# Patient Record
Sex: FEMALE | Race: BLACK OR AFRICAN AMERICAN | NOT HISPANIC OR LATINO | URBAN - METROPOLITAN AREA
[De-identification: names, ages, dates, MRNs, and addresses within clinical notes are randomized per-mention and may not be internally consistent; named-entity substitution may affect disease eponyms.]

---

## 2017-05-22 ENCOUNTER — EMERGENCY (EMERGENCY)
Facility: HOSPITAL | Age: 52
LOS: 1 days | Discharge: HOME | End: 2017-05-22
Attending: EMERGENCY MEDICINE | Admitting: EMERGENCY MEDICINE
Payer: MEDICAID

## 2017-05-22 VITALS
TEMPERATURE: 98 F | HEART RATE: 91 BPM | SYSTOLIC BLOOD PRESSURE: 154 MMHG | DIASTOLIC BLOOD PRESSURE: 86 MMHG | OXYGEN SATURATION: 100 % | RESPIRATION RATE: 18 BRPM

## 2017-05-22 VITALS
TEMPERATURE: 98 F | SYSTOLIC BLOOD PRESSURE: 143 MMHG | OXYGEN SATURATION: 100 % | RESPIRATION RATE: 18 BRPM | DIASTOLIC BLOOD PRESSURE: 76 MMHG | HEART RATE: 72 BPM

## 2017-05-22 DIAGNOSIS — Z90.710 ACQUIRED ABSENCE OF BOTH CERVIX AND UTERUS: Chronic | ICD-10-CM

## 2017-05-22 LAB
4/8 RATIO: 1.23 CELLS/UL — LOW (ref 1.69–2.84)
ABS CD8: 1204 CELLS/UL — HIGH (ref 291–576)
ALBUMIN SERPL ELPH-MCNC: 4.2 G/DL — SIGNIFICANT CHANGE UP (ref 3.3–5)
ALP SERPL-CCNC: 125 U/L — HIGH (ref 40–120)
ALT FLD-CCNC: 23 U/L — SIGNIFICANT CHANGE UP (ref 4–33)
AST SERPL-CCNC: 19 U/L — SIGNIFICANT CHANGE UP (ref 4–32)
BASOPHILS # BLD AUTO: 0.05 K/UL — SIGNIFICANT CHANGE UP (ref 0–0.2)
BASOPHILS NFR BLD AUTO: 0.6 % — SIGNIFICANT CHANGE UP (ref 0–2)
BILIRUB SERPL-MCNC: 0.2 MG/DL — SIGNIFICANT CHANGE UP (ref 0.2–1.2)
BUN SERPL-MCNC: 18 MG/DL — SIGNIFICANT CHANGE UP (ref 7–23)
CALCIUM SERPL-MCNC: 9.2 MG/DL — SIGNIFICANT CHANGE UP (ref 8.4–10.5)
CD4 %: 43 % — SIGNIFICANT CHANGE UP (ref 43–52)
CD8 %: 35 % — HIGH (ref 17–28)
CHLORIDE SERPL-SCNC: 108 MMOL/L — HIGH (ref 98–107)
CO2 SERPL-SCNC: 22 MMOL/L — SIGNIFICANT CHANGE UP (ref 22–31)
CREAT SERPL-MCNC: 1.05 MG/DL — SIGNIFICANT CHANGE UP (ref 0.5–1.3)
EOSINOPHIL # BLD AUTO: 0.21 K/UL — SIGNIFICANT CHANGE UP (ref 0–0.5)
EOSINOPHIL NFR BLD AUTO: 2.3 % — SIGNIFICANT CHANGE UP (ref 0–6)
GLUCOSE SERPL-MCNC: 118 MG/DL — HIGH (ref 70–99)
HCT VFR BLD CALC: 38.9 % — SIGNIFICANT CHANGE UP (ref 34.5–45)
HGB BLD-MCNC: 12.6 G/DL — SIGNIFICANT CHANGE UP (ref 11.5–15.5)
IMM GRANULOCYTES NFR BLD AUTO: 0.2 % — SIGNIFICANT CHANGE UP (ref 0–1.5)
LIDOCAIN IGE QN: 32 U/L — SIGNIFICANT CHANGE UP (ref 7–60)
LYMPHOCYTES # BLD AUTO: 3.5 K/UL — HIGH (ref 1–3.3)
LYMPHOCYTES # BLD AUTO: 38.8 % — SIGNIFICANT CHANGE UP (ref 13–44)
MCHC RBC-ENTMCNC: 29.5 PG — SIGNIFICANT CHANGE UP (ref 27–34)
MCHC RBC-ENTMCNC: 32.4 % — SIGNIFICANT CHANGE UP (ref 32–36)
MCV RBC AUTO: 91.1 FL — SIGNIFICANT CHANGE UP (ref 80–100)
MONOCYTES # BLD AUTO: 0.4 K/UL — SIGNIFICANT CHANGE UP (ref 0–0.9)
MONOCYTES NFR BLD AUTO: 4.4 % — SIGNIFICANT CHANGE UP (ref 2–14)
NEUTROPHILS # BLD AUTO: 4.85 K/UL — SIGNIFICANT CHANGE UP (ref 1.8–7.4)
NEUTROPHILS NFR BLD AUTO: 53.7 % — SIGNIFICANT CHANGE UP (ref 43–77)
PLATELET # BLD AUTO: 413 K/UL — HIGH (ref 150–400)
PMV BLD: 8.9 FL — SIGNIFICANT CHANGE UP (ref 7–13)
POTASSIUM SERPL-MCNC: 3.9 MMOL/L — SIGNIFICANT CHANGE UP (ref 3.5–5.3)
POTASSIUM SERPL-SCNC: 3.9 MMOL/L — SIGNIFICANT CHANGE UP (ref 3.5–5.3)
PROT SERPL-MCNC: 7.5 G/DL — SIGNIFICANT CHANGE UP (ref 6–8.3)
RBC # BLD: 4.27 M/UL — SIGNIFICANT CHANGE UP (ref 3.8–5.2)
RBC # FLD: 15.1 % — HIGH (ref 10.3–14.5)
SODIUM SERPL-SCNC: 145 MMOL/L — SIGNIFICANT CHANGE UP (ref 135–145)
T-CELL CD4 SUBSET PNL BLD: 1483 CELL/UL — HIGH (ref 431–1434)
WBC # BLD: 9.03 K/UL — SIGNIFICANT CHANGE UP (ref 3.8–10.5)
WBC # FLD AUTO: 9.03 K/UL — SIGNIFICANT CHANGE UP (ref 3.8–10.5)

## 2017-05-22 PROCEDURE — 93010 ELECTROCARDIOGRAM REPORT: CPT

## 2017-05-22 PROCEDURE — 99285 EMERGENCY DEPT VISIT HI MDM: CPT | Mod: 25

## 2017-05-22 PROCEDURE — 71020: CPT | Mod: 26

## 2017-05-22 RX ORDER — FAMOTIDINE 10 MG/ML
20 INJECTION INTRAVENOUS ONCE
Qty: 0 | Refills: 0 | Status: COMPLETED | OUTPATIENT
Start: 2017-05-22 | End: 2017-05-22

## 2017-05-22 RX ADMIN — FAMOTIDINE 20 MILLIGRAM(S): 10 INJECTION INTRAVENOUS at 20:36

## 2017-05-22 RX ADMIN — Medication 30 MILLILITER(S): at 20:36

## 2017-05-22 NOTE — ED PROVIDER NOTE - OBJECTIVE STATEMENT
50 yo F with history hiv, GERD, htn presenting with epigastric burning worse at night x months. PT states nexium works for her but cannot use nexium because it is not covered by medicaid. Denies fevers, chills, cough, rhinorrhea, otorrhea, otalgia, nausea, vomiting, constipation, diarrhea, chest pain, shortness of breath or changes in urinary habits. 52 yo F with history hiv, GERD, htn presenting with epigastric burning worse at night x months. PT states nexium works for her but cannot use nexium because it is not covered by medicaid. Denies fevers, chills, cough, rhinorrhea, otorrhea, otalgia, nausea, vomiting, constipation, diarrhea, chest pain, shortness of breath or changes in urinary habits.  Pascale att: 51F h/o HIV, gerd, asthma c/o midsternal burning sensation x months. Past 3-4 months midsternal burning radiating down midline abdomen, constant, worse with meals, worse in nighttime, associated burps and nausea. Pain with food ingestion. Denies fever, chills, sob, pleurisy. Patient took nexium in past with relief but insurance recently discontinued. Patient states HIV load undetectable months ago on atripla, per EMR cd4 41 2016. HEART 1 (age) PERC zero SMOKING active FamHx neg cad, neg early mi, neg sudden death. Denies pleurisy, hemoptysis, tachypnea, fever, cough, chills, rhinorrhea, sneezing.

## 2017-05-22 NOTE — ED PROVIDER NOTE - MEDICAL DECISION MAKING DETAILS
52 yo with GERD - pepcid, maalox, labs given hiv history - very low concern esophagitis, no signs of inflammation in the oropharynx

## 2017-05-22 NOTE — ED PROVIDER NOTE - PROGRESS NOTE DETAILS
cxr neg, labs wnl, dc with nexium or ppi, f/up with id, explained signs of esophagitis, return for new or worsening signs.

## 2017-05-22 NOTE — ED PROVIDER NOTE - ATTENDING CONTRIBUTION TO CARE
Dr. Ashraf: I have personally seen and examined this patient at the bedside. I have fully participated in the care of this patient. I have reviewed all pertinent clinical information, including history, physical exam, plan and the Resident's note and agree except as noted. HPI above as by me. PE above as by me. DDX low risk acs, esophagitis versus gerd PLAN cxr cbc cmp lipase cd4 count

## 2017-05-23 LAB
HIV1 RNA # SERPL NAA+PROBE: SIGNIFICANT CHANGE UP ZZ
HIV1 RNA SERPL NAA+PROBE-LOG#: SIGNIFICANT CHANGE UP

## 2017-06-06 ENCOUNTER — EMERGENCY (EMERGENCY)
Facility: HOSPITAL | Age: 52
LOS: 1 days | Discharge: HOME | End: 2017-06-06
Attending: EMERGENCY MEDICINE | Admitting: EMERGENCY MEDICINE
Payer: MEDICAID

## 2017-06-06 VITALS
TEMPERATURE: 98 F | RESPIRATION RATE: 15 BRPM | HEART RATE: 70 BPM | SYSTOLIC BLOOD PRESSURE: 137 MMHG | DIASTOLIC BLOOD PRESSURE: 67 MMHG | OXYGEN SATURATION: 100 % | WEIGHT: 177.91 LBS

## 2017-06-06 VITALS
HEART RATE: 80 BPM | RESPIRATION RATE: 18 BRPM | OXYGEN SATURATION: 100 % | SYSTOLIC BLOOD PRESSURE: 143 MMHG | DIASTOLIC BLOOD PRESSURE: 98 MMHG

## 2017-06-06 DIAGNOSIS — Z90.710 ACQUIRED ABSENCE OF BOTH CERVIX AND UTERUS: Chronic | ICD-10-CM

## 2017-06-06 LAB
ALBUMIN SERPL ELPH-MCNC: 4.2 G/DL — SIGNIFICANT CHANGE UP (ref 3.3–5)
ALP SERPL-CCNC: 145 U/L — HIGH (ref 40–120)
ALT FLD-CCNC: 27 U/L — SIGNIFICANT CHANGE UP (ref 4–33)
APPEARANCE UR: CLEAR — SIGNIFICANT CHANGE UP
AST SERPL-CCNC: 21 U/L — SIGNIFICANT CHANGE UP (ref 4–32)
BACTERIA # UR AUTO: SIGNIFICANT CHANGE UP
BASOPHILS # BLD AUTO: 0.03 K/UL — SIGNIFICANT CHANGE UP (ref 0–0.2)
BASOPHILS NFR BLD AUTO: 0.4 % — SIGNIFICANT CHANGE UP (ref 0–2)
BILIRUB SERPL-MCNC: 0.2 MG/DL — SIGNIFICANT CHANGE UP (ref 0.2–1.2)
BILIRUB UR-MCNC: NEGATIVE — SIGNIFICANT CHANGE UP
BLOOD UR QL VISUAL: NEGATIVE — SIGNIFICANT CHANGE UP
BUN SERPL-MCNC: 16 MG/DL — SIGNIFICANT CHANGE UP (ref 7–23)
CALCIUM SERPL-MCNC: 9.5 MG/DL — SIGNIFICANT CHANGE UP (ref 8.4–10.5)
CHLORIDE SERPL-SCNC: 104 MMOL/L — SIGNIFICANT CHANGE UP (ref 98–107)
CK MB BLD-MCNC: 1.3 — SIGNIFICANT CHANGE UP (ref 0–2.5)
CK MB BLD-MCNC: 1.4 — SIGNIFICANT CHANGE UP (ref 0–2.5)
CK MB BLD-MCNC: 5.81 NG/ML — HIGH (ref 1–4.7)
CK MB BLD-MCNC: 6.2 NG/ML — HIGH (ref 1–4.7)
CK SERPL-CCNC: 407 U/L — HIGH (ref 25–170)
CK SERPL-CCNC: 469 U/L — HIGH (ref 25–170)
CO2 SERPL-SCNC: 22 MMOL/L — SIGNIFICANT CHANGE UP (ref 22–31)
COLOR SPEC: SIGNIFICANT CHANGE UP
CREAT SERPL-MCNC: 0.83 MG/DL — SIGNIFICANT CHANGE UP (ref 0.5–1.3)
EOSINOPHIL # BLD AUTO: 0.2 K/UL — SIGNIFICANT CHANGE UP (ref 0–0.5)
EOSINOPHIL NFR BLD AUTO: 3 % — SIGNIFICANT CHANGE UP (ref 0–6)
GLUCOSE SERPL-MCNC: 115 MG/DL — HIGH (ref 70–99)
GLUCOSE UR-MCNC: NEGATIVE — SIGNIFICANT CHANGE UP
HCT VFR BLD CALC: 41.2 % — SIGNIFICANT CHANGE UP (ref 34.5–45)
HGB BLD-MCNC: 13.2 G/DL — SIGNIFICANT CHANGE UP (ref 11.5–15.5)
IMM GRANULOCYTES NFR BLD AUTO: 0.3 % — SIGNIFICANT CHANGE UP (ref 0–1.5)
KETONES UR-MCNC: NEGATIVE — SIGNIFICANT CHANGE UP
LEUKOCYTE ESTERASE UR-ACNC: NEGATIVE — SIGNIFICANT CHANGE UP
LYMPHOCYTES # BLD AUTO: 2.48 K/UL — SIGNIFICANT CHANGE UP (ref 1–3.3)
LYMPHOCYTES # BLD AUTO: 36.9 % — SIGNIFICANT CHANGE UP (ref 13–44)
MCHC RBC-ENTMCNC: 29.4 PG — SIGNIFICANT CHANGE UP (ref 27–34)
MCHC RBC-ENTMCNC: 32 % — SIGNIFICANT CHANGE UP (ref 32–36)
MCV RBC AUTO: 91.8 FL — SIGNIFICANT CHANGE UP (ref 80–100)
MONOCYTES # BLD AUTO: 0.36 K/UL — SIGNIFICANT CHANGE UP (ref 0–0.9)
MONOCYTES NFR BLD AUTO: 5.4 % — SIGNIFICANT CHANGE UP (ref 2–14)
NEUTROPHILS # BLD AUTO: 3.63 K/UL — SIGNIFICANT CHANGE UP (ref 1.8–7.4)
NEUTROPHILS NFR BLD AUTO: 54 % — SIGNIFICANT CHANGE UP (ref 43–77)
NITRITE UR-MCNC: NEGATIVE — SIGNIFICANT CHANGE UP
PH UR: 7 — SIGNIFICANT CHANGE UP (ref 4.6–8)
PLATELET # BLD AUTO: 406 K/UL — HIGH (ref 150–400)
PMV BLD: 9.3 FL — SIGNIFICANT CHANGE UP (ref 7–13)
POTASSIUM SERPL-MCNC: 3.7 MMOL/L — SIGNIFICANT CHANGE UP (ref 3.5–5.3)
POTASSIUM SERPL-SCNC: 3.7 MMOL/L — SIGNIFICANT CHANGE UP (ref 3.5–5.3)
PROT SERPL-MCNC: 7.5 G/DL — SIGNIFICANT CHANGE UP (ref 6–8.3)
PROT UR-MCNC: 10 — SIGNIFICANT CHANGE UP
RBC # BLD: 4.49 M/UL — SIGNIFICANT CHANGE UP (ref 3.8–5.2)
RBC # FLD: 14.7 % — HIGH (ref 10.3–14.5)
RBC CASTS # UR COMP ASSIST: SIGNIFICANT CHANGE UP (ref 0–?)
SODIUM SERPL-SCNC: 140 MMOL/L — SIGNIFICANT CHANGE UP (ref 135–145)
SP GR SPEC: 1.02 — SIGNIFICANT CHANGE UP (ref 1–1.03)
SQUAMOUS # UR AUTO: SIGNIFICANT CHANGE UP
TROPONIN T SERPL-MCNC: < 0.06 NG/ML — SIGNIFICANT CHANGE UP (ref 0–0.06)
TROPONIN T SERPL-MCNC: < 0.06 NG/ML — SIGNIFICANT CHANGE UP (ref 0–0.06)
UROBILINOGEN FLD QL: NORMAL E.U. — SIGNIFICANT CHANGE UP (ref 0.1–0.2)
WBC # BLD: 6.72 K/UL — SIGNIFICANT CHANGE UP (ref 3.8–10.5)
WBC # FLD AUTO: 6.72 K/UL — SIGNIFICANT CHANGE UP (ref 3.8–10.5)
WBC UR QL: SIGNIFICANT CHANGE UP (ref 0–?)

## 2017-06-06 PROCEDURE — 93010 ELECTROCARDIOGRAM REPORT: CPT

## 2017-06-06 PROCEDURE — 99285 EMERGENCY DEPT VISIT HI MDM: CPT | Mod: 25,GC

## 2017-06-06 PROCEDURE — 71020: CPT | Mod: 26

## 2017-06-06 RX ORDER — FAMOTIDINE 10 MG/ML
20 INJECTION INTRAVENOUS ONCE
Qty: 0 | Refills: 0 | Status: COMPLETED | OUTPATIENT
Start: 2017-06-06 | End: 2017-06-06

## 2017-06-06 RX ORDER — ACETAMINOPHEN 500 MG
650 TABLET ORAL ONCE
Qty: 0 | Refills: 0 | Status: COMPLETED | OUTPATIENT
Start: 2017-06-06 | End: 2017-06-06

## 2017-06-06 RX ADMIN — Medication 650 MILLIGRAM(S): at 17:57

## 2017-06-06 RX ADMIN — Medication 30 MILLILITER(S): at 15:15

## 2017-06-06 RX ADMIN — FAMOTIDINE 20 MILLIGRAM(S): 10 INJECTION INTRAVENOUS at 15:15

## 2017-06-06 NOTE — ED PROVIDER NOTE - PROGRESS NOTE DETAILS
Benjamin Lantigua MD PGY3: Labs, imaging reviewed. Pt improved w/ tylenol, pepcid, maalox. Will discharge with instructions for outpatient cardiology follow-up.

## 2017-06-06 NOTE — ED PROVIDER NOTE - ATTENDING CONTRIBUTION TO CARE
Seen and examined, episodic R CP this a.m., non-exertional, +positional, not pleuritic. States had similar pains in past, prev. CASTANEDA incl. stress test 3 yrs. ago was neg. and pain did not recur until now. No assoc. SOB/N/V/diaphoresis. Pt. NAD at rest. On HAART tx for HIV- Atripla. Clear lungs, mild tender CW, soft, NT abd, NT calves.

## 2017-06-06 NOTE — ED ADULT NURSE NOTE - CHPI ED SYMPTOMS NEG
no vomiting/no pain/no fever/no dizziness/no tingling/no chills/no nausea/no decreased eating/drinking/no numbness/no weakness

## 2017-06-06 NOTE — ED PROVIDER NOTE - CARE PLAN
Principal Discharge DX:	Chest pain Principal Discharge DX:	Chest pain  Instructions for follow-up, activity and diet:	Call your cardiologist today or tomorrow to schedule follow up appointment for within the next 3-5 days.  Continue taking your medications as prescribed.  Return to this Emergency Department if you experience worsening condition or for any other emergency.

## 2017-06-06 NOTE — ED PROVIDER NOTE - OBJECTIVE STATEMENT
51 yo woman w/ h/o htn, hiv, asthma, osteoarthritis p/w chest pain. Developed chest pain this am, locate in R upper chest, w/o radiation, constant, worse w/ sitting up, improved w/ lying flat. Has never had pain like this before, had normal stress test years ago. Also complaining of pain in b/l knees and ankles, which is chronic and thought to be 2/2 osteoarthritis, no improvement w/ acetaminophen 325mg. States for past few days she has had swelling in b/l ankles, worse w/ standing, improved at night when she puts her legs up. States she has had multiple aunts and uncles w/ cardiac disease. Stopped smoking last month. Denies fever, cough, injury, n/v/d, h/o thrombosis, recent surgery/hospitalization. 53 yo woman w/ h/o htn, hiv, asthma, osteoarthritis p/w chest pain. Developed chest pain this am, locate in R upper chest, w/o radiation, constant, worse w/ sitting up, improved w/ lying flat. ? if had pain like this before, had normal stress test years ago. Also complaining of pain in b/l knees and ankles, which is chronic and thought to be 2/2 osteoarthritis, no improvement w/ acetaminophen 325mg. States for past few days she has had swelling in b/l ankles, worse w/ standing, improved at night when she puts her legs up. States she has had multiple aunts and uncles w/ cardiac disease. Stopped smoking last month. Denies fever, cough, injury, n/v/d, h/o thrombosis, recent surgery/hospitalization.

## 2017-06-06 NOTE — ED ADULT NURSE NOTE - OBJECTIVE STATEMENT
53 y/o female presents to ED with intermittent BLE swelling and right sided chest pain.  Pt states that she has been noticing edema to Ascencion ankles at the end of the day, pt states that the swelling 53 y/o female presents to ED with intermittent BLE swelling and right sided chest pain.  Pt states that she has been noticing edema to Ascencion ankles at the end of the day, for the past 3-4 days. pt states that the swelling resolves with elevation and rest. Pt states that this AM she started feeling some right chest discomfort c/w her reflux.  Pt denies SOB, diaphoresis, lightheadedness, no n/v/d, no fever/ chills, no cough

## 2017-06-06 NOTE — ED PROVIDER NOTE - PLAN OF CARE
Call your cardiologist today or tomorrow to schedule follow up appointment for within the next 3-5 days.  Continue taking your medications as prescribed.  Return to this Emergency Department if you experience worsening condition or for any other emergency.

## 2017-06-06 NOTE — ED PROVIDER NOTE - MEDICAL DECISION MAKING DETAILS
53 yo woman w/ chest pain, peripheral edema. Chest pain is likely musculoskeletal given tenderness on exam. History is atypical for acs. Lower extremity edema is likely dependent. Will check labs, cxr, ua. Likely trop x2 and discharge. 51 yo woman w/ chest pain, peripheral edema. Chest pain may be musculoskeletal given tenderness on exam. History is atypical for acs. Lower extremity edema is likely dependent. Will check labs, cxr, ua. Likely trop x2 and discharge.

## 2017-07-12 ENCOUNTER — EMERGENCY (EMERGENCY)
Facility: HOSPITAL | Age: 52
LOS: 1 days | Discharge: HOME | End: 2017-07-12
Admitting: EMERGENCY MEDICINE
Payer: MEDICAID

## 2017-07-12 VITALS
OXYGEN SATURATION: 99 % | DIASTOLIC BLOOD PRESSURE: 97 MMHG | SYSTOLIC BLOOD PRESSURE: 149 MMHG | TEMPERATURE: 98 F | RESPIRATION RATE: 20 BRPM | HEART RATE: 88 BPM

## 2017-07-12 DIAGNOSIS — Z90.710 ACQUIRED ABSENCE OF BOTH CERVIX AND UTERUS: Chronic | ICD-10-CM

## 2017-07-12 PROCEDURE — 99285 EMERGENCY DEPT VISIT HI MDM: CPT

## 2017-07-12 PROCEDURE — 73060 X-RAY EXAM OF HUMERUS: CPT | Mod: 26,RT

## 2017-07-12 PROCEDURE — 93971 EXTREMITY STUDY: CPT | Mod: 26

## 2017-07-12 RX ORDER — FAMOTIDINE 10 MG/ML
1 INJECTION INTRAVENOUS
Qty: 15 | Refills: 0 | OUTPATIENT
Start: 2017-07-12 | End: 2017-07-27

## 2017-07-12 RX ORDER — OXYCODONE AND ACETAMINOPHEN 5; 325 MG/1; MG/1
1 TABLET ORAL ONCE
Qty: 0 | Refills: 0 | Status: DISCONTINUED | OUTPATIENT
Start: 2017-07-12 | End: 2017-07-12

## 2017-07-12 RX ORDER — DIPHENHYDRAMINE HCL 50 MG
1 CAPSULE ORAL
Qty: 40 | Refills: 0 | OUTPATIENT
Start: 2017-07-12 | End: 2017-07-22

## 2017-07-12 RX ORDER — ACETAMINOPHEN 500 MG
1 TABLET ORAL
Qty: 40 | Refills: 0 | OUTPATIENT
Start: 2017-07-12 | End: 2017-07-22

## 2017-07-12 RX ORDER — FAMOTIDINE 10 MG/ML
40 INJECTION INTRAVENOUS ONCE
Qty: 0 | Refills: 0 | Status: COMPLETED | OUTPATIENT
Start: 2017-07-12 | End: 2017-07-12

## 2017-07-12 RX ADMIN — FAMOTIDINE 40 MILLIGRAM(S): 10 INJECTION INTRAVENOUS at 15:42

## 2017-07-12 RX ADMIN — OXYCODONE AND ACETAMINOPHEN 1 TABLET(S): 5; 325 TABLET ORAL at 15:20

## 2017-07-12 RX ADMIN — OXYCODONE AND ACETAMINOPHEN 1 TABLET(S): 5; 325 TABLET ORAL at 14:51

## 2017-07-12 NOTE — ED PROVIDER NOTE - PLAN OF CARE
Take Pepcid 40mg daily for acid reflux symptoms. Take Tylenol 650mg every 4 hours as needed for pain. Follow up with your primary care physician within 1 week for further evaluation. Return to the Emergency Department for any new, worsening or concerning symptoms.

## 2017-07-12 NOTE — ED PROVIDER NOTE - OBJECTIVE STATEMENT
52 year old female with PMHx of HTN, HIV+ pw atraumatic right sided arm pain, decreased right shoulder ROM and right arm ecchymosis since 2 days ago. Pt states that she woke up with arm pain 2 days ago and bruising which has not felt any better after two days. Admits that the right arm was slightly swollen since the pain began. The patient has taken Tyenol 650mg 4 hours ago with no relief. Denies n/v/f/c, CP, SOB, abdominal pain, hx of clots/DVT/PE, OCP use, tobacco use, hx of travel/immobilization, numbness/weakness/tingling in extremities. 52 year old female with PMHx of HTN, HIV+ pw atraumatic right sided arm pain, decreased right shoulder ROM and right arm ecchymosis since 2 days ago. Pt states that she woke up with arm pain 2 days ago and bruising which has not felt any better after two days. Admits that the right arm was slightly swollen since the pain began. The patient has taken Tyenol 650mg 4 hours ago with no relief. Denies n/v/f/c, CP, SOB, abdominal pain, hx of clots/DVT/PE, OCP use, tobacco use, hx of travel/immobilization, numbness/weakness/tingling in extremities, blood thinner use.

## 2017-07-12 NOTE — ED ADULT TRIAGE NOTE - CHIEF COMPLAINT QUOTE
pt coming with right arm pain/and woke up on Monday with ecchymotic spot to right arm denies trauma, pt also stated F/u with Dermatology for other spots in Guevara arm.   Hx. Asthma, acid reflex, HTN

## 2017-07-12 NOTE — ED PROVIDER NOTE - CARE PLAN
Principal Discharge DX:	Arm pain  Instructions for follow-up, activity and diet:	Take Pepcid 40mg daily for acid reflux symptoms. Take Tylenol 650mg every 4 hours as needed for pain. Follow up with your primary care physician within 1 week for further evaluation. Return to the Emergency Department for any new, worsening or concerning symptoms.

## 2017-07-12 NOTE — ED PROVIDER NOTE - MEDICAL DECISION MAKING DETAILS
52 year old female with PMHx of HTN, HIV+ pw atraumatic right sided arm pain, decreased right shoulder ROM and right arm ecchymosis since 2 days ago.  Plan: pain management, xray, doppler right arm

## 2017-07-15 ENCOUNTER — EMERGENCY (EMERGENCY)
Facility: HOSPITAL | Age: 52
LOS: 1 days | Discharge: HOME | End: 2017-07-15
Attending: EMERGENCY MEDICINE | Admitting: EMERGENCY MEDICINE
Payer: MEDICAID

## 2017-07-15 VITALS
OXYGEN SATURATION: 100 % | DIASTOLIC BLOOD PRESSURE: 80 MMHG | SYSTOLIC BLOOD PRESSURE: 128 MMHG | RESPIRATION RATE: 16 BRPM | HEART RATE: 65 BPM

## 2017-07-15 VITALS
RESPIRATION RATE: 18 BRPM | SYSTOLIC BLOOD PRESSURE: 151 MMHG | DIASTOLIC BLOOD PRESSURE: 77 MMHG | OXYGEN SATURATION: 100 % | TEMPERATURE: 97 F | HEART RATE: 84 BPM

## 2017-07-15 DIAGNOSIS — Z90.710 ACQUIRED ABSENCE OF BOTH CERVIX AND UTERUS: Chronic | ICD-10-CM

## 2017-07-15 LAB
ALBUMIN SERPL ELPH-MCNC: 4.1 G/DL — SIGNIFICANT CHANGE UP (ref 3.3–5)
ALP SERPL-CCNC: 167 U/L — HIGH (ref 40–120)
ALT FLD-CCNC: 25 U/L — SIGNIFICANT CHANGE UP (ref 4–33)
AST SERPL-CCNC: 17 U/L — SIGNIFICANT CHANGE UP (ref 4–32)
BASOPHILS # BLD AUTO: 0.05 K/UL — SIGNIFICANT CHANGE UP (ref 0–0.2)
BASOPHILS NFR BLD AUTO: 0.6 % — SIGNIFICANT CHANGE UP (ref 0–2)
BILIRUB SERPL-MCNC: < 0.2 MG/DL — LOW (ref 0.2–1.2)
BUN SERPL-MCNC: 15 MG/DL — SIGNIFICANT CHANGE UP (ref 7–23)
CALCIUM SERPL-MCNC: 9.1 MG/DL — SIGNIFICANT CHANGE UP (ref 8.4–10.5)
CHLORIDE SERPL-SCNC: 105 MMOL/L — SIGNIFICANT CHANGE UP (ref 98–107)
CK MB BLD-MCNC: 3.67 NG/ML — SIGNIFICANT CHANGE UP (ref 1–4.7)
CK SERPL-CCNC: 178 U/L — HIGH (ref 25–170)
CO2 SERPL-SCNC: 22 MMOL/L — SIGNIFICANT CHANGE UP (ref 22–31)
CREAT SERPL-MCNC: 0.75 MG/DL — SIGNIFICANT CHANGE UP (ref 0.5–1.3)
EOSINOPHIL # BLD AUTO: 0.27 K/UL — SIGNIFICANT CHANGE UP (ref 0–0.5)
EOSINOPHIL NFR BLD AUTO: 3 % — SIGNIFICANT CHANGE UP (ref 0–6)
GLUCOSE SERPL-MCNC: 131 MG/DL — HIGH (ref 70–99)
HCT VFR BLD CALC: 39.3 % — SIGNIFICANT CHANGE UP (ref 34.5–45)
HGB BLD-MCNC: 12.8 G/DL — SIGNIFICANT CHANGE UP (ref 11.5–15.5)
IMM GRANULOCYTES # BLD AUTO: 0.03 # — SIGNIFICANT CHANGE UP
IMM GRANULOCYTES NFR BLD AUTO: 0.3 % — SIGNIFICANT CHANGE UP (ref 0–1.5)
LIDOCAIN IGE QN: 24.6 U/L — SIGNIFICANT CHANGE UP (ref 7–60)
LYMPHOCYTES # BLD AUTO: 2.87 K/UL — SIGNIFICANT CHANGE UP (ref 1–3.3)
LYMPHOCYTES # BLD AUTO: 31.7 % — SIGNIFICANT CHANGE UP (ref 13–44)
MCHC RBC-ENTMCNC: 29.6 PG — SIGNIFICANT CHANGE UP (ref 27–34)
MCHC RBC-ENTMCNC: 32.6 % — SIGNIFICANT CHANGE UP (ref 32–36)
MCV RBC AUTO: 91 FL — SIGNIFICANT CHANGE UP (ref 80–100)
MONOCYTES # BLD AUTO: 0.49 K/UL — SIGNIFICANT CHANGE UP (ref 0–0.9)
MONOCYTES NFR BLD AUTO: 5.4 % — SIGNIFICANT CHANGE UP (ref 2–14)
NEUTROPHILS # BLD AUTO: 5.35 K/UL — SIGNIFICANT CHANGE UP (ref 1.8–7.4)
NEUTROPHILS NFR BLD AUTO: 59 % — SIGNIFICANT CHANGE UP (ref 43–77)
NRBC # FLD: 0 — SIGNIFICANT CHANGE UP
PLATELET # BLD AUTO: 437 K/UL — HIGH (ref 150–400)
PMV BLD: 8.7 FL — SIGNIFICANT CHANGE UP (ref 7–13)
POTASSIUM SERPL-MCNC: 3.7 MMOL/L — SIGNIFICANT CHANGE UP (ref 3.5–5.3)
POTASSIUM SERPL-SCNC: 3.7 MMOL/L — SIGNIFICANT CHANGE UP (ref 3.5–5.3)
PROT SERPL-MCNC: 7.6 G/DL — SIGNIFICANT CHANGE UP (ref 6–8.3)
RBC # BLD: 4.32 M/UL — SIGNIFICANT CHANGE UP (ref 3.8–5.2)
RBC # FLD: 14 % — SIGNIFICANT CHANGE UP (ref 10.3–14.5)
SODIUM SERPL-SCNC: 143 MMOL/L — SIGNIFICANT CHANGE UP (ref 135–145)
TROPONIN T SERPL-MCNC: < 0.06 NG/ML — SIGNIFICANT CHANGE UP (ref 0–0.06)
WBC # BLD: 9.06 K/UL — SIGNIFICANT CHANGE UP (ref 3.8–10.5)
WBC # FLD AUTO: 9.06 K/UL — SIGNIFICANT CHANGE UP (ref 3.8–10.5)

## 2017-07-15 PROCEDURE — 99284 EMERGENCY DEPT VISIT MOD MDM: CPT

## 2017-07-15 RX ORDER — SODIUM CHLORIDE 9 MG/ML
1000 INJECTION INTRAMUSCULAR; INTRAVENOUS; SUBCUTANEOUS ONCE
Qty: 0 | Refills: 0 | Status: COMPLETED | OUTPATIENT
Start: 2017-07-15 | End: 2017-07-15

## 2017-07-15 RX ADMIN — SODIUM CHLORIDE 1000 MILLILITER(S): 9 INJECTION INTRAMUSCULAR; INTRAVENOUS; SUBCUTANEOUS at 18:09

## 2017-07-15 RX ADMIN — Medication 30 MILLILITER(S): at 19:35

## 2017-07-15 NOTE — ED ADULT TRIAGE NOTE - CHIEF COMPLAINT QUOTE
pt c/o dizziness and bubble ing feeling in chest. pt went to [facility] to [facility] and came out not  feeling great. states  I have acid reflux but not sure if that's it / denies pain in chest or sob/

## 2017-07-15 NOTE — ED PROVIDER NOTE - OBJECTIVE STATEMENT
53 y/o F pt 51 y/o F pt w pmhx of HIV, HTN, GERD, asthma, presents w c/o intermittent dizziness since yesterday. Pt states she felt lightheaded yesterday which resolved, today she had a heavy breakfast and then she went to sleep, she felt pinching chest pain along with gassiness, went to [facility] to get her BP checked it was 155/97, felt lightheaded while leaving the pharmacy. Pt states she went to an urgent care, was not able to be seen due to her insurance was sent to ED upon pt's request via ambulance. Pt admits to frequent burping and passing gas, her pinching chest pain gets relieved with passing gas. Pt denies f/c, n, 53 y/o F pt w pmhx of HIV, HTN, GERD, asthma, presents w c/o intermittent dizziness since yesterday. Pt states she felt lightheaded yesterday which resolved, today she had a heavy breakfast and then she went to sleep, she felt pinching chest pain along with gassiness and mild nausea, went to [facility] to get her BP checked it was 155/97, felt lightheaded while leaving the pharmacy. Pt states she went to an urgent care, was not able to be seen due to her insurance was sent to ED upon pt's request via ambulance. Pt admits to frequent burping and passing gas, her pinching chest pain gets relieved with passing gas. Pt denies f/c, sob, abdominal pain, diarrhea, constipation, weakness, numbness, tingling.

## 2017-07-15 NOTE — ED PROVIDER NOTE - CARE PLAN
Principal Discharge DX:	GERD (gastroesophageal reflux disease)  Instructions for follow-up, activity and diet:	Rest, drink plenty of fluids.  Advance activity as tolerated.  Continue all previously prescribed medications as directed. Follow up with your primary care physician in 48-72 hours. Follow up with Gastroenterologist for endoscopy consult as previously scheduled - bring copies of your results.  Return to the ER for worsening or persistent symptoms, and/or ANY NEW OR CONCERNING SYMPTOMS. If you have issues obtaining follow up, please call: [phone] to obtain a doctor or specialist who takes your insurance in your area.

## 2017-07-15 NOTE — ED PROVIDER NOTE - PLAN OF CARE
Rest, drink plenty of fluids.  Advance activity as tolerated.  Continue all previously prescribed medications as directed. Follow up with your primary care physician in 48-72 hours. Follow up with Gastroenterologist for endoscopy consult as previously scheduled - bring copies of your results.  Return to the ER for worsening or persistent symptoms, and/or ANY NEW OR CONCERNING SYMPTOMS. If you have issues obtaining follow up, please call: [phone] to obtain a doctor or specialist who takes your insurance in your area.

## 2017-07-15 NOTE — ED PROVIDER NOTE - MEDICAL DECISION MAKING DETAILS
51 y/o pt presents with left sided pinching chest pain, lightheaded, burping and flatulence  CAD, GERD, cbc, cmp, lipase, Jerica, EKG, Maalox, IV ns

## 2017-08-09 ENCOUNTER — EMERGENCY (EMERGENCY)
Facility: HOSPITAL | Age: 52
LOS: 1 days | Discharge: HOME | End: 2017-08-09
Attending: EMERGENCY MEDICINE | Admitting: EMERGENCY MEDICINE
Payer: MEDICAID

## 2017-08-09 VITALS
HEART RATE: 72 BPM | SYSTOLIC BLOOD PRESSURE: 156 MMHG | RESPIRATION RATE: 18 BRPM | DIASTOLIC BLOOD PRESSURE: 97 MMHG | TEMPERATURE: 98 F | OXYGEN SATURATION: 98 %

## 2017-08-09 VITALS
OXYGEN SATURATION: 99 % | SYSTOLIC BLOOD PRESSURE: 145 MMHG | HEART RATE: 78 BPM | RESPIRATION RATE: 18 BRPM | DIASTOLIC BLOOD PRESSURE: 81 MMHG

## 2017-08-09 DIAGNOSIS — Z90.710 ACQUIRED ABSENCE OF BOTH CERVIX AND UTERUS: Chronic | ICD-10-CM

## 2017-08-09 LAB
ALBUMIN SERPL ELPH-MCNC: 4.2 G/DL — SIGNIFICANT CHANGE UP (ref 3.3–5)
ALP SERPL-CCNC: 151 U/L — HIGH (ref 40–120)
ALT FLD-CCNC: 30 U/L — SIGNIFICANT CHANGE UP (ref 4–33)
AST SERPL-CCNC: 26 U/L — SIGNIFICANT CHANGE UP (ref 4–32)
BASOPHILS # BLD AUTO: 0.04 K/UL — SIGNIFICANT CHANGE UP (ref 0–0.2)
BASOPHILS NFR BLD AUTO: 0.6 % — SIGNIFICANT CHANGE UP (ref 0–2)
BILIRUB SERPL-MCNC: 0.3 MG/DL — SIGNIFICANT CHANGE UP (ref 0.2–1.2)
BUN SERPL-MCNC: 13 MG/DL — SIGNIFICANT CHANGE UP (ref 7–23)
CALCIUM SERPL-MCNC: 9 MG/DL — SIGNIFICANT CHANGE UP (ref 8.4–10.5)
CHLORIDE SERPL-SCNC: 98 MMOL/L — SIGNIFICANT CHANGE UP (ref 98–107)
CK MB BLD-MCNC: 5.77 NG/ML — HIGH (ref 1–4.7)
CK SERPL-CCNC: 343 U/L — HIGH (ref 25–170)
CO2 SERPL-SCNC: 23 MMOL/L — SIGNIFICANT CHANGE UP (ref 22–31)
CREAT SERPL-MCNC: 0.71 MG/DL — SIGNIFICANT CHANGE UP (ref 0.5–1.3)
D DIMER BLD IA.RAPID-MCNC: < 150 NG/ML — SIGNIFICANT CHANGE UP
EOSINOPHIL # BLD AUTO: 0.22 K/UL — SIGNIFICANT CHANGE UP (ref 0–0.5)
EOSINOPHIL NFR BLD AUTO: 3.2 % — SIGNIFICANT CHANGE UP (ref 0–6)
GLUCOSE SERPL-MCNC: 99 MG/DL — SIGNIFICANT CHANGE UP (ref 70–99)
HCT VFR BLD CALC: 39.1 % — SIGNIFICANT CHANGE UP (ref 34.5–45)
HGB BLD-MCNC: 12.8 G/DL — SIGNIFICANT CHANGE UP (ref 11.5–15.5)
IMM GRANULOCYTES # BLD AUTO: 0.02 # — SIGNIFICANT CHANGE UP
IMM GRANULOCYTES NFR BLD AUTO: 0.3 % — SIGNIFICANT CHANGE UP (ref 0–1.5)
LYMPHOCYTES # BLD AUTO: 2.42 K/UL — SIGNIFICANT CHANGE UP (ref 1–3.3)
LYMPHOCYTES # BLD AUTO: 35.4 % — SIGNIFICANT CHANGE UP (ref 13–44)
MCHC RBC-ENTMCNC: 28.8 PG — SIGNIFICANT CHANGE UP (ref 27–34)
MCHC RBC-ENTMCNC: 32.7 % — SIGNIFICANT CHANGE UP (ref 32–36)
MCV RBC AUTO: 88.1 FL — SIGNIFICANT CHANGE UP (ref 80–100)
MONOCYTES # BLD AUTO: 0.4 K/UL — SIGNIFICANT CHANGE UP (ref 0–0.9)
MONOCYTES NFR BLD AUTO: 5.9 % — SIGNIFICANT CHANGE UP (ref 2–14)
NEUTROPHILS # BLD AUTO: 3.73 K/UL — SIGNIFICANT CHANGE UP (ref 1.8–7.4)
NEUTROPHILS NFR BLD AUTO: 54.6 % — SIGNIFICANT CHANGE UP (ref 43–77)
NRBC # FLD: 0 — SIGNIFICANT CHANGE UP
PLATELET # BLD AUTO: 408 K/UL — HIGH (ref 150–400)
PMV BLD: 9.2 FL — SIGNIFICANT CHANGE UP (ref 7–13)
POTASSIUM SERPL-MCNC: 4.3 MMOL/L — SIGNIFICANT CHANGE UP (ref 3.5–5.3)
POTASSIUM SERPL-SCNC: 4.3 MMOL/L — SIGNIFICANT CHANGE UP (ref 3.5–5.3)
PROT SERPL-MCNC: 7.7 G/DL — SIGNIFICANT CHANGE UP (ref 6–8.3)
RBC # BLD: 4.44 M/UL — SIGNIFICANT CHANGE UP (ref 3.8–5.2)
RBC # FLD: 13.5 % — SIGNIFICANT CHANGE UP (ref 10.3–14.5)
SODIUM SERPL-SCNC: 136 MMOL/L — SIGNIFICANT CHANGE UP (ref 135–145)
TROPONIN T SERPL-MCNC: < 0.06 NG/ML — SIGNIFICANT CHANGE UP (ref 0–0.06)
WBC # BLD: 6.83 K/UL — SIGNIFICANT CHANGE UP (ref 3.8–10.5)
WBC # FLD AUTO: 6.83 K/UL — SIGNIFICANT CHANGE UP (ref 3.8–10.5)

## 2017-08-09 PROCEDURE — 71020: CPT | Mod: 26

## 2017-08-09 PROCEDURE — 93010 ELECTROCARDIOGRAM REPORT: CPT | Mod: 59

## 2017-08-09 PROCEDURE — 99284 EMERGENCY DEPT VISIT MOD MDM: CPT | Mod: 25

## 2017-08-09 RX ORDER — KETOROLAC TROMETHAMINE 30 MG/ML
15 SYRINGE (ML) INJECTION ONCE
Qty: 0 | Refills: 0 | Status: DISCONTINUED | OUTPATIENT
Start: 2017-08-09 | End: 2017-08-09

## 2017-08-09 RX ORDER — ACETAMINOPHEN 500 MG
650 TABLET ORAL ONCE
Qty: 0 | Refills: 0 | Status: COMPLETED | OUTPATIENT
Start: 2017-08-09 | End: 2017-08-09

## 2017-08-09 RX ORDER — FAMOTIDINE 10 MG/ML
20 INJECTION INTRAVENOUS ONCE
Qty: 0 | Refills: 0 | Status: COMPLETED | OUTPATIENT
Start: 2017-08-09 | End: 2017-08-09

## 2017-08-09 RX ADMIN — FAMOTIDINE 20 MILLIGRAM(S): 10 INJECTION INTRAVENOUS at 12:06

## 2017-08-09 RX ADMIN — Medication 650 MILLIGRAM(S): at 12:06

## 2017-08-09 RX ADMIN — Medication 15 MILLIGRAM(S): at 08:29

## 2017-08-09 RX ADMIN — Medication 30 MILLILITER(S): at 12:06

## 2017-08-09 RX ADMIN — Medication 15 MILLIGRAM(S): at 08:45

## 2017-08-09 NOTE — ED PROVIDER NOTE - PLAN OF CARE
Take Ibuprofen 600mg every 6 hours as needed for pain. Continue taking home medications as prescribed. Follow up with your primary care physician within 1 week for further evaluation. Return to the Emergency Department for any new, worsening or concerning symptoms.

## 2017-08-09 NOTE — ED ADULT NURSE NOTE - CHPI ED SYMPTOMS NEG
no dizziness/no syncope/no cough/no vomiting/no back pain/no shortness of breath/no chills/no diaphoresis/no nausea/no fever

## 2017-08-09 NOTE — ED PROVIDER NOTE - CARE PLAN
Principal Discharge DX:	Chest wall pain  Instructions for follow-up, activity and diet:	Take Ibuprofen 600mg every 6 hours as needed for pain. Continue taking home medications as prescribed. Follow up with your primary care physician within 1 week for further evaluation. Return to the Emergency Department for any new, worsening or concerning symptoms.

## 2017-08-09 NOTE — ED ADULT NURSE REASSESSMENT NOTE - NS ED NURSE REASSESS COMMENT FT1
received report on pt from YIMI Pineda at 0815. pt presents awake a&ox4, denies dizziness or ha. skin warm, dry, appropriate for race. respirations even unlabored. endorses pain beneath left breast, non-radiating. denies sob. abdomen soft nontender nondistended. denies n/v/d denies fever or chills. IVL site clean, dry, intact, patent. D-Dimer drawn and sent to lab. cardiac monitor in place in nsr. pt medicated with toradol 15mg ivp as ordered. safety maintained. will continue to observe.

## 2017-08-09 NOTE — ED ADULT TRIAGE NOTE - CHIEF COMPLAINT QUOTE
Pt arrives crying pt  st" I woke up with left sided pain in chest...very sharp." "I have hx of gerd but this is different."

## 2017-08-09 NOTE — ED PROVIDER NOTE - MEDICAL DECISION MAKING DETAILS
53 year old female with a PMHx of HIV, GERD, Asthma, HTN pw non-radiating left sided pleuritic chest pain that woke her from sleep today at 06:00.  Plan: cbc, cmp, CE, EKG, CXR, pain management

## 2017-08-09 NOTE — ED PROVIDER NOTE - OBJECTIVE STATEMENT
53 year old female with a PMHx of HIV, GERD, Asthma, HTN pw non-radiating left sided pleuritic chest pain that woke her from sleep today at 06:00. Pt was here on 7/15/2017 for dizziness and had a negative cardiac enzymes. Quit smoking 3 months ago. Admits she is feeling gassy. Pt took an asa this am with no relief. Pain is constant, sharp in nature, 8/10 in severity, worse when laying down and deep breathing, alleviated when sitting up. Denies LOC, syncope, lightheadedness, diaphoresis, n/v/f/c, cough, hemoptysis, SOB, abdominal pain, urinary symptoms, hx of clots/PE/DVT, hx of travel/immobilization, calf pain/swelling, pertinent fam hx, rash. 53 year old female with a PMHx of HIV, GERD, Asthma, HTN pw non-radiating left sided pleuritic chest pain that woke her from sleep today at 06:00. Pt was here on 7/15/2017 for dizziness and had negative cardiac enzymes. Quit smoking 3 months ago. Admits she is feeling gassy. Pt took an asa this am with no relief. Pain is constant, sharp in nature, 8/10 in severity, worse when laying down and deep breathing, alleviated when sitting up. Denies LOC, syncope, lightheadedness, diaphoresis, n/v/f/c, cough, hemoptysis, SOB, abdominal pain, urinary symptoms, hx of clots/PE/DVT, hx of travel/immobilization, calf pain/swelling, pertinent fam hx, rash.

## 2017-08-09 NOTE — ED ADULT NURSE NOTE - OBJECTIVE STATEMENT
Pt 52y female pmh OA, HIV, GERD, Hysterectomy, HTN, aaox4 and ambulatory. Pt presents to ED c/o sharp left sided chest pain that woke her from sleep. Pt has GERD states that this pain is different. Increased flatulence past few days. Pt appears comfortable, NAD observed, pain 7/10. IV placed left hand 22G, labs drawn and sent. Pt placed on cardiac monitor, NSR on monitor. Pt denies SOB, dizziness, fever, chills, dysuria, urinary symptoms, n/v/d, abd pain. Will continue to monitor.

## 2017-08-09 NOTE — ED PROVIDER NOTE - ATTENDING CONTRIBUTION TO CARE
51 yo female, c/o pleuritic like chest pain, left sided, like gas, no chest pressure, movement increases discomfort, but walking does not. no radiation to arms jaw or back, denies SOB, PERC negative. /88 P 80 RR 18 Pulse ox 98 percent RA chest clear, movement increase discomfort, cor RR without murmur, abd soft non tender Imp; musculoskeletal chest pain EKG no acute changes, d-dimer negative, Plan NSAIDS abnd discharge to PMD.

## 2017-09-28 ENCOUNTER — EMERGENCY (EMERGENCY)
Facility: HOSPITAL | Age: 52
LOS: 1 days | Discharge: HOME | End: 2017-09-28
Attending: EMERGENCY MEDICINE | Admitting: EMERGENCY MEDICINE
Payer: MEDICAID

## 2017-09-28 VITALS
SYSTOLIC BLOOD PRESSURE: 147 MMHG | RESPIRATION RATE: 16 BRPM | HEART RATE: 85 BPM | TEMPERATURE: 99 F | DIASTOLIC BLOOD PRESSURE: 76 MMHG | OXYGEN SATURATION: 100 %

## 2017-09-28 VITALS
TEMPERATURE: 98 F | RESPIRATION RATE: 18 BRPM | SYSTOLIC BLOOD PRESSURE: 142 MMHG | OXYGEN SATURATION: 97 % | HEART RATE: 78 BPM | DIASTOLIC BLOOD PRESSURE: 95 MMHG

## 2017-09-28 DIAGNOSIS — Z90.710 ACQUIRED ABSENCE OF BOTH CERVIX AND UTERUS: Chronic | ICD-10-CM

## 2017-09-28 LAB
ALBUMIN SERPL ELPH-MCNC: 4.1 G/DL — SIGNIFICANT CHANGE UP (ref 3.3–5)
ALP SERPL-CCNC: 166 U/L — HIGH (ref 40–120)
ALT FLD-CCNC: 58 U/L — HIGH (ref 4–33)
APPEARANCE UR: CLEAR — SIGNIFICANT CHANGE UP
AST SERPL-CCNC: 39 U/L — HIGH (ref 4–32)
BACTERIA # UR AUTO: SIGNIFICANT CHANGE UP
BASE EXCESS BLDV CALC-SCNC: -1.2 MMOL/L — SIGNIFICANT CHANGE UP
BASOPHILS # BLD AUTO: 0.05 K/UL — SIGNIFICANT CHANGE UP (ref 0–0.2)
BASOPHILS NFR BLD AUTO: 0.5 % — SIGNIFICANT CHANGE UP (ref 0–2)
BILIRUB SERPL-MCNC: 0.2 MG/DL — SIGNIFICANT CHANGE UP (ref 0.2–1.2)
BILIRUB UR-MCNC: NEGATIVE — SIGNIFICANT CHANGE UP
BLOOD GAS VENOUS - CREATININE: 0.59 MG/DL — SIGNIFICANT CHANGE UP (ref 0.5–1.3)
BLOOD UR QL VISUAL: NEGATIVE — SIGNIFICANT CHANGE UP
BUN SERPL-MCNC: 12 MG/DL — SIGNIFICANT CHANGE UP (ref 7–23)
CALCIUM SERPL-MCNC: 9.1 MG/DL — SIGNIFICANT CHANGE UP (ref 8.4–10.5)
CHLORIDE BLDV-SCNC: 107 MMOL/L — SIGNIFICANT CHANGE UP (ref 96–108)
CHLORIDE SERPL-SCNC: 102 MMOL/L — SIGNIFICANT CHANGE UP (ref 98–107)
CO2 SERPL-SCNC: 20 MMOL/L — LOW (ref 22–31)
COLOR SPEC: YELLOW — SIGNIFICANT CHANGE UP
CREAT SERPL-MCNC: 0.78 MG/DL — SIGNIFICANT CHANGE UP (ref 0.5–1.3)
EOSINOPHIL # BLD AUTO: 0.22 K/UL — SIGNIFICANT CHANGE UP (ref 0–0.5)
EOSINOPHIL NFR BLD AUTO: 2.4 % — SIGNIFICANT CHANGE UP (ref 0–6)
GAS PNL BLDV: 137 MMOL/L — SIGNIFICANT CHANGE UP (ref 136–146)
GLUCOSE BLDV-MCNC: 121 — HIGH (ref 70–99)
GLUCOSE SERPL-MCNC: 118 MG/DL — HIGH (ref 70–99)
GLUCOSE UR-MCNC: NEGATIVE — SIGNIFICANT CHANGE UP
HCG SERPL-ACNC: < 5 MIU/ML — SIGNIFICANT CHANGE UP
HCO3 BLDV-SCNC: 24 MMOL/L — SIGNIFICANT CHANGE UP (ref 20–27)
HCT VFR BLD CALC: 39.7 % — SIGNIFICANT CHANGE UP (ref 34.5–45)
HCT VFR BLDV CALC: 41.2 % — SIGNIFICANT CHANGE UP (ref 34.5–45)
HGB BLD-MCNC: 12.8 G/DL — SIGNIFICANT CHANGE UP (ref 11.5–15.5)
HGB BLDV-MCNC: 13.4 G/DL — SIGNIFICANT CHANGE UP (ref 11.5–15.5)
IMM GRANULOCYTES # BLD AUTO: 0.02 # — SIGNIFICANT CHANGE UP
IMM GRANULOCYTES NFR BLD AUTO: 0.2 % — SIGNIFICANT CHANGE UP (ref 0–1.5)
KETONES UR-MCNC: NEGATIVE — SIGNIFICANT CHANGE UP
LACTATE BLDV-MCNC: 1.5 MMOL/L — SIGNIFICANT CHANGE UP (ref 0.5–2)
LEUKOCYTE ESTERASE UR-ACNC: NEGATIVE — SIGNIFICANT CHANGE UP
LIDOCAIN IGE QN: 28.4 U/L — SIGNIFICANT CHANGE UP (ref 7–60)
LYMPHOCYTES # BLD AUTO: 2.59 K/UL — SIGNIFICANT CHANGE UP (ref 1–3.3)
LYMPHOCYTES # BLD AUTO: 27.9 % — SIGNIFICANT CHANGE UP (ref 13–44)
MCHC RBC-ENTMCNC: 28.6 PG — SIGNIFICANT CHANGE UP (ref 27–34)
MCHC RBC-ENTMCNC: 32.2 % — SIGNIFICANT CHANGE UP (ref 32–36)
MCV RBC AUTO: 88.8 FL — SIGNIFICANT CHANGE UP (ref 80–100)
MONOCYTES # BLD AUTO: 0.61 K/UL — SIGNIFICANT CHANGE UP (ref 0–0.9)
MONOCYTES NFR BLD AUTO: 6.6 % — SIGNIFICANT CHANGE UP (ref 2–14)
MUCOUS THREADS # UR AUTO: SIGNIFICANT CHANGE UP
NEUTROPHILS # BLD AUTO: 5.8 K/UL — SIGNIFICANT CHANGE UP (ref 1.8–7.4)
NEUTROPHILS NFR BLD AUTO: 62.4 % — SIGNIFICANT CHANGE UP (ref 43–77)
NITRITE UR-MCNC: NEGATIVE — SIGNIFICANT CHANGE UP
NRBC # FLD: 0 — SIGNIFICANT CHANGE UP
PCO2 BLDV: 38 MMHG — LOW (ref 41–51)
PH BLDV: 7.4 PH — SIGNIFICANT CHANGE UP (ref 7.32–7.43)
PH UR: 7 — SIGNIFICANT CHANGE UP (ref 4.6–8)
PLATELET # BLD AUTO: 475 K/UL — HIGH (ref 150–400)
PMV BLD: 8.6 FL — SIGNIFICANT CHANGE UP (ref 7–13)
PO2 BLDV: 65 MMHG — HIGH (ref 35–40)
POTASSIUM BLDV-SCNC: 3.7 MMOL/L — SIGNIFICANT CHANGE UP (ref 3.4–4.5)
POTASSIUM SERPL-MCNC: 4.6 MMOL/L — SIGNIFICANT CHANGE UP (ref 3.5–5.3)
POTASSIUM SERPL-SCNC: 4.6 MMOL/L — SIGNIFICANT CHANGE UP (ref 3.5–5.3)
PROT SERPL-MCNC: 7.8 G/DL — SIGNIFICANT CHANGE UP (ref 6–8.3)
PROT UR-MCNC: 20 — SIGNIFICANT CHANGE UP
RBC # BLD: 4.47 M/UL — SIGNIFICANT CHANGE UP (ref 3.8–5.2)
RBC # FLD: 14 % — SIGNIFICANT CHANGE UP (ref 10.3–14.5)
RBC CASTS # UR COMP ASSIST: SIGNIFICANT CHANGE UP (ref 0–?)
SAO2 % BLDV: 93 % — HIGH (ref 60–85)
SODIUM SERPL-SCNC: 136 MMOL/L — SIGNIFICANT CHANGE UP (ref 135–145)
SP GR SPEC: 1.01 — SIGNIFICANT CHANGE UP (ref 1–1.03)
SQUAMOUS # UR AUTO: SIGNIFICANT CHANGE UP
UROBILINOGEN FLD QL: NORMAL E.U. — SIGNIFICANT CHANGE UP (ref 0.1–0.2)
WBC # BLD: 9.29 K/UL — SIGNIFICANT CHANGE UP (ref 3.8–10.5)
WBC # FLD AUTO: 9.29 K/UL — SIGNIFICANT CHANGE UP (ref 3.8–10.5)
WBC UR QL: SIGNIFICANT CHANGE UP (ref 0–?)

## 2017-09-28 PROCEDURE — 74177 CT ABD & PELVIS W/CONTRAST: CPT | Mod: 26

## 2017-09-28 PROCEDURE — 93010 ELECTROCARDIOGRAM REPORT: CPT

## 2017-09-28 PROCEDURE — 99285 EMERGENCY DEPT VISIT HI MDM: CPT | Mod: 25

## 2017-09-28 PROCEDURE — 71020: CPT | Mod: 26

## 2017-09-28 RX ORDER — FAMOTIDINE 10 MG/ML
20 INJECTION INTRAVENOUS ONCE
Qty: 0 | Refills: 0 | Status: COMPLETED | OUTPATIENT
Start: 2017-09-28 | End: 2017-09-28

## 2017-09-28 RX ORDER — MORPHINE SULFATE 50 MG/1
2 CAPSULE, EXTENDED RELEASE ORAL ONCE
Qty: 0 | Refills: 0 | Status: DISCONTINUED | OUTPATIENT
Start: 2017-09-28 | End: 2017-09-28

## 2017-09-28 RX ORDER — ONDANSETRON 8 MG/1
4 TABLET, FILM COATED ORAL ONCE
Qty: 0 | Refills: 0 | Status: COMPLETED | OUTPATIENT
Start: 2017-09-28 | End: 2017-09-28

## 2017-09-28 RX ORDER — MORPHINE SULFATE 50 MG/1
4 CAPSULE, EXTENDED RELEASE ORAL ONCE
Qty: 0 | Refills: 0 | Status: DISCONTINUED | OUTPATIENT
Start: 2017-09-28 | End: 2017-09-28

## 2017-09-28 RX ADMIN — MORPHINE SULFATE 4 MILLIGRAM(S): 50 CAPSULE, EXTENDED RELEASE ORAL at 14:46

## 2017-09-28 RX ADMIN — ONDANSETRON 4 MILLIGRAM(S): 8 TABLET, FILM COATED ORAL at 14:46

## 2017-09-28 RX ADMIN — MORPHINE SULFATE 4 MILLIGRAM(S): 50 CAPSULE, EXTENDED RELEASE ORAL at 15:16

## 2017-09-28 RX ADMIN — FAMOTIDINE 20 MILLIGRAM(S): 10 INJECTION INTRAVENOUS at 14:46

## 2017-09-28 RX ADMIN — Medication 30 MILLILITER(S): at 18:35

## 2017-09-28 NOTE — ED PROVIDER NOTE - PLAN OF CARE
See your primary care doctor within 48 hours. 1) Follow up with Gastroenterology this week. Take Pepcid 20 mg 30 minutes before meals 2x/day.  Take over the counter Maalox as needed. Stop eating spicy and acidic foods. Eat smaller meals more frequently. 2) See your OBGYN doctor, referral given if needed. 3) Take Tylenol as needed for pain. Avoid heavy lifting. You may see a cardiologist, referral given. Worsening pain, new fever, chills, nausea, vomiting, new chest pain/shortness of breath return to ER.

## 2017-09-28 NOTE — ED PROVIDER NOTE - CARE PLAN
Instructions for follow-up, activity and diet:	See your primary care doctor within 48 hours. 1) Follow up with Gastroenterology this week. Take Pepcid 20 mg 30 minutes before meals 2x/day.  Take over the counter Maalox as needed. Stop eating spicy and acidic foods. Eat smaller meals more frequently. 2) See your OBGYN doctor, referral given if needed. 3) Take Tylenol as needed for pain. Avoid heavy lifting. You may see a cardiologist, referral given. Worsening pain, new fever, chills, nausea, vomiting, new chest pain/shortness of breath return to ER. Principal Discharge DX:	Lower abdominal pain  Instructions for follow-up, activity and diet:	See your primary care doctor within 48 hours. 1) Follow up with Gastroenterology this week. Take Pepcid 20 mg 30 minutes before meals 2x/day.  Take over the counter Maalox as needed. Stop eating spicy and acidic foods. Eat smaller meals more frequently. 2) See your OBGYN doctor, referral given if needed. 3) Take Tylenol as needed for pain. Avoid heavy lifting. You may see a cardiologist, referral given. Worsening pain, new fever, chills, nausea, vomiting, new chest pain/shortness of breath return to ER.

## 2017-09-28 NOTE — ED PROVIDER NOTE - PROGRESS NOTE DETAILS
KRISTYN Rome: received sign out from KRISTYN Munoz to follow up CT.  CT negative.  Pt states she feels better and is requesting to eat.  Pt ambulating without difficulty.  PO challenge and reassess. PA Gerasimou - pt feeling well, abdomen ND, NT, no rebound or guarding. amenable for discharge with GI follow up

## 2017-09-28 NOTE — ED ADULT NURSE NOTE - OBJECTIVE STATEMENT
pt c.o. abd pain since last week. seen by pmd and was thought to have a uti ,. continued pain across lower abdomen as well as rib pain with deep breath. denies fevers, vomiting, diarrhea. slight pain on palpation of lower abdomen. denies vaginal discharge. sl placed labs sent. pt c.o. abd pain since last week. seen by pmd and was thought to have a uti ,. continued pain across lower abdomen as well as rib pain with deep breath. denies fevers, vomiting, diarrhea. slight pain on palpation of lower abdomen. denies vaginal discharge. pt c.o. feeling like she was going to pass out eariler, BIBA. sl placed labs sent.

## 2017-09-28 NOTE — ED PROVIDER NOTE - OBJECTIVE STATEMENT
51 y/o female with pmhx of HIV on Atripla (undetectable viral load), HPV, GERD, asthma, HTN presents to ED for multiple complaints. Pt c/o diffuse lower abdominal pain x 2 weeks. Pt states she has had intermittent lower abd "cramping," worsening, associated with increased gas, nausea. Relief with bowel movements, drinks tea and feels better. Worse with food. LBM this morning, no diarrhea. Endorsing polyuria x 2 weeks, no hematuria or dysuria. States she was given a cream from her pcp for a yeast infection for vaginal itchiness which resolved. Given Bactrim antibiotic, unsure why, has been taking it for 1 week. Pt c/o b/l lower rib pain since lifting weights at physical therapy for her arthritis. Worse with breathing in and movement. No cp or sob, cough, recent URI, recent travel or surgeries, family hx of MI/clots. Pt was recently seen twice in ED last 2 months, dimer, cxr, were negative. No fever, chills, cp, sob, palpitations, abd distention, vaginal discharge or bleeding, pelvic pain, weakness, numbness. 53 y/o female with pmhx of HIV on Atripla (undetectable viral load), HPV, GERD, asthma, HTN presents to ED for multiple complaints. Pt c/o diffuse lower abdominal pain x 2 weeks. Pt states she has had intermittent lower abd "cramping," worsening, associated with increased gas, nausea. Relief with bowel movements, drinks tea and feels better. Worse with food. LBM this morning, no diarrhea. Endorsing polyuria x 2 weeks, no hematuria or dysuria. States she was given a cream from her pcp for a yeast infection for vaginal itchiness which resolved. Given Bactrim antibiotic, unsure why, has been taking it for 1 week. Has not seen an OBGYN in 10-15 years. Pt c/o b/l lower rib pain since lifting weights at physical therapy for her arthritis. Worse with breathing in and movement. No cp or sob, cough, recent URI, recent travel or surgeries, family hx of MI/clots. Pt was recently seen twice in ED last 2 months, dimer, cxr, were negative. No fever, chills, cp, sob, palpitations, abd distention, vaginal discharge or bleeding, pelvic pain, weakness, numbness.

## 2017-09-28 NOTE — ED PROVIDER NOTE - MEDICAL DECISION MAKING DETAILS
51 y/o female with pmhx of HIV on Atripla (undetectable viral load), HPV, GERD, asthma, HTN presents to ED for multiple complaints. 1) rib pain - ekg, cxr 2) labs, u/a, urine cx, CT with contrast

## 2017-09-28 NOTE — ED PROVIDER NOTE - ATTENDING CONTRIBUTION TO CARE
AJM: Patient seen with PA and agree with above note. 53 y/o female with pmhx of HIV on Atripla (undetectable viral load), HPV, GERD, asthma, HTN presents to ED for multiple complaints. Pt c/o diffuse lower abdominal pain x 2 weeks. Pt states she has had intermittent lower abd "cramping," worsening, associated with increased gas, nausea. Relief with bowel movements, drinks tea and feels better. Worse with food. LBM this morning, no diarrhea. Endorsing polyuria x 2 weeks, no hematuria or dysuria.. Benign abd exam. No vaginal complaints. No cvs ttp. VSS. Pt had abd ttp for PA on exam. given vague symptoms with ttp on PA exam will obtain labs, ua, ugc, ct a/p. if unremarkable will dc home. No chest pain, sob, syncope, rashes, travel, trauma

## 2017-09-28 NOTE — ED PROVIDER NOTE - PMH
Asthma    Benign hypertension    GERD (gastroesophageal reflux disease)    H/O abdominal hysterectomy    HIV disease    HPV (human papilloma virus) infection    Osteoarthritis

## 2017-09-29 LAB
BACTERIA UR CULT: SIGNIFICANT CHANGE UP
C TRACH RRNA SPEC QL NAA+PROBE: SIGNIFICANT CHANGE UP
N GONORRHOEA RRNA SPEC QL NAA+PROBE: SIGNIFICANT CHANGE UP
SPECIMEN SOURCE: SIGNIFICANT CHANGE UP
SPECIMEN SOURCE: SIGNIFICANT CHANGE UP

## 2018-04-27 ENCOUNTER — EMERGENCY (EMERGENCY)
Facility: HOSPITAL | Age: 53
LOS: 1 days | Discharge: HOME | End: 2018-04-27
Attending: EMERGENCY MEDICINE | Admitting: EMERGENCY MEDICINE
Payer: MEDICAID

## 2018-04-27 VITALS
RESPIRATION RATE: 16 BRPM | TEMPERATURE: 98 F | HEART RATE: 86 BPM | SYSTOLIC BLOOD PRESSURE: 152 MMHG | DIASTOLIC BLOOD PRESSURE: 85 MMHG | OXYGEN SATURATION: 100 %

## 2018-04-27 DIAGNOSIS — Z90.710 ACQUIRED ABSENCE OF BOTH CERVIX AND UTERUS: Chronic | ICD-10-CM

## 2018-04-27 PROCEDURE — 71046 X-RAY EXAM CHEST 2 VIEWS: CPT | Mod: 26

## 2018-04-27 PROCEDURE — 99285 EMERGENCY DEPT VISIT HI MDM: CPT | Mod: 25

## 2018-04-27 PROCEDURE — 93010 ELECTROCARDIOGRAM REPORT: CPT

## 2018-04-27 RX ORDER — ACETAMINOPHEN 500 MG
650 TABLET ORAL ONCE
Qty: 0 | Refills: 0 | Status: COMPLETED | OUTPATIENT
Start: 2018-04-27 | End: 2018-04-27

## 2018-04-27 RX ORDER — FAMOTIDINE 10 MG/ML
20 INJECTION INTRAVENOUS ONCE
Qty: 0 | Refills: 0 | Status: COMPLETED | OUTPATIENT
Start: 2018-04-27 | End: 2018-04-27

## 2018-04-27 RX ORDER — METOCLOPRAMIDE HCL 10 MG
10 TABLET ORAL ONCE
Qty: 0 | Refills: 0 | Status: COMPLETED | OUTPATIENT
Start: 2018-04-27 | End: 2018-04-27

## 2018-04-27 NOTE — ED ADULT TRIAGE NOTE - CHIEF COMPLAINT QUOTE
Pt c/o burning chest pain and headache x 1 week. Pt describes chest pain as burning after eating and when laying flat. Pt states "I've been taking my zantac but it doesn't work all day" Respirations are even and unlabored. Hx of HTN, GERD, Asthma.

## 2018-04-27 NOTE — ED PROVIDER NOTE - OBJECTIVE STATEMENT
52F h/o HIV on Atripla (undetectable viral load), HPV, GERD, asthma, HTN presenting with chest pain. Notes epigastric burning for past few months ago, intermittent, associated with generalized headache. Denies change in vision, F, SOB, N/V/D. 52F h/o HIV on Atripla (undetectable viral load), HPV, GERD, asthma, HTN presenting with chest pain. Notes epigastric burning for past few months ago, intermittent, associated with generalized headache. No acute change in pain today, she just feels symptoms have lasted too long. Notes her PMD trying famotidine instead of zantec but famotidine has not worked as well, has not been on PPi. Denies change in vision, F, SOB, N/V/D.

## 2018-04-27 NOTE — ED PROVIDER NOTE - ATTENDING CONTRIBUTION TO CARE
52f, pmhx hiv on HAART, htn. p/w chronic epigastric pain into chest, burning in nature. last egd 2 years ago. takes zantac bid with relief but came to ED jerad it's ongoing 52f, pmhx hiv on HAART, htn. p/w chronic epigastric pain into chest, burning in nature. last egd 2 years ago. takes zantac bid with relief but came to ED jerad it's ongoing x 2 months. worse at night and when she lies down. no change in her pain bringing her in today. no f/c, brbpr/melena, n/v, sob, palps or dizziness. exam, vs wnl, nad. hs and lungs normal, abdo benign, legs normal. will get labs. ecg normal. if labs normal, d/c with ppi and GI f/u. given chronicity and typical gerd symptoms w/o weight loss or brbpr, then if w/u neg safe for d/c.

## 2018-04-27 NOTE — ED PROVIDER NOTE - MEDICAL DECISION MAKING DETAILS
52F w/ above history p/w intermittent burning epigastric pain for few months, no acute change today, denies SOB, N/V, dysuria, concerning for symptomatic GERD

## 2018-04-28 LAB
ALBUMIN SERPL ELPH-MCNC: 4.5 G/DL — SIGNIFICANT CHANGE UP (ref 3.3–5)
ALP SERPL-CCNC: 152 U/L — HIGH (ref 40–120)
ALT FLD-CCNC: 30 U/L — SIGNIFICANT CHANGE UP (ref 4–33)
AST SERPL-CCNC: 22 U/L — SIGNIFICANT CHANGE UP (ref 4–32)
BASOPHILS # BLD AUTO: 0.06 K/UL — SIGNIFICANT CHANGE UP (ref 0–0.2)
BASOPHILS NFR BLD AUTO: 0.6 % — SIGNIFICANT CHANGE UP (ref 0–2)
BILIRUB SERPL-MCNC: 0.2 MG/DL — SIGNIFICANT CHANGE UP (ref 0.2–1.2)
BUN SERPL-MCNC: 20 MG/DL — SIGNIFICANT CHANGE UP (ref 7–23)
CALCIUM SERPL-MCNC: 9.8 MG/DL — SIGNIFICANT CHANGE UP (ref 8.4–10.5)
CHLORIDE SERPL-SCNC: 102 MMOL/L — SIGNIFICANT CHANGE UP (ref 98–107)
CO2 SERPL-SCNC: 21 MMOL/L — LOW (ref 22–31)
CREAT SERPL-MCNC: 0.88 MG/DL — SIGNIFICANT CHANGE UP (ref 0.5–1.3)
EOSINOPHIL # BLD AUTO: 0.34 K/UL — SIGNIFICANT CHANGE UP (ref 0–0.5)
EOSINOPHIL NFR BLD AUTO: 3.3 % — SIGNIFICANT CHANGE UP (ref 0–6)
GLUCOSE SERPL-MCNC: 103 MG/DL — HIGH (ref 70–99)
HCT VFR BLD CALC: 40.5 % — SIGNIFICANT CHANGE UP (ref 34.5–45)
HGB BLD-MCNC: 13.4 G/DL — SIGNIFICANT CHANGE UP (ref 11.5–15.5)
IMM GRANULOCYTES # BLD AUTO: 0.03 # — SIGNIFICANT CHANGE UP
IMM GRANULOCYTES NFR BLD AUTO: 0.3 % — SIGNIFICANT CHANGE UP (ref 0–1.5)
LIDOCAIN IGE QN: 33.7 U/L — SIGNIFICANT CHANGE UP (ref 7–60)
LYMPHOCYTES # BLD AUTO: 3.9 K/UL — HIGH (ref 1–3.3)
LYMPHOCYTES # BLD AUTO: 37.6 % — SIGNIFICANT CHANGE UP (ref 13–44)
MCHC RBC-ENTMCNC: 30.1 PG — SIGNIFICANT CHANGE UP (ref 27–34)
MCHC RBC-ENTMCNC: 33.1 % — SIGNIFICANT CHANGE UP (ref 32–36)
MCV RBC AUTO: 91 FL — SIGNIFICANT CHANGE UP (ref 80–100)
MONOCYTES # BLD AUTO: 0.51 K/UL — SIGNIFICANT CHANGE UP (ref 0–0.9)
MONOCYTES NFR BLD AUTO: 4.9 % — SIGNIFICANT CHANGE UP (ref 2–14)
NEUTROPHILS # BLD AUTO: 5.54 K/UL — SIGNIFICANT CHANGE UP (ref 1.8–7.4)
NEUTROPHILS NFR BLD AUTO: 53.3 % — SIGNIFICANT CHANGE UP (ref 43–77)
NRBC # FLD: 0 — SIGNIFICANT CHANGE UP
PLATELET # BLD AUTO: 445 K/UL — HIGH (ref 150–400)
PMV BLD: 8.7 FL — SIGNIFICANT CHANGE UP (ref 7–13)
POTASSIUM SERPL-MCNC: 3.9 MMOL/L — SIGNIFICANT CHANGE UP (ref 3.5–5.3)
POTASSIUM SERPL-SCNC: 3.9 MMOL/L — SIGNIFICANT CHANGE UP (ref 3.5–5.3)
PROT SERPL-MCNC: 7.9 G/DL — SIGNIFICANT CHANGE UP (ref 6–8.3)
RBC # BLD: 4.45 M/UL — SIGNIFICANT CHANGE UP (ref 3.8–5.2)
RBC # FLD: 14.4 % — SIGNIFICANT CHANGE UP (ref 10.3–14.5)
SODIUM SERPL-SCNC: 140 MMOL/L — SIGNIFICANT CHANGE UP (ref 135–145)
TROPONIN T SERPL-MCNC: < 0.06 NG/ML — SIGNIFICANT CHANGE UP (ref 0–0.06)
WBC # BLD: 10.38 K/UL — SIGNIFICANT CHANGE UP (ref 3.8–10.5)
WBC # FLD AUTO: 10.38 K/UL — SIGNIFICANT CHANGE UP (ref 3.8–10.5)

## 2018-04-28 RX ORDER — OMEPRAZOLE 10 MG/1
1 CAPSULE, DELAYED RELEASE ORAL
Qty: 14 | Refills: 0
Start: 2018-04-28 | End: 2018-05-11

## 2018-04-28 RX ADMIN — Medication 650 MILLIGRAM(S): at 00:17

## 2018-04-28 RX ADMIN — FAMOTIDINE 20 MILLIGRAM(S): 10 INJECTION INTRAVENOUS at 00:17

## 2018-04-28 RX ADMIN — Medication 10 MILLIGRAM(S): at 00:19

## 2018-04-28 RX ADMIN — Medication 30 MILLILITER(S): at 00:17

## 2018-04-28 NOTE — ED ADULT NURSE NOTE - OBJECTIVE STATEMENT
Break coverage RN received pt to spot 3 A&Ox4 c/o worsening epigastric pain intermittently for one year reports became unbearable earlier tonight. Denies SOB associated, denies N/V, denies difficulty with BMs or urination. IV placed, labs sent, VS as documented, will endorse to primary RN.

## 2018-06-13 ENCOUNTER — EMERGENCY (EMERGENCY)
Facility: HOSPITAL | Age: 53
LOS: 1 days | Discharge: HOME | End: 2018-06-13
Attending: EMERGENCY MEDICINE | Admitting: EMERGENCY MEDICINE
Payer: MEDICAID

## 2018-06-13 VITALS
DIASTOLIC BLOOD PRESSURE: 82 MMHG | OXYGEN SATURATION: 98 % | SYSTOLIC BLOOD PRESSURE: 148 MMHG | HEART RATE: 88 BPM | TEMPERATURE: 98 F | RESPIRATION RATE: 18 BRPM

## 2018-06-13 DIAGNOSIS — Z90.710 ACQUIRED ABSENCE OF BOTH CERVIX AND UTERUS: Chronic | ICD-10-CM

## 2018-06-13 LAB
ALBUMIN SERPL ELPH-MCNC: 4.1 G/DL — SIGNIFICANT CHANGE UP (ref 3.3–5)
ALP SERPL-CCNC: 140 U/L — HIGH (ref 40–120)
ALT FLD-CCNC: 27 U/L — SIGNIFICANT CHANGE UP (ref 4–33)
AST SERPL-CCNC: 23 U/L — SIGNIFICANT CHANGE UP (ref 4–32)
BASOPHILS # BLD AUTO: 0.04 K/UL — SIGNIFICANT CHANGE UP (ref 0–0.2)
BASOPHILS NFR BLD AUTO: 0.5 % — SIGNIFICANT CHANGE UP (ref 0–2)
BILIRUB SERPL-MCNC: < 0.2 MG/DL — LOW (ref 0.2–1.2)
BUN SERPL-MCNC: 12 MG/DL — SIGNIFICANT CHANGE UP (ref 7–23)
CALCIUM SERPL-MCNC: 9.2 MG/DL — SIGNIFICANT CHANGE UP (ref 8.4–10.5)
CHLORIDE SERPL-SCNC: 102 MMOL/L — SIGNIFICANT CHANGE UP (ref 98–107)
CO2 SERPL-SCNC: 21 MMOL/L — LOW (ref 22–31)
CREAT SERPL-MCNC: 0.88 MG/DL — SIGNIFICANT CHANGE UP (ref 0.5–1.3)
EOSINOPHIL # BLD AUTO: 0.29 K/UL — SIGNIFICANT CHANGE UP (ref 0–0.5)
EOSINOPHIL NFR BLD AUTO: 3.7 % — SIGNIFICANT CHANGE UP (ref 0–6)
GLUCOSE SERPL-MCNC: 112 MG/DL — HIGH (ref 70–99)
HCT VFR BLD CALC: 40.6 % — SIGNIFICANT CHANGE UP (ref 34.5–45)
HGB BLD-MCNC: 13.6 G/DL — SIGNIFICANT CHANGE UP (ref 11.5–15.5)
IMM GRANULOCYTES # BLD AUTO: 0.01 # — SIGNIFICANT CHANGE UP
IMM GRANULOCYTES NFR BLD AUTO: 0.1 % — SIGNIFICANT CHANGE UP (ref 0–1.5)
LYMPHOCYTES # BLD AUTO: 2.93 K/UL — SIGNIFICANT CHANGE UP (ref 1–3.3)
LYMPHOCYTES # BLD AUTO: 37 % — SIGNIFICANT CHANGE UP (ref 13–44)
MCHC RBC-ENTMCNC: 29.6 PG — SIGNIFICANT CHANGE UP (ref 27–34)
MCHC RBC-ENTMCNC: 33.5 % — SIGNIFICANT CHANGE UP (ref 32–36)
MCV RBC AUTO: 88.3 FL — SIGNIFICANT CHANGE UP (ref 80–100)
MONOCYTES # BLD AUTO: 0.55 K/UL — SIGNIFICANT CHANGE UP (ref 0–0.9)
MONOCYTES NFR BLD AUTO: 6.9 % — SIGNIFICANT CHANGE UP (ref 2–14)
NEUTROPHILS # BLD AUTO: 4.1 K/UL — SIGNIFICANT CHANGE UP (ref 1.8–7.4)
NEUTROPHILS NFR BLD AUTO: 51.8 % — SIGNIFICANT CHANGE UP (ref 43–77)
NRBC # FLD: 0 — SIGNIFICANT CHANGE UP
PLATELET # BLD AUTO: 437 K/UL — HIGH (ref 150–400)
PMV BLD: 8.6 FL — SIGNIFICANT CHANGE UP (ref 7–13)
POTASSIUM SERPL-MCNC: 3.9 MMOL/L — SIGNIFICANT CHANGE UP (ref 3.5–5.3)
POTASSIUM SERPL-SCNC: 3.9 MMOL/L — SIGNIFICANT CHANGE UP (ref 3.5–5.3)
PROT SERPL-MCNC: 7.7 G/DL — SIGNIFICANT CHANGE UP (ref 6–8.3)
RBC # BLD: 4.6 M/UL — SIGNIFICANT CHANGE UP (ref 3.8–5.2)
RBC # FLD: 14.3 % — SIGNIFICANT CHANGE UP (ref 10.3–14.5)
SODIUM SERPL-SCNC: 138 MMOL/L — SIGNIFICANT CHANGE UP (ref 135–145)
WBC # BLD: 7.92 K/UL — SIGNIFICANT CHANGE UP (ref 3.8–10.5)
WBC # FLD AUTO: 7.92 K/UL — SIGNIFICANT CHANGE UP (ref 3.8–10.5)

## 2018-06-13 PROCEDURE — 71046 X-RAY EXAM CHEST 2 VIEWS: CPT | Mod: 26

## 2018-06-13 PROCEDURE — 99284 EMERGENCY DEPT VISIT MOD MDM: CPT

## 2018-06-13 RX ORDER — IPRATROPIUM/ALBUTEROL SULFATE 18-103MCG
3 AEROSOL WITH ADAPTER (GRAM) INHALATION
Qty: 0 | Refills: 0 | Status: COMPLETED | OUTPATIENT
Start: 2018-06-13 | End: 2018-06-13

## 2018-06-13 RX ADMIN — Medication 3 MILLILITER(S): at 17:46

## 2018-06-13 RX ADMIN — Medication 3 MILLILITER(S): at 17:59

## 2018-06-13 RX ADMIN — Medication 125 MILLIGRAM(S): at 17:58

## 2018-06-13 RX ADMIN — Medication 3 MILLILITER(S): at 17:58

## 2018-06-13 NOTE — ED PROVIDER NOTE - ATTENDING CONTRIBUTION TO CARE
DR. AGUILERA, ATTENDING MD-  I performed a face to face bedside interview with patient regarding history of present illness, review of symptoms and past medical history. I completed an independent physical exam.  I have discussed patient's plan of care with PA.   Documentation as above in the note.    52 y/o female h/o hiv on haart undetectable viral load, asthma, htn bibems for two days of prod cough and wheezing.  Denies f/c, ha, neck stiffness, abd pain, n/v/d, dysuria, rash.  Afebrile, vs wnl, nad, diffuse end exp wheeze, tachypnea, s1s2 rrr no m/r/g, abd soft non ttp no r/g, no cva tenderness b/l, no leg swelling b/l, no rash.  Asthma exac, eval for uri vs pna.  Obtain cbc, bmp, cxr, DR. AGUILERA, ATTENDING MD-  I performed a face to face bedside interview with patient regarding history of present illness, review of symptoms and past medical history. I completed an independent physical exam.  I have discussed patient's plan of care with PA.   Documentation as above in the note.    54 y/o female h/o hiv on haart undetectable viral load, asthma, htn bibems for two days of prod cough and wheezing.  Denies f/c, ha, neck stiffness, abd pain, n/v/d, dysuria, rash.  Afebrile, vs wnl, nad, diffuse end exp wheeze, tachypnea, s1s2 rrr no m/r/g, abd soft non ttp no r/g, no cva tenderness b/l, no leg swelling b/l, no rash.  Asthma exac, eval for inciting uri vs pna.  Obtain cbc, bmp, cxr, give duonebs, steroid, reassess, may need obs for further care.

## 2018-06-13 NOTE — ED PROVIDER NOTE - MEDICAL DECISION MAKING DETAILS
53 y.o female smoker pmhx of HIV ( on Atripla undetectable viral load), asthma, GERD here with 3 days of productive cough, chills with L side pain in the setting of coughing that she states feels the same way when she had walking pneumonia. Pt with diffuse wheezing on exam- will give nebs, steroids, basic labs, xray chest to r.o pneumonia. ekg was ordered in triage. reassess.

## 2018-06-13 NOTE — ED PROVIDER NOTE - PLAN OF CARE
You were seen today for your asthma exacerbation.  Continue to use albuterol as needed for wheezing.  Take the prescribed steroids until you feel better.  Follow up with your primary care physician in 2-3 days for re-evaluation.  RETURN TO THE EMERGENCY DEPARTMENT IMMEDIATELY IF YOU FEEL SEVERE SHORTNESS OF BREATH OR FOR ANY OTHER CONCERN.

## 2018-06-13 NOTE — ED PROVIDER NOTE - PHYSICAL EXAMINATION
anterior L chest wall TTP anterior L chest wall TTP  tolerating airway and secretions  speaking in full sentences

## 2018-06-13 NOTE — ED ADULT TRIAGE NOTE - CHIEF COMPLAINT QUOTE
Patient brought to ER by EMS from home for cough and flu like symptoms for the past 2 days with yellow sputum. Pt took a couple of Bactrim with no relief. Pt has positive wheeze and left sided chest pain. Pt given an Albuterol treatment with some relief.

## 2018-06-13 NOTE — ED PROVIDER NOTE - OBJECTIVE STATEMENT
53 y.o female smoker pmhx of HIV ( on Atripla undetectable viral load), asthma, GERD here with 3 days of productive cough, chills with L side pain in the setting of coughing that she states feels the same way when she had walking pneumonia. Denies headache, dizziness, sob, n/v/d, abdominal pain, numbness, tingling, weakness, urinary symptoms. Given a nebulizer in the ambulance with some relief.

## 2018-06-13 NOTE — ED PROVIDER NOTE - CARE PLAN
Principal Discharge DX:	Asthma Principal Discharge DX:	Asthma  Assessment and plan of treatment:	You were seen today for your asthma exacerbation.  Continue to use albuterol as needed for wheezing.  Take the prescribed steroids until you feel better.  Follow up with your primary care physician in 2-3 days for re-evaluation.  RETURN TO THE EMERGENCY DEPARTMENT IMMEDIATELY IF YOU FEEL SEVERE SHORTNESS OF BREATH OR FOR ANY OTHER CONCERN.

## 2018-07-01 ENCOUNTER — OUTPATIENT (OUTPATIENT)
Dept: OUTPATIENT SERVICES | Facility: HOSPITAL | Age: 53
LOS: 1 days | End: 2018-07-01
Payer: MEDICAID

## 2018-07-01 DIAGNOSIS — Z90.710 ACQUIRED ABSENCE OF BOTH CERVIX AND UTERUS: Chronic | ICD-10-CM

## 2018-07-01 PROCEDURE — G9001: CPT

## 2018-07-16 NOTE — ED PROVIDER NOTE - NS ED ATTENDING STATEMENT MOD
2018      RE: Olena Gilmore  [address]  [facility] [address]             Date: 2018    PATIENT:  Olena Gilmore  :          2005  NEHEMIAS:          2018    Dear Katt Hoff:    I had the pleasure of seeing your patient, Olena Gilmore, for a follow-up visit in the [facility] on 2018 at the [facility] in [place].  Olena was last seen in this clinic 3 mos ago.  Please see below for my assessment and plan of care.    Intercurrent History:    Olena was accompanied to this appointment by her mom.  As you may recall, Olena is a 13 year old girl with a BMI in the overweight range who has experienced a notable increase in her BMI since about age 10.  Over the past 3 mos since her last appointment here her weigh increased 8 lbs and she grew about 0.2 in.      Olena has been doing ok.  Mom has had a busy summer thus far with lots of driving to activities in [place].  This has resulted in eating that has not been as healthy as mom would have liked.  Fortunately, these activities recently ended.  Snacks at home have improved but dad still buys some unhealthy options at times - primarily for  younger brother who takes Concerta and is under weight.  Olena will be starting with a new therapist in Aug.  Her mood has been irritable.               Current Medications:  Current Outpatient Rx   Medication Sig Dispense Refill     Acetaminophen (CHILDRENS TYLENOL OR) Take by mouth as needed        hydrocortisone 2.5 % ointment Apply topically 2 times daily 20 g 0     Ibuprofen (ADVIL PO) Take by mouth as needed for moderate pain       mupirocin (BACTROBAN) 2 % ointment Apply topically 2 times daily 1 g 0     Pediatric Multivit-Minerals-C (KIDS GUMMY BEAR VITAMINS PO) Take  by mouth.       MELATONIN PO Take 3 mg by mouth At Bedtime         Physical Exam:    Vitals:  B/P: 118/78, P: 84, R: Data Unavailable  "  BP:  Blood pressure percentiles are 79 % systolic and 91 % diastolic based on the 2017 AAP Clinical Practice Guideline. Blood pressure percentile targets: 90: 123/77, 95: 127/81, 95 + 12 mmH/93.  Measured Weights:  Wt Readings from Last 4 Encounters:   18 73.2 kg (161 lb 6.4 oz) (97 %)*   18 72.9 kg (160 lb 11.2 oz) (97 %)*   18 69.5 kg (153 lb 3.5 oz) (96 %)*   18 68.2 kg (150 lb 6 oz) (95 %)*     * Growth percentiles are based on CDC 2-20 Years data.     Height:    Ht Readings from Last 4 Encounters:   18 1.687 m (5' 6.42\") (93 %)*   18 1.651 m (5' 5\") (83 %)*   18 1.669 m (5' 5.71\") (91 %)*   18 1.664 m (5' 5.5\") (90 %)*     * Growth percentiles are based on [facility] 2-20 Years data.     Body Mass Index:  Body mass index is 25.72 kg/(m^2).  Body Mass Index Percentile:  94 %ile based on CDC 2-20 Years BMI-for-age data using vitals from 2018.    Labs:  None today.    Assessment:      Olena is a 13 year old female whose BMI today is at the 95th percentile.  It continues to increase rapidly.  Mom however is feeling overwhelmed with life circumstances and is doing the very best she can to provide healthy food for her family.  My concern is for her risk for metabolic syndrome given her acanthosis nigricans.  Her mood is being addressed appropriately with therapy.    I spent a total of 25 minutes face-to-face with Olena during today s office visit. Over 50% of this time was spent counseling the patient and/or coordinating care regarding obesity. See note for details.     Olena s current problem list reviewed today includes:    Encounter Diagnoses   Name Primary?     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      We are looking forward to seeing Olena for a follow-up visit in 4 " weeks.    Thank you for including me in the care of your patient.  Please do not hesitate to call with questions or concerns.    Sincerely,    Carmen Brock MD MPH  Diplomate, American Board of Obesity Medicine    Director, Pediatric Weight Management Clinic  Department of Pediatrics  [facility] [phone]  [facility] [phone]  [facility] [phone]  [facility] [place] [phone]            CC  Copy to patient  ABE CHAMBERS DANIEL  [address] [facility] [address]        Carmen Brock MD, MD       I have personally seen and examined this patient. I have fully participated in the care of this patient. I have reviewed all pertinent clinical information, including history physical exam, plan and the Resident's note and agree except as noted

## 2018-07-25 ENCOUNTER — EMERGENCY (EMERGENCY)
Facility: HOSPITAL | Age: 53
LOS: 1 days | Discharge: HOME | End: 2018-07-25
Attending: EMERGENCY MEDICINE | Admitting: EMERGENCY MEDICINE
Payer: MEDICAID

## 2018-07-25 VITALS
TEMPERATURE: 98 F | OXYGEN SATURATION: 100 % | RESPIRATION RATE: 18 BRPM | HEART RATE: 74 BPM | DIASTOLIC BLOOD PRESSURE: 84 MMHG | SYSTOLIC BLOOD PRESSURE: 143 MMHG

## 2018-07-25 VITALS
HEART RATE: 86 BPM | OXYGEN SATURATION: 97 % | TEMPERATURE: 98 F | DIASTOLIC BLOOD PRESSURE: 102 MMHG | RESPIRATION RATE: 16 BRPM | SYSTOLIC BLOOD PRESSURE: 148 MMHG

## 2018-07-25 DIAGNOSIS — Z90.710 ACQUIRED ABSENCE OF BOTH CERVIX AND UTERUS: Chronic | ICD-10-CM

## 2018-07-25 PROBLEM — M19.90 UNSPECIFIED OSTEOARTHRITIS, UNSPECIFIED SITE: Chronic | Status: ACTIVE | Noted: 2017-06-06

## 2018-07-25 PROBLEM — B97.7 PAPILLOMAVIRUS AS THE CAUSE OF DISEASES CLASSIFIED ELSEWHERE: Chronic | Status: ACTIVE | Noted: 2017-09-28

## 2018-07-25 PROBLEM — K21.9 GASTRO-ESOPHAGEAL REFLUX DISEASE WITHOUT ESOPHAGITIS: Chronic | Status: ACTIVE | Noted: 2017-09-28

## 2018-07-25 LAB
ALBUMIN SERPL ELPH-MCNC: 4.3 G/DL — SIGNIFICANT CHANGE UP (ref 3.3–5)
ALP SERPL-CCNC: 155 U/L — HIGH (ref 40–120)
ALT FLD-CCNC: 29 U/L — SIGNIFICANT CHANGE UP (ref 4–33)
APTT BLD: 29.2 SEC — SIGNIFICANT CHANGE UP (ref 27.5–37.4)
AST SERPL-CCNC: 25 U/L — SIGNIFICANT CHANGE UP (ref 4–32)
BASOPHILS # BLD AUTO: 0.04 K/UL — SIGNIFICANT CHANGE UP (ref 0–0.2)
BASOPHILS NFR BLD AUTO: 0.5 % — SIGNIFICANT CHANGE UP (ref 0–2)
BILIRUB SERPL-MCNC: 0.3 MG/DL — SIGNIFICANT CHANGE UP (ref 0.2–1.2)
BUN SERPL-MCNC: 12 MG/DL — SIGNIFICANT CHANGE UP (ref 7–23)
CALCIUM SERPL-MCNC: 9 MG/DL — SIGNIFICANT CHANGE UP (ref 8.4–10.5)
CHLORIDE SERPL-SCNC: 101 MMOL/L — SIGNIFICANT CHANGE UP (ref 98–107)
CO2 SERPL-SCNC: 22 MMOL/L — SIGNIFICANT CHANGE UP (ref 22–31)
CREAT SERPL-MCNC: 0.71 MG/DL — SIGNIFICANT CHANGE UP (ref 0.5–1.3)
D DIMER BLD IA.RAPID-MCNC: < 150 NG/ML — SIGNIFICANT CHANGE UP
EOSINOPHIL # BLD AUTO: 0.26 K/UL — SIGNIFICANT CHANGE UP (ref 0–0.5)
EOSINOPHIL NFR BLD AUTO: 3 % — SIGNIFICANT CHANGE UP (ref 0–6)
GLUCOSE SERPL-MCNC: 93 MG/DL — SIGNIFICANT CHANGE UP (ref 70–99)
HCT VFR BLD CALC: 42.2 % — SIGNIFICANT CHANGE UP (ref 34.5–45)
HGB BLD-MCNC: 13.9 G/DL — SIGNIFICANT CHANGE UP (ref 11.5–15.5)
IMM GRANULOCYTES # BLD AUTO: 0.02 # — SIGNIFICANT CHANGE UP
IMM GRANULOCYTES NFR BLD AUTO: 0.2 % — SIGNIFICANT CHANGE UP (ref 0–1.5)
INR BLD: 0.95 — SIGNIFICANT CHANGE UP (ref 0.88–1.17)
LYMPHOCYTES # BLD AUTO: 2.91 K/UL — SIGNIFICANT CHANGE UP (ref 1–3.3)
LYMPHOCYTES # BLD AUTO: 34.1 % — SIGNIFICANT CHANGE UP (ref 13–44)
MCHC RBC-ENTMCNC: 30.2 PG — SIGNIFICANT CHANGE UP (ref 27–34)
MCHC RBC-ENTMCNC: 32.9 % — SIGNIFICANT CHANGE UP (ref 32–36)
MCV RBC AUTO: 91.5 FL — SIGNIFICANT CHANGE UP (ref 80–100)
MONOCYTES # BLD AUTO: 0.53 K/UL — SIGNIFICANT CHANGE UP (ref 0–0.9)
MONOCYTES NFR BLD AUTO: 6.2 % — SIGNIFICANT CHANGE UP (ref 2–14)
NEUTROPHILS # BLD AUTO: 4.78 K/UL — SIGNIFICANT CHANGE UP (ref 1.8–7.4)
NEUTROPHILS NFR BLD AUTO: 56 % — SIGNIFICANT CHANGE UP (ref 43–77)
NRBC # FLD: 0 — SIGNIFICANT CHANGE UP
PLATELET # BLD AUTO: 450 K/UL — HIGH (ref 150–400)
PMV BLD: 8.7 FL — SIGNIFICANT CHANGE UP (ref 7–13)
POTASSIUM SERPL-MCNC: 3.7 MMOL/L — SIGNIFICANT CHANGE UP (ref 3.5–5.3)
POTASSIUM SERPL-SCNC: 3.7 MMOL/L — SIGNIFICANT CHANGE UP (ref 3.5–5.3)
PROT SERPL-MCNC: 8 G/DL — SIGNIFICANT CHANGE UP (ref 6–8.3)
PROTHROM AB SERPL-ACNC: 10.5 SEC — SIGNIFICANT CHANGE UP (ref 9.8–13.1)
RBC # BLD: 4.61 M/UL — SIGNIFICANT CHANGE UP (ref 3.8–5.2)
RBC # FLD: 14.2 % — SIGNIFICANT CHANGE UP (ref 10.3–14.5)
SODIUM SERPL-SCNC: 137 MMOL/L — SIGNIFICANT CHANGE UP (ref 135–145)
TROPONIN T, HIGH SENSITIVITY: 6 NG/L — SIGNIFICANT CHANGE UP (ref ?–14)
TROPONIN T, HIGH SENSITIVITY: < 6 NG/L — SIGNIFICANT CHANGE UP (ref ?–14)
WBC # BLD: 8.54 K/UL — SIGNIFICANT CHANGE UP (ref 3.8–10.5)
WBC # FLD AUTO: 8.54 K/UL — SIGNIFICANT CHANGE UP (ref 3.8–10.5)

## 2018-07-25 PROCEDURE — 99284 EMERGENCY DEPT VISIT MOD MDM: CPT

## 2018-07-25 PROCEDURE — 71046 X-RAY EXAM CHEST 2 VIEWS: CPT | Mod: 26

## 2018-07-25 RX ORDER — FAMOTIDINE 10 MG/ML
1 INJECTION INTRAVENOUS
Qty: 14 | Refills: 0 | OUTPATIENT
Start: 2018-07-25 | End: 2018-07-31

## 2018-07-25 RX ORDER — ACETAMINOPHEN 500 MG
650 TABLET ORAL ONCE
Qty: 0 | Refills: 0 | Status: COMPLETED | OUTPATIENT
Start: 2018-07-25 | End: 2018-07-25

## 2018-07-25 RX ORDER — FAMOTIDINE 10 MG/ML
20 INJECTION INTRAVENOUS ONCE
Qty: 0 | Refills: 0 | Status: COMPLETED | OUTPATIENT
Start: 2018-07-25 | End: 2018-07-25

## 2018-07-25 RX ADMIN — Medication 650 MILLIGRAM(S): at 14:03

## 2018-07-25 RX ADMIN — FAMOTIDINE 20 MILLIGRAM(S): 10 INJECTION INTRAVENOUS at 14:03

## 2018-07-25 NOTE — ED PROVIDER NOTE - PROGRESS NOTE DETAILS
KRISTYN ARTIS:  Pt notes resolution of pain. Pt requesting to go home.  D dimer negative.  Trop 6, <6.  Pt tolerating PO.  Pt to follow up with PMD, gastroenterology and cardiology (referral list provided).

## 2018-07-25 NOTE — ED PROVIDER NOTE - OBJECTIVE STATEMENT
Pt is a 53 F smoker PMHx HTN, HIV (On Atripla, viral load not detectable, CD4 in 2000s), GERD, Asthma, hysterectomy p/w shortness of breath this morning.  Pt states for past three days pt had intermittent left sided chest wall pain which occurs with deep inhalation, twisting upper body and positional change.  Pt states she has had same pain intermittently for past few years for which she had been evaluated at [facility] ED with normal work up.  Pt states today while getting ready, pt developed shortness of breath without any associated diaphoresis, lightheadedness, wheezing.  Pt states symptoms upon exiting home and "getting fresh air."  + Recent long car ride to and from [place] ~ 3 weeks ago.  Pt denies any fevers, chills, nausea, vomiting, calf pain/swelling, h/o dvt/pe in past, hemoptysis, h/o malignancy, recent surgeries, OCP use, illicit drug use, 1st degree family history of heart disease.  Pt also notes right sided neck pain which pt attributes to "sleeping wrong," which worsens with ranging neck.  + Family history dvt in uncle  Pt also notes heartburn after eating and when lying down which pt states is identical to heartburn in the past. Pt is a 53 F smoker PMHx HTN, HIV (On Atripla, viral load not detectable, CD4 in 2000s), GERD, Asthma, hysterectomy p/w shortness of breath this morning.  Pt states for past three days pt had intermittent left sided chest wall pain which occurs with deep inhalation, twisting upper body and positional change.  Pt states she has had same pain intermittently for past few years for which she had been evaluated at [facility] ED with normal work up.  Pt states today while getting ready, pt developed shortness of breath without any associated diaphoresis, lightheadedness, wheezing.  Pt states symptoms upon exiting home and "getting fresh air."  + Recent long car ride to and from [place] ~ 3 weeks ago.  Pt denies any fevers, chills, nausea, vomiting, calf pain/swelling, h/o dvt/pe in past, hemoptysis, h/o malignancy, recent surgeries, OCP use, illicit drug use, 1st degree family history of heart disease.  Pt also notes right sided neck pain which pt attributes to "sleeping wrong," which worsens with ranging neck.  + Family history dvt in uncle  Pt also notes heartburn after eating and when lying down which pt states is identical to heartburn in the past.  Pt denies blurry vision, but states she had spots in her eyes for several months, which is being managed by an ophthalmologist Pt is a 53 F smoker PMHx HTN, HIV (On Atripla, viral load not detectable, CD4 in 2000s), GERD, Asthma, hysterectomy p/w shortness of breath this morning.  Pt states for past three days pt had intermittent left sided chest wall pain which occurs with deep inhalation, twisting upper body and positional change.  Pt states she has had same pain intermittently for past few years for which she had been evaluated at [facility] ED with normal work up.  Pt states today while getting ready, pt developed shortness of breath without any associated diaphoresis, lightheadedness, wheezing.  Pt states symptoms upon exiting home and "getting fresh air."  + Recent long car ride to and from [place] ~ 3 weeks ago.  Pt denies any fevers, chills, nausea, vomiting, calf pain/swelling, h/o dvt/pe in past, hemoptysis, abdominal pain, h/o malignancy, recent surgeries, OCP use, illicit drug use, 1st degree family history of heart disease.  Pt also notes right sided neck pain which pt attributes to "sleeping wrong," which worsens with ranging neck.  + Family history dvt in uncle  Pt also notes heartburn after eating and when lying down which pt states is identical to heartburn in the past.  Pt denies blurry vision, but states she had spots in her eyes for several months, which is being managed by an ophthalmologist

## 2018-07-25 NOTE — ED ADULT TRIAGE NOTE - CHIEF COMPLAINT QUOTE
Pt. c/o right neck, left chest and abdominal pain x 1 week associated with increased SOB and blurry vision. Also c/o acid reflex with nausea. Denies vomiting, diarrhea or fever. PMHx HTN, asthma, GERD and HIV

## 2018-07-25 NOTE — ED ADULT NURSE REASSESSMENT NOTE - NS ED NURSE REASSESS COMMENT FT1
RN Break Coverage: received report on pt. pt presents awake a&ox4, denies dizziness or ha. skin warm, dry, appropriate for race. respirations even, unlabored. denies cp or sob. abdomen soft, nontender nondistended. denies n/v/d. denies fevers or chills. IVL site clean, dry, intact, patent. pt medicated as ordered. cardiac monitor in place in nsr. safety maintained. pending disposition.

## 2018-07-25 NOTE — ED PROVIDER NOTE - MUSCULOSKELETAL NECK EXAM
right sided neck tenderness at superolateral aspect of trapezius; no warmth; no redness; no crepitus; no skin changes; no midline neck tenderness

## 2018-07-25 NOTE — ED PROVIDER NOTE - CARE PLAN
Principal Discharge DX:	Chest wall pain  Assessment and plan of treatment:	Advance activity as tolerated.  Continue all previously prescribed medications as directed unless otherwise instructed.  Take Tylenol 650mg (Two 325 mg pills) every 4-6 hours as needed for pain or fevers. Take Pepcid 20 mg twice a day as needed for indigestion. Follow up with your primary care physician, gastroenterology, cardiology (referral list provided) in 48-72 hours- bring copies of your results.  Return to the ER for worsening or persistent symptoms, including but not limited to worsening/persistent pain, shortness of breath, lightheadedness, and/or ANY NEW OR CONCERNING SYMPTOMS. If you have issues obtaining follow up, please call: [phone] to obtain a doctor or specialist who takes your insurance in your area.  You may call [phone] to make an appointment with the internal medicine clinic.  Secondary Diagnosis:	GERD (gastroesophageal reflux disease)

## 2018-07-25 NOTE — ED PROVIDER NOTE - ATTENDING CONTRIBUTION TO CARE
gurdeep: pmh as noted.   pt notes acute onset shortness of breath for several minutes along with intermittent left chest discomfort. the chest discomfort has been occurring 1-2x/month for years. had a normal stress test 'years' ago.   also: pt had rt ear pain several days prior which resolved, and pt now has discomfort to rt neck.  no other acute complaints. no hx VTE. pt has an uncle who had DVT  in the past.  exam: there is diffuse chest wall tenderness including mckenzie chest wall. otherwise unremarkable including rt neck.   Will obtain labs including ddimer and CXR.

## 2018-07-25 NOTE — ED PROVIDER NOTE - MEDICAL DECISION MAKING DETAILS
Pt is a 53 F smoker PMHx HTN, HIV (On Atripla, viral load not detectable, CD4 in 2000s), GERD, Asthma, hysterectomy p/w shortness of breath this morning -- likely musculoskeletal neck pain and chest wall pain, low risk for PE, no e/o DVT -- labs, d dimer, cxr, pain control, ekg, trop

## 2018-07-25 NOTE — ED PROVIDER NOTE - NONTENDER LOCATION
periumbilical/left costovertebral angle/right upper quadrant/suprapubic/left upper quadrant/left lower quadrant/right lower quadrant/right costovertebral angle/umbilical

## 2018-07-25 NOTE — ED PROVIDER NOTE - PLAN OF CARE
Advance activity as tolerated.  Continue all previously prescribed medications as directed unless otherwise instructed.  Take Tylenol 650mg (Two 325 mg pills) every 4-6 hours as needed for pain or fevers. Take Pepcid 20 mg twice a day as needed for indigestion. Follow up with your primary care physician, gastroenterology, cardiology (referral list provided) in 48-72 hours- bring copies of your results.  Return to the ER for worsening or persistent symptoms, including but not limited to worsening/persistent pain, shortness of breath, lightheadedness, and/or ANY NEW OR CONCERNING SYMPTOMS. If you have issues obtaining follow up, please call: [phone] to obtain a doctor or specialist who takes your insurance in your area.  You may call [phone] to make an appointment with the internal medicine clinic.

## 2018-07-26 DIAGNOSIS — Z71.89 OTHER SPECIFIED COUNSELING: ICD-10-CM

## 2018-08-01 ENCOUNTER — OUTPATIENT (OUTPATIENT)
Dept: OUTPATIENT SERVICES | Facility: HOSPITAL | Age: 53
LOS: 1 days | End: 2018-08-01
Payer: MEDICAID

## 2018-08-01 DIAGNOSIS — Z90.710 ACQUIRED ABSENCE OF BOTH CERVIX AND UTERUS: Chronic | ICD-10-CM

## 2018-08-01 PROCEDURE — G9001: CPT

## 2018-08-27 NOTE — ED ADULT NURSE NOTE - ILLNESS RECENT EXPOSURE
ASPEN AVILEZ 88y [id number]    Patient is a 88y old  Male who presents with a chief complaint of SOB (26 Aug 2018 14:27)      Follow Up/CC:  ID following for bacteremia    Interval History/ROS: better, no acute events, planning for hospice    Allergies    No Known Allergies    Intolerances        ANTIMICROBIALS:  piperacillin/tazobactam IVPB. 3.375 every 8 hours      MEDICATIONS  (STANDING):  dextrose 5%. 1000 milliLiter(s) (50 mL/Hr) IV Continuous <Continuous>  dextrose 50% Injectable 12.5 Gram(s) IV Push once  dextrose 50% Injectable 25 Gram(s) IV Push once  dextrose 50% Injectable 25 Gram(s) IV Push once  insulin lispro (HumaLOG) corrective regimen sliding scale   SubCutaneous three times a day before meals  insulin lispro (HumaLOG) corrective regimen sliding scale   SubCutaneous at bedtime  piperacillin/tazobactam IVPB. 3.375 Gram(s) IV Intermittent every 8 hours    MEDICATIONS  (PRN):  acetaminophen  Suppository 650 milliGRAM(s) Rectal every 6 hours PRN For Temp greater than 38 C (100.4 F)  acetaminophen  Suppository. 650 milliGRAM(s) Rectal every 6 hours PRN Mild Pain (1 - 3)  dextrose 40% Gel 15 Gram(s) Oral once PRN Blood Glucose LESS THAN 70 milliGRAM(s)/deciliter  glucagon  Injectable 1 milliGRAM(s) IntraMuscular once PRN Glucose LESS THAN 70 milligrams/deciliter  HYDROmorphone  Injectable 0.2 milliGRAM(s) IV Push every 3 hours PRN tachypnic / sob  ondansetron Injectable 4 milliGRAM(s) IV Push every 6 hours PRN Nausea        Vital Signs Last 24 Hrs  T(C): 36.6 (27 Aug 2018 13:41), Max: 37.1 (26 Aug 2018 21:14)  T(F): 97.8 (27 Aug 2018 13:41), Max: 98.8 (26 Aug 2018 21:14)  HR: 81 (27 Aug 2018 13:41) (73 - 81)  BP: 125/82 (27 Aug 2018 13:41) (125/82 - 138/64)  BP(mean): --  RR: 18 (27 Aug 2018 13:41) (18 - 18)  SpO2: 98% (27 Aug 2018 13:41) (95% - 100%)    CBC Full  -  ( 27 Aug 2018 06:17 )  WBC Count : 7.63 K/uL  Hemoglobin : 12.1 g/dL  Hematocrit : 37.3 %  Platelet Count - Automated : 179 K/uL  Mean Cell Volume : 86.9 fL  Mean Cell Hemoglobin : 28.2 pg  Mean Cell Hemoglobin Concentration : 32.4 %  Auto Neutrophil # : x  Auto Lymphocyte # : x  Auto Monocyte # : x  Auto Eosinophil # : x  Auto Basophil # : x  Auto Neutrophil % : x  Auto Lymphocyte % : x  Auto Monocyte % : x  Auto Eosinophil % : x  Auto Basophil % : x    08-27    138  |  107  |  8   ----------------------------<  98  3.6   |  21<L>  |  1.36<H>    Ca    8.5      27 Aug 2018 06:17  Phos  2.9     08-27  Mg     1.9     08-27            MICROBIOLOGY:  BLOOD PERIPHERAL  08-26-18 --  --  --      BLOOD PERIPHERAL  08-25-18 --  --  --      BLOOD PERIPHERAL  08-22-18 --  --  Klebsiella pneumoniae      URINE MIDSTREAM  08-20-18 --  --  --      BLOOD PERIPHERAL  08-20-18 --  --  BLOOD CULTURE PCR  Escherichia coli              v            RADIOLOGY None known

## 2018-08-28 ENCOUNTER — EMERGENCY (EMERGENCY)
Facility: HOSPITAL | Age: 53
LOS: 1 days | Discharge: HOME | End: 2018-08-28
Attending: EMERGENCY MEDICINE | Admitting: EMERGENCY MEDICINE
Payer: MEDICAID

## 2018-08-28 VITALS
TEMPERATURE: 98 F | HEART RATE: 81 BPM | SYSTOLIC BLOOD PRESSURE: 158 MMHG | RESPIRATION RATE: 17 BRPM | OXYGEN SATURATION: 100 % | DIASTOLIC BLOOD PRESSURE: 98 MMHG

## 2018-08-28 DIAGNOSIS — Z90.710 ACQUIRED ABSENCE OF BOTH CERVIX AND UTERUS: Chronic | ICD-10-CM

## 2018-08-28 LAB
ALBUMIN SERPL ELPH-MCNC: 4.4 G/DL — SIGNIFICANT CHANGE UP (ref 3.3–5)
ALP SERPL-CCNC: 145 U/L — HIGH (ref 40–120)
ALT FLD-CCNC: 34 U/L — HIGH (ref 4–33)
APPEARANCE UR: CLEAR — SIGNIFICANT CHANGE UP
AST SERPL-CCNC: 21 U/L — SIGNIFICANT CHANGE UP (ref 4–32)
BACTERIA # UR AUTO: SIGNIFICANT CHANGE UP
BASE EXCESS BLDV CALC-SCNC: -1.1 MMOL/L — SIGNIFICANT CHANGE UP
BASOPHILS # BLD AUTO: 0.05 K/UL — SIGNIFICANT CHANGE UP (ref 0–0.2)
BASOPHILS NFR BLD AUTO: 0.9 % — SIGNIFICANT CHANGE UP (ref 0–2)
BILIRUB SERPL-MCNC: 0.3 MG/DL — SIGNIFICANT CHANGE UP (ref 0.2–1.2)
BILIRUB UR-MCNC: NEGATIVE — SIGNIFICANT CHANGE UP
BLOOD GAS VENOUS - CREATININE: 0.68 MG/DL — SIGNIFICANT CHANGE UP (ref 0.5–1.3)
BLOOD UR QL VISUAL: SIGNIFICANT CHANGE UP
BUN SERPL-MCNC: 9 MG/DL — SIGNIFICANT CHANGE UP (ref 7–23)
CALCIUM SERPL-MCNC: 9.3 MG/DL — SIGNIFICANT CHANGE UP (ref 8.4–10.5)
CHLORIDE BLDV-SCNC: 106 MMOL/L — SIGNIFICANT CHANGE UP (ref 96–108)
CHLORIDE SERPL-SCNC: 100 MMOL/L — SIGNIFICANT CHANGE UP (ref 98–107)
CHOLEST SERPL-MCNC: 126 MG/DL — SIGNIFICANT CHANGE UP (ref 120–199)
CK SERPL-CCNC: 326 U/L — HIGH (ref 25–170)
CO2 SERPL-SCNC: 24 MMOL/L — SIGNIFICANT CHANGE UP (ref 22–31)
COLOR SPEC: YELLOW — SIGNIFICANT CHANGE UP
CREAT SERPL-MCNC: 0.71 MG/DL — SIGNIFICANT CHANGE UP (ref 0.5–1.3)
EOSINOPHIL # BLD AUTO: 0.23 K/UL — SIGNIFICANT CHANGE UP (ref 0–0.5)
EOSINOPHIL NFR BLD AUTO: 3.9 % — SIGNIFICANT CHANGE UP (ref 0–6)
EPI CELLS # UR: SIGNIFICANT CHANGE UP
GAS PNL BLDV: 135 MMOL/L — LOW (ref 136–146)
GLUCOSE BLDV-MCNC: 114 — HIGH (ref 70–99)
GLUCOSE SERPL-MCNC: 104 MG/DL — HIGH (ref 70–99)
GLUCOSE UR-MCNC: NEGATIVE — SIGNIFICANT CHANGE UP
HBA1C BLD-MCNC: 5.9 % — HIGH (ref 4–5.6)
HCG UR-SCNC: NEGATIVE — SIGNIFICANT CHANGE UP
HCO3 BLDV-SCNC: 23 MMOL/L — SIGNIFICANT CHANGE UP (ref 20–27)
HCT VFR BLD CALC: 42.9 % — SIGNIFICANT CHANGE UP (ref 34.5–45)
HCT VFR BLDV CALC: 44.7 % — SIGNIFICANT CHANGE UP (ref 34.5–45)
HDLC SERPL-MCNC: 35 MG/DL — LOW (ref 45–65)
HGB BLD-MCNC: 14.1 G/DL — SIGNIFICANT CHANGE UP (ref 11.5–15.5)
HGB BLDV-MCNC: 14.6 G/DL — SIGNIFICANT CHANGE UP (ref 11.5–15.5)
IMM GRANULOCYTES # BLD AUTO: 0.01 # — SIGNIFICANT CHANGE UP
IMM GRANULOCYTES NFR BLD AUTO: 0.2 % — SIGNIFICANT CHANGE UP (ref 0–1.5)
KETONES UR-MCNC: NEGATIVE — SIGNIFICANT CHANGE UP
LACTATE BLDV-MCNC: 1.7 MMOL/L — SIGNIFICANT CHANGE UP (ref 0.5–2)
LEUKOCYTE ESTERASE UR-ACNC: NEGATIVE — SIGNIFICANT CHANGE UP
LIPID PNL WITH DIRECT LDL SERPL: 71 MG/DL — SIGNIFICANT CHANGE UP
LYMPHOCYTES # BLD AUTO: 2.13 K/UL — SIGNIFICANT CHANGE UP (ref 1–3.3)
LYMPHOCYTES # BLD AUTO: 36.5 % — SIGNIFICANT CHANGE UP (ref 13–44)
MCHC RBC-ENTMCNC: 29.9 PG — SIGNIFICANT CHANGE UP (ref 27–34)
MCHC RBC-ENTMCNC: 32.9 % — SIGNIFICANT CHANGE UP (ref 32–36)
MCV RBC AUTO: 90.9 FL — SIGNIFICANT CHANGE UP (ref 80–100)
MONOCYTES # BLD AUTO: 0.32 K/UL — SIGNIFICANT CHANGE UP (ref 0–0.9)
MONOCYTES NFR BLD AUTO: 5.5 % — SIGNIFICANT CHANGE UP (ref 2–14)
NEUTROPHILS # BLD AUTO: 3.1 K/UL — SIGNIFICANT CHANGE UP (ref 1.8–7.4)
NEUTROPHILS NFR BLD AUTO: 53 % — SIGNIFICANT CHANGE UP (ref 43–77)
NITRITE UR-MCNC: NEGATIVE — SIGNIFICANT CHANGE UP
NRBC # FLD: 0 — SIGNIFICANT CHANGE UP
PCO2 BLDV: 41 MMHG — SIGNIFICANT CHANGE UP (ref 41–51)
PH BLDV: 7.38 PH — SIGNIFICANT CHANGE UP (ref 7.32–7.43)
PH UR: 6.5 — SIGNIFICANT CHANGE UP (ref 5–8)
PLATELET # BLD AUTO: 450 K/UL — HIGH (ref 150–400)
PMV BLD: 9 FL — SIGNIFICANT CHANGE UP (ref 7–13)
PO2 BLDV: 115 MMHG — HIGH (ref 35–40)
POTASSIUM BLDV-SCNC: 3.6 MMOL/L — SIGNIFICANT CHANGE UP (ref 3.4–4.5)
POTASSIUM SERPL-MCNC: 3.7 MMOL/L — SIGNIFICANT CHANGE UP (ref 3.5–5.3)
POTASSIUM SERPL-SCNC: 3.7 MMOL/L — SIGNIFICANT CHANGE UP (ref 3.5–5.3)
PROT SERPL-MCNC: 7.6 G/DL — SIGNIFICANT CHANGE UP (ref 6–8.3)
PROT UR-MCNC: NEGATIVE — SIGNIFICANT CHANGE UP
RBC # BLD: 4.72 M/UL — SIGNIFICANT CHANGE UP (ref 3.8–5.2)
RBC # FLD: 13.6 % — SIGNIFICANT CHANGE UP (ref 10.3–14.5)
RBC CASTS # UR COMP ASSIST: SIGNIFICANT CHANGE UP (ref 0–?)
SAO2 % BLDV: 98.1 % — HIGH (ref 60–85)
SODIUM SERPL-SCNC: 138 MMOL/L — SIGNIFICANT CHANGE UP (ref 135–145)
SP GR SPEC: 1.02 — SIGNIFICANT CHANGE UP (ref 1–1.04)
TRIGL SERPL-MCNC: 123 MG/DL — SIGNIFICANT CHANGE UP (ref 10–149)
TROPONIN T, HIGH SENSITIVITY: < 6 NG/L — SIGNIFICANT CHANGE UP (ref ?–14)
TROPONIN T, HIGH SENSITIVITY: < 6 NG/L — SIGNIFICANT CHANGE UP (ref ?–14)
UROBILINOGEN FLD QL: 0.2 — SIGNIFICANT CHANGE UP
WBC # BLD: 5.84 K/UL — SIGNIFICANT CHANGE UP (ref 3.8–10.5)
WBC # FLD AUTO: 5.84 K/UL — SIGNIFICANT CHANGE UP (ref 3.8–10.5)
WBC UR QL: SIGNIFICANT CHANGE UP (ref 0–?)

## 2018-08-28 PROCEDURE — 71045 X-RAY EXAM CHEST 1 VIEW: CPT | Mod: 26

## 2018-08-28 PROCEDURE — 70498 CT ANGIOGRAPHY NECK: CPT | Mod: 26

## 2018-08-28 PROCEDURE — 70496 CT ANGIOGRAPHY HEAD: CPT | Mod: 26

## 2018-08-28 PROCEDURE — 99218: CPT

## 2018-08-28 PROCEDURE — 70450 CT HEAD/BRAIN W/O DYE: CPT | Mod: 26

## 2018-08-28 PROCEDURE — 99284 EMERGENCY DEPT VISIT MOD MDM: CPT | Mod: GC

## 2018-08-28 RX ORDER — ONDANSETRON 8 MG/1
4 TABLET, FILM COATED ORAL EVERY 4 HOURS
Qty: 0 | Refills: 0 | Status: DISCONTINUED | OUTPATIENT
Start: 2018-08-28 | End: 2018-09-01

## 2018-08-28 RX ORDER — MECLIZINE HCL 12.5 MG
25 TABLET ORAL EVERY 6 HOURS
Qty: 0 | Refills: 0 | Status: DISCONTINUED | OUTPATIENT
Start: 2018-08-28 | End: 2018-09-01

## 2018-08-28 RX ORDER — DIPHENHYDRAMINE HCL 50 MG
25 CAPSULE ORAL ONCE
Qty: 0 | Refills: 0 | Status: DISCONTINUED | OUTPATIENT
Start: 2018-08-28 | End: 2018-08-28

## 2018-08-28 RX ORDER — DIPHENHYDRAMINE HCL 50 MG
50 CAPSULE ORAL EVERY 6 HOURS
Qty: 0 | Refills: 0 | Status: DISCONTINUED | OUTPATIENT
Start: 2018-08-28 | End: 2018-09-01

## 2018-08-28 RX ORDER — FAMOTIDINE 10 MG/ML
20 INJECTION INTRAVENOUS ONCE
Qty: 0 | Refills: 0 | Status: COMPLETED | OUTPATIENT
Start: 2018-08-28 | End: 2018-08-28

## 2018-08-28 RX ORDER — ALPRAZOLAM 0.25 MG
0.5 TABLET ORAL ONCE
Qty: 0 | Refills: 0 | Status: DISCONTINUED | OUTPATIENT
Start: 2018-08-28 | End: 2018-08-28

## 2018-08-28 RX ORDER — ONDANSETRON 8 MG/1
4 TABLET, FILM COATED ORAL ONCE
Qty: 0 | Refills: 0 | Status: COMPLETED | OUTPATIENT
Start: 2018-08-28 | End: 2018-08-28

## 2018-08-28 RX ORDER — MECLIZINE HCL 12.5 MG
25 TABLET ORAL ONCE
Qty: 0 | Refills: 0 | Status: COMPLETED | OUTPATIENT
Start: 2018-08-28 | End: 2018-08-28

## 2018-08-28 RX ORDER — DIPHENHYDRAMINE HCL 50 MG
25 CAPSULE ORAL ONCE
Qty: 0 | Refills: 0 | Status: COMPLETED | OUTPATIENT
Start: 2018-08-28 | End: 2018-08-28

## 2018-08-28 RX ORDER — SODIUM CHLORIDE 9 MG/ML
1000 INJECTION, SOLUTION INTRAVENOUS ONCE
Qty: 0 | Refills: 0 | Status: COMPLETED | OUTPATIENT
Start: 2018-08-28 | End: 2018-08-28

## 2018-08-28 RX ADMIN — Medication 25 MILLIGRAM(S): at 12:15

## 2018-08-28 RX ADMIN — Medication 0.5 MILLIGRAM(S): at 17:51

## 2018-08-28 RX ADMIN — Medication 30 MILLILITER(S): at 21:25

## 2018-08-28 RX ADMIN — Medication 25 MILLIGRAM(S): at 09:09

## 2018-08-28 RX ADMIN — ONDANSETRON 4 MILLIGRAM(S): 8 TABLET, FILM COATED ORAL at 09:09

## 2018-08-28 RX ADMIN — SODIUM CHLORIDE 1000 MILLILITER(S): 9 INJECTION, SOLUTION INTRAVENOUS at 09:09

## 2018-08-28 RX ADMIN — SODIUM CHLORIDE 1000 MILLILITER(S): 9 INJECTION, SOLUTION INTRAVENOUS at 10:14

## 2018-08-28 RX ADMIN — FAMOTIDINE 20 MILLIGRAM(S): 10 INJECTION INTRAVENOUS at 11:50

## 2018-08-28 RX ADMIN — Medication 50 MILLIGRAM(S): at 21:25

## 2018-08-28 NOTE — ED ADULT NURSE REASSESSMENT NOTE - NS ED NURSE REASSESS COMMENT FT1
Receive report from Frank GELLER, pt is alert and oriented x 3 c/o headache. and nausea.  Denies dizziness, chills, chest pain, sob.  IV intact.  VS see charting.  Meds given.  NS in progress

## 2018-08-28 NOTE — ED PROVIDER NOTE - OBJECTIVE STATEMENT
Pt is a 53 Y F with hx of HTN, HIV states her viral load undetectable for years still taking her meds, GERD, asthma, who presents stating that she just doesn't feel right. She was triaged as headache but patient states I don't have a headache I just feel like I am light headed and dizzy. She says that her neck feels a little weak but not stiff or sore. She noticed her BP was higher than normal so she took an extra blood pressure medication yesterday, she says she has been feeling off for 3 days or so. She admits to mild photophobia. She says she has a history of meningitis and this feels nothing like that. She denies fever, chills, LOC, SOB. She says she had some mild chest pain yesterday. She admits to feeling nauseous without any vomiting. She said that she decided to come in this morning because she felt like she was going to pass out this morning when she stood up. She has no hx of ACS but was worked up for chest pain last month without any disease identified.

## 2018-08-28 NOTE — ED CDU PROVIDER INITIAL DAY NOTE - MEDICAL DECISION MAKING DETAILS
PT is a 53 y.o female with HTN, GERD, HIV (viral load undetectable) w/o dizziness and elevated BP at home. Sent to CDU for; neuro checks, Labs, MRI/MRA's, tele monitoring and frequent re-evals.

## 2018-08-28 NOTE — ED ADULT NURSE NOTE - OBJECTIVE STATEMENT
53y F AaOx3 c/o of a headache and high blood pressure. pt states that yesterday she was at a bbq and when he left she started having a headache and becoming nausea. PT denies vomiting/diarrhea. pt denies photophobia, pressure behind the eyes, and dizziness. Pt eyes PERRLA. pt noted with some grimacing upon palpation when assessing her abdomen. pt states it is a 6/10 non radiating. pt denies CP/palpitations/SOB. pt labs collected and sent.

## 2018-08-28 NOTE — ED CDU PROVIDER INITIAL DAY NOTE - MUSCULOSKELETAL, MLM
Spine appears normal, range of motion is not limited, no muscle or joint tenderness. No midline tenderness. No crepitus or obvious deformities. No LE swelling.

## 2018-08-28 NOTE — CONSULT NOTE ADULT - ASSESSMENT
52 y/o RH AA woman w/ PMH HIV (well-managed on HAART) and HTN c/o dizziness/imbalance, LKN 11 AM yesterday, a/w nausea and phonophobia, in the setting of possible recent ear infection. Neurologic exam significant only for fatigable horizontal nystagmus to the right with no ataxia, dysdiadochokinesia, or other focal deficits noted. CTH shows no acute pathology.    Impression: Symptoms are vague, however based on description may represent peripheral vertigo (ie from vestibular neuronitis) vs basilar migraine (given a/w phonophobia, nausea, and headache) vs orthostatic hypotension, lower suspicion for central etiology given non-focal neurologic exam.     Recommend:  - C/w symptomatic management as needed (meclizine or valium PRN, IVF)  - Consider checking orthostatic vitals   - Neurology will follow up MRI results if performed (gadolinium not required for MRI brain)  - Upon discharge, patient should follow up in neurology clinic at [facility] [phone] 54 y/o RH AA woman w/ PMH HIV (well-managed on HAART) and HTN c/o dizziness/imbalance, LKN 11 AM yesterday, a/w nausea and phonophobia, in the setting of possible recent ear infection. Neurologic exam significant only for fatigable horizontal nystagmus to the right with no ataxia, dysdiadochokinesia, or other focal deficits noted. CTH shows no acute pathology.    Impression: Symptoms are vague, however based on description may represent peripheral vertigo (ie from vestibular neuronitis) vs basilar migraine (given a/w phonophobia, nausea, and headache) vs orthostatic hypotension, lower suspicion for central etiology given non-focal neurologic exam.     Recommend:  - C/w symptomatic management as needed (meclizine or valium PRN, IVF)  - Consider checking orthostatic vitals   - Neurology will follow up MRI results if performed (gadolinium not required for MRI brain to r/o infarct)  - Upon discharge, patient should follow up in neurology clinic at [facility] [phone]

## 2018-08-28 NOTE — ED PROVIDER NOTE - PHYSICAL EXAMINATION
Neuro: A&Ox4, MS +5/5 in UE and LE BL, gross sensation intact in UE and LE BL, gait UNSTEADY but coordinated, POSITIVE rhomberg, negative pronator drift

## 2018-08-28 NOTE — ED CDU PROVIDER INITIAL DAY NOTE - PROGRESS NOTE DETAILS
Patient feeling well at this time. Patient was to undergo MRI/MRA's, patient pre-medicated with xanax but as per MRI tech patient too claustrophobic to tolerate imaging. Pt requesting study be performed outpatient. We offered to provide patient with medications to encourage MRI/MRA's but patient still refused. Will order CTA's head and neck instead. Pt to be monitored overnight on tele and pending AM labs, Echo. This patient was signed out to me by CDU day Winston Ortiz and CDU attending Dr. Sylvester at 1900 hrs; test results and orders reviewed and plan of care discussed.  CE x 2:  <6 ---> <6, EKGs consistent with last on file in [place] EMR; CXR negative, CT head negative for acute pathology.  Neuro was consulted; MRI/A was ordered but pt refused study and CTA head/neck was subsequently ordered by the day team (results pending at time of this note).  CDU overnight plan:  Cardiac tele monitoring, neuro checks per orders, follow up CTA results, echocardiogram (8/29 daytime), Neuro team reassessment, general monitoring and observation care.  In the interim, pt has been objectively noted to be resting comfortably.  Pt. verbalizes agreement with CDU plan of care; pt stable at present with benign exam; will continue to monitor.

## 2018-08-28 NOTE — ED CDU PROVIDER INITIAL DAY NOTE - NEUROLOGICAL, MLM
Alert and oriented, no focal deficits, no motor or sensory deficits. No slurred speech, no facial droop. Normal 5/5 strength UE/LE b/l.

## 2018-08-28 NOTE — ED CDU PROVIDER INITIAL DAY NOTE - OBJECTIVE STATEMENT
ED HPI: Pt is a 53 Y F with hx of HTN, HIV states her viral load undetectable for years still taking her meds, GERD, asthma, who presents stating that she just doesn't feel right. She was triaged as headache but patient states I don't have a headache I just feel like I am light headed and dizzy. She says that her neck feels a little weak but not stiff or sore. She noticed her BP was higher than normal so she took an extra blood pressure medication yesterday, she says she has been feeling off for 3 days or so. She admits to mild photophobia. She says she has a history of meningitis and this feels nothing like that. She denies fever, chills, LOC, SOB. She says she had some mild chest pain yesterday. She admits to feeling nauseous without any vomiting. She said that she decided to come in this morning because she felt like she was going to pass out this morning when she stood up. She has no hx of ACS but was worked up for chest pain last month without any disease identified      HPI CDU- (KRISTYN Robertson) Patient is a 53 y.o female w/ PMHx of HTN, GERD, HIV (viral load undetectable), asthma who presents to ED c/o feeling dizzy and lightheaded at home. Patient admits she felt like she may pass out, stating "just having been feeling well". Pt also states her BP was elevated at home and she had slight headache. Pt admitted to ED team of having some photophobia, but denied any visual changes or loss of vision to CDU team. Pt treated symptomatically in ED for HA w. some relief. Neuro was consulted by ED team and patient seen/evaluated. Pt sent to CDU for; neuro checks, MRI/MRA's brain and neck, vitals q4 and frequent re-evals.  Denies cp, sob, n/v/d, abdominal pain, numbness or tingling, weakness, neck pain, back pain, recent trauma or falls, use of blood thinners, pleuritic pain, slurred speech, room spinning, fevers, chills, recent travel or any other complaints. HPI above was reviewed.

## 2018-08-28 NOTE — ED PROVIDER NOTE - ATTENDING CONTRIBUTION TO CARE
I performed a face to face bedside interview with patient regarding history of present illness, review of symptoms and past medical history. I completed an independent physical exam.  I have discussed patient's plan of care.   I agree with note as stated above, having amended the EMR as needed to reflect my findings. I have discussed the assessment and plan of care.  This includes during the time I functioned as the attending physician for this patient.  Attending Contribution to Care: agree with plan of resident. HTN, HIV states her viral load undetectable for years still taking her meds, GERD, asthma, who presents stating that she just doesn't feel right. She was triaged as headache but patient states I don't have a headache I just feel like I am light headed and dizzy. denies any localization of symptoms. feels sig improvement by personal assessment with no undetectable viral load, normal cd4, pt with no signs of meningismus, ams, cp , infectous  process. will obs for arrythmia. tele monitoring.

## 2018-08-28 NOTE — CONSULT NOTE ADULT - SUBJECTIVE AND OBJECTIVE BOX
Neurology Consult Note    Name  CHRISTOPHER VERDUZCO    HPI:  52 y/o RH AA woman w/ PMH HIV (well-managed on HAART) and HTN c/o dizziness, LKN 11 AM yesterday. Pt woke up on  in usual state of health, ate breakfast, and went to sleep again at 11 AM. When she awoke she felt dizzy, which she describes as imbalance and trouble ambulating. She denies room spinning sensation. This was associated with a holocephalic headache, phonophobia, and nausea. No vomiting or photophobia. No history of migraines. Was prescribed a new antihypertensive med last week which she took once, also made her feel imbalanced, but has not taken it since. Had pain in her L ear last week, was prescribed drops. Denies tinnitus. No longer has a headache and symptoms have improved after IV fluids and meclizine. Denies visual changes, focal numbness or weakness, or speech changes. Denies fever or chills.     Review of Systems:  CONSTITUTIONAL:  As above  HEENT:  Eyes:  No visual loss, blurred vision, double vision or yellow sclerae. Ears, Nose, Throat:  No hearing loss, sneezing, congestion, runny nose or sore throat.  SKIN:  No rash or itching.  CARDIOVASCULAR:  No chest pain, chest pressure or chest discomfort. No palpitations or edema.  RESPIRATORY:  No shortness of breath, cough or sputum.  GASTROINTESTINAL:  No anorexia, nausea, vomiting or diarrhea. No abdominal pain or blood.  GENITOURINARY: No Burning on urination.   NEUROLOGICAL:  see HPI  MUSCULOSKELETAL:  No muscle, back pain, joint pain or stiffness.  HEMATOLOGIC:  No anemia, bleeding or bruising.  LYMPHATICS:  No enlarged nodes. No history of splenectomy.  PSYCHIATRIC:  No history of depression or anxiety.  ENDOCRINOLOGIC:  No reports of sweating, cold or heat intolerance. No polyuria or polydipsia.  ALLERGIES:  No history of asthma, hives, eczema or rhinitis.    MEDICATIONS  (STANDING):  · 	famotidine 20 mg oral tablet: 1 tab(s) orally 2 times a day, As Needed - for indigestion  · 	predniSONE 20 mg oral tablet: 2 tab(s) orally once a day   · 	omeprazole 20 mg oral delayed release capsule: 1 cap(s) orally once a day   · 	Tylenol 325 mg oral tablet: 1 tab(s) orally 4 times a day  · 	Benadryl 25 mg oral capsule: 1 cap(s) orally every 6 hours  · 	Pepcid 40 mg oral tablet: 1 tab(s) orally once a day  · 	ondansetron 4 mg oral tablet: 1 tab(s) orally 3 times a day, As needed, Nausea and/or Vomiting  · 	Atripla 600 mg-200 mg-300 mg oral tablet: 1 tab(s) orally once a day (at bedtime)  · 	amlodipine 10 mg oral tablet: 1 tab(s) orally once a day  · 	albuterol CFC free 90 mcg/inh inhalation aerosol:  inhaled   · 	Geritol oral tablet: 1 tab(s) orally once a day    Allergies  No Known Allergies    PAST MEDICAL & SURGICAL HISTORY:  HPV (human papilloma virus) infection  GERD (gastroesophageal reflux disease)  Osteoarthritis  HIV disease  Asthma  H/O abdominal hysterectomy  Benign hypertension  S/P hysterectomy  Benign hypertension    FH: Mother  of MI/stroke    SH: Current cigarette smoker, denies EtOH or drug use    Objective:   Vital Signs Last 24 Hrs  T(C): 36.7 (28 Aug 2018 08:25), Max: 36.7 (28 Aug 2018 06:32)  T(F): 98 (28 Aug 2018 08:25), Max: 98.1 (28 Aug 2018 06:32)  HR: 68 (28 Aug 2018 08:25) (68 - 81)  BP: 144/81 (28 Aug 2018 08:25) (144/81 - 158/98)  RR: 18 (28 Aug 2018 08:25) (17 - 18)  SpO2: 100% (28 Aug 2018 08:25) (100% - 100%)    General Exam:   General appearance: No acute distress                   Neurological Exam:  Mental Status: AAOx3, speech fluent, follows commands    Cranial Nerves: PERRL, EOMI, VFF, few beats of horizontal nystagmus on R lateral gaze, fatigable. CN V1-3 intact to light touch.  No facial asymmetry.  Hearing intact to finger rub bilaterally.  Tongue, uvula and palate midline.  Sternocleidomastoid and Trapezius intact bilaterally.    Motor:   Tone: normal.                  Strength:     [] Upper extremity                      Delt       Bicep    Tricep                                                  R         5/5        5/5        5/5       5/5                                               L          5/5        5/5        5/5       5/5  [] Lower extremity                       HF          KE          KF        DF         PF                                               R        5/5        5/5        5/5       5/5       5/5                                               L         5/5        5/5       5/5       5/5        5/5  Pronator drift: none                 Tremor: No resting, postural or action tremor.  No myoclonus.    Sensation: intact to light touch x 4 extremities. No extinction on DSS.    Coord: No ataxia or dysmetria on FTN or HTS b/l. No dysdiadochokinesia.     Gait: Normal      Other:                        14.1   5.84  )-----------( 450      ( 28 Aug 2018 07:40 )             42.9         138  |  100  |  9   ----------------------------<  104<H>  3.7   |  24  |  0.71    Ca    9.3      28 Aug 2018 07:40    TPro  7.6  /  Alb  4.4  /  TBili  0.3  /  DBili  x   /  AST  21  /  ALT  34<H>  /  AlkPhos  145<H>      LIVER FUNCTIONS - ( 28 Aug 2018 07:40 )  Alb: 4.4 g/dL / Pro: 7.6 g/dL / ALK PHOS: 145 u/L / ALT: 34 u/L / AST: 21 u/L / GGT: x             Radiology    < from: CT Head No Cont (18 @ 09:26) >    FINDINGS:   There is no CT evidence of acute intracranial hemorrhage, extra-axial   collection, mass effect, midline shift, or hydrocephalus.     There is mild cerebral volume loss. There is minimal patchy white matter   hypoattenuation which is nonspecific in etiology comment most likely   reflecting chronic microvascular ischemic changes given the patient's age.    The visualized paranasal sinuses are clear. The mastoid air cells and   middle ear cavities are clear.    The soft tissues of the scalp are unremarkable. The calvarium is intact.    Nonspecific enlargement of the adenoids of nasopharynx is noted, however   this appears decreased compared to the 2016 examination, favoring   adenoidal hypertrophy. Correlate with direct visualization findings if   clinically warranted.    IMPRESSION:     No CT evidence of acute intracranial hemorrhage, brain edema, or mass   effect.  If the patient has persistent headaches, consider brain MRI   imaging follow-up.    < end of copied text >

## 2018-08-28 NOTE — ED ADULT NURSE NOTE - NSIMPLEMENTINTERV_GEN_ALL_ED
Implemented All Universal Safety Interventions:  [place] to call system. Call bell, personal items and telephone within reach. Instruct patient to call for assistance. Room bathroom lighting operational. Non-slip footwear when patient is off stretcher. Physically safe environment: no spills, clutter or unnecessary equipment. Stretcher in lowest position, wheels locked, appropriate side rails in place.

## 2018-08-28 NOTE — ED CDU PROVIDER INITIAL DAY NOTE - DETAILS
Pt is a 53 y.o female w/ PMH of HIV (viral load undetectable), GERD, asthma , HTN who presents to ED c/o feeling lightheaded a/w headache and dizziness and elevated BP at home. Pt transferred to CDU for; MRI/MRA's, neuro checks, labs, echo, stress test and tele monitoring. Neuro following.

## 2018-08-28 NOTE — CONSULT NOTE ADULT - ATTENDING COMMENTS
PAtient seen and examined with neurology resident and above note reviewed and I agree with assessment and plan as outlined. Patient presented since last night with a sense of heaviness and lightheaded feeling in her head and worse with certain movements and improved with lying down. She had associated nausea, and light and sound sensitivity. No focal motor or sensory deficits or coordination issues.   CT head was normal and no recent fevers or infections etc..  Given that she has persistent symptoms and risk factors for ischemia would obtain MRI brain and MRA of the head and neck to rule out infarct.  Would start her on aspirin 81 mg daily and check fasting lipids, HB A1C in am and TSH and ESR and CRP.    Continue CDU care and supportive management.

## 2018-08-28 NOTE — ED CDU PROVIDER INITIAL DAY NOTE - ATTENDING CONTRIBUTION TO CARE
Nga CAMEJO- 54 Y/O F with h/o htn, gerd, hiv on haart, undetectable viral load p/w feeling dizzy , lightheaded since yesterday with imbalance and headache but no nausea, vomiting, photophobia, neck stiffness, fall or trauma. No recent travel, sick contact. No fever, chills, neck pain. No chest pain. Pt has fhx of acs and stroke and is active smoker    pt is alert, well appearing female, s1s2 normal reg, b/l clear breath sounds, abd soft, nt, nd,  normal bowel sounds, neuro exam aox3, cn 2-12 intact, 5/5 strength, no pronator drift, normal cerebellar signs, skin warm, dry, good turgor    pt feels better, per ED team, pt had + romberg earlier and could be TIA involving post circulation, ddx incudes post cva vs acs vs htn urgency     will do MRI brain and echo and serial trop

## 2018-08-28 NOTE — ED ADULT TRIAGE NOTE - TEMPERATURE IN CELSIUS (DEGREES C)
Supriya Simpson   MRN: [id number]    Department:  [facility] Emergency Department   Date of Visit:  4/26/2018           Disclosure     Insurance plans vary and the physician(s) referred by the ER may not be covered by your plan.  Ple tell this physician (or your personal doctor if your instructions are to return to your personal doctor) about any new or lasting problems. The primary care or specialist physician will see patients referred from the [facility] Emergency Department.  Juana Chacko 36.7

## 2018-08-28 NOTE — ED PROVIDER NOTE - MEDICAL DECISION MAKING DETAILS
53 Y F with hx of HIV well controlled, HTN, GERD who presents with vague complaints of dizziness, weakness, nausea, photophobia over the course of 3 days. DDx: ICH, ACS, BPPV, CVA, gastroenteritis. Will get labs including troponin, ekg without STEMI or signs of ischemia at this time, will get CT head, meclizine and fluids now to see if that improves symptoms. Likely BPPV and will DC home however given hx of HIV higher risk for ACS, doubt any infectious etiology given 3 days of symptoms.

## 2018-08-28 NOTE — ED ADULT TRIAGE NOTE - CHIEF COMPLAINT QUOTE
pt. came in c/o intermittent headache since last night, feels light-headed, nauseated (no vomiting), (+) photophobia. denies any head trauma, blurry vision nor other neuro def. PMHx: HTN, HIV, asthma

## 2018-08-29 VITALS
TEMPERATURE: 98 F | RESPIRATION RATE: 16 BRPM | OXYGEN SATURATION: 100 % | DIASTOLIC BLOOD PRESSURE: 71 MMHG | SYSTOLIC BLOOD PRESSURE: 120 MMHG | HEART RATE: 81 BPM

## 2018-08-29 DIAGNOSIS — Z71.89 OTHER SPECIFIED COUNSELING: ICD-10-CM

## 2018-08-29 PROCEDURE — 93306 TTE W/DOPPLER COMPLETE: CPT | Mod: 26

## 2018-08-29 PROCEDURE — 99217: CPT

## 2018-08-29 RX ORDER — LOSARTAN POTASSIUM 100 MG/1
150 TABLET, FILM COATED ORAL DAILY
Qty: 0 | Refills: 0 | Status: DISCONTINUED | OUTPATIENT
Start: 2018-08-29 | End: 2018-08-29

## 2018-08-29 RX ORDER — AMLODIPINE BESYLATE 2.5 MG/1
10 TABLET ORAL DAILY
Qty: 0 | Refills: 0 | Status: DISCONTINUED | OUTPATIENT
Start: 2018-08-29 | End: 2018-09-01

## 2018-08-29 RX ORDER — LOSARTAN POTASSIUM 100 MG/1
50 TABLET, FILM COATED ORAL DAILY
Qty: 0 | Refills: 0 | Status: DISCONTINUED | OUTPATIENT
Start: 2018-08-29 | End: 2018-09-01

## 2018-08-29 RX ORDER — EFAVIRENZ, EMTRICITABINE AND TENOFOVIR DISOPROXIL FUMARATE 600; 200; 300 MG/1; MG/1; MG/1
1 TABLET, FILM COATED ORAL DAILY
Qty: 0 | Refills: 0 | Status: DISCONTINUED | OUTPATIENT
Start: 2018-08-29 | End: 2018-09-01

## 2018-08-29 RX ADMIN — Medication 30 MILLILITER(S): at 10:50

## 2018-08-29 RX ADMIN — Medication 25 MILLIGRAM(S): at 05:38

## 2018-08-29 RX ADMIN — AMLODIPINE BESYLATE 10 MILLIGRAM(S): 2.5 TABLET ORAL at 10:52

## 2018-08-29 RX ADMIN — LOSARTAN POTASSIUM 50 MILLIGRAM(S): 100 TABLET, FILM COATED ORAL at 10:52

## 2018-08-29 RX ADMIN — EFAVIRENZ, EMTRICITABINE AND TENOFOVIR DISOPROXIL FUMARATE 1 TABLET(S): 600; 200; 300 TABLET, FILM COATED ORAL at 10:52

## 2018-08-29 RX ADMIN — Medication 50 MILLIGRAM(S): at 10:53

## 2018-08-29 NOTE — ED CDU PROVIDER SUBSEQUENT DAY NOTE - PMH
[address], [address]  [address] [facility]  [phone]               Thank you for choosing us for your health care visit with Lyle Morgan DO.   We are glad to serve you and happy to provide you with this summary If you've recently had a stay at the Hospital you can access your discharge instructions in [employer] by going to Visits < Admission Summaries.  If you've been to the Emergency Department or your doctor's office, you can view your past visit information in My Visit [facility] online at  [email or web address] Asthma    Benign hypertension    Cigarette smoker    GERD (gastroesophageal reflux disease)    H/O abdominal hysterectomy    HIV disease    HPV (human papilloma virus) infection    Osteoarthritis    Pruritus  (chronic)

## 2018-08-29 NOTE — ED CDU PROVIDER SUBSEQUENT DAY NOTE - HISTORY
52 yo female, PMH of HIV+ (on HAART, states viral load undetectable), asthma, HTN, GERD, HPV, osteoarthritis, cigarette smoker status, and hx/o chronic pruritus, presented to the ED c/o generalized malaise, lightheadedness, dizziness, feeling neck weakness, higher blood pressure readings, mild photophobia, chest pain, nausea, and near-syncope.  Pt. was evaluated in the ED; CE x 2:  <6 ---> <6, EKGs consistent with last on file in [place] EMR; CXR negative, CT head negative for acute pathology.  Neuro was consulted; pt was dispo'd to CDU for further care.  MRI/A was ordered but pt refused study and CTA head/neck was subsequently ordered by the CDU day team.  CTA head/neck showed: "Noncontrast CT brain: No acute intracranial hemorrhage, mass effect, or evidence of acute vascular territorial infarction....CT angiography neck: No flow-limiting stenosis, dissection or occlusion within the cervical carotid or vertebral arteries.  CT angiography brain: No major vessel occlusion or proximal stenosis."..... study also notes: "Centrilobular emphysema with biapical pleural-parenchymal scarring."  CDU plan:  Cardiac tele monitoring, neuro checks per orders, echocardiogram (8/29 daytime), Neuro team reassessment, general monitoring and observation care.  In the interim, pt has been objectively noted to be resting comfortably; no issues or change in neuro exam thus far.

## 2018-08-29 NOTE — ED CDU PROVIDER DISPOSITION NOTE - ATTENDING CONTRIBUTION TO CARE
Pt was seen and evaluated by me. Pt is a 52 y/o female PMHx of HIV+ on HAART with reports of viral load undetectable, Asthma, HTN, GERD, and OA who presented to the ED for generalized malaise and dizziness. Pt denied any fever, chills, nausea, vomiting, SOB, chest pain, or abd pain. Sent to OBS for MRI, Neuro, and echo. Pt refused and received a CTA of head and neck that showed no acute findings but did find emphysema on parts of the lungs in the images. Lungs CTA b/l, RRR, Abd soft, non-tender. No focal deficits.

## 2018-08-29 NOTE — ED CDU PROVIDER SUBSEQUENT DAY NOTE - PROGRESS NOTE DETAILS
This patient has been objectively been resting comfortably in the interim; no c/o or issues in the interim thus far.  Will continue to monitor.  Pt. will be signed out to the day CDU attending and PA at 0700 hrs. pt reports doing well, without acute complaints. Pt declined MRI yesterday however CTA negative. Plan for echo today and touch base with neuro for further reccs. pt doing well, echo and CTA reviewed, d/w neuro resident recc f.u in clinic. will dc for out patient f/u

## 2018-08-29 NOTE — ED CDU PROVIDER SUBSEQUENT DAY NOTE - ENMT NEGATIVE STATEMENT, MLM
Ears: no current ear pain and no active hearing problems. Nose: no nasal congestion and no nasal drainage. Mouth/Throat: no dysphagia, no hoarseness and no throat pain.Neck: no lumps, no pain, no stiffness and no swollen glands

## 2018-08-29 NOTE — ED CDU PROVIDER DISPOSITION NOTE - CLINICAL COURSE
52 yo female, PMH of HIV+ (on HAART, states viral load undetectable), asthma, HTN, GERD, HPV, osteoarthritis, cigarette smoker status, and hx/o chronic pruritus, presented to the ED c/o generalized malaise, lightheadedness, dizziness, feeling neck weakness, higher blood pressure readings, mild photophobia, chest pain, nausea, and near-syncope.  Pt. was evaluated in the ED; CE x 2:  <6 ---> <6, EKGs consistent with last on file in EMR, CXR negative, CT head negative for acute pathology.  Neuro was consulted; pt was dispo'd to CDU for further care.  MRI/A was ordered but pt refused study and CTA head/neck was subsequently ordered by the CDU day team.  CTA head/neck showed: "Noncontrast CT brain: No acute intracranial hemorrhage, mass effect, or evidence of acute vascular territorial infarction....CT angiography neck: No flow-limiting stenosis, dissection or occlusion within the cervical carotid or vertebral arteries.  CT angiography brain: No major vessel occlusion or proximal stenosis."..... study also notes: "Centrilobular emphysema with biapical pleural-parenchymal scarring." Echo was performed which was grossly normal. Pt reports feeling improved and well in CDU. D/w Neuro team and recc d/c home and follow up in clinic. pt agrees with plan and reports she understands all results and instructions.

## 2018-08-29 NOTE — ED CDU PROVIDER SUBSEQUENT DAY NOTE - MEDICAL DECISION MAKING DETAILS
Cardiac tele monitoring, neuro checks per orders, echocardiogram (8/29 daytime), Neuro team reassessment, general monitoring and observation care.

## 2018-08-29 NOTE — ED CDU PROVIDER SUBSEQUENT DAY NOTE - ATTENDING CONTRIBUTION TO CARE
Pt was seen and evaluated by me. Pt is a 54 y/o female PMHx of HIV+ on HAART with reports of viral load undetectable, Asthma, HTN, GERD, and OA who presented to the ED for generalized malaise and dizziness. Pt denied any fever, chills, nausea, vomiting, SOB, chest pain, or abd pain. Sent to OBS for MRI, Neuro, and echo. Pt refused and received a CTA of head and neck that showed no acute findings but did find emphysema on parts of the lungs in the images. Lungs CTA b/l, RRR, Abd soft, non-tender. No focal deficits.

## 2018-10-02 ENCOUNTER — APPOINTMENT (OUTPATIENT)
Dept: GASTROENTEROLOGY | Facility: CLINIC | Age: 53
End: 2018-10-02

## 2018-10-25 ENCOUNTER — EMERGENCY (EMERGENCY)
Facility: HOSPITAL | Age: 53
LOS: 1 days | Discharge: HOME | End: 2018-10-25
Admitting: EMERGENCY MEDICINE
Payer: MEDICAID

## 2018-10-25 VITALS
SYSTOLIC BLOOD PRESSURE: 158 MMHG | OXYGEN SATURATION: 98 % | HEART RATE: 84 BPM | DIASTOLIC BLOOD PRESSURE: 95 MMHG | RESPIRATION RATE: 20 BRPM | TEMPERATURE: 98 F

## 2018-10-25 DIAGNOSIS — Z90.710 ACQUIRED ABSENCE OF BOTH CERVIX AND UTERUS: Chronic | ICD-10-CM

## 2018-10-25 PROBLEM — L29.9 PRURITUS, UNSPECIFIED: Chronic | Status: ACTIVE | Noted: 2018-08-29

## 2018-10-25 LAB
ALBUMIN SERPL ELPH-MCNC: 4.1 G/DL — SIGNIFICANT CHANGE UP (ref 3.3–5)
ALP SERPL-CCNC: 158 U/L — HIGH (ref 40–120)
ALT FLD-CCNC: 52 U/L — HIGH (ref 4–33)
AST SERPL-CCNC: 49 U/L — HIGH (ref 4–32)
BASE EXCESS BLDV CALC-SCNC: 0.3 MMOL/L — SIGNIFICANT CHANGE UP
BASOPHILS # BLD AUTO: 0.04 K/UL — SIGNIFICANT CHANGE UP (ref 0–0.2)
BASOPHILS NFR BLD AUTO: 0.5 % — SIGNIFICANT CHANGE UP (ref 0–2)
BILIRUB SERPL-MCNC: 0.3 MG/DL — SIGNIFICANT CHANGE UP (ref 0.2–1.2)
BLOOD GAS VENOUS - CREATININE: SIGNIFICANT CHANGE UP MG/DL (ref 0.5–1.3)
BUN SERPL-MCNC: 14 MG/DL — SIGNIFICANT CHANGE UP (ref 7–23)
CALCIUM SERPL-MCNC: 9.3 MG/DL — SIGNIFICANT CHANGE UP (ref 8.4–10.5)
CHLORIDE BLDV-SCNC: 107 MMOL/L — SIGNIFICANT CHANGE UP (ref 96–108)
CHLORIDE SERPL-SCNC: 104 MMOL/L — SIGNIFICANT CHANGE UP (ref 98–107)
CO2 SERPL-SCNC: 22 MMOL/L — SIGNIFICANT CHANGE UP (ref 22–31)
CREAT SERPL-MCNC: 0.8 MG/DL — SIGNIFICANT CHANGE UP (ref 0.5–1.3)
EOSINOPHIL # BLD AUTO: 0.21 K/UL — SIGNIFICANT CHANGE UP (ref 0–0.5)
EOSINOPHIL NFR BLD AUTO: 2.8 % — SIGNIFICANT CHANGE UP (ref 0–6)
GAS PNL BLDV: 138 MMOL/L — SIGNIFICANT CHANGE UP (ref 136–146)
GLUCOSE BLDV-MCNC: 113 — HIGH (ref 70–99)
GLUCOSE SERPL-MCNC: 114 MG/DL — HIGH (ref 70–99)
HCO3 BLDV-SCNC: 24 MMOL/L — SIGNIFICANT CHANGE UP (ref 20–27)
HCT VFR BLD CALC: 41.1 % — SIGNIFICANT CHANGE UP (ref 34.5–45)
HCT VFR BLDV CALC: 40.7 % — SIGNIFICANT CHANGE UP (ref 34.5–45)
HGB BLD-MCNC: 13.7 G/DL — SIGNIFICANT CHANGE UP (ref 11.5–15.5)
HGB BLDV-MCNC: 13.2 G/DL — SIGNIFICANT CHANGE UP (ref 11.5–15.5)
IMM GRANULOCYTES # BLD AUTO: 0.02 # — SIGNIFICANT CHANGE UP
IMM GRANULOCYTES NFR BLD AUTO: 0.3 % — SIGNIFICANT CHANGE UP (ref 0–1.5)
LACTATE BLDV-MCNC: 1.7 MMOL/L — SIGNIFICANT CHANGE UP (ref 0.5–2)
LYMPHOCYTES # BLD AUTO: 2.92 K/UL — SIGNIFICANT CHANGE UP (ref 1–3.3)
LYMPHOCYTES # BLD AUTO: 38.6 % — SIGNIFICANT CHANGE UP (ref 13–44)
MCHC RBC-ENTMCNC: 30.4 PG — SIGNIFICANT CHANGE UP (ref 27–34)
MCHC RBC-ENTMCNC: 33.3 % — SIGNIFICANT CHANGE UP (ref 32–36)
MCV RBC AUTO: 91.1 FL — SIGNIFICANT CHANGE UP (ref 80–100)
MONOCYTES # BLD AUTO: 0.49 K/UL — SIGNIFICANT CHANGE UP (ref 0–0.9)
MONOCYTES NFR BLD AUTO: 6.5 % — SIGNIFICANT CHANGE UP (ref 2–14)
NEUTROPHILS # BLD AUTO: 3.89 K/UL — SIGNIFICANT CHANGE UP (ref 1.8–7.4)
NEUTROPHILS NFR BLD AUTO: 51.3 % — SIGNIFICANT CHANGE UP (ref 43–77)
NRBC # FLD: 0 — SIGNIFICANT CHANGE UP
PCO2 BLDV: 45 MMHG — SIGNIFICANT CHANGE UP (ref 41–51)
PH BLDV: 7.36 PH — SIGNIFICANT CHANGE UP (ref 7.32–7.43)
PLATELET # BLD AUTO: 488 K/UL — HIGH (ref 150–400)
PMV BLD: 9.2 FL — SIGNIFICANT CHANGE UP (ref 7–13)
PO2 BLDV: 45 MMHG — HIGH (ref 35–40)
POTASSIUM BLDV-SCNC: 4 MMOL/L — SIGNIFICANT CHANGE UP (ref 3.4–4.5)
POTASSIUM SERPL-MCNC: 4.3 MMOL/L — SIGNIFICANT CHANGE UP (ref 3.5–5.3)
POTASSIUM SERPL-SCNC: 4.3 MMOL/L — SIGNIFICANT CHANGE UP (ref 3.5–5.3)
PROT SERPL-MCNC: 8 G/DL — SIGNIFICANT CHANGE UP (ref 6–8.3)
RBC # BLD: 4.51 M/UL — SIGNIFICANT CHANGE UP (ref 3.8–5.2)
RBC # FLD: 14.4 % — SIGNIFICANT CHANGE UP (ref 10.3–14.5)
SAO2 % BLDV: 77.3 % — SIGNIFICANT CHANGE UP (ref 60–85)
SODIUM SERPL-SCNC: 141 MMOL/L — SIGNIFICANT CHANGE UP (ref 135–145)
TROPONIN T, HIGH SENSITIVITY: < 6 NG/L — SIGNIFICANT CHANGE UP (ref ?–14)
WBC # BLD: 7.57 K/UL — SIGNIFICANT CHANGE UP (ref 3.8–10.5)
WBC # FLD AUTO: 7.57 K/UL — SIGNIFICANT CHANGE UP (ref 3.8–10.5)

## 2018-10-25 PROCEDURE — 71046 X-RAY EXAM CHEST 2 VIEWS: CPT | Mod: 26

## 2018-10-25 PROCEDURE — 99284 EMERGENCY DEPT VISIT MOD MDM: CPT

## 2018-10-25 RX ORDER — KETOROLAC TROMETHAMINE 30 MG/ML
15 SYRINGE (ML) INJECTION ONCE
Qty: 0 | Refills: 0 | Status: DISCONTINUED | OUTPATIENT
Start: 2018-10-25 | End: 2018-10-25

## 2018-10-25 RX ORDER — IPRATROPIUM/ALBUTEROL SULFATE 18-103MCG
3 AEROSOL WITH ADAPTER (GRAM) INHALATION ONCE
Qty: 0 | Refills: 0 | Status: COMPLETED | OUTPATIENT
Start: 2018-10-25 | End: 2018-10-25

## 2018-10-25 RX ORDER — ALBUTEROL 90 UG/1
1 AEROSOL, METERED ORAL ONCE
Qty: 0 | Refills: 0 | Status: COMPLETED | OUTPATIENT
Start: 2018-10-25 | End: 2018-10-25

## 2018-10-25 RX ORDER — ALBUTEROL 90 UG/1
2 AEROSOL, METERED ORAL
Qty: 1 | Refills: 0 | OUTPATIENT
Start: 2018-10-25

## 2018-10-25 RX ORDER — LIDOCAINE 4 G/100G
15 CREAM TOPICAL ONCE
Qty: 0 | Refills: 0 | Status: COMPLETED | OUTPATIENT
Start: 2018-10-25 | End: 2018-10-25

## 2018-10-25 RX ORDER — SODIUM CHLORIDE 9 MG/ML
1000 INJECTION INTRAMUSCULAR; INTRAVENOUS; SUBCUTANEOUS ONCE
Qty: 0 | Refills: 0 | Status: COMPLETED | OUTPATIENT
Start: 2018-10-25 | End: 2018-10-25

## 2018-10-25 RX ORDER — FAMOTIDINE 10 MG/ML
20 INJECTION INTRAVENOUS ONCE
Qty: 0 | Refills: 0 | Status: COMPLETED | OUTPATIENT
Start: 2018-10-25 | End: 2018-10-25

## 2018-10-25 RX ADMIN — LIDOCAINE 15 MILLILITER(S): 4 CREAM TOPICAL at 12:57

## 2018-10-25 RX ADMIN — FAMOTIDINE 20 MILLIGRAM(S): 10 INJECTION INTRAVENOUS at 12:56

## 2018-10-25 RX ADMIN — Medication 30 MILLILITER(S): at 12:56

## 2018-10-25 RX ADMIN — Medication 15 MILLIGRAM(S): at 12:57

## 2018-10-25 RX ADMIN — Medication 3 MILLILITER(S): at 12:57

## 2018-10-25 RX ADMIN — Medication 15 MILLIGRAM(S): at 14:51

## 2018-10-25 RX ADMIN — Medication 600 MILLIGRAM(S): at 14:57

## 2018-10-25 RX ADMIN — ALBUTEROL 1 PUFF(S): 90 AEROSOL, METERED ORAL at 15:26

## 2018-10-25 RX ADMIN — Medication 40 MILLIGRAM(S): at 14:57

## 2018-10-25 RX ADMIN — SODIUM CHLORIDE 1000 MILLILITER(S): 9 INJECTION INTRAMUSCULAR; INTRAVENOUS; SUBCUTANEOUS at 12:57

## 2018-10-25 NOTE — ED ADULT TRIAGE NOTE - CHIEF COMPLAINT QUOTE
patient reports dry cough x 3 days and reports bilateral rib aching that is worse with cough, pt also reports substernal chest "burning" which she attributes to GERD.

## 2018-10-25 NOTE — ED PROVIDER NOTE - PMH
Asthma    Benign hypertension    Cigarette smoker    GERD (gastroesophageal reflux disease)    H/O abdominal hysterectomy    HIV disease    HPV (human papilloma virus) infection    Osteoarthritis    Pruritus  (chronic)

## 2018-10-25 NOTE — ED PROVIDER NOTE - CARE PLAN
73 Principal Discharge DX:	Asthma exacerbation  Assessment and plan of treatment:	Follow up with your PMD within 48-72 hours. Rest, increase fluids. Take Prednisone 40mg daily for 4 more days, Albuterol inhaler 2 inhalations every 4-6 hours as needed with the spacer as instructed. Take all of your other medications as previously prescribed. Any worsening wheezing, shortness of breath, chest pain, fever, chills return to the ED  Secondary Diagnosis:	Cough in adult patient

## 2018-10-25 NOTE — ED PROVIDER NOTE - PROGRESS NOTE DETAILS
KRISTYN Lott: Pt reassessed, wheezing improved on exam. Given a Ventolin HFA and showed how to use a spacer with pt teach back. Prednisone sent to pharmacy. Will dc w/ close PMD follow up. Pt feels better overall, chest wall pain and GERD resolved as well.

## 2018-10-25 NOTE — ED PROVIDER NOTE - OBJECTIVE STATEMENT
52 y/o F hx HIV on HAART (viral load undetectable), asthma (hx hospitalizations, no intubation), GERD here c/o non productive cough, chest congestion, and wheezing x 1 week. Also c/o jenn rib pain, worse with coughing and worse w/ movement, improved w/ taking Tylenol, attributed to her persistent coughing, x 3 days. Also states she has been having difficulty controlling her GERD at home, has been given Ranitidine by her PMD, but never got relief from it in the first place. When asked about eating habits, patient states "I have a good appetite". Pt saw her PMD 1 week ago for these sx, was given a 3 day course of Azithromycin. Denies fevers, chills, exertional chest pain, lower ext edema/calf pain, recent immobilization, or any other complaints. Current smoker 2-3 cigarettes daily.

## 2018-10-25 NOTE — ED PROVIDER NOTE - MEDICAL DECISION MAKING DETAILS
52 y/o F w/ cough/wheezing x 7 days, sp z-pack w/o relief, has not been using her albuterol at home, here w/ wheezing and rib pain 2/2 coughing. Plan cxr eval for PNA, labs/trop given cp x 3 days, gerd meds (chronic gerd), ekg wnl.

## 2018-11-11 NOTE — ED PROVIDER NOTE - LOCATION
Problem: Patient Centered Care  Goal: Patient preferences are identified and integrated in the patient's plan of care  Interventions:  - What would you like us to know as we care for you?   - Provide timely, complete, and accurate information to patient/fa period  INTERVENTIONS  - Monitor WBC  - Administer growth factors as ordered  - Implement neutropenic guidelines  Outcome: Progressing      Problem: DISCHARGE PLANNING  Goal: Discharge to home or other facility with appropriate resources  INTERVENTIONS:  - epigastric area

## 2018-12-08 ENCOUNTER — EMERGENCY (EMERGENCY)
Facility: HOSPITAL | Age: 53
LOS: 1 days | Discharge: HOME | End: 2018-12-08
Attending: EMERGENCY MEDICINE | Admitting: EMERGENCY MEDICINE
Payer: MEDICAID

## 2018-12-08 VITALS
HEART RATE: 84 BPM | DIASTOLIC BLOOD PRESSURE: 70 MMHG | RESPIRATION RATE: 16 BRPM | OXYGEN SATURATION: 100 % | SYSTOLIC BLOOD PRESSURE: 120 MMHG | TEMPERATURE: 99 F

## 2018-12-08 VITALS
RESPIRATION RATE: 18 BRPM | TEMPERATURE: 98 F | HEART RATE: 90 BPM | DIASTOLIC BLOOD PRESSURE: 79 MMHG | OXYGEN SATURATION: 100 % | SYSTOLIC BLOOD PRESSURE: 135 MMHG

## 2018-12-08 DIAGNOSIS — Z90.710 ACQUIRED ABSENCE OF BOTH CERVIX AND UTERUS: Chronic | ICD-10-CM

## 2018-12-08 PROCEDURE — 99284 EMERGENCY DEPT VISIT MOD MDM: CPT

## 2018-12-08 PROCEDURE — 70450 CT HEAD/BRAIN W/O DYE: CPT | Mod: 26

## 2018-12-08 PROCEDURE — 71046 X-RAY EXAM CHEST 2 VIEWS: CPT | Mod: 26

## 2018-12-08 RX ORDER — FAMOTIDINE 10 MG/ML
1 INJECTION INTRAVENOUS
Qty: 30 | Refills: 0 | OUTPATIENT
Start: 2018-12-08

## 2018-12-08 RX ORDER — ACETAMINOPHEN 500 MG
650 TABLET ORAL ONCE
Qty: 0 | Refills: 0 | Status: COMPLETED | OUTPATIENT
Start: 2018-12-08 | End: 2018-12-08

## 2018-12-08 RX ORDER — FAMOTIDINE 10 MG/ML
20 INJECTION INTRAVENOUS ONCE
Qty: 0 | Refills: 0 | Status: COMPLETED | OUTPATIENT
Start: 2018-12-08 | End: 2018-12-08

## 2018-12-08 RX ADMIN — FAMOTIDINE 20 MILLIGRAM(S): 10 INJECTION INTRAVENOUS at 09:39

## 2018-12-08 RX ADMIN — Medication 650 MILLIGRAM(S): at 09:39

## 2018-12-08 NOTE — ED PROVIDER NOTE - PHYSICAL EXAMINATION
HDS, NAD, MMM, eyes clear, lungs CTAB, heart sounds normal, abd soft, NT, ND, no CVAT, LEs without edema, wwp, skin normal temperature and color, neuro: AAOx3, PERRLA, EOMIB, CN 2-12 intact, 5/5 strength, SILT

## 2018-12-08 NOTE — ED ADULT NURSE NOTE - CHIEF COMPLAINT QUOTE
Pt BIBA from home for c/o headache x 2 days. Pt states she didn't take any pain medications to relieve the headache, states she took 1pill of Amlodipine this morning but not feeling better (has been off her blood pressure medications because she was unable to get them).

## 2018-12-08 NOTE — ED PROVIDER NOTE - NSFOLLOWUPINSTRUCTIONS_ED_ALL_ED_FT
You have been seen for headache, cough, and high blood pressure.  Your pressure was not concerning here, and your head CT and CXR were normal.  It is safe for you to go home and follow up with your PMD.    Please come back to the ED if you develop fever, pain with looking at lights, or neck stiffness.

## 2018-12-08 NOTE — ED PROVIDER NOTE - OBJECTIVE STATEMENT
Cabot: 53F with PMH of HIV on HAART with CD4 count 2K and UDVL, as well as GERD, who comes in with a concern for HTN.  She states that her MD switched around her medication to amlodipine and "an L word medication", and she didn't think the BP was getting under control.  However, she states that here her BP is in the 140s/70s, and she is happy with that.  She reports mild frontal HA and nausea when her BP is high.  She also reports a dry cough when she goes into the cold.  She denies photophobia, neck stiffness, sputum, F/C, recent travel, and sick contacts.

## 2018-12-08 NOTE — ED PROVIDER NOTE - MEDICAL DECISION MAKING DETAILS
Cabot: 53F with PMH of HIV on HAART with CD4 count 2K and UDVL, as well as GERD, who comes in with a concern for HTN.  She states that her MD switched around her medication to amlodipine and "an L word medication", and she didn't think the BP was getting under control.  However, she states that here her BP is in the 140s/70s, and she is happy with that.  She reports mild frontal HA and nausea when her BP is high.  She also reports a dry cough when she goes into the cold.  She denies photophobia, neck stiffness, sputum, F/C, recent travel, and sick contacts. Normal exam.  Given age and comorbidities, will check CT head and CXR and give tylenol for HA.

## 2018-12-08 NOTE — ED ADULT TRIAGE NOTE - CHIEF COMPLAINT QUOTE
Pt BIBA from home for c/o headache x 2 days. Pt states she didn't take any pain medications to relieve the headache, states she took 1pill of Amlodipine but not feeling better (has been off her blood pressure medications because she was unable to get them). Pt BIBA from home for c/o headache x 2 days. Pt states she didn't take any pain medications to relieve the headache, states she took 1pill of Amlodipine this morning but not feeling better (has been off her blood pressure medications because she was unable to get them).

## 2018-12-08 NOTE — ED ADULT NURSE NOTE - NSIMPLEMENTINTERV_GEN_ALL_ED
Implemented All Universal Safety Interventions:  [place] to call system. Call bell, personal items and telephone within reach. Instruct patient to call for assistance. Room bathroom lighting operational. Non-slip footwear when patient is off stretcher. Physically safe environment: no spills, clutter or unnecessary equipment. Stretcher in lowest position, wheels locked, appropriate side rails in place.

## 2019-02-01 ENCOUNTER — OUTPATIENT (OUTPATIENT)
Dept: OUTPATIENT SERVICES | Facility: HOSPITAL | Age: 54
LOS: 1 days | End: 2019-02-01
Payer: MEDICAID

## 2019-02-01 DIAGNOSIS — Z90.710 ACQUIRED ABSENCE OF BOTH CERVIX AND UTERUS: Chronic | ICD-10-CM

## 2019-02-21 ENCOUNTER — INPATIENT (INPATIENT)
Facility: HOSPITAL | Age: 54
LOS: 1 days | Discharge: HOME | End: 2019-02-23
Attending: STUDENT IN AN ORGANIZED HEALTH CARE EDUCATION/TRAINING PROGRAM | Admitting: STUDENT IN AN ORGANIZED HEALTH CARE EDUCATION/TRAINING PROGRAM
Payer: MEDICAID

## 2019-02-21 VITALS
SYSTOLIC BLOOD PRESSURE: 136 MMHG | DIASTOLIC BLOOD PRESSURE: 93 MMHG | OXYGEN SATURATION: 98 % | RESPIRATION RATE: 16 BRPM | TEMPERATURE: 98 F | HEART RATE: 114 BPM

## 2019-02-21 DIAGNOSIS — Z90.710 ACQUIRED ABSENCE OF BOTH CERVIX AND UTERUS: Chronic | ICD-10-CM

## 2019-02-21 LAB
ALBUMIN SERPL ELPH-MCNC: 4.8 G/DL — SIGNIFICANT CHANGE UP (ref 3.3–5)
ALP SERPL-CCNC: 151 U/L — HIGH (ref 40–120)
ALT FLD-CCNC: 59 U/L — HIGH (ref 4–33)
ANION GAP SERPL CALC-SCNC: 18 MMO/L — HIGH (ref 7–14)
APAP SERPL-MCNC: < 15 UG/ML — LOW (ref 15–25)
APTT BLD: 22 SEC — LOW (ref 27.5–36.3)
AST SERPL-CCNC: 55 U/L — HIGH (ref 4–32)
BASE EXCESS BLDV CALC-SCNC: -1.1 MMOL/L — SIGNIFICANT CHANGE UP
BASOPHILS # BLD AUTO: 0.07 K/UL — SIGNIFICANT CHANGE UP (ref 0–0.2)
BASOPHILS NFR BLD AUTO: 0.6 % — SIGNIFICANT CHANGE UP (ref 0–2)
BILIRUB SERPL-MCNC: 0.3 MG/DL — SIGNIFICANT CHANGE UP (ref 0.2–1.2)
BLOOD GAS VENOUS - CREATININE: 1.72 MG/DL — HIGH (ref 0.5–1.3)
BUN SERPL-MCNC: 27 MG/DL — HIGH (ref 7–23)
CALCIUM SERPL-MCNC: 9.8 MG/DL — SIGNIFICANT CHANGE UP (ref 8.4–10.5)
CHLORIDE BLDV-SCNC: 104 MMOL/L — SIGNIFICANT CHANGE UP (ref 96–108)
CHLORIDE SERPL-SCNC: 99 MMOL/L — SIGNIFICANT CHANGE UP (ref 98–107)
CO2 SERPL-SCNC: 19 MMOL/L — LOW (ref 22–31)
CREAT SERPL-MCNC: 1.74 MG/DL — HIGH (ref 0.5–1.3)
EOSINOPHIL # BLD AUTO: 0.22 K/UL — SIGNIFICANT CHANGE UP (ref 0–0.5)
EOSINOPHIL NFR BLD AUTO: 1.8 % — SIGNIFICANT CHANGE UP (ref 0–6)
ETHANOL BLD-MCNC: < 10 MG/DL — SIGNIFICANT CHANGE UP
GAS PNL BLDV: 139 MMOL/L — SIGNIFICANT CHANGE UP (ref 136–146)
GLUCOSE BLDV-MCNC: 117 — HIGH (ref 70–99)
GLUCOSE SERPL-MCNC: 110 MG/DL — HIGH (ref 70–99)
HCO3 BLDV-SCNC: 23 MMOL/L — SIGNIFICANT CHANGE UP (ref 20–27)
HCT VFR BLD CALC: 46.8 % — HIGH (ref 34.5–45)
HCT VFR BLDV CALC: 46 % — HIGH (ref 34.5–45)
HGB BLD-MCNC: 15 G/DL — SIGNIFICANT CHANGE UP (ref 11.5–15.5)
HGB BLDV-MCNC: 15 G/DL — SIGNIFICANT CHANGE UP (ref 11.5–15.5)
IMM GRANULOCYTES NFR BLD AUTO: 0.3 % — SIGNIFICANT CHANGE UP (ref 0–1.5)
INR BLD: 0.97 — SIGNIFICANT CHANGE UP (ref 0.88–1.17)
LACTATE BLDV-MCNC: 1.4 MMOL/L — SIGNIFICANT CHANGE UP (ref 0.5–2)
LYMPHOCYTES # BLD AUTO: 35.7 % — SIGNIFICANT CHANGE UP (ref 13–44)
LYMPHOCYTES # BLD AUTO: 4.45 K/UL — HIGH (ref 1–3.3)
MCHC RBC-ENTMCNC: 29.8 PG — SIGNIFICANT CHANGE UP (ref 27–34)
MCHC RBC-ENTMCNC: 32.1 % — SIGNIFICANT CHANGE UP (ref 32–36)
MCV RBC AUTO: 93 FL — SIGNIFICANT CHANGE UP (ref 80–100)
MONOCYTES # BLD AUTO: 0.68 K/UL — SIGNIFICANT CHANGE UP (ref 0–0.9)
MONOCYTES NFR BLD AUTO: 5.4 % — SIGNIFICANT CHANGE UP (ref 2–14)
NEUTROPHILS # BLD AUTO: 7.02 K/UL — SIGNIFICANT CHANGE UP (ref 1.8–7.4)
NEUTROPHILS NFR BLD AUTO: 56.2 % — SIGNIFICANT CHANGE UP (ref 43–77)
NRBC # FLD: 0 K/UL — LOW (ref 25–125)
PCO2 BLDV: 38 MMHG — LOW (ref 41–51)
PH BLDV: 7.4 PH — SIGNIFICANT CHANGE UP (ref 7.32–7.43)
PLATELET # BLD AUTO: 511 K/UL — HIGH (ref 150–400)
PMV BLD: 8.8 FL — SIGNIFICANT CHANGE UP (ref 7–13)
PO2 BLDV: 48 MMHG — HIGH (ref 35–40)
POTASSIUM BLDV-SCNC: 3.2 MMOL/L — LOW (ref 3.4–4.5)
POTASSIUM SERPL-MCNC: 3.6 MMOL/L — SIGNIFICANT CHANGE UP (ref 3.5–5.3)
POTASSIUM SERPL-SCNC: 3.6 MMOL/L — SIGNIFICANT CHANGE UP (ref 3.5–5.3)
PROT SERPL-MCNC: 8.6 G/DL — HIGH (ref 6–8.3)
PROTHROM AB SERPL-ACNC: 10.7 SEC — SIGNIFICANT CHANGE UP (ref 9.8–13.1)
RBC # BLD: 5.03 M/UL — SIGNIFICANT CHANGE UP (ref 3.8–5.2)
RBC # FLD: 14.2 % — SIGNIFICANT CHANGE UP (ref 10.3–14.5)
SALICYLATES SERPL-MCNC: < 5 MG/DL — LOW (ref 15–30)
SAO2 % BLDV: 82.1 % — SIGNIFICANT CHANGE UP (ref 60–85)
SODIUM SERPL-SCNC: 136 MMOL/L — SIGNIFICANT CHANGE UP (ref 135–145)
TROPONIN T, HIGH SENSITIVITY: 12 NG/L — SIGNIFICANT CHANGE UP (ref ?–14)
WBC # BLD: 12.48 K/UL — HIGH (ref 3.8–10.5)
WBC # FLD AUTO: 12.48 K/UL — HIGH (ref 3.8–10.5)

## 2019-02-21 PROCEDURE — 93970 EXTREMITY STUDY: CPT | Mod: 26

## 2019-02-21 PROCEDURE — 71045 X-RAY EXAM CHEST 1 VIEW: CPT | Mod: 26

## 2019-02-21 PROCEDURE — 71046 X-RAY EXAM CHEST 2 VIEWS: CPT | Mod: 26

## 2019-02-21 RX ORDER — SODIUM CHLORIDE 9 MG/ML
1000 INJECTION INTRAMUSCULAR; INTRAVENOUS; SUBCUTANEOUS ONCE
Qty: 0 | Refills: 0 | Status: COMPLETED | OUTPATIENT
Start: 2019-02-21 | End: 2019-02-21

## 2019-02-21 RX ORDER — MORPHINE SULFATE 50 MG/1
4 CAPSULE, EXTENDED RELEASE ORAL ONCE
Qty: 0 | Refills: 0 | Status: DISCONTINUED | OUTPATIENT
Start: 2019-02-21 | End: 2019-02-21

## 2019-02-21 RX ORDER — ONDANSETRON 8 MG/1
4 TABLET, FILM COATED ORAL ONCE
Qty: 0 | Refills: 0 | Status: COMPLETED | OUTPATIENT
Start: 2019-02-21 | End: 2019-02-21

## 2019-02-21 RX ADMIN — Medication 30 MILLILITER(S): at 18:39

## 2019-02-21 RX ADMIN — ONDANSETRON 4 MILLIGRAM(S): 8 TABLET, FILM COATED ORAL at 18:39

## 2019-02-21 RX ADMIN — MORPHINE SULFATE 4 MILLIGRAM(S): 50 CAPSULE, EXTENDED RELEASE ORAL at 18:49

## 2019-02-21 RX ADMIN — SODIUM CHLORIDE 1000 MILLILITER(S): 9 INJECTION INTRAMUSCULAR; INTRAVENOUS; SUBCUTANEOUS at 18:39

## 2019-02-21 NOTE — ED PROVIDER NOTE - PHYSICAL EXAMINATION
General: WDWN  HEENT: Normocephalic and atraumatic, EOMI, Trachea midline.   Cardiac: Normal S1 and S2 w/ RRR. No MRG.  Pulmonary: Mild expiratory diffuse wheezing. No increased WOB.   Abdominal: Soft, NTND  Neurologic: No focal sensory or motor deficits.  Musculoskeletal: No limited ROM.  Vascular: No peripheral edema. WWP  Skin: Color appropriate for race.   Psychiatric: Appropriate mood and affect. No apparent risk to self or others.  Rhett Clinton, PGY-1

## 2019-02-21 NOTE — ED PROVIDER NOTE - NS ED ROS FT
Constitution: No Fever or chills  Eyes: No visual changes  HEENT: No URI symptoms  Cardio: No Chest pain  Resp: +SOB  GI: No abdominal pain, +nausea   : No dysuria  MSK: +BL rib pain. No Back pain  Neuro: No Headache  Skin: No rashes  All other ROS as per HPI  Rhett Clinton, PGY-1

## 2019-02-21 NOTE — ED PROVIDER NOTE - ATTENDING CONTRIBUTION TO CARE
Attending Statement: I have personally seen and examined this patient. I have fully participated in the care of this patient. I have reviewed all pertinent clinical information, including history physical exam, plan and the Resident's note and agree except as noted  52yo F hx of HTN, GERD, HIV, asthma pw headache, congestion and pain along bl ribs x 3 days. Constant pain, varies in intensity, located on "both sides of chest" Associated with SOB and nausea. no vomit. no orthopnea. no cough. subjective temp, chills at home. Pain worse when moving and sitting up. Improved with laying down. no recent travel. no leg swelling no calf pain. non smoker. +hx of cocaine use, last time was a week ago. Denies any IVDU. social ETOH. States last CD4 "was very high" and viral load undetectable three months ago as per pt. PMD in the [place].   Vital signs noted. sitting up, anxious appearing female. mmm. non icteric. talking in full clear sentences. coarse BS.  tachycardic. soft obese female nontender abdomen. no pedal edema. no calf tenderness. normal pulses bilateral feet. old scars on bl arms. no swelling of arms. AO3 nl gait.   plan labs, ekg, cxr, IVF, ct angio chest, re assess

## 2019-02-21 NOTE — ED PROVIDER NOTE - PROGRESS NOTE DETAILS
pt denies any hx of renal insufficiency. "kidney were fine" Last blood tests were three months ago. PT aware of Cr on blood gas 1.72 pending cmp. order cxr, dopplers lower ext. pt denies any hx of PE or DVT. pt denies any hx of renal insufficiency. "kidney were fine" Last blood tests were three months ago. PT aware of Cr on blood gas 1.72 pending cmp. order cxr, dopplers lower ext. pt denies any hx of PE or DVT. pt states was on "bactrim for congestion recently" pt poor historian. pt pending trop, us lower ext, tox. will need admission. Endorse to Dr Patterson and Dr Mckeon

## 2019-02-21 NOTE — ED PROVIDER NOTE - CLINICAL SUMMARY MEDICAL DECISION MAKING FREE TEXT BOX
53F p/w BL rib pain, sharp, with inspiration, and changes in position. DDX, PE, pericarditis, GERD, MSK. Labs, CTA, pain control, fluids.

## 2019-02-21 NOTE — ED PROVIDER NOTE - OBJECTIVE STATEMENT
53F p/w 3 days of rib pain. Pt states 3 days she has been having BL rib pain. 8/10 intensity. Worse with movement and sitting up, relieved by laying down. Is accompanied by SOB and nausea. Pt states 3 nights ago she was drinking with friends. The next day she had one episode of NBNB vomiting. Denies abdominal pain, denies chest pain. No fevers. No recent travel, no recent surg/hospitalization, no leg swelling. Recently quit smoking 1 month ago, former PPD smoker.   Has h/o HIV on HAART, HTN, GERD

## 2019-02-22 DIAGNOSIS — M17.0 BILATERAL PRIMARY OSTEOARTHRITIS OF KNEE: ICD-10-CM

## 2019-02-22 DIAGNOSIS — K21.9 GASTRO-ESOPHAGEAL REFLUX DISEASE WITHOUT ESOPHAGITIS: ICD-10-CM

## 2019-02-22 DIAGNOSIS — E55.9 VITAMIN D DEFICIENCY, UNSPECIFIED: ICD-10-CM

## 2019-02-22 DIAGNOSIS — R07.81 PLEURODYNIA: ICD-10-CM

## 2019-02-22 DIAGNOSIS — R00.0 TACHYCARDIA, UNSPECIFIED: ICD-10-CM

## 2019-02-22 DIAGNOSIS — N17.9 ACUTE KIDNEY FAILURE, UNSPECIFIED: ICD-10-CM

## 2019-02-22 DIAGNOSIS — J45.909 UNSPECIFIED ASTHMA, UNCOMPLICATED: ICD-10-CM

## 2019-02-22 DIAGNOSIS — B20 HUMAN IMMUNODEFICIENCY VIRUS [HIV] DISEASE: ICD-10-CM

## 2019-02-22 LAB
4/8 RATIO: 1.26 CELLS/UL — LOW (ref 1.69–2.84)
ABS CD8: 1541 CELLS/UL — HIGH (ref 291–576)
ALBUMIN SERPL ELPH-MCNC: 3.8 G/DL — SIGNIFICANT CHANGE UP (ref 3.3–5)
ALP SERPL-CCNC: 116 U/L — SIGNIFICANT CHANGE UP (ref 40–120)
ALT FLD-CCNC: 38 U/L — HIGH (ref 4–33)
AMPHET UR-MCNC: NEGATIVE — SIGNIFICANT CHANGE UP
ANION GAP SERPL CALC-SCNC: 12 MMO/L — SIGNIFICANT CHANGE UP (ref 7–14)
APPEARANCE UR: CLEAR — SIGNIFICANT CHANGE UP
AST SERPL-CCNC: 29 U/L — SIGNIFICANT CHANGE UP (ref 4–32)
BARBITURATES UR SCN-MCNC: NEGATIVE — SIGNIFICANT CHANGE UP
BENZODIAZ UR-MCNC: NEGATIVE — SIGNIFICANT CHANGE UP
BILIRUB SERPL-MCNC: 0.4 MG/DL — SIGNIFICANT CHANGE UP (ref 0.2–1.2)
BILIRUB UR-MCNC: NEGATIVE — SIGNIFICANT CHANGE UP
BLOOD UR QL VISUAL: NEGATIVE — SIGNIFICANT CHANGE UP
BUN SERPL-MCNC: 26 MG/DL — HIGH (ref 7–23)
CALCIUM SERPL-MCNC: 9 MG/DL — SIGNIFICANT CHANGE UP (ref 8.4–10.5)
CANNABINOIDS UR-MCNC: NEGATIVE — SIGNIFICANT CHANGE UP
CD4 %: 42 % — LOW (ref 43–52)
CD8 %: 33 % — HIGH (ref 17–28)
CHLORIDE SERPL-SCNC: 101 MMOL/L — SIGNIFICANT CHANGE UP (ref 98–107)
CK MB BLD-MCNC: 1.7 — SIGNIFICANT CHANGE UP (ref 0–2.5)
CK MB BLD-MCNC: 5.49 NG/ML — HIGH (ref 1–4.7)
CK SERPL-CCNC: 328 U/L — HIGH (ref 25–170)
CK SERPL-CCNC: 331 U/L — HIGH (ref 25–170)
CO2 SERPL-SCNC: 23 MMOL/L — SIGNIFICANT CHANGE UP (ref 22–31)
COCAINE METAB.OTHER UR-MCNC: NEGATIVE — SIGNIFICANT CHANGE UP
COLOR SPEC: SIGNIFICANT CHANGE UP
CREAT SERPL-MCNC: 1.51 MG/DL — HIGH (ref 0.5–1.3)
ERYTHROCYTE [SEDIMENTATION RATE] IN BLOOD: 12 MM/HR — SIGNIFICANT CHANGE UP (ref 4–25)
GLUCOSE SERPL-MCNC: 117 MG/DL — HIGH (ref 70–99)
GLUCOSE UR-MCNC: NEGATIVE — SIGNIFICANT CHANGE UP
HBA1C BLD-MCNC: 5.7 % — HIGH (ref 4–5.6)
HCT VFR BLD CALC: 38 % — SIGNIFICANT CHANGE UP (ref 34.5–45)
HCV AB S/CO SERPL IA: 0.06 S/CO — SIGNIFICANT CHANGE UP (ref 0–0.79)
HCV AB SERPL-IMP: SIGNIFICANT CHANGE UP
HGB BLD-MCNC: 12.1 G/DL — SIGNIFICANT CHANGE UP (ref 11.5–15.5)
KETONES UR-MCNC: NEGATIVE — SIGNIFICANT CHANGE UP
LEUKOCYTE ESTERASE UR-ACNC: NEGATIVE — SIGNIFICANT CHANGE UP
MAGNESIUM SERPL-MCNC: 1.9 MG/DL — SIGNIFICANT CHANGE UP (ref 1.6–2.6)
MCHC RBC-ENTMCNC: 29.8 PG — SIGNIFICANT CHANGE UP (ref 27–34)
MCHC RBC-ENTMCNC: 31.8 % — LOW (ref 32–36)
MCV RBC AUTO: 93.6 FL — SIGNIFICANT CHANGE UP (ref 80–100)
METHADONE UR-MCNC: NEGATIVE — SIGNIFICANT CHANGE UP
NITRITE UR-MCNC: NEGATIVE — SIGNIFICANT CHANGE UP
NRBC # FLD: 0 K/UL — LOW (ref 25–125)
OPIATES UR-MCNC: POSITIVE — SIGNIFICANT CHANGE UP
OXYCODONE UR-MCNC: NEGATIVE — SIGNIFICANT CHANGE UP
PCP UR-MCNC: NEGATIVE — SIGNIFICANT CHANGE UP
PH UR: 6.5 — SIGNIFICANT CHANGE UP (ref 5–8)
PHOSPHATE SERPL-MCNC: 3.7 MG/DL — SIGNIFICANT CHANGE UP (ref 2.5–4.5)
PLATELET # BLD AUTO: 444 K/UL — HIGH (ref 150–400)
PMV BLD: 9.4 FL — SIGNIFICANT CHANGE UP (ref 7–13)
POTASSIUM SERPL-MCNC: 3 MMOL/L — LOW (ref 3.5–5.3)
POTASSIUM SERPL-SCNC: 3 MMOL/L — LOW (ref 3.5–5.3)
PROT SERPL-MCNC: 6.9 G/DL — SIGNIFICANT CHANGE UP (ref 6–8.3)
PROT UR-MCNC: 10 — SIGNIFICANT CHANGE UP
RBC # BLD: 4.06 M/UL — SIGNIFICANT CHANGE UP (ref 3.8–5.2)
RBC # FLD: 14.2 % — SIGNIFICANT CHANGE UP (ref 10.3–14.5)
SODIUM SERPL-SCNC: 136 MMOL/L — SIGNIFICANT CHANGE UP (ref 135–145)
SP GR SPEC: 1.01 — SIGNIFICANT CHANGE UP (ref 1–1.04)
SPECIMEN SOURCE: SIGNIFICANT CHANGE UP
SPECIMEN SOURCE: SIGNIFICANT CHANGE UP
T-CELL CD4 SUBSET PNL BLD: 1943 CELL/UL — HIGH (ref 431–1434)
TROPONIN T, HIGH SENSITIVITY: 13 NG/L — SIGNIFICANT CHANGE UP (ref ?–14)
TROPONIN T, HIGH SENSITIVITY: 9 NG/L — SIGNIFICANT CHANGE UP (ref ?–14)
TSH SERPL-MCNC: 1.62 UIU/ML — SIGNIFICANT CHANGE UP (ref 0.27–4.2)
UROBILINOGEN FLD QL: NORMAL — SIGNIFICANT CHANGE UP
WBC # BLD: 12.8 K/UL — HIGH (ref 3.8–10.5)
WBC # FLD AUTO: 12.8 K/UL — HIGH (ref 3.8–10.5)

## 2019-02-22 PROCEDURE — 78582 LUNG VENTILAT&PERFUS IMAGING: CPT | Mod: 26,GC

## 2019-02-22 PROCEDURE — 99223 1ST HOSP IP/OBS HIGH 75: CPT

## 2019-02-22 PROCEDURE — 93010 ELECTROCARDIOGRAM REPORT: CPT

## 2019-02-22 PROCEDURE — 71110 X-RAY EXAM RIBS BIL 3 VIEWS: CPT | Mod: 26

## 2019-02-22 PROCEDURE — 12345: CPT | Mod: NC

## 2019-02-22 RX ORDER — ERGOCALCIFEROL 1.25 MG/1
1 CAPSULE ORAL
Qty: 0 | Refills: 0 | COMMUNITY

## 2019-02-22 RX ORDER — ALBUTEROL 90 UG/1
2 AEROSOL, METERED ORAL EVERY 6 HOURS
Qty: 0 | Refills: 0 | Status: DISCONTINUED | OUTPATIENT
Start: 2019-02-22 | End: 2019-02-23

## 2019-02-22 RX ORDER — CHOLECALCIFEROL (VITAMIN D3) 125 MCG
1000 CAPSULE ORAL DAILY
Qty: 0 | Refills: 0 | Status: DISCONTINUED | OUTPATIENT
Start: 2019-02-22 | End: 2019-02-23

## 2019-02-22 RX ORDER — FLUCONAZOLE 150 MG/1
150 TABLET ORAL ONCE
Qty: 0 | Refills: 0 | Status: COMPLETED | OUTPATIENT
Start: 2019-02-22 | End: 2019-02-22

## 2019-02-22 RX ORDER — SODIUM CHLORIDE 9 MG/ML
1000 INJECTION, SOLUTION INTRAVENOUS ONCE
Qty: 0 | Refills: 0 | Status: COMPLETED | OUTPATIENT
Start: 2019-02-22 | End: 2019-02-22

## 2019-02-22 RX ORDER — IPRATROPIUM/ALBUTEROL SULFATE 18-103MCG
3 AEROSOL WITH ADAPTER (GRAM) INHALATION ONCE
Qty: 0 | Refills: 0 | Status: COMPLETED | OUTPATIENT
Start: 2019-02-22 | End: 2019-02-22

## 2019-02-22 RX ORDER — PANTOPRAZOLE SODIUM 20 MG/1
40 TABLET, DELAYED RELEASE ORAL
Qty: 0 | Refills: 0 | Status: DISCONTINUED | OUTPATIENT
Start: 2019-02-22 | End: 2019-02-23

## 2019-02-22 RX ORDER — SODIUM CHLORIDE 9 MG/ML
1000 INJECTION, SOLUTION INTRAVENOUS
Qty: 0 | Refills: 0 | Status: DISCONTINUED | OUTPATIENT
Start: 2019-02-22 | End: 2019-02-23

## 2019-02-22 RX ORDER — POTASSIUM CHLORIDE 20 MEQ
40 PACKET (EA) ORAL EVERY 4 HOURS
Qty: 0 | Refills: 0 | Status: COMPLETED | OUTPATIENT
Start: 2019-02-22 | End: 2019-02-22

## 2019-02-22 RX ORDER — EFAVIRENZ, EMTRICITABINE AND TENOFOVIR DISOPROXIL FUMARATE 600; 200; 300 MG/1; MG/1; MG/1
1 TABLET, FILM COATED ORAL AT BEDTIME
Qty: 0 | Refills: 0 | Status: DISCONTINUED | OUTPATIENT
Start: 2019-02-22 | End: 2019-02-23

## 2019-02-22 RX ORDER — AMLODIPINE BESYLATE 2.5 MG/1
10 TABLET ORAL DAILY
Qty: 0 | Refills: 0 | Status: DISCONTINUED | OUTPATIENT
Start: 2019-02-22 | End: 2019-02-23

## 2019-02-22 RX ORDER — ERGOCALCIFEROL 1.25 MG/1
50000 CAPSULE ORAL
Qty: 0 | Refills: 0 | Status: DISCONTINUED | OUTPATIENT
Start: 2019-02-22 | End: 2019-02-23

## 2019-02-22 RX ORDER — HYDROMORPHONE HYDROCHLORIDE 2 MG/ML
0.5 INJECTION INTRAMUSCULAR; INTRAVENOUS; SUBCUTANEOUS EVERY 4 HOURS
Qty: 0 | Refills: 0 | Status: DISCONTINUED | OUTPATIENT
Start: 2019-02-22 | End: 2019-02-23

## 2019-02-22 RX ADMIN — Medication 40 MILLIEQUIVALENT(S): at 09:55

## 2019-02-22 RX ADMIN — EFAVIRENZ, EMTRICITABINE AND TENOFOVIR DISOPROXIL FUMARATE 1 TABLET(S): 600; 200; 300 TABLET, FILM COATED ORAL at 03:35

## 2019-02-22 RX ADMIN — Medication 40 MILLIEQUIVALENT(S): at 16:00

## 2019-02-22 RX ADMIN — MORPHINE SULFATE 4 MILLIGRAM(S): 50 CAPSULE, EXTENDED RELEASE ORAL at 00:07

## 2019-02-22 RX ADMIN — FLUCONAZOLE 150 MILLIGRAM(S): 150 TABLET ORAL at 16:01

## 2019-02-22 RX ADMIN — SODIUM CHLORIDE 1000 MILLILITER(S): 9 INJECTION, SOLUTION INTRAVENOUS at 02:54

## 2019-02-22 RX ADMIN — Medication 40 MILLIEQUIVALENT(S): at 21:05

## 2019-02-22 RX ADMIN — EFAVIRENZ, EMTRICITABINE AND TENOFOVIR DISOPROXIL FUMARATE 1 TABLET(S): 600; 200; 300 TABLET, FILM COATED ORAL at 21:05

## 2019-02-22 RX ADMIN — SODIUM CHLORIDE 1000 MILLILITER(S): 9 INJECTION INTRAMUSCULAR; INTRAVENOUS; SUBCUTANEOUS at 00:07

## 2019-02-22 RX ADMIN — Medication 3 MILLILITER(S): at 22:02

## 2019-02-22 RX ADMIN — AMLODIPINE BESYLATE 10 MILLIGRAM(S): 2.5 TABLET ORAL at 06:00

## 2019-02-22 RX ADMIN — SODIUM CHLORIDE 100 MILLILITER(S): 9 INJECTION, SOLUTION INTRAVENOUS at 03:30

## 2019-02-22 RX ADMIN — Medication 1 TABLET(S): at 16:01

## 2019-02-22 RX ADMIN — Medication 30 MILLILITER(S): at 21:23

## 2019-02-22 RX ADMIN — PANTOPRAZOLE SODIUM 40 MILLIGRAM(S): 20 TABLET, DELAYED RELEASE ORAL at 06:00

## 2019-02-22 NOTE — H&P ADULT - MUSCULOSKELETAL COMMENTS
pain over B/L ribs area - in the axillary line region moderate tenderness to mild/moderate palpation over the lower ribs in axillary line

## 2019-02-22 NOTE — H&P ADULT - PROBLEM SELECTOR PLAN 3
- on Atripla PTA; continued  - last viral load "undetectable," per patient  - f/u viral load in the AM (ordered)

## 2019-02-22 NOTE — PROGRESS NOTE ADULT - PROBLEM SELECTOR PLAN 2
- bilateral lower areas in mid axillary line, with worsening over time and no longer palliated with Tylenol; tenderness to palpation mild/moderate  - onset after significant retching with vomiting on empty stomach  - CPK elevated to 328; f/u repeat in the AM  - EKG showed t wave inversions in lateral leads on admission which are unchanged on repeat EKG. troponin 12 --> 13; Doubt cardiac etiology  check V/Q scan to r/o PE  check rib series to r/o fractures or lesions  - review of past history in [facility] notes B/L rib pain with coughing in 03/2014  - Dilaudid 0.5 mg IV PRN moderate/severe pain; no NSAIDS prescribed cause of history of significant GERD but patient says pain doesn't feel like GERD.

## 2019-02-22 NOTE — H&P ADULT - PMH
Asthma    Benign hypertension    Cigarette smoker    GERD (gastroesophageal reflux disease)    H/O abdominal hysterectomy    HIV disease    HPV (human papilloma virus) infection    Knee pain, bilateral    Osteoarthritis    Pruritus  (chronic)  Vitamin D deficiency

## 2019-02-22 NOTE — H&P ADULT - PROBLEM SELECTOR PLAN 5
- on Vit D2 once weekly (Monday)  - reports taking med for > 2 months  - would consider changing to daily supplement of Vit D3

## 2019-02-22 NOTE — H&P ADULT - PSYCHIATRIC COMMENTS
occasionally feels stressed (manageable, per patient).  Had tearful episode in the ED (prior to being seen) Initially w/ flat affect, improved toward end of interview.  Cooperative

## 2019-02-22 NOTE — H&P ADULT - NSHPLABSRESULTS_GEN_ALL_CORE
15.0   12.48 )-----------( 511      ( 21 Feb 2019 18:29 )             46.8       02-21    136  |  99  |  27<H>  ----------------------------<  110<H>  3.6   |  19<L>  |  1.74<H>    Ca    9.8      21 Feb 2019 18:29    TPro  8.6<H>  /  Alb  4.8  /  TBili  0.3  /  DBili  x   /  AST  55<H>  /  ALT  59<H>  /  AlkPhos  151<H>  02-21    PT/INR - ( 21 Feb 2019 18:29 )   PT: 10.7 SEC;   INR: 0.97          PTT - ( 21 Feb 2019 18:29 )  PTT:22.0 SEC    Lactate Trend      CARDIAC MARKERS ( 22 Feb 2019 01:40 )  x     / x     / 328 u/L / 5.49 ng/mL / x          CAPILLARY BLOOD GLUCOSE

## 2019-02-22 NOTE — H&P ADULT - MUSCULOSKELETAL
details… detailed exam no joint warmth/no calf tenderness/mild decreased ROM of the trunk in sitting up in bed (due to pain)/no joint swelling/no joint erythema

## 2019-02-22 NOTE — PROGRESS NOTE ADULT - SUBJECTIVE AND OBJECTIVE BOX
Patient is a 53y old  Female who presents with a chief complaint of Tachycardia, Acute kidney injury (22 Feb 2019 02:33)      SUBJECTIVE / OVERNIGHT EVENTS:  Patient c/o B/L rib pain x few days. No SOB, cp, abdominal pain, N/V/D/ no travel history.    MEDICATIONS  (STANDING):  amLODIPine   Tablet 10 milliGRAM(s) Oral daily  efavirenz 600/emtricitabine 200/tenofovir 300mg 1 Tablet(s) Oral at bedtime  ergocalciferol 17700 Unit(s) Oral <User Schedule>  lactated ringers. 1000 milliLiter(s) (100 mL/Hr) IV Continuous <Continuous>  multivitamin 1 Tablet(s) Oral daily  pantoprazole    Tablet 40 milliGRAM(s) Oral before breakfast  potassium chloride    Tablet ER 40 milliEquivalent(s) Oral every 4 hours    MEDICATIONS  (PRN):  ALBUTerol    90 MICROgram(s) HFA Inhaler 2 Puff(s) Inhalation every 6 hours PRN Shortness of Breath and/or Wheezing  HYDROmorphone  Injectable 0.5 milliGRAM(s) IV Push every 4 hours PRN Moderate Pain (4 - 6) or Severe Pain (7 - 10)      Vital Signs Last 24 Hrs  T(C): 36.7 (22 Feb 2019 05:59), Max: 37.5 (21 Feb 2019 18:52)  T(F): 98.1 (22 Feb 2019 05:59), Max: 99.5 (21 Feb 2019 18:52)  HR: 86 (22 Feb 2019 05:59) (86 - 114)  BP: 134/71 (22 Feb 2019 05:59) (132/71 - 136/93)  BP(mean): --  RR: 16 (22 Feb 2019 05:59) (16 - 18)  SpO2: 100% (22 Feb 2019 05:59) (98% - 100%)  CAPILLARY BLOOD GLUCOSE        I&O's Summary      PHYSICAL EXAM:  GENERAL: NAD, well-developed  HEAD:  Atraumatic, Normocephalic  EYES: EOMI, PERRLA, conjunctiva and sclera clear  NECK: Supple, No JVD  CHEST/LUNG: B/L rhonchi; No wheeze  HEART: Regular rate and rhythm; No murmurs, rubs, or gallops  ABDOMEN: Soft, Nontender, Nondistended; Bowel sounds present  EXTREMITIES:  2+ Peripheral Pulses, No clubbing, cyanosis, or edema  PSYCH: AAOx3  NEUROLOGY: non-focal  SKIN: No rashes or lesions    LABS:                        12.1   12.80 )-----------( 444      ( 22 Feb 2019 06:54 )             38.0     02-22    136  |  101  |  26<H>  ----------------------------<  117<H>  3.0<L>   |  23  |  1.51<H>    Ca    9.0      22 Feb 2019 06:54  Phos  3.7     02-22  Mg     1.9     02-22    TPro  6.9  /  Alb  3.8  /  TBili  0.4  /  DBili  x   /  AST  29  /  ALT  38<H>  /  AlkPhos  116  02-22    PT/INR - ( 21 Feb 2019 18:29 )   PT: 10.7 SEC;   INR: 0.97          PTT - ( 21 Feb 2019 18:29 )  PTT:22.0 SEC  CARDIAC MARKERS ( 22 Feb 2019 06:54 )  x     / x     / 331 u/L / x     / x      CARDIAC MARKERS ( 22 Feb 2019 01:40 )  x     / x     / 328 u/L / 5.49 ng/mL / x              RADIOLOGY & ADDITIONAL TESTS:    Imaging Personally Reviewed: < from: US Duplex Venous Lower Ext Complete, Bilateral (02.21.19 @ 20:49) >  Impression:     No evidence of deep vein thrombosis in either lower extremity.    < end of copied text >       EKG Sinus rythm @ 88 bpm T wave inversion in V4-V6    Consultant(s) Notes Reviewed:      Care Discussed with Consultants/Other Providers:

## 2019-02-22 NOTE — H&P ADULT - PROBLEM SELECTOR PLAN 2
- bilateral lower areas in mid axillary line, with worsening over time and no longer palliated with Tylenol; tenderness to palpation mild/moderate  - onset after significant retching with vomiting on empty stomach  - troponin 12 --> 13  - ECG not available in chart, will order repeat in the AM  - review of past history in [facility] notes B/L rib pain with coughing in 03/2014  - Dilaudid 0.5 mg IV PRN moderate/severe pain  - pain unlikely of cardiac or pulmonary etiology  - f/u for improvement - bilateral lower areas in mid axillary line, with worsening over time and no longer palliated with Tylenol; tenderness to palpation mild/moderate  - onset after significant retching with vomiting on empty stomach  - CPK elevated to 328; f/u repeat in the AM  - troponin 12 --> 13  - ECG not available in chart, will order repeat in the AM  - review of past history in [facility] notes B/L rib pain with coughing in 03/2014  - Dilaudid 0.5 mg IV PRN moderate/severe pain; no NSAIDS prescribed cause of history of significant GERD  - pain unlikely of cardiac or pulmonary etiology  - f/u for improvement - bilateral lower areas in mid axillary line, with worsening over time and no longer palliated with Tylenol; tenderness to palpation mild/moderate  - onset after significant retching with vomiting on empty stomach  - CPK elevated to 328; f/u repeat in the AM  - troponin 12 --> 13  - ECG not available in chart, will order repeat in the AM  - review of past history in [facility] notes B/L rib pain with coughing in 03/2014  - Dilaudid 0.5 mg IV PRN moderate/severe pain; no NSAIDS prescribed because of history of significant GERD and risk of GIB  - pain unlikely of cardiac or pulmonary etiology  - f/u for improvement

## 2019-02-22 NOTE — H&P ADULT - HISTORY OF PRESENT ILLNESS
53 year old female, with past history significant for Asthma, HTN, Smoking (quit 12/31/2019), GERD, HIV (last viral load undetectable ~ 3 months ago), HPV, OA, Pruritis and Hysterectomy, presented to the ED secondary to worsening bilateral rib pain.  Seen and evaluated at bedside;      Vital signs upon ED presentation as follows: BP = 136/93, HR = 114, RR = 16, T = 36.6 C/97.8 F, O2 Sat = 98% on RA.  Diagnosed with Tachycardia and Acute Kidney Injury.  Presribed 53 year old female, with past history significant for Asthma, HTN, Smoking (quit 12/31/2019), GERD, HIV (last viral load undetectable ~ 3 months ago), HPV, OA, Pruritis and Hysterectomy, presented to the ED secondary to worsening bilateral rib pain.  Seen and evaluated at bedside with ; NAD.  Patient relates onset of bilateral ribs pain, which woke her from sleep, a few days prior.  Has been taking Tylenol, but pains intensified on the day prior to coming to thew ED.  History significant for alcohol consumption on an empty stomach several days ago, and non-bloody vomiting on Tuesday; significant retching with only acidic phlegm produced.  No reports of fevers, chills, sweating, chest pain, shortness of breath, palpitations, abdominal pain, diarrhea, constipation or dysuria.    Vital signs upon ED presentation as follows: BP = 136/93, HR = 114, RR = 16, T = 36.6 C/97.8 F, O2 Sat = 98% on RA.  Diagnosed with Tachycardia and Acute Kidney Injury.  Presribed

## 2019-02-22 NOTE — CHART NOTE - NSCHARTNOTEFT_GEN_A_CORE
Patient complaining of internal vaginal itchiness. External vaginal exam performed with chaperone Ann Gonzalez RN. Vulva without lesions or discharge.   UA ordered. Patient +HIV, +HPV. Will order Fluconazole. Patient instructed to follow up closely with GYN outpatient.     Padmini Warner PA-C   [id number]

## 2019-02-22 NOTE — H&P ADULT - PROBLEM SELECTOR PLAN 7
- no issues presently, and well controlled, per patient; no exacerbation for a long period  - continues on albuterol PRN  - follow pulse oximetry

## 2019-02-22 NOTE — H&P ADULT - PROBLEM SELECTOR PLAN 1
- BUN/Cr = 27/1.74 (14/0.8 in 10/2018)  - in the setting of dehydration caused by nausea w/ vomiting and inability to tolerate PO intake; improved and now reports wanting to eat  - s/p Zofran 4 mg IV, Maalox 30 mL and morphine 4 mg IV x 2 in the ED  - also s/p 1 liter NS in the ED; additional 1 liter fluid (LR) prescribed with subsequent maintenance at 100 mL/Hr x 10 hours  - will give Dilaudid 0.5 mg Q4H PRN moderate or severe pain for now - due to renal compromise  - f/u patient's clinical status and repeat lab-work in the AM  - if no improvement in renal function, would obtain renal ultrasound - BUN/Cr = 27/1.74 (14/0.8 in 10/2018)  - in the setting of dehydration caused by nausea w/ vomiting and inability to tolerate PO intake; improved and now reports wanting to eat  - s/p Zofran 4 mg IV, Maalox 30 mL and morphine 4 mg IV x 2 in the ED  - also s/p 1 liter NS in the ED; additional 1 liter fluid (LR) prescribed with subsequent maintenance at 100 mL/Hr x 10 hours  - due to renal compromise, will give Dilaudid 0.5 mg Q4H PRN moderate or severe pain for now  - avoid nephrotoxins  - f/u CPK level in the AM (currently 328)  - encourage oral hydration  - f/u patient's clinical status and repeat lab-work in the AM  - if no improvement in renal function, would obtain renal ultrasound and nephrology consult

## 2019-02-22 NOTE — H&P ADULT - NSHPSOCIALHISTORY_GEN_ALL_CORE
Lives with   History of smoking; approximately > 30 years.  1 to 1.5 ppd.  Quit 12/31/2019  No history of alcohol abuse  No history of illegal drug use

## 2019-02-22 NOTE — H&P ADULT - PROBLEM SELECTOR PLAN 4
- ongoing, but palliated with analgesic  - may need orthopedic consult as outpatient  - continues on vitamin D therapy once weekly

## 2019-02-22 NOTE — PROGRESS NOTE ADULT - PROBLEM SELECTOR PLAN 1
-resolving  - BUN/Cr = 27/1.74 (14/0.8 in 10/2018)  - in the setting of dehydration caused by nausea w/ vomiting and inability to tolerate PO intake; improved and now reports wanting to eat  - avoid nephrotoxins  - encourage oral hydration  -c/w Lactated ringers @ 100ml/hr

## 2019-02-22 NOTE — PROGRESS NOTE ADULT - ASSESSMENT
53 year old female, w/ hx   HIV (last viral load undetectable ~ 3 months ago), Asthma, HTN, Smoking (quit 12/31/2018), GERD, HPV, OA, p/w worsening bilateral rib pain found to be tachycardic with JULISA.

## 2019-02-22 NOTE — H&P ADULT - ASSESSMENT
53 year old female, with past history significant for Asthma, HTN, Smoking (quit 12/31/2019), GERD, HIV (last viral load undetectable ~ 3 months ago), HPV, OA, Pruritis and Hysterectomy, presented to the ED secondary to worsening bilateral rib pain.  Vital signs upon ED presentation as follows: BP = 136/93, HR = 114, RR = 16, T = 36.6 C/97.8 F, O2 Sat = 98% on RA.  Diagnosed with Tachycardia and Acute Kidney Injury.  Admitted for further management of:

## 2019-02-22 NOTE — PROGRESS NOTE ADULT - PROBLEM SELECTOR PLAN 7
- Patient reports no exacerbation for a long period  Did hear rhonchi on PE so will give dounebs  - continues on albuterol PRN  - follow pulse oximetry

## 2019-02-22 NOTE — H&P ADULT - FAMILY HISTORY
Grandparent  Still living? Unknown  Family history of hypertension, Age at diagnosis: Age Unknown  Family history of diabetes mellitus, Age at diagnosis: Age Unknown     Aunt  Still living? Unknown  Family history of breast cancer, Age at diagnosis: Age Unknown  Family history of myocardial infarction, Age at diagnosis: Age Unknown  Family history of cerebrovascular accident (CVA), Age at diagnosis: Age Unknown     Uncle  Still living? Unknown  Family history of diabetes mellitus, Age at diagnosis: Age Unknown  Family history of cancer, Age at diagnosis: Age Unknown     Uncle  Still living? Unknown  Family history of diabetes mellitus, Age at diagnosis: Age Unknown  Family history of cancer, Age at diagnosis: Age Unknown

## 2019-02-23 ENCOUNTER — TRANSCRIPTION ENCOUNTER (OUTPATIENT)
Age: 54
End: 2019-02-23

## 2019-02-23 VITALS
SYSTOLIC BLOOD PRESSURE: 137 MMHG | OXYGEN SATURATION: 100 % | DIASTOLIC BLOOD PRESSURE: 78 MMHG | TEMPERATURE: 98 F | RESPIRATION RATE: 18 BRPM | HEART RATE: 82 BPM

## 2019-02-23 LAB
ANION GAP SERPL CALC-SCNC: 10 MMO/L — SIGNIFICANT CHANGE UP (ref 7–14)
BASOPHILS # BLD AUTO: 0.04 K/UL — SIGNIFICANT CHANGE UP (ref 0–0.2)
BASOPHILS NFR BLD AUTO: 0.6 % — SIGNIFICANT CHANGE UP (ref 0–2)
BUN SERPL-MCNC: 16 MG/DL — SIGNIFICANT CHANGE UP (ref 7–23)
CALCIUM SERPL-MCNC: 9 MG/DL — SIGNIFICANT CHANGE UP (ref 8.4–10.5)
CHLORIDE SERPL-SCNC: 103 MMOL/L — SIGNIFICANT CHANGE UP (ref 98–107)
CK SERPL-CCNC: 208 U/L — HIGH (ref 25–170)
CO2 SERPL-SCNC: 23 MMOL/L — SIGNIFICANT CHANGE UP (ref 22–31)
CREAT SERPL-MCNC: 0.86 MG/DL — SIGNIFICANT CHANGE UP (ref 0.5–1.3)
EOSINOPHIL # BLD AUTO: 0.16 K/UL — SIGNIFICANT CHANGE UP (ref 0–0.5)
EOSINOPHIL NFR BLD AUTO: 2.3 % — SIGNIFICANT CHANGE UP (ref 0–6)
GLUCOSE SERPL-MCNC: 117 MG/DL — HIGH (ref 70–99)
HCT VFR BLD CALC: 38.7 % — SIGNIFICANT CHANGE UP (ref 34.5–45)
HGB BLD-MCNC: 12.2 G/DL — SIGNIFICANT CHANGE UP (ref 11.5–15.5)
HIV-1 VIRAL LOAD RESULT: SIGNIFICANT CHANGE UP
HIV1 RNA # SERPL NAA+PROBE: SIGNIFICANT CHANGE UP
HIV1 RNA SER-IMP: SIGNIFICANT CHANGE UP
HIV1 RNA SERPL NAA+PROBE-ACNC: SIGNIFICANT CHANGE UP
HIV1 RNA SERPL NAA+PROBE-LOG#: SIGNIFICANT CHANGE UP
IMM GRANULOCYTES NFR BLD AUTO: 0.3 % — SIGNIFICANT CHANGE UP (ref 0–1.5)
LYMPHOCYTES # BLD AUTO: 2.36 K/UL — SIGNIFICANT CHANGE UP (ref 1–3.3)
LYMPHOCYTES # BLD AUTO: 33.5 % — SIGNIFICANT CHANGE UP (ref 13–44)
MAGNESIUM SERPL-MCNC: 2.1 MG/DL — SIGNIFICANT CHANGE UP (ref 1.6–2.6)
MCHC RBC-ENTMCNC: 29.3 PG — SIGNIFICANT CHANGE UP (ref 27–34)
MCHC RBC-ENTMCNC: 31.5 % — LOW (ref 32–36)
MCV RBC AUTO: 92.8 FL — SIGNIFICANT CHANGE UP (ref 80–100)
MONOCYTES # BLD AUTO: 0.46 K/UL — SIGNIFICANT CHANGE UP (ref 0–0.9)
MONOCYTES NFR BLD AUTO: 6.5 % — SIGNIFICANT CHANGE UP (ref 2–14)
NEUTROPHILS # BLD AUTO: 4.01 K/UL — SIGNIFICANT CHANGE UP (ref 1.8–7.4)
NEUTROPHILS NFR BLD AUTO: 56.8 % — SIGNIFICANT CHANGE UP (ref 43–77)
NRBC # FLD: 0 K/UL — LOW (ref 25–125)
PHOSPHATE SERPL-MCNC: 2.1 MG/DL — LOW (ref 2.5–4.5)
PLATELET # BLD AUTO: 427 K/UL — HIGH (ref 150–400)
PMV BLD: 9.4 FL — SIGNIFICANT CHANGE UP (ref 7–13)
POTASSIUM SERPL-MCNC: 4.3 MMOL/L — SIGNIFICANT CHANGE UP (ref 3.5–5.3)
POTASSIUM SERPL-SCNC: 4.3 MMOL/L — SIGNIFICANT CHANGE UP (ref 3.5–5.3)
RBC # BLD: 4.17 M/UL — SIGNIFICANT CHANGE UP (ref 3.8–5.2)
RBC # FLD: 14.1 % — SIGNIFICANT CHANGE UP (ref 10.3–14.5)
SODIUM SERPL-SCNC: 136 MMOL/L — SIGNIFICANT CHANGE UP (ref 135–145)
WBC # BLD: 7.05 K/UL — SIGNIFICANT CHANGE UP (ref 3.8–10.5)
WBC # FLD AUTO: 7.05 K/UL — SIGNIFICANT CHANGE UP (ref 3.8–10.5)

## 2019-02-23 PROCEDURE — 93306 TTE W/DOPPLER COMPLETE: CPT | Mod: 26

## 2019-02-23 PROCEDURE — 99239 HOSP IP/OBS DSCHRG MGMT >30: CPT

## 2019-02-23 RX ADMIN — Medication 1 TABLET(S): at 11:36

## 2019-02-23 RX ADMIN — Medication 1000 UNIT(S): at 11:36

## 2019-02-23 RX ADMIN — PANTOPRAZOLE SODIUM 40 MILLIGRAM(S): 20 TABLET, DELAYED RELEASE ORAL at 05:33

## 2019-02-23 RX ADMIN — AMLODIPINE BESYLATE 10 MILLIGRAM(S): 2.5 TABLET ORAL at 05:33

## 2019-02-23 NOTE — DISCHARGE NOTE ADULT - HOSPITAL COURSE
53 year old female, w/ hx   HIV (last viral load undetectable ~ 3 months ago), Asthma, HTN, Smoking (quit 12/31/2018), GERD, HPV, OA, p/w worsening bilateral rib pain after vomiting and  found to be tachycardic, JULISA from dehydration now resolved. d/c 40 minutes.     JULISA (acute kidney injury).  Plan: -resolved  - BUN/Cr = 27/1.74 (14/0.8 in 10/2018)  - in the setting of dehydration caused by nausea w/ vomiting and inability to tolerate PO intake; resolved now tolerating PO  -s/p Lactated ringers @ 100ml/hr.  Rib pain.  Plan: - resolved  - bilateral lower areas in mid axillary line, with worsening over time and no longer palliated with Tylenol; tenderness to palpation mild/moderate  - onset after significant retching with vomiting on empty stomach  - suspect costochrondritis from retching  - EKG showed t wave inversions in lateral leads on admission which are unchanged on repeat EKG. troponin 12 --> 13; Doubt cardiac etiology  TTE WNL  V/Q scan negative for PE  Venous dopplers negative for DVT  check rib series to r/o fractures or lesions.  HIV disease.  Plan: - on Atripla PTA; continue  -  viral load undetectable       Vitamin D deficiency.  Plan: - on Vit D2 once weekly (Monday).  Gastroesophageal reflux disease, esophagitis presence not specified. Plan: - chronic issue  - on PPI; continued    Asthma.  Plan: - Patient reports no exacerbation for a long period  - stable  - continues on albuterol PRN  - follow pulse oximetry.  Patient cleared for discharge as per Medical Attg Dr. Mcgee.

## 2019-02-23 NOTE — DISCHARGE NOTE ADULT - PLAN OF CARE
resolution of symptoms, keep well hydrated in the setting of dehydration caused by nausea w/ vomiting and inability to tolerate PO intake; resolved now tolerating PO EKG showed t wave inversions in lateral leads on admission which are unchanged on repeat EKG. troponin 12 --> 13; Doubt cardiac etiology  Echo normal   V/Q scan negative for PE on Atripla PTA; continue continue Vit D2 once weekly (Monday). low salt diet, continue Norvasc

## 2019-02-23 NOTE — PROGRESS NOTE ADULT - PROBLEM SELECTOR PLAN 7
- Patient reports no exacerbation for a long period  Did hear rhonchi on PE so will give dounebs  - continues on albuterol PRN  - follow pulse oximetry - Patient reports no exacerbation for a long period  - stable  - continues on albuterol PRN  - follow pulse oximetry

## 2019-02-23 NOTE — DISCHARGE NOTE ADULT - MEDICATION SUMMARY - MEDICATIONS TO TAKE
I will START or STAY ON the medications listed below when I get home from the hospital:    Atripla 600 mg-200 mg-300 mg oral tablet  -- 1 tab(s) by mouth once a day (at bedtime)  -- Indication: For HIV disease    albuterol CFC free 90 mcg/inh inhalation aerosol  --  inhaled   -- Indication: For Asthma    amlodipine 10 mg oral tablet  -- 1 tab(s) by mouth once a day  -- Indication: For Hypertensive     omeprazole 20 mg oral delayed release capsule  -- 1 cap(s) by mouth once a day   -- It is very important that you take or use this exactly as directed.  Do not skip doses or discontinue unless directed by your doctor.  Obtain medical advice before taking any non-prescription drugs as some may affect the action of this medication.  Swallow whole.  Do not crush.    -- Indication: For Gastroesophageal reflux disease, esophagitis presence not specified    Multiple Vitamins oral tablet  -- 1 tab(s) by mouth once a day  -- Indication: For supplement     Vitamin D2 50,000 intl units (1.25 mg) oral capsule  -- 1 cap(s) by mouth once a week ~ on Monday  -- Indication: For supplement

## 2019-02-23 NOTE — DISCHARGE NOTE ADULT - CARE PLAN
Principal Discharge DX:	Dehydration  Goal:	resolution of symptoms, keep well hydrated  Assessment and plan of treatment:	in the setting of dehydration caused by nausea w/ vomiting and inability to tolerate PO intake; resolved now tolerating PO  Secondary Diagnosis:	JULISA (acute kidney injury)  Assessment and plan of treatment:	in the setting of dehydration caused by nausea w/ vomiting and inability to tolerate PO intake; resolved now tolerating PO  Secondary Diagnosis:	Tachycardia  Assessment and plan of treatment:	EKG showed t wave inversions in lateral leads on admission which are unchanged on repeat EKG. troponin 12 --> 13; Doubt cardiac etiology  Echo normal   V/Q scan negative for PE  Secondary Diagnosis:	HIV disease  Assessment and plan of treatment:	on Atripla PTA; continue  Secondary Diagnosis:	Vitamin D deficiency  Assessment and plan of treatment:	continue Vit D2 once weekly (Monday).  Secondary Diagnosis:	Hypertension  Assessment and plan of treatment:	low salt diet, continue Norvasc

## 2019-02-23 NOTE — PROGRESS NOTE ADULT - PROBLEM SELECTOR PLAN 5
- on Vit D2 once weekly (Monday)  - reports taking med for > 2 months  - would consider changing to daily supplement of Vit D3 - on Vit D2 once weekly (Monday)

## 2019-02-23 NOTE — PROGRESS NOTE ADULT - ASSESSMENT
53 year old female, w/ hx   HIV (last viral load undetectable ~ 3 months ago), Asthma, HTN, Smoking (quit 12/31/2018), GERD, HPV, OA, p/w worsening bilateral rib pain found to be tachycardic with JULISA. 53 year old female, w/ hx   HIV (last viral load undetectable ~ 3 months ago), Asthma, HTN, Smoking (quit 12/31/2018), GERD, HPV, OA, p/w worsening bilateral rib pain after vomiting and  found to be tachycardic, JULISA from dehydration now resolved. d/c 40 minutes.

## 2019-02-23 NOTE — PROGRESS NOTE ADULT - PROBLEM SELECTOR PLAN 2
- bilateral lower areas in mid axillary line, with worsening over time and no longer palliated with Tylenol; tenderness to palpation mild/moderate  - onset after significant retching with vomiting on empty stomach  - CPK elevated to 328; f/u repeat in the AM  - EKG showed t wave inversions in lateral leads on admission which are unchanged on repeat EKG. troponin 12 --> 13; Doubt cardiac etiology  V/Q scan negative for PE  Venous dopplers negative for DVT  check rib series to r/o fractures or lesions  - review of past history in [facility] notes B/L rib pain with coughing in 03/2014  - Dilaudid 0.5 mg IV PRN moderate/severe pain; no NSAIDS prescribed cause of history of significant GERD but patient says pain doesn't feel like GERD. - resolved  - bilateral lower areas in mid axillary line, with worsening over time and no longer palliated with Tylenol; tenderness to palpation mild/moderate  - onset after significant retching with vomiting on empty stomach  - suspect costochrondritis from retching  - EKG showed t wave inversions in lateral leads on admission which are unchanged on repeat EKG. troponin 12 --> 13; Doubt cardiac etiology  TTE WNL  V/Q scan negative for PE  Venous dopplers negative for DVT  check rib series to r/o fractures or lesions

## 2019-02-23 NOTE — PROGRESS NOTE ADULT - PROBLEM SELECTOR PLAN 1
-resolving  - BUN/Cr = 27/1.74 (14/0.8 in 10/2018)  - in the setting of dehydration caused by nausea w/ vomiting and inability to tolerate PO intake; improved and now reports wanting to eat  - avoid nephrotoxins  - encourage oral hydration  -c/w Lactated ringers @ 100ml/hr -resolved  - BUN/Cr = 27/1.74 (14/0.8 in 10/2018)  - in the setting of dehydration caused by nausea w/ vomiting and inability to tolerate PO intake; resolved now tolerating PO  -c/w Lactated ringers @ 100ml/hr

## 2019-02-25 DIAGNOSIS — Z71.89 OTHER SPECIFIED COUNSELING: ICD-10-CM

## 2019-02-26 LAB
BACTERIA BLD CULT: SIGNIFICANT CHANGE UP
BACTERIA BLD CULT: SIGNIFICANT CHANGE UP

## 2019-06-26 ENCOUNTER — EMERGENCY (EMERGENCY)
Facility: HOSPITAL | Age: 54
LOS: 1 days | Discharge: HOME | End: 2019-06-26
Attending: EMERGENCY MEDICINE | Admitting: EMERGENCY MEDICINE
Payer: MEDICAID

## 2019-06-26 VITALS
RESPIRATION RATE: 16 BRPM | HEART RATE: 81 BPM | OXYGEN SATURATION: 100 % | DIASTOLIC BLOOD PRESSURE: 102 MMHG | SYSTOLIC BLOOD PRESSURE: 174 MMHG | TEMPERATURE: 98 F

## 2019-06-26 VITALS
OXYGEN SATURATION: 100 % | SYSTOLIC BLOOD PRESSURE: 154 MMHG | RESPIRATION RATE: 16 BRPM | DIASTOLIC BLOOD PRESSURE: 79 MMHG | HEART RATE: 77 BPM

## 2019-06-26 DIAGNOSIS — Z90.710 ACQUIRED ABSENCE OF BOTH CERVIX AND UTERUS: Chronic | ICD-10-CM

## 2019-06-26 PROBLEM — M25.561 PAIN IN RIGHT KNEE: Chronic | Status: ACTIVE | Noted: 2019-02-22

## 2019-06-26 PROBLEM — E55.9 VITAMIN D DEFICIENCY, UNSPECIFIED: Chronic | Status: ACTIVE | Noted: 2019-02-22

## 2019-06-26 LAB
ALBUMIN SERPL ELPH-MCNC: 4.4 G/DL — SIGNIFICANT CHANGE UP (ref 3.3–5)
ALP SERPL-CCNC: 156 U/L — HIGH (ref 40–120)
ALT FLD-CCNC: 34 U/L — HIGH (ref 4–33)
ANION GAP SERPL CALC-SCNC: 13 MMO/L — SIGNIFICANT CHANGE UP (ref 7–14)
AST SERPL-CCNC: 26 U/L — SIGNIFICANT CHANGE UP (ref 4–32)
BASOPHILS # BLD AUTO: 0.05 K/UL — SIGNIFICANT CHANGE UP (ref 0–0.2)
BASOPHILS NFR BLD AUTO: 0.7 % — SIGNIFICANT CHANGE UP (ref 0–2)
BILIRUB SERPL-MCNC: 0.3 MG/DL — SIGNIFICANT CHANGE UP (ref 0.2–1.2)
BUN SERPL-MCNC: 15 MG/DL — SIGNIFICANT CHANGE UP (ref 7–23)
CALCIUM SERPL-MCNC: 9.1 MG/DL — SIGNIFICANT CHANGE UP (ref 8.4–10.5)
CHLORIDE SERPL-SCNC: 102 MMOL/L — SIGNIFICANT CHANGE UP (ref 98–107)
CO2 SERPL-SCNC: 24 MMOL/L — SIGNIFICANT CHANGE UP (ref 22–31)
CREAT SERPL-MCNC: 0.72 MG/DL — SIGNIFICANT CHANGE UP (ref 0.5–1.3)
EOSINOPHIL # BLD AUTO: 0.23 K/UL — SIGNIFICANT CHANGE UP (ref 0–0.5)
EOSINOPHIL NFR BLD AUTO: 3.2 % — SIGNIFICANT CHANGE UP (ref 0–6)
GLUCOSE SERPL-MCNC: 108 MG/DL — HIGH (ref 70–99)
HCT VFR BLD CALC: 41.3 % — SIGNIFICANT CHANGE UP (ref 34.5–45)
HGB BLD-MCNC: 13.4 G/DL — SIGNIFICANT CHANGE UP (ref 11.5–15.5)
IMM GRANULOCYTES NFR BLD AUTO: 0.1 % — SIGNIFICANT CHANGE UP (ref 0–1.5)
LIDOCAIN IGE QN: 24.4 U/L — SIGNIFICANT CHANGE UP (ref 7–60)
LYMPHOCYTES # BLD AUTO: 2.38 K/UL — SIGNIFICANT CHANGE UP (ref 1–3.3)
LYMPHOCYTES # BLD AUTO: 33.2 % — SIGNIFICANT CHANGE UP (ref 13–44)
MCHC RBC-ENTMCNC: 29.3 PG — SIGNIFICANT CHANGE UP (ref 27–34)
MCHC RBC-ENTMCNC: 32.4 % — SIGNIFICANT CHANGE UP (ref 32–36)
MCV RBC AUTO: 90.4 FL — SIGNIFICANT CHANGE UP (ref 80–100)
MONOCYTES # BLD AUTO: 0.49 K/UL — SIGNIFICANT CHANGE UP (ref 0–0.9)
MONOCYTES NFR BLD AUTO: 6.8 % — SIGNIFICANT CHANGE UP (ref 2–14)
NEUTROPHILS # BLD AUTO: 4.01 K/UL — SIGNIFICANT CHANGE UP (ref 1.8–7.4)
NEUTROPHILS NFR BLD AUTO: 56 % — SIGNIFICANT CHANGE UP (ref 43–77)
NRBC # FLD: 0 K/UL — SIGNIFICANT CHANGE UP (ref 0–0)
PLATELET # BLD AUTO: 467 K/UL — HIGH (ref 150–400)
PMV BLD: 8.9 FL — SIGNIFICANT CHANGE UP (ref 7–13)
POTASSIUM SERPL-MCNC: 3.8 MMOL/L — SIGNIFICANT CHANGE UP (ref 3.5–5.3)
POTASSIUM SERPL-SCNC: 3.8 MMOL/L — SIGNIFICANT CHANGE UP (ref 3.5–5.3)
PROT SERPL-MCNC: 7.6 G/DL — SIGNIFICANT CHANGE UP (ref 6–8.3)
RBC # BLD: 4.57 M/UL — SIGNIFICANT CHANGE UP (ref 3.8–5.2)
RBC # FLD: 14.7 % — HIGH (ref 10.3–14.5)
SODIUM SERPL-SCNC: 139 MMOL/L — SIGNIFICANT CHANGE UP (ref 135–145)
WBC # BLD: 7.17 K/UL — SIGNIFICANT CHANGE UP (ref 3.8–10.5)
WBC # FLD AUTO: 7.17 K/UL — SIGNIFICANT CHANGE UP (ref 3.8–10.5)

## 2019-06-26 PROCEDURE — 99284 EMERGENCY DEPT VISIT MOD MDM: CPT

## 2019-06-26 RX ORDER — FAMOTIDINE 10 MG/ML
20 INJECTION INTRAVENOUS ONCE
Refills: 0 | Status: COMPLETED | OUTPATIENT
Start: 2019-06-26 | End: 2019-06-26

## 2019-06-26 RX ORDER — ACETAMINOPHEN 500 MG
1000 TABLET ORAL ONCE
Refills: 0 | Status: COMPLETED | OUTPATIENT
Start: 2019-06-26 | End: 2019-06-26

## 2019-06-26 RX ORDER — FAMOTIDINE 10 MG/ML
1 INJECTION INTRAVENOUS
Qty: 30 | Refills: 0
Start: 2019-06-26

## 2019-06-26 RX ORDER — METOCLOPRAMIDE HCL 10 MG
10 TABLET ORAL ONCE
Refills: 0 | Status: COMPLETED | OUTPATIENT
Start: 2019-06-26 | End: 2019-06-26

## 2019-06-26 RX ORDER — SODIUM CHLORIDE 9 MG/ML
1000 INJECTION INTRAMUSCULAR; INTRAVENOUS; SUBCUTANEOUS ONCE
Refills: 0 | Status: COMPLETED | OUTPATIENT
Start: 2019-06-26 | End: 2019-06-26

## 2019-06-26 RX ADMIN — Medication 30 MILLILITER(S): at 10:09

## 2019-06-26 RX ADMIN — Medication 10 MILLIGRAM(S): at 07:48

## 2019-06-26 RX ADMIN — FAMOTIDINE 20 MILLIGRAM(S): 10 INJECTION INTRAVENOUS at 10:09

## 2019-06-26 RX ADMIN — Medication 400 MILLIGRAM(S): at 07:48

## 2019-06-26 RX ADMIN — SODIUM CHLORIDE 1000 MILLILITER(S): 9 INJECTION INTRAMUSCULAR; INTRAVENOUS; SUBCUTANEOUS at 07:48

## 2019-06-26 NOTE — ED ADULT NURSE NOTE - OBJECTIVE STATEMENT
pt brought into room 6. a&ox4 amb self care female with a history of HIV, HTN, Asthma and acid reflux presents to the ed today for nausea x2 days and headache. pt denies abdominal pain/ vomiting/ chest pain/ SOB. pt speaking in clear and full sentences. pt states she has had feelings of constipation. pt states the nausea today is worse than it has been. PT breathing even and unlabored. awaiting MD mckeon

## 2019-06-26 NOTE — ED PROVIDER NOTE - OBJECTIVE STATEMENT
54 y.o. female PMH asthma, HIV p/w nausea, headache, and constipation.  The nausea started two days ago, but she has had constipation for a while.  No vomiting.  No change in vision.  Light makes the headache worse.   no abdominal pain.  She says her CD4 count is over 2000.

## 2019-06-26 NOTE — ED ADULT TRIAGE NOTE - CHIEF COMPLAINT QUOTE
pt arrives w/ c/o sweating and nausea. pt states she wakes up everyday like this but today was worst. pt also c/o constipation. pmh HTN

## 2019-06-26 NOTE — ED PROVIDER NOTE - ATTENDING CONTRIBUTION TO CARE
53 y/o F with h/o asthma, HIV (on ART with normal CD4) here with headache.  Pt reports gradual onset HA diffuse associated with nausea and photophobia this morning.  Pt also c/o chronic constipation - states she has been having BM, but straining more and hard stools.  No fevers, neck stiffness, uri sxs, weakness, vision changes, numbness, rash.  Well appearing, lying comfortably in stretcher, awake and alert, nontoxic.  VSS.  NCAT EOMI PERRL.  Neck is supple, no nuchal rigidity.  Lungs cta bl.  Cards nl S1/S2, RRR, no MRG.  Abd soft ntnd.  No focal neuro deficits.  Plan for symptomatic rx, no red flag sxs or on exam concerning for primary ha, no indication for cns imaging, will reassess.

## 2019-06-26 NOTE — ED PROVIDER NOTE - CLINICAL SUMMARY MEDICAL DECISION MAKING FREE TEXT BOX
well-appearing HIV immunocompetent patient with headache and nausea.  Possibly migraine.  IVF, tylenol, reglan, labs, reassess.

## 2019-06-26 NOTE — ED PROVIDER NOTE - PHYSICAL EXAMINATION
Gen:  NAD, alert and oriented  HEENT:  PERRL, sclera clear, MMM  Heart:  RRR, no M/R/G  Lung:  CTA b/l, no rales or wheeze  Abd:  ND, soft, NT  Ext:  No edema  Skin:  No rash or ecchymosis  Neuro:  Nonfocal

## 2019-07-10 NOTE — ED ADULT NURSE NOTE - NS ED NOTE  TALK SOMEONE YN
The patient returns for follow-up of recent onset of upper abdominal pain, recently felt an upper back as well.  Her recent CT scan of the abdomen done with pancreatic protocol revealed pancreatic had enhancement with surrounding edema suspicious for pancreatic neoplasm, possibly with associated pancreatitis. Her abdominal pain was not relieved by PPI Dexilant. She denies recent alcohol use and has discontinued previously used NSAIDs and aspirin. Abdominal ultrasound done about a month ago by primary care physician's office demonstrated fatty liver, contracted gallbladder, couple of small nonvascular liver lesions suggestive of hemangiomas, poorly visualized pancreas. Recent EGD demonstrated a fairly large sliding hiatal hernia, small benign gastric polyp. There was no evidence of H. pylori infection or celiac sprue found at random biopsies. Colonoscopy was overall unremarkable, couple of small benign polyps were removed. Patient denies fever, nausea, vomiting, dysphagia, reflux, rectal bleeding, melena. She describes losing total of about 15 pounds of weight since onset of her pain. Denies pleuritic or positional change in pain, shortness of breath and chest pain.  no

## 2019-09-28 NOTE — ED ADULT NURSE NOTE - NEURO WDL
DATE:  10/09/2017    HISTORY OF PRESENT ILLNESS:  The patient is a 63-year-old woman, was admitted to  the hospital through emergency room because of chest pain.  The patient has a  long-standing history of coronary artery disease and multiple history of  coronary stent placements.  She had a stent placed in May of 2017.  At that  time, she had an inferior STEMI.  She said that she did not have any pain last  night.  She woke up in the early morning today with tight chest pain.  She took  some nitroglycerin and aspirin at home, did not respond to that, so she came  into the emergency room.  Later during the day when she was given nitroglycerin  drip, her pain got better.  She had the pain radiated to the left arm as well as  towards the back.  The pain was sharp in nature.  She said that she has been  sweating profusely lately.  She did not say that this was connected with her  pain, however, generally speaking, she was sweating profusely.  She tells me  this type of symptoms persisted when she had an MI back in May of 2017.    REVIEW OF SYSTEMS:  Constitutional:  She does not have any fever or chills.  No  weight loss or weight gain reported lately.  Neurological:  Negative for  headache, dizziness, syncope, or presyncope.  HEENT:  Symptoms are negative for  sore throat, epistaxis, hemoptysis, congested sinuses.  Cardiovascular:  Positive for chest pain.  Pulmonary:  Positive for shortness of breath.  GI:  Negative for abdominal pain, nausea, vomiting, diarrhea, black colored stool or  bloody stool.  Genitourinary:  Negative for urinary retention, urinary  frequency, urgency, or hematuria.  Extremities:  Negative for lower extremity  edema, joint pains, or swellings.  Integumentary:  Negative for skin rash.  No  petechial or any other form of hemorrhages.  Cognitive:  Negative for any  Pathology.  Psychiatric:  Also negative.    Review of system otherwise is negative.    PAST MEDICAL HISTORY:  Coronary artery  disease, history of 16 stents placement,  history of MI, last one was in May of 2017 with percutaneous intervention;  hypertension, COPD, diabetes, hypercholesterolemia, obesity, umbilical hernia,  sleep apnea.    MEDICATIONS:  Medications at home, DuoNeb, albuterol, aspirin, atorvastatin,  Symbicort, carvedilol, Plavix, famotidine, furosemide, lispro insulin,  nitroglycerin, potassium, Zoloft.    ALLERGIES:  DEMEROL.    FAMILY HISTORY:  Significant for coronary artery disease, both parents and 7  other siblings had coronary artery disease.    PERSONAL HISTORY:  No report of smoking, alcohol, or substance abuse.    PHYSICAL EXAMINATION:  GENERAL  The patient is alert, awake, oriented, no acute distress during my examination.    VITAL SIGNS  Stable vital signs.    HEENT  Normocephalic.  Pupils reactive to light and accommodation.    NECK  Supple.  No jugular venous distention or carotid bruit.  No lymphadenopathy.    CHEST  Reveals bilateral equal air entry.  No crackles or wheezing heard.    CARDIOVASCULAR  Unremarkable.    ABDOMEN  Soft.  No tenderness.  Bowel sounds within normal limits.    EXTREMITIES  Lower extremities without any edema.  Peripheral pulses normal.    NEUROLOGIC  Nonfocal.    LABORATORY DATA:  Lab data reveals a white count elevation of 21,000, hemoglobin  16.4 g/dL, platelets 250,000.   Urinalysis apparently is negative for  significant leukocyte esterase.  Chest x-ray is showing trace lung base densities.  Troponin is elevated.    IMPRESSION:  1. Non-ST-elevation myocardial infarction.  2. Chest pain.  3. History of coronary artery disease with multiple stents placed and history  of myocardial infarction.  4. Hypertension.  5. Elevated white blood cell count with slight abnormalities in the chest x-  ray.  6. Hypercholesterolemia.    PLAN:  The patient will be monitored in the hospital.  Cardiology consultation  has been obtained.  The patient will undergo cardiac catheterization today.   The  patient will be maintained on nebulizer treatments.  Other home medications  including Symbicort will be continued.  She will have IV antibiotics given  directed towards COPD, which will include ceftriaxone and Zithromax.  She will  be given DVT prophylaxis with subcutaneous Lovenox.  Blood glucose will be  monitored periodically, covered with insulin as needed.  Metformin will be held  tomorrow because of cardiac cath.   Further plan as the case progresses.        DATE AND TIME                       BING Ann/AKHIL-#[id number]  DD:  10/09/2017 16:43:49      DT:  10/09/2017 20:55:33        [facility]    HISTORY AND PHYSICAL      CARLITOS UNDERWOOD  MRN#: [id number]  ROOM: [address] [place] [address]  ACCT#: [id number] and Physical         Electronically Signed On 10/26/2017 20:04  __________________________________________________   POWER PORRAS     Alert and oriented to person, place and time, memory intact, behavior appropriate to situation, PERRL.

## 2019-10-01 PROCEDURE — G9005: CPT

## 2019-11-02 ENCOUNTER — EMERGENCY (EMERGENCY)
Facility: HOSPITAL | Age: 54
LOS: 1 days | Discharge: HOME | End: 2019-11-02
Attending: STUDENT IN AN ORGANIZED HEALTH CARE EDUCATION/TRAINING PROGRAM
Payer: MEDICAID

## 2019-11-02 VITALS
HEART RATE: 73 BPM | DIASTOLIC BLOOD PRESSURE: 90 MMHG | RESPIRATION RATE: 16 BRPM | SYSTOLIC BLOOD PRESSURE: 140 MMHG | TEMPERATURE: 98 F | OXYGEN SATURATION: 99 %

## 2019-11-02 VITALS
HEART RATE: 85 BPM | RESPIRATION RATE: 18 BRPM | DIASTOLIC BLOOD PRESSURE: 77 MMHG | SYSTOLIC BLOOD PRESSURE: 157 MMHG | OXYGEN SATURATION: 100 % | TEMPERATURE: 98 F

## 2019-11-02 DIAGNOSIS — Z90.710 ACQUIRED ABSENCE OF BOTH CERVIX AND UTERUS: Chronic | ICD-10-CM

## 2019-11-02 LAB
APPEARANCE UR: CLEAR — SIGNIFICANT CHANGE UP
BACTERIA # UR AUTO: NEGATIVE — SIGNIFICANT CHANGE UP
BILIRUB UR-MCNC: NEGATIVE — SIGNIFICANT CHANGE UP
BLOOD UR QL VISUAL: NEGATIVE — SIGNIFICANT CHANGE UP
COLOR SPEC: SIGNIFICANT CHANGE UP
FLU A RESULT: NOT DETECTED — SIGNIFICANT CHANGE UP
FLU A RESULT: NOT DETECTED — SIGNIFICANT CHANGE UP
FLUAV AG NPH QL: NOT DETECTED — SIGNIFICANT CHANGE UP
FLUBV AG NPH QL: NOT DETECTED — SIGNIFICANT CHANGE UP
GLUCOSE UR-MCNC: NEGATIVE — SIGNIFICANT CHANGE UP
HYALINE CASTS # UR AUTO: NEGATIVE — SIGNIFICANT CHANGE UP
KETONES UR-MCNC: NEGATIVE — SIGNIFICANT CHANGE UP
LEUKOCYTE ESTERASE UR-ACNC: NEGATIVE — SIGNIFICANT CHANGE UP
NITRITE UR-MCNC: NEGATIVE — SIGNIFICANT CHANGE UP
PH UR: 6.5 — SIGNIFICANT CHANGE UP (ref 5–8)
PROT UR-MCNC: 20 — SIGNIFICANT CHANGE UP
RBC CASTS # UR COMP ASSIST: SIGNIFICANT CHANGE UP (ref 0–?)
RSV RESULT: SIGNIFICANT CHANGE UP
RSV RNA RESP QL NAA+PROBE: SIGNIFICANT CHANGE UP
SP GR SPEC: 1.03 — SIGNIFICANT CHANGE UP (ref 1–1.04)
SQUAMOUS # UR AUTO: SIGNIFICANT CHANGE UP
UROBILINOGEN FLD QL: NORMAL — SIGNIFICANT CHANGE UP
WBC UR QL: SIGNIFICANT CHANGE UP (ref 0–?)

## 2019-11-02 PROCEDURE — 99284 EMERGENCY DEPT VISIT MOD MDM: CPT

## 2019-11-02 PROCEDURE — 71046 X-RAY EXAM CHEST 2 VIEWS: CPT | Mod: 26

## 2019-11-02 RX ORDER — IPRATROPIUM/ALBUTEROL SULFATE 18-103MCG
3 AEROSOL WITH ADAPTER (GRAM) INHALATION ONCE
Refills: 0 | Status: COMPLETED | OUTPATIENT
Start: 2019-11-02 | End: 2019-11-02

## 2019-11-02 RX ORDER — ALBUTEROL 90 UG/1
2 AEROSOL, METERED ORAL
Qty: 1 | Refills: 0
Start: 2019-11-02

## 2019-11-02 RX ORDER — ACETAMINOPHEN 500 MG
650 TABLET ORAL ONCE
Refills: 0 | Status: COMPLETED | OUTPATIENT
Start: 2019-11-02 | End: 2019-11-02

## 2019-11-02 RX ORDER — KETOROLAC TROMETHAMINE 30 MG/ML
30 SYRINGE (ML) INJECTION ONCE
Refills: 0 | Status: DISCONTINUED | OUTPATIENT
Start: 2019-11-02 | End: 2019-11-02

## 2019-11-02 RX ADMIN — Medication 3 MILLILITER(S): at 07:50

## 2019-11-02 RX ADMIN — Medication 3 MILLILITER(S): at 08:21

## 2019-11-02 RX ADMIN — Medication 60 MILLIGRAM(S): at 07:54

## 2019-11-02 RX ADMIN — Medication 3 MILLILITER(S): at 10:56

## 2019-11-02 RX ADMIN — Medication 3 MILLILITER(S): at 07:54

## 2019-11-02 RX ADMIN — Medication 650 MILLIGRAM(S): at 12:42

## 2019-11-02 RX ADMIN — Medication 30 MILLILITER(S): at 12:42

## 2019-11-02 RX ADMIN — Medication 30 MILLIGRAM(S): at 07:54

## 2019-11-02 NOTE — ED PROVIDER NOTE - OBJECTIVE STATEMENT
54f w hx HIV (on HAART, VL undetectable), mild intermittent asthma, HTN here today for body aches x4 days. Also w cough productive of whitish sputum, cp on deep inspiration, nasal congestion and loose stools. Denies vomiting, no known sick contacts though works as [employer], no travel.

## 2019-11-02 NOTE — ED ADULT NURSE NOTE - OBJECTIVE STATEMENT
Pt a&ox3, calm and cooperative. Pt c/o of productive cough since Wednesday, endorses sob and wheezing. Pt denies fever, chills, chest pain, abdominal discomfort. Pt hx of asthma and HIV+. respirations even/labored, wheezing. provider at bedside, no labs needed at this time.

## 2019-11-02 NOTE — ED PROVIDER NOTE - PATIENT PORTAL LINK FT
You can access the FollowMyHealth Patient Portal offered by [facility] by registering at the following website: [email or web address]. By joining [employer]’s FollowMyHealth portal, you will also be able to view your health information using other applications (apps) compatible with our system.

## 2019-11-02 NOTE — ED PROVIDER NOTE - PROGRESS NOTE DETAILS
Pt feeling better with nebulizer Elizabeth Goldberger PGY-3: pt feeling better, lungs now clear w good air movement. Flu neg. Will dc w steroids and outpt fu. Pt requesting maalox for chronic reflux prior to dc

## 2019-11-02 NOTE — ED ADULT TRIAGE NOTE - CHIEF COMPLAINT QUOTE
Pt walk in . c/o productive cough, white sputum, runny nose body aches chills for 3 days. Taking Nightquil w/o relief.  PMHx Asthma HTN

## 2019-11-02 NOTE — ED PROVIDER NOTE - NSFOLLOWUPINSTRUCTIONS_ED_ALL_ED_FT
You were seen in the emergency department for cough and body aches. Please follow up with your primary doctor. Take ibuprofen 400 milligrams every 4 hours as needed for pain. Take prednisone 40 milligrams once a day for the next 4 days, and use albuterol every 4 hours as needed for continued wheeze. Return to the emergency department immediately if you experience chest pain that worsens with exertion, difficulty breathing or any other concerning symptoms.

## 2019-11-02 NOTE — ED PROVIDER NOTE - ATTENDING CONTRIBUTION TO CARE
I performed a history and physical exam of the patient and discussed their management with the resident.  I reviewed the resident's note and agree with the documented findings and plan of care except as noted below. My medical decision making and observations are as follows:    54F hx HIV (on HAART, VL undetectable), mild intermittent asthma, HTN here today for body aches x4 days. Also w cough productive of whitish sputum, cp on deep inspiration, nasal congestion and loose stools.  no I performed a history and physical exam of the patient and discussed their management with the resident.  I reviewed the resident's note and agree with the documented findings and plan of care except as noted below. My medical decision making and observations are as follows:    54F hx HIV (on HAART, VL undetectable), mild intermittent asthma, HTN here today for body aches x4 days. Also w cough productive of whitish sputum, cp on deep inspiration, nasal congestion and loose stools.  denies f/c, abd pain, nausea/vomiting.  No sick contacts.  patient speaking in full sentences, NAD, heart rrr, lungs with diffuse scattered wheezes and coarse breath sounds, abd soft ntnd, no peripheral edema.  Likely asthma exacerbation, will check cxr to r/o pna, give duonebs, steroids and reassess.

## 2019-11-02 NOTE — ED PROVIDER NOTE - CLINICAL SUMMARY MEDICAL DECISION MAKING FREE TEXT BOX
54f w hx HIV (on HAART, VL undetectable), mild intermittent asthma, HTN here today for 4 days body aches, cough and nasal congestion, ROS also w dysuria. Pt appears well NAD. Exam w diffuse wheezing but no resp distress, VS WNL. Sx likely 2/2 viral syndrome possibly inciting asthma exacerbation. Will assess w CXR to r/o pna, also nebs and steroids + pain ctrl, UA reassess

## 2019-11-03 LAB
BACTERIA UR CULT: SIGNIFICANT CHANGE UP
SPECIMEN SOURCE: SIGNIFICANT CHANGE UP

## 2020-03-25 ENCOUNTER — EMERGENCY (EMERGENCY)
Facility: HOSPITAL | Age: 55
LOS: 1 days | Discharge: HOME | End: 2020-03-25
Admitting: EMERGENCY MEDICINE
Payer: MEDICAID

## 2020-03-25 VITALS
DIASTOLIC BLOOD PRESSURE: 110 MMHG | RESPIRATION RATE: 18 BRPM | SYSTOLIC BLOOD PRESSURE: 161 MMHG | TEMPERATURE: 98 F | OXYGEN SATURATION: 100 % | HEART RATE: 95 BPM

## 2020-03-25 VITALS
DIASTOLIC BLOOD PRESSURE: 103 MMHG | HEART RATE: 88 BPM | SYSTOLIC BLOOD PRESSURE: 179 MMHG | TEMPERATURE: 98 F | RESPIRATION RATE: 18 BRPM | OXYGEN SATURATION: 100 %

## 2020-03-25 DIAGNOSIS — Z90.710 ACQUIRED ABSENCE OF BOTH CERVIX AND UTERUS: Chronic | ICD-10-CM

## 2020-03-25 LAB
ALBUMIN SERPL ELPH-MCNC: 5 G/DL — SIGNIFICANT CHANGE UP (ref 3.3–5)
ALP SERPL-CCNC: 160 U/L — HIGH (ref 40–120)
ALT FLD-CCNC: 47 U/L — HIGH (ref 4–33)
ANION GAP SERPL CALC-SCNC: 15 MMO/L — HIGH (ref 7–14)
APPEARANCE UR: CLEAR — SIGNIFICANT CHANGE UP
AST SERPL-CCNC: 30 U/L — SIGNIFICANT CHANGE UP (ref 4–32)
BACTERIA # UR AUTO: SIGNIFICANT CHANGE UP
BASOPHILS # BLD AUTO: 0.01 K/UL — SIGNIFICANT CHANGE UP (ref 0–0.2)
BASOPHILS NFR BLD AUTO: 0.1 % — SIGNIFICANT CHANGE UP (ref 0–2)
BILIRUB SERPL-MCNC: 0.4 MG/DL — SIGNIFICANT CHANGE UP (ref 0.2–1.2)
BILIRUB UR-MCNC: NEGATIVE — SIGNIFICANT CHANGE UP
BLOOD UR QL VISUAL: SIGNIFICANT CHANGE UP
BUN SERPL-MCNC: 21 MG/DL — SIGNIFICANT CHANGE UP (ref 7–23)
CALCIUM SERPL-MCNC: 9.7 MG/DL — SIGNIFICANT CHANGE UP (ref 8.4–10.5)
CHLORIDE SERPL-SCNC: 102 MMOL/L — SIGNIFICANT CHANGE UP (ref 98–107)
CO2 SERPL-SCNC: 24 MMOL/L — SIGNIFICANT CHANGE UP (ref 22–31)
COLOR SPEC: YELLOW — SIGNIFICANT CHANGE UP
CREAT SERPL-MCNC: 0.85 MG/DL — SIGNIFICANT CHANGE UP (ref 0.5–1.3)
EOSINOPHIL # BLD AUTO: 0 K/UL — SIGNIFICANT CHANGE UP (ref 0–0.5)
EOSINOPHIL NFR BLD AUTO: 0 % — SIGNIFICANT CHANGE UP (ref 0–6)
GLUCOSE SERPL-MCNC: 167 MG/DL — HIGH (ref 70–99)
GLUCOSE UR-MCNC: NEGATIVE — SIGNIFICANT CHANGE UP
HCT VFR BLD CALC: 45.8 % — HIGH (ref 34.5–45)
HGB BLD-MCNC: 14.8 G/DL — SIGNIFICANT CHANGE UP (ref 11.5–15.5)
HYALINE CASTS # UR AUTO: SIGNIFICANT CHANGE UP
IMM GRANULOCYTES NFR BLD AUTO: 0.3 % — SIGNIFICANT CHANGE UP (ref 0–1.5)
KETONES UR-MCNC: NEGATIVE — SIGNIFICANT CHANGE UP
LEUKOCYTE ESTERASE UR-ACNC: NEGATIVE — SIGNIFICANT CHANGE UP
LIDOCAIN IGE QN: 12.8 U/L — SIGNIFICANT CHANGE UP (ref 7–60)
LYMPHOCYTES # BLD AUTO: 0.91 K/UL — LOW (ref 1–3.3)
LYMPHOCYTES # BLD AUTO: 7.9 % — LOW (ref 13–44)
MCHC RBC-ENTMCNC: 28.4 PG — SIGNIFICANT CHANGE UP (ref 27–34)
MCHC RBC-ENTMCNC: 32.3 % — SIGNIFICANT CHANGE UP (ref 32–36)
MCV RBC AUTO: 87.9 FL — SIGNIFICANT CHANGE UP (ref 80–100)
MONOCYTES # BLD AUTO: 0.7 K/UL — SIGNIFICANT CHANGE UP (ref 0–0.9)
MONOCYTES NFR BLD AUTO: 6.1 % — SIGNIFICANT CHANGE UP (ref 2–14)
NEUTROPHILS # BLD AUTO: 9.86 K/UL — HIGH (ref 1.8–7.4)
NEUTROPHILS NFR BLD AUTO: 85.6 % — HIGH (ref 43–77)
NITRITE UR-MCNC: NEGATIVE — SIGNIFICANT CHANGE UP
NRBC # FLD: 0 K/UL — SIGNIFICANT CHANGE UP (ref 0–0)
PH UR: 6.5 — SIGNIFICANT CHANGE UP (ref 5–8)
PLATELET # BLD AUTO: 475 K/UL — HIGH (ref 150–400)
PMV BLD: 9.1 FL — SIGNIFICANT CHANGE UP (ref 7–13)
POTASSIUM SERPL-MCNC: 3.6 MMOL/L — SIGNIFICANT CHANGE UP (ref 3.5–5.3)
POTASSIUM SERPL-SCNC: 3.6 MMOL/L — SIGNIFICANT CHANGE UP (ref 3.5–5.3)
PROT SERPL-MCNC: 9.1 G/DL — HIGH (ref 6–8.3)
PROT UR-MCNC: 600 — HIGH
RBC # BLD: 5.21 M/UL — HIGH (ref 3.8–5.2)
RBC # FLD: 14.8 % — HIGH (ref 10.3–14.5)
RBC CASTS # UR COMP ASSIST: SIGNIFICANT CHANGE UP (ref 0–?)
SODIUM SERPL-SCNC: 141 MMOL/L — SIGNIFICANT CHANGE UP (ref 135–145)
SP GR SPEC: 1.02 — SIGNIFICANT CHANGE UP (ref 1–1.04)
SQUAMOUS # UR AUTO: SIGNIFICANT CHANGE UP
UROBILINOGEN FLD QL: NORMAL — SIGNIFICANT CHANGE UP
WBC # BLD: 11.51 K/UL — HIGH (ref 3.8–10.5)
WBC # FLD AUTO: 11.51 K/UL — HIGH (ref 3.8–10.5)
WBC UR QL: SIGNIFICANT CHANGE UP (ref 0–?)

## 2020-03-25 PROCEDURE — 76705 ECHO EXAM OF ABDOMEN: CPT | Mod: 26

## 2020-03-25 PROCEDURE — 99284 EMERGENCY DEPT VISIT MOD MDM: CPT

## 2020-03-25 RX ORDER — ONDANSETRON 8 MG/1
4 TABLET, FILM COATED ORAL ONCE
Refills: 0 | Status: COMPLETED | OUTPATIENT
Start: 2020-03-25 | End: 2020-03-25

## 2020-03-25 RX ORDER — METOCLOPRAMIDE HCL 10 MG
10 TABLET ORAL ONCE
Refills: 0 | Status: COMPLETED | OUTPATIENT
Start: 2020-03-25 | End: 2020-03-25

## 2020-03-25 RX ORDER — ONDANSETRON 8 MG/1
1 TABLET, FILM COATED ORAL
Qty: 20 | Refills: 0
Start: 2020-03-25

## 2020-03-25 RX ORDER — AMLODIPINE BESYLATE 2.5 MG/1
10 TABLET ORAL ONCE
Refills: 0 | Status: COMPLETED | OUTPATIENT
Start: 2020-03-25 | End: 2020-03-25

## 2020-03-25 RX ORDER — SODIUM CHLORIDE 9 MG/ML
1000 INJECTION INTRAMUSCULAR; INTRAVENOUS; SUBCUTANEOUS ONCE
Refills: 0 | Status: COMPLETED | OUTPATIENT
Start: 2020-03-25 | End: 2020-03-25

## 2020-03-25 RX ORDER — ACETAMINOPHEN 500 MG
650 TABLET ORAL ONCE
Refills: 0 | Status: COMPLETED | OUTPATIENT
Start: 2020-03-25 | End: 2020-03-25

## 2020-03-25 RX ADMIN — SODIUM CHLORIDE 1000 MILLILITER(S): 9 INJECTION INTRAMUSCULAR; INTRAVENOUS; SUBCUTANEOUS at 12:01

## 2020-03-25 RX ADMIN — SODIUM CHLORIDE 1000 MILLILITER(S): 9 INJECTION INTRAMUSCULAR; INTRAVENOUS; SUBCUTANEOUS at 16:54

## 2020-03-25 RX ADMIN — Medication 10 MILLIGRAM(S): at 13:44

## 2020-03-25 RX ADMIN — ONDANSETRON 4 MILLIGRAM(S): 8 TABLET, FILM COATED ORAL at 12:01

## 2020-03-25 RX ADMIN — Medication 650 MILLIGRAM(S): at 15:40

## 2020-03-25 RX ADMIN — ONDANSETRON 4 MILLIGRAM(S): 8 TABLET, FILM COATED ORAL at 17:37

## 2020-03-25 RX ADMIN — AMLODIPINE BESYLATE 10 MILLIGRAM(S): 2.5 TABLET ORAL at 15:41

## 2020-03-25 NOTE — ED PROVIDER NOTE - PATIENT PORTAL LINK FT
You can access the FollowMyHealth Patient Portal offered by [facility] by registering at the following website: [email or web address]. By joining [employer]’s FollowMyHealth portal, you will also be able to view your health information using other applications (apps) compatible with our system.

## 2020-03-25 NOTE — ED PROVIDER NOTE - OBJECTIVE STATEMENT
53 y/o F pmh HIV on HARRT (states VL undetected x few months ago, unknown CD4 but states "its good") c/o nausea and vomiting x 2 days. Pt unable to keep anything down. Pt has been unable to take her meds due to vomiting. Has not tried anything for her symptoms. Denies abd pain, diarrhea, recent travel, sick contacts, suspicious food intake, fever, chills, URI s/s, cp, sob.

## 2020-03-25 NOTE — ED ADULT NURSE NOTE - OBJECTIVE STATEMENT
pt awake and alert, acting appropriate for age. No resp distress. cap refill less than 2 seconds. VSS. Heart sounds auscultated and normal. pt with vomiting, dizziness abd soft non distended. no blood in vomiting or stool. no fever, cough, URI, DB, CP, Syncopal episodes.  PMhX HIV+.

## 2020-03-25 NOTE — ED PROVIDER NOTE - PROGRESS NOTE DETAILS
Pt feeling better. on cellphone. given return precautions. likely gastroenteritis, given that covid may cause abd discomfort and pt's leukopenia will recommend quarantine

## 2020-03-25 NOTE — ED ADULT NURSE NOTE - CHPI ED NUR SYMPTOMS NEG
no dysuria/no blood in stool/no burning urination/no chills/no diarrhea/no fever/no abdominal distension/no hematuria

## 2020-03-25 NOTE — ED PROVIDER NOTE - CLINICAL SUMMARY MEDICAL DECISION MAKING FREE TEXT BOX
55 y/o F pmh HIV on HARRT (states VL undetected x few months ago, unknown CD4 but states "its good") c/o nausea and vomiting x 2 days.  abd soft/nt/nd  likely gastroenteritis, will do labs, give IVFs, zofran, po challenge, reassess

## 2020-03-25 NOTE — ED PROVIDER NOTE - NSFOLLOWUPINSTRUCTIONS_ED_ALL_ED_FT
You received verbal instructions and did not sign because of the risk of infection, and expressed understanding of the discharge instructions. Please followup with your doctor (call) and consider follow up in his/her office. Please practice good hand hygiene and social distancing. If fever, cough, shortness of breath, or any worse symptoms return to the ER.  If you have symptoms, consideration to quarantine yourself for 14 days from start of symptoms should be considered.  You can call [phone] for any Covid related questions or your local department of health. ([facility] [phone], or [facility]).     You may stop home isolation after having no fever for 3 full days without use od medication AND at least 7 days have passed since symptoms first appeared

## 2020-03-25 NOTE — ED ADULT NURSE NOTE - NSIMPLEMENTINTERV_GEN_ALL_ED
Implemented All Universal Safety Interventions:  [place] to call system. Call bell, personal items and telephone within reach. Instruct patient to call for assistance. Room bathroom lighting operational. Non-slip footwear when patient is off stretcher. Physically safe environment: no spills, clutter or unnecessary equipment. Stretcher in lowest position, wheels locked, appropriate side rails in place.

## 2020-03-26 LAB
CULTURE RESULTS: SIGNIFICANT CHANGE UP
SPECIMEN SOURCE: SIGNIFICANT CHANGE UP

## 2020-09-17 NOTE — ED PROVIDER NOTE - SKIN LOCATION #1
Detail Level: Simple
Additional Notes: Left hand treated with LN2. Patient was instructed to continue using Triamcinolone 0.01%\\ncream once left hand heals from LN2.
Additional Notes: 4 comedo pressed out with comedo extractor on left nose
arm

## 2020-11-11 NOTE — ED ADULT NURSE NOTE - NS ED NURSE RECORD ANOTHER VITAL SIGN
Yes

## 2021-04-15 ENCOUNTER — EMERGENCY (EMERGENCY)
Facility: HOSPITAL | Age: 56
LOS: 1 days | Discharge: HOME | End: 2021-04-15
Attending: EMERGENCY MEDICINE | Admitting: EMERGENCY MEDICINE
Payer: MEDICAID

## 2021-04-15 VITALS
HEIGHT: 67 IN | SYSTOLIC BLOOD PRESSURE: 153 MMHG | RESPIRATION RATE: 16 BRPM | TEMPERATURE: 99 F | HEART RATE: 85 BPM | DIASTOLIC BLOOD PRESSURE: 87 MMHG | OXYGEN SATURATION: 100 %

## 2021-04-15 VITALS
DIASTOLIC BLOOD PRESSURE: 67 MMHG | RESPIRATION RATE: 16 BRPM | SYSTOLIC BLOOD PRESSURE: 131 MMHG | OXYGEN SATURATION: 100 % | TEMPERATURE: 99 F | HEART RATE: 77 BPM

## 2021-04-15 DIAGNOSIS — Z90.710 ACQUIRED ABSENCE OF BOTH CERVIX AND UTERUS: Chronic | ICD-10-CM

## 2021-04-15 LAB
ALBUMIN SERPL ELPH-MCNC: 4.1 G/DL — SIGNIFICANT CHANGE UP (ref 3.3–5)
ALP SERPL-CCNC: 170 U/L — HIGH (ref 40–120)
ALT FLD-CCNC: 55 U/L — HIGH (ref 4–33)
ANION GAP SERPL CALC-SCNC: 12 MMOL/L — SIGNIFICANT CHANGE UP (ref 7–14)
AST SERPL-CCNC: 34 U/L — HIGH (ref 4–32)
BASOPHILS # BLD AUTO: 0.07 K/UL — SIGNIFICANT CHANGE UP (ref 0–0.2)
BASOPHILS NFR BLD AUTO: 0.6 % — SIGNIFICANT CHANGE UP (ref 0–2)
BILIRUB SERPL-MCNC: 0.4 MG/DL — SIGNIFICANT CHANGE UP (ref 0.2–1.2)
BUN SERPL-MCNC: 18 MG/DL — SIGNIFICANT CHANGE UP (ref 7–23)
CALCIUM SERPL-MCNC: 9.7 MG/DL — SIGNIFICANT CHANGE UP (ref 8.4–10.5)
CHLORIDE SERPL-SCNC: 98 MMOL/L — SIGNIFICANT CHANGE UP (ref 98–107)
CO2 SERPL-SCNC: 27 MMOL/L — SIGNIFICANT CHANGE UP (ref 22–31)
CREAT SERPL-MCNC: 0.8 MG/DL — SIGNIFICANT CHANGE UP (ref 0.5–1.3)
D DIMER BLD IA.RAPID-MCNC: <150 NG/ML DDU — SIGNIFICANT CHANGE UP
EOSINOPHIL # BLD AUTO: 0.21 K/UL — SIGNIFICANT CHANGE UP (ref 0–0.5)
EOSINOPHIL NFR BLD AUTO: 1.8 % — SIGNIFICANT CHANGE UP (ref 0–6)
GLUCOSE SERPL-MCNC: 127 MG/DL — HIGH (ref 70–99)
HCT VFR BLD CALC: 40.8 % — SIGNIFICANT CHANGE UP (ref 34.5–45)
HGB BLD-MCNC: 13.1 G/DL — SIGNIFICANT CHANGE UP (ref 11.5–15.5)
IANC: 7.98 K/UL — SIGNIFICANT CHANGE UP (ref 1.5–8.5)
IMM GRANULOCYTES NFR BLD AUTO: 0.3 % — SIGNIFICANT CHANGE UP (ref 0–1.5)
LIDOCAIN IGE QN: 25 U/L — SIGNIFICANT CHANGE UP (ref 7–60)
LYMPHOCYTES # BLD AUTO: 2.93 K/UL — SIGNIFICANT CHANGE UP (ref 1–3.3)
LYMPHOCYTES # BLD AUTO: 24.6 % — SIGNIFICANT CHANGE UP (ref 13–44)
MCHC RBC-ENTMCNC: 29 PG — SIGNIFICANT CHANGE UP (ref 27–34)
MCHC RBC-ENTMCNC: 32.1 GM/DL — SIGNIFICANT CHANGE UP (ref 32–36)
MCV RBC AUTO: 90.3 FL — SIGNIFICANT CHANGE UP (ref 80–100)
MONOCYTES # BLD AUTO: 0.66 K/UL — SIGNIFICANT CHANGE UP (ref 0–0.9)
MONOCYTES NFR BLD AUTO: 5.6 % — SIGNIFICANT CHANGE UP (ref 2–14)
NEUTROPHILS # BLD AUTO: 7.98 K/UL — HIGH (ref 1.8–7.4)
NEUTROPHILS NFR BLD AUTO: 67.1 % — SIGNIFICANT CHANGE UP (ref 43–77)
NRBC # BLD: 0 /100 WBCS — SIGNIFICANT CHANGE UP
NRBC # FLD: 0 K/UL — SIGNIFICANT CHANGE UP
PLATELET # BLD AUTO: 534 K/UL — HIGH (ref 150–400)
POTASSIUM SERPL-MCNC: 3.1 MMOL/L — LOW (ref 3.5–5.3)
POTASSIUM SERPL-SCNC: 3.1 MMOL/L — LOW (ref 3.5–5.3)
PROT SERPL-MCNC: 7.9 G/DL — SIGNIFICANT CHANGE UP (ref 6–8.3)
RBC # BLD: 4.52 M/UL — SIGNIFICANT CHANGE UP (ref 3.8–5.2)
RBC # FLD: 13.9 % — SIGNIFICANT CHANGE UP (ref 10.3–14.5)
SARS-COV-2 RNA SPEC QL NAA+PROBE: SIGNIFICANT CHANGE UP
SODIUM SERPL-SCNC: 137 MMOL/L — SIGNIFICANT CHANGE UP (ref 135–145)
TROPONIN T, HIGH SENSITIVITY RESULT: 8 NG/L — SIGNIFICANT CHANGE UP
TROPONIN T, HIGH SENSITIVITY RESULT: 8 NG/L — SIGNIFICANT CHANGE UP
WBC # BLD: 11.89 K/UL — HIGH (ref 3.8–10.5)
WBC # FLD AUTO: 11.89 K/UL — HIGH (ref 3.8–10.5)

## 2021-04-15 PROCEDURE — 71046 X-RAY EXAM CHEST 2 VIEWS: CPT | Mod: 26

## 2021-04-15 PROCEDURE — 99284 EMERGENCY DEPT VISIT MOD MDM: CPT

## 2021-04-15 RX ORDER — FAMOTIDINE 10 MG/ML
20 INJECTION INTRAVENOUS ONCE
Refills: 0 | Status: COMPLETED | OUTPATIENT
Start: 2021-04-15 | End: 2021-04-15

## 2021-04-15 RX ORDER — KETOROLAC TROMETHAMINE 30 MG/ML
15 SYRINGE (ML) INJECTION ONCE
Refills: 0 | Status: DISCONTINUED | OUTPATIENT
Start: 2021-04-15 | End: 2021-04-15

## 2021-04-15 RX ORDER — POTASSIUM CHLORIDE 20 MEQ
40 PACKET (EA) ORAL ONCE
Refills: 0 | Status: COMPLETED | OUTPATIENT
Start: 2021-04-15 | End: 2021-04-15

## 2021-04-15 RX ADMIN — FAMOTIDINE 20 MILLIGRAM(S): 10 INJECTION INTRAVENOUS at 08:48

## 2021-04-15 RX ADMIN — Medication 15 MILLIGRAM(S): at 08:48

## 2021-04-15 RX ADMIN — Medication 40 MILLIEQUIVALENT(S): at 10:57

## 2021-04-15 NOTE — ED ADULT TRIAGE NOTE - CHIEF COMPLAINT QUOTE
Pt c/o left under breast pain x 3 days. pain worse when laying on left side and better when she lays flat. Pt denies any inj or trauma to area. No complaints of chest pain, headache, nausea, dizziness, vomiting  SOB, fever, chills verbalized.

## 2021-04-15 NOTE — ED PROVIDER NOTE - OBJECTIVE STATEMENT
56 y/o f presents to the ed with chest pain, left side and center of chest, nonradiating, constant, worse with positional change and leaning on left side, started 3 days ago. No associated fevers, chills, ha, sob, sweating, vomiting, diarrhea, dysuria, le edema, hormone use. Patient denies any pain like this in past, no trauma to region. No recent immobility. no cough. Pmd dr. kinsey. Tried tylenol which hasn't helped and ibuprofen bothers her stomach. Patient closely followed for HIV on meds, last viral load undetectable with good cd4 count per her report.

## 2021-04-15 NOTE — ED PROVIDER NOTE - CLINICAL SUMMARY MEDICAL DECISION MAKING FREE TEXT BOX
Patient presents to the ed with left side and central cp constant x 3 days worse with certain positions, feels otherwise well, vss, nontoxic appearing, benign PE. Plan for labs, cxr, EKG, tele monitoring, dimer, covid swab, eval for diff including but not limited to anemia, elec disturbance, organ dysfunction, ptx, effusion, very low suspicion for pe given low wells score screen with dimer, possibly costochondritis, monitor, reass.

## 2021-04-15 NOTE — ED ADULT NURSE NOTE - OBJECTIVE STATEMENT
54 y/o female arrives to E.D. rm 11 c/o left-sided chest pain, specifically under the breast, non-radiating, that has been constant x3days without relief. Pt a&ox4, ambulatory, endorses SOB and diaphoresis at times, neg accessory muscle use, pt speaking in complete sentences, endorses left-sided c/p currently, denies n/v/d, denies fevers/cough/body aches. NSR on telemetry, HR in the 80s. Pt endorses mild relief with ASA. Pt states she received 1st covid vaccine 2 weeks ago and has had a dry, itchy rash to right arm ever since. Provider at bedside. Awaiting orders.

## 2021-04-15 NOTE — ED PROVIDER NOTE - NSFOLLOWUPINSTRUCTIONS_ED_ALL_ED_FT
Your diagnosis: chest pain    We performed labs which showed that your potassium was low. Your heart and lungs otherwise looked fine on labs and chest x-ray.    Discharge instructions:    - Please follow up with your Primary Care Doctor or our primary care clinic today or tomorrow.    - Tylenol up to 650 mg every 8 hours as needed for pain.    - Be sure to return to the ED if you develop new or worsening symptoms. Specific signs and symptoms to be vigilant of: worsening or persistent chest pain or shortness of breath.

## 2021-04-15 NOTE — ED PROVIDER NOTE - PHYSICAL EXAMINATION
GEN - NAD; well appearing; A+O x3   HEAD - NC/AT   EYES- PERRL, EOMI  ENT: Airway patent, mmm, Oral cavity and pharynx normal.   NECK: Neck supple, no masses.  PULMONARY - CTA b/l, symmetric breath sounds.   CARDIAC -s1s2, RRR, no M,G,R, +reproducible ttp to lower intercostal muscular region/sternal border left side, no rash, no crepitus, no deformity  ABDOMEN - +BS, ND, NT, soft, no guarding, no rebound, no masses   BACK - no CVA tenderness, Normal  spine   EXTREMITIES - FROM, symmetric pulses, capillary refill < 2 seconds, no edema   SKIN - no rash or bruising   NEUROLOGIC - alert, speech clear, no focal deficits  PSYCH -nl mood/affect, nl insight.

## 2021-04-15 NOTE — ED PROVIDER NOTE - PROGRESS NOTE DETAILS
Tong: trop 8 and 8. DD negative. Potassium repleted. Patient feeling better. Safe for discharge with PMD follow up. Has her own PMD but requesting referral to our clinic. all results d/w patient, copies given, return precautions discussed. feels well, ok for dc.

## 2021-04-15 NOTE — ED PROVIDER NOTE - NSFOLLOWUPCLINICS_GEN_ALL_ED_FT
[facility] General Internal Medicine  General Internal Medicine  [address] [place] [address]  Phone: [phone]  Fax:

## 2021-04-15 NOTE — ED PROVIDER NOTE - PATIENT PORTAL LINK FT
You can access the FollowMyHealth Patient Portal offered by [facility] by registering at the following website: [email or web address]. By joining [employer]’s FollowMyHealth portal, you will also be able to view your health information using other applications (apps) compatible with our system.

## 2021-05-07 ENCOUNTER — EMERGENCY (EMERGENCY)
Facility: HOSPITAL | Age: 56
LOS: 1 days | Discharge: HOME | End: 2021-05-07
Attending: EMERGENCY MEDICINE | Admitting: EMERGENCY MEDICINE
Payer: MEDICAID

## 2021-05-07 VITALS
HEART RATE: 71 BPM | RESPIRATION RATE: 18 BRPM | TEMPERATURE: 99 F | OXYGEN SATURATION: 98 % | HEIGHT: 67 IN | DIASTOLIC BLOOD PRESSURE: 89 MMHG | SYSTOLIC BLOOD PRESSURE: 141 MMHG

## 2021-05-07 DIAGNOSIS — Z90.710 ACQUIRED ABSENCE OF BOTH CERVIX AND UTERUS: Chronic | ICD-10-CM

## 2021-05-07 PROCEDURE — 99284 EMERGENCY DEPT VISIT MOD MDM: CPT

## 2021-05-07 RX ORDER — IBUPROFEN 200 MG
600 TABLET ORAL ONCE
Refills: 0 | Status: COMPLETED | OUTPATIENT
Start: 2021-05-07 | End: 2021-05-07

## 2021-05-07 RX ORDER — LIDOCAINE 4 G/100G
1 CREAM TOPICAL ONCE
Refills: 0 | Status: COMPLETED | OUTPATIENT
Start: 2021-05-07 | End: 2021-05-07

## 2021-05-07 RX ORDER — OXYCODONE HYDROCHLORIDE 5 MG/1
5 TABLET ORAL ONCE
Refills: 0 | Status: DISCONTINUED | OUTPATIENT
Start: 2021-05-07 | End: 2021-05-07

## 2021-05-07 RX ORDER — DIAZEPAM 5 MG
5 TABLET ORAL ONCE
Refills: 0 | Status: DISCONTINUED | OUTPATIENT
Start: 2021-05-07 | End: 2021-05-07

## 2021-05-07 RX ORDER — ACETAMINOPHEN 500 MG
650 TABLET ORAL ONCE
Refills: 0 | Status: COMPLETED | OUTPATIENT
Start: 2021-05-07 | End: 2021-05-07

## 2021-05-07 RX ORDER — CYCLOBENZAPRINE HYDROCHLORIDE 10 MG/1
10 TABLET, FILM COATED ORAL ONCE
Refills: 0 | Status: DISCONTINUED | OUTPATIENT
Start: 2021-05-07 | End: 2021-05-07

## 2021-05-07 RX ORDER — OXYCODONE HYDROCHLORIDE 5 MG/1
1 TABLET ORAL
Qty: 12 | Refills: 0
Start: 2021-05-07 | End: 2021-05-09

## 2021-05-07 RX ADMIN — OXYCODONE HYDROCHLORIDE 5 MILLIGRAM(S): 5 TABLET ORAL at 10:46

## 2021-05-07 RX ADMIN — LIDOCAINE 1 PATCH: 4 CREAM TOPICAL at 09:51

## 2021-05-07 RX ADMIN — Medication 650 MILLIGRAM(S): at 08:44

## 2021-05-07 RX ADMIN — Medication 600 MILLIGRAM(S): at 09:50

## 2021-05-07 RX ADMIN — LIDOCAINE 1 PATCH: 4 CREAM TOPICAL at 08:44

## 2021-05-07 RX ADMIN — Medication 600 MILLIGRAM(S): at 09:59

## 2021-05-07 RX ADMIN — Medication 5 MILLIGRAM(S): at 08:44

## 2021-05-07 RX ADMIN — Medication 650 MILLIGRAM(S): at 09:59

## 2021-05-07 NOTE — ED PROVIDER NOTE - OBJECTIVE STATEMENT
54 y/o F PMHx HIV+ undetectable, HTN, GERD, Osteoarthritis, hysterectomy presents to the ED c/o intermittent sharp right leg pain that has been worsening. States pain radiates to her right buttocks. Reports she is taking 325 mg of Tylenol with mild relief at first. Last dose of Tylenol at 5am. Able to ambulate with a limp and pain. Denies trauma or falls. No back pain. NKDA. Quit smoking 1 year and 4 months. 54 y/o F PMHx HIV+ undetectable, Osteoarthritis, presents to the ED c/o intermittent sharp right leg pain that has been worsening. States pain radiates to her right buttocks. Reports she is taking 325 mg of Tylenol with mild relief at first. Last dose of Tylenol at 5am. Able to ambulate with a limp and pain. Denies trauma or falls. No back pain. NKDA. Quit smoking 1 year and 4 months.

## 2021-05-07 NOTE — ED PROVIDER NOTE - PROGRESS NOTE DETAILS
Renaldo, PGY1: Patient reassessed and noting moderate improvement in pain; will provide motrin + addiitonal lidocaine patch for leg pain and reassess Renaldo, PGY1: Patient reassessed and has difficulty walking secondary to pain; will provide oxy and reassess Renaldo, PGY1: Patient reassessed and noting symptomatic relief and feels ready to go home. Provided patient with strict return precautions; patient understands and agrees w/ plan.

## 2021-05-07 NOTE — ED ADULT TRIAGE NOTE - CHIEF COMPLAINT QUOTE
Pt is c/o severe pain to right hip radiating down to her right lower leg. Denies fall or injury. Pt says this pain had been going on for some time but since Yesterday it is worse. Took Tylenol 650mg around 0530 with minimal relief. PMH of HTN.

## 2021-05-07 NOTE — ED PROVIDER NOTE - PATIENT PORTAL LINK FT
You can access the FollowMyHealth Patient Portal offered by [facility] by registering at the following website: [email or web address]. By joining [employer]’s FollowMyHealth portal, you will also be able to view your health information using other applications (apps) compatible with our system.
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
## 2021-05-07 NOTE — ED PROVIDER NOTE - CLINICAL SUMMARY MEDICAL DECISION MAKING FREE TEXT BOX
54 y/o F with PMHx of HIV+ undetectable and Osteoarthritis with worsening right lower back pain shooting down to right leg. Likely secondary to sciatica vs muscle strain. No red flags at this time. Symptoms not concerning for cord compression. Treat symptomatically and reassess.

## 2021-05-07 NOTE — ED PROVIDER NOTE - NSFOLLOWUPINSTRUCTIONS_ED_ALL_ED_FT
Back Pain    Back pain is very common in adults. The cause of back pain is rarely dangerous and the pain often gets better over time. The cause of your back pain may not be known and may include strain of muscles or ligaments, degeneration of the spinal disks, or arthritis. Occasionally the pain may radiate down your leg(s). Over-the-counter medicines to reduce pain and inflammation are often the most helpful. Stretching and remaining active frequently helps the healing process.     Low Back Strain  A strain is a stretch or tear in a muscle or the strong cords of tissue that attach muscle to bone (tendons). Strains of the lower back (lumbar spine) are a common cause of low back pain. A strain occurs when muscles or tendons are torn or are stretched beyond their limits. The muscles may become inflamed, resulting in pain and sudden muscle tightening (spasms). A strain can happen suddenly due to an injury (trauma), or it can develop gradually due to overuse.    What increases the risk?  The following factors may increase your risk of getting this condition:  Playing contact sports.  Participating in sports or activities that put excessive stress on the back and require a lot of bending and twisting, including:  Lifting weights or heavy objects, Gymnastics, Soccer, Figure skating, Snowboarding, Being overweight or obese, Having poor strength and flexibility.    What are the signs or symptoms?  Symptoms of this condition may include:  Sharp or dull pain in the lower back that does not go away. Pain may extend to the buttocks.  Stiffness.  Limited range of motion.  Inability to stand up straight due to stiffness or pain.  Muscle spasms.    How is this diagnosed?  This condition may be diagnosed based on:  Your symptoms, Your medical history, A physical exam, Your health care provider may push on certain areas of your back to determine the source of your pain. You may be asked to bend forward, backward, and side to side to assess the severity of your pain and your range of motion.  Imaging tests, such as:  X-rays, MRI.    How is this treated?  Treatment for this condition may include:  Applying heat and cold to the affected area.  Medicines to help relieve pain and to relax your muscles (muscle relaxants).  NSAIDs to help reduce swelling and discomfort.  Physical therapy.  When your symptoms improve, it is important to gradually return to your normal routine as soon as possible to reduce pain, avoid stiffness, and avoid loss of muscle strength. Generally, symptoms should improve within 6 weeks of treatment. However, recovery time varies.    Follow these instructions at home:  Managing pain, stiffness, and swelling     If directed, apply ice to the injured area during the first 24 hours after your injury.  Put ice in a plastic bag.  Place a towel between your skin and the bag.  Leave the ice on for 20 minutes, 2–3 times a day.  If directed, apply heat to the affected area as often as told by your health care provider. Use the heat source that your health care provider recommends, such as a moist heat pack or a heating pad.  Place a towel between your skin and the heat source.  Leave the heat on for 20–30 minutes.  Remove the heat if your skin turns bright red. This is especially important if you are unable to feel pain, heat, or cold. You may have a greater risk of getting burned.  Activity     Rest and return to your normal activities as told by your health care provider. Ask your health care provider what activities are safe for you.  Avoid activities that take a lot of effort (are strenuous) for as long as told by your health care provider.  Do exercises as told by your health care provider.  General instructions     Take over-the-counter and prescription medicines only as told by your health care provider.  If you have questions or concerns about safety while taking pain medicine, talk with your health care provider.  Do not drive or operate heavy machinery until you know how your pain medicine affects you.  Do not use any tobacco products, such as cigarettes, chewing tobacco, and e-cigarettes. Tobacco can delay bone healing. If you need help quitting, ask your health care provider.  Keep all follow-up visits as told by your health care provider. This is important.  How is this prevented?  Image Image Image Image Image   Warm up and stretch before being active.  Cool down and stretch after being active.  Give your body time to rest between periods of activity.  Avoid:  Being physically inactive for long periods at a time.  Exercising or playing sports when you are tired or in pain.  Use correct form when playing sports and lifting heavy objects.  Use good posture when sitting and standing.  Maintain a healthy weight.  Sleep on a mattress with medium firmness to support your back.  Make sure to use equipment that fits you, including shoes that fit well.  Be safe and responsible while being active to avoid falls.  Do at least 150 minutes of moderate-intensity exercise each week, such as brisk walking or water aerobics. Try a form of exercise that takes stress off your back, such as swimming or stationary cycling.  Maintain physical fitness, including:  Strength.  Flexibility.  Cardiovascular fitness.  Endurance.  Contact a health care provider if:  Your back pain does not improve after 6 weeks of treatment.  Your symptoms get worse.  Get help right away if:  Your back pain is severe.  You are unable to stand or walk.  You develop pain in your legs.  You develop weakness in your buttocks or legs.  You have difficulty controlling when you urinate or when you have a bowel movement.  This information is not intended to replace advice given to you by your health care provider. Make sure you discuss any questions you have with your health care provider.      SEEK IMMEDIATE MEDICAL CARE IF YOU HAVE ANY OF THE FOLLOWING SYMPTOMS: bowel or bladder control problems, unusual weakness or numbness in your arms or legs, nausea or vomiting, abdominal pain, fever, dizziness/lightheadedness.

## 2021-05-11 ENCOUNTER — EMERGENCY (EMERGENCY)
Facility: HOSPITAL | Age: 56
LOS: 1 days | Discharge: HOME | End: 2021-05-11
Attending: EMERGENCY MEDICINE | Admitting: EMERGENCY MEDICINE
Payer: MEDICAID

## 2021-05-11 VITALS
DIASTOLIC BLOOD PRESSURE: 82 MMHG | HEART RATE: 88 BPM | OXYGEN SATURATION: 97 % | TEMPERATURE: 98 F | SYSTOLIC BLOOD PRESSURE: 136 MMHG | HEIGHT: 67 IN | RESPIRATION RATE: 17 BRPM

## 2021-05-11 DIAGNOSIS — Z90.710 ACQUIRED ABSENCE OF BOTH CERVIX AND UTERUS: Chronic | ICD-10-CM

## 2021-05-11 PROCEDURE — 99285 EMERGENCY DEPT VISIT HI MDM: CPT

## 2021-05-11 NOTE — ED ADULT TRIAGE NOTE - CHIEF COMPLAINT QUOTE
pt c/o cough with difficulty breathing beginning tonight, took albuterol at home with no relief. pt also c/o chest tightness. pt in no respiratory distress at this time. hx. asthma, HTN.

## 2021-05-12 LAB
ALBUMIN SERPL ELPH-MCNC: 4.1 G/DL — SIGNIFICANT CHANGE UP (ref 3.3–5)
ALP SERPL-CCNC: 164 U/L — HIGH (ref 40–120)
ALT FLD-CCNC: 37 U/L — HIGH (ref 4–33)
ANION GAP SERPL CALC-SCNC: 12 MMOL/L — SIGNIFICANT CHANGE UP (ref 7–14)
AST SERPL-CCNC: 39 U/L — HIGH (ref 4–32)
BILIRUB SERPL-MCNC: 0.3 MG/DL — SIGNIFICANT CHANGE UP (ref 0.2–1.2)
BLOOD GAS VENOUS COMPREHENSIVE RESULT: SIGNIFICANT CHANGE UP
BUN SERPL-MCNC: 20 MG/DL — SIGNIFICANT CHANGE UP (ref 7–23)
CALCIUM SERPL-MCNC: 10 MG/DL — SIGNIFICANT CHANGE UP (ref 8.4–10.5)
CHLORIDE SERPL-SCNC: 101 MMOL/L — SIGNIFICANT CHANGE UP (ref 98–107)
CO2 SERPL-SCNC: 25 MMOL/L — SIGNIFICANT CHANGE UP (ref 22–31)
CREAT SERPL-MCNC: 0.92 MG/DL — SIGNIFICANT CHANGE UP (ref 0.5–1.3)
GLUCOSE SERPL-MCNC: 123 MG/DL — HIGH (ref 70–99)
HCT VFR BLD CALC: 38 % — SIGNIFICANT CHANGE UP (ref 34.5–45)
HGB BLD-MCNC: 12.4 G/DL — SIGNIFICANT CHANGE UP (ref 11.5–15.5)
MAGNESIUM SERPL-MCNC: 2.1 MG/DL — SIGNIFICANT CHANGE UP (ref 1.6–2.6)
MCHC RBC-ENTMCNC: 29.3 PG — SIGNIFICANT CHANGE UP (ref 27–34)
MCHC RBC-ENTMCNC: 32.6 GM/DL — SIGNIFICANT CHANGE UP (ref 32–36)
MCV RBC AUTO: 89.8 FL — SIGNIFICANT CHANGE UP (ref 80–100)
NRBC # BLD: 0 /100 WBCS — SIGNIFICANT CHANGE UP
NRBC # FLD: 0 K/UL — SIGNIFICANT CHANGE UP
PHOSPHATE SERPL-MCNC: 4.2 MG/DL — SIGNIFICANT CHANGE UP (ref 2.5–4.5)
PLATELET # BLD AUTO: 493 K/UL — HIGH (ref 150–400)
POTASSIUM SERPL-MCNC: 4.3 MMOL/L — SIGNIFICANT CHANGE UP (ref 3.5–5.3)
POTASSIUM SERPL-SCNC: 4.3 MMOL/L — SIGNIFICANT CHANGE UP (ref 3.5–5.3)
PROT SERPL-MCNC: 8.1 G/DL — SIGNIFICANT CHANGE UP (ref 6–8.3)
RBC # BLD: 4.23 M/UL — SIGNIFICANT CHANGE UP (ref 3.8–5.2)
RBC # FLD: 14.2 % — SIGNIFICANT CHANGE UP (ref 10.3–14.5)
SODIUM SERPL-SCNC: 138 MMOL/L — SIGNIFICANT CHANGE UP (ref 135–145)
WBC # BLD: 10.62 K/UL — HIGH (ref 3.8–10.5)
WBC # FLD AUTO: 10.62 K/UL — HIGH (ref 3.8–10.5)

## 2021-05-12 PROCEDURE — 71045 X-RAY EXAM CHEST 1 VIEW: CPT | Mod: 26

## 2021-05-12 RX ORDER — IPRATROPIUM/ALBUTEROL SULFATE 18-103MCG
3 AEROSOL WITH ADAPTER (GRAM) INHALATION ONCE
Refills: 0 | Status: COMPLETED | OUTPATIENT
Start: 2021-05-12 | End: 2021-05-12

## 2021-05-12 RX ORDER — ALBUTEROL 90 UG/1
2 AEROSOL, METERED ORAL
Qty: 360 | Refills: 0
Start: 2021-05-12 | End: 2021-06-10

## 2021-05-12 RX ORDER — ALBUTEROL 90 UG/1
2 AEROSOL, METERED ORAL
Qty: 360 | Refills: 0
Start: 2021-05-12 | End: 2023-05-07

## 2021-05-12 RX ADMIN — Medication 3 MILLILITER(S): at 00:31

## 2021-05-12 RX ADMIN — Medication 3 MILLILITER(S): at 01:04

## 2021-05-12 RX ADMIN — Medication 50 MILLIGRAM(S): at 01:04

## 2021-05-12 NOTE — ED POST DISCHARGE NOTE - RESULT SUMMARY
Patient called stating albuterol for nebulizer was not sent to pharmacy, rx seen in rx writer, printed, changed to esubmit and submitted.

## 2021-05-12 NOTE — ED PROVIDER NOTE - PHYSICAL EXAMINATION
GENERAL: NAD, well-developed  HEAD: Atraumatic, Normocephalic  EYES: EOMI, PERRLA, conjunctiva and sclera clear  NECK: Supple, No JVD  CHEST/LUNG: Clear to auscultation bilaterally; No wheezes/rales/rhonchi  HEART: Regular rate and rhythm; No murmurs, rubs, or gallops  ABDOMEN: Soft, Nontender, Nondistended; Bowel sounds present  EXTREMITIES:  2+ dP pulses b/l, No clubbing, cyanosis, or edema  PSYCH: reactive affect  NEUROLOGY: AAOx3, non-focal  SKIN: No rashes or lesions

## 2021-05-12 NOTE — ED PROVIDER NOTE - CLINICAL SUMMARY MEDICAL DECISION MAKING FREE TEXT BOX
55 F with asthma and HIV (on Biktarvy, reported undetectable viral load), presented with cough and difficulty breathing in setting of asthma exacerbation. Labs, duonebs, prednisone 50mg. Improved and stable for d/c home.

## 2021-05-12 NOTE — ED PROVIDER NOTE - NSFOLLOWUPINSTRUCTIONS_ED_ALL_ED_FT
Asthma    Asthma is a condition in which the airways tighten and narrow, making it difficult to breath. Asthma episodes, also called asthma attacks, range from minor to life-threatening. Symptoms include wheezing, coughing, chest tightness, or shortness of breath. The diagnosis of asthma is made by a review of your medical history and a physical exam, but may involve additional testing. Asthma cannot be cured, but medicines and lifestyle changes can help control it. Avoid triggers of asthma which may include animal dander, pollen, mold, smoke, air pollutants, etc.       Use albuterol 2 puffs every 4 hours as needed for wheezing or shortness of breath. Take prednisone 50mg once daily for four more days. Follow up with your primary doctor as needed for repeat evaluation. Return here to the emergency department for severe shortness of breath, or other new symptoms of concern.

## 2021-05-12 NOTE — ED ADULT NURSE NOTE - OBJECTIVE STATEMENT
Received patient to room 9 for asthma exacerbation. A7o4, ambulatory, pt stating was sleeping about 1 hour prior to arrival, used albuterol with no relief. Appears comfortable at this time, breathing equal and unlabored. Pending MD evaluation. Left 20G forearm placed, labs drawn. hx of HIV

## 2021-05-12 NOTE — ED PROVIDER NOTE - OBJECTIVE STATEMENT
55 F with asthma and HIV (on Biktarvy, reported undetectable viral load), presented with cough and difficulty breathing for a few hours. Patient reported she was in usual state of health  until last night when she was awoke from her sleep with difficulty breathing. Reported symptoms similar to previous asthma exacerbations. No clear inciting factors. Took albuterol without significant relief. Denied CP, palpitations, nausea, vomiting, diarrhea, constipation.

## 2021-05-12 NOTE — ED PROVIDER NOTE - PATIENT PORTAL LINK FT
You can access the FollowMyHealth Patient Portal offered by [facility] by registering at the following website: [email or web address]. By joining [employer]’s FollowMyHealth portal, you will also be able to view your health information using other applications (apps) compatible with our system.

## 2021-05-12 NOTE — ED PROVIDER NOTE - ATTENDING CONTRIBUTION TO CARE
Afebrile. Awake and Alert. Lungs expiratory wheezing, comfortable respirations on RA, speaking in full sentences. Heart RRR. Abdomen soft NTND. CN II-XII grossly intact. Moves all extremities without lateralization.    SOB with PMH asthma  No h/o hospitalization or intubation  r/o PNA  Duonebs and reassess

## 2021-05-26 ENCOUNTER — APPOINTMENT (OUTPATIENT)
Dept: PULMONOLOGY | Facility: CLINIC | Age: 56
End: 2021-05-26

## 2022-01-11 NOTE — ED ADULT NURSE NOTE - CHIEF COMPLAINT
Quality 130: Documentation Of Current Medications In The Medical Record: Current Medications Documented
The patient is a 55y Female complaining of difficulty breathing.
Detail Level: Zone

## 2022-03-12 ENCOUNTER — EMERGENCY (EMERGENCY)
Facility: HOSPITAL | Age: 57
LOS: 1 days | Discharge: HOME | End: 2022-03-12
Attending: EMERGENCY MEDICINE | Admitting: EMERGENCY MEDICINE
Payer: MEDICAID

## 2022-03-12 VITALS
RESPIRATION RATE: 17 BRPM | TEMPERATURE: 99 F | HEART RATE: 93 BPM | HEIGHT: 67 IN | OXYGEN SATURATION: 99 % | DIASTOLIC BLOOD PRESSURE: 88 MMHG | SYSTOLIC BLOOD PRESSURE: 136 MMHG

## 2022-03-12 VITALS
RESPIRATION RATE: 16 BRPM | OXYGEN SATURATION: 98 % | SYSTOLIC BLOOD PRESSURE: 126 MMHG | DIASTOLIC BLOOD PRESSURE: 71 MMHG | TEMPERATURE: 99 F | HEART RATE: 66 BPM

## 2022-03-12 DIAGNOSIS — Z90.710 ACQUIRED ABSENCE OF BOTH CERVIX AND UTERUS: Chronic | ICD-10-CM

## 2022-03-12 LAB
ALBUMIN SERPL ELPH-MCNC: 4 G/DL — SIGNIFICANT CHANGE UP (ref 3.3–5)
ALP SERPL-CCNC: 195 U/L — HIGH (ref 40–120)
ALT FLD-CCNC: 55 U/L — HIGH (ref 4–33)
ANION GAP SERPL CALC-SCNC: 18 MMOL/L — HIGH (ref 7–14)
APPEARANCE UR: ABNORMAL
AST SERPL-CCNC: 41 U/L — HIGH (ref 4–32)
BACTERIA # UR AUTO: ABNORMAL
BASOPHILS # BLD AUTO: 0.04 K/UL — SIGNIFICANT CHANGE UP (ref 0–0.2)
BASOPHILS NFR BLD AUTO: 0.4 % — SIGNIFICANT CHANGE UP (ref 0–2)
BILIRUB SERPL-MCNC: 0.2 MG/DL — SIGNIFICANT CHANGE UP (ref 0.2–1.2)
BILIRUB UR-MCNC: NEGATIVE — SIGNIFICANT CHANGE UP
BUN SERPL-MCNC: 15 MG/DL — SIGNIFICANT CHANGE UP (ref 7–23)
CALCIUM SERPL-MCNC: 9.6 MG/DL — SIGNIFICANT CHANGE UP (ref 8.4–10.5)
CHLORIDE SERPL-SCNC: 100 MMOL/L — SIGNIFICANT CHANGE UP (ref 98–107)
CO2 SERPL-SCNC: 22 MMOL/L — SIGNIFICANT CHANGE UP (ref 22–31)
COLOR SPEC: YELLOW — SIGNIFICANT CHANGE UP
CREAT SERPL-MCNC: 0.77 MG/DL — SIGNIFICANT CHANGE UP (ref 0.5–1.3)
DIFF PNL FLD: NEGATIVE — SIGNIFICANT CHANGE UP
EGFR: 90 ML/MIN/1.73M2 — SIGNIFICANT CHANGE UP
EOSINOPHIL # BLD AUTO: 0.22 K/UL — SIGNIFICANT CHANGE UP (ref 0–0.5)
EOSINOPHIL NFR BLD AUTO: 2.1 % — SIGNIFICANT CHANGE UP (ref 0–6)
EPI CELLS # UR: 6 /HPF — HIGH (ref 0–5)
GLUCOSE SERPL-MCNC: 115 MG/DL — HIGH (ref 70–99)
GLUCOSE UR QL: NEGATIVE — SIGNIFICANT CHANGE UP
HCT VFR BLD CALC: 40.3 % — SIGNIFICANT CHANGE UP (ref 34.5–45)
HGB BLD-MCNC: 13 G/DL — SIGNIFICANT CHANGE UP (ref 11.5–15.5)
HYALINE CASTS # UR AUTO: 6 /LPF — SIGNIFICANT CHANGE UP (ref 0–7)
IANC: 6.68 K/UL — SIGNIFICANT CHANGE UP (ref 1.5–8.5)
IMM GRANULOCYTES NFR BLD AUTO: 0.3 % — SIGNIFICANT CHANGE UP (ref 0–1.5)
KETONES UR-MCNC: NEGATIVE — SIGNIFICANT CHANGE UP
LEUKOCYTE ESTERASE UR-ACNC: NEGATIVE — SIGNIFICANT CHANGE UP
LYMPHOCYTES # BLD AUTO: 29.6 % — SIGNIFICANT CHANGE UP (ref 13–44)
LYMPHOCYTES # BLD AUTO: 3.16 K/UL — SIGNIFICANT CHANGE UP (ref 1–3.3)
MCHC RBC-ENTMCNC: 28.9 PG — SIGNIFICANT CHANGE UP (ref 27–34)
MCHC RBC-ENTMCNC: 32.3 GM/DL — SIGNIFICANT CHANGE UP (ref 32–36)
MCV RBC AUTO: 89.6 FL — SIGNIFICANT CHANGE UP (ref 80–100)
MONOCYTES # BLD AUTO: 0.54 K/UL — SIGNIFICANT CHANGE UP (ref 0–0.9)
MONOCYTES NFR BLD AUTO: 5.1 % — SIGNIFICANT CHANGE UP (ref 2–14)
NEUTROPHILS # BLD AUTO: 6.68 K/UL — SIGNIFICANT CHANGE UP (ref 1.8–7.4)
NEUTROPHILS NFR BLD AUTO: 62.5 % — SIGNIFICANT CHANGE UP (ref 43–77)
NITRITE UR-MCNC: NEGATIVE — SIGNIFICANT CHANGE UP
NRBC # BLD: 0 /100 WBCS — SIGNIFICANT CHANGE UP
NRBC # FLD: 0 K/UL — SIGNIFICANT CHANGE UP
PH UR: 6.5 — SIGNIFICANT CHANGE UP (ref 5–8)
PLATELET # BLD AUTO: 495 K/UL — HIGH (ref 150–400)
POTASSIUM SERPL-MCNC: 3.5 MMOL/L — SIGNIFICANT CHANGE UP (ref 3.5–5.3)
POTASSIUM SERPL-SCNC: 3.5 MMOL/L — SIGNIFICANT CHANGE UP (ref 3.5–5.3)
PROT SERPL-MCNC: 7.5 G/DL — SIGNIFICANT CHANGE UP (ref 6–8.3)
PROT UR-MCNC: ABNORMAL
RBC # BLD: 4.5 M/UL — SIGNIFICANT CHANGE UP (ref 3.8–5.2)
RBC # FLD: 13.9 % — SIGNIFICANT CHANGE UP (ref 10.3–14.5)
RBC CASTS # UR COMP ASSIST: 4 /HPF — SIGNIFICANT CHANGE UP (ref 0–4)
SODIUM SERPL-SCNC: 140 MMOL/L — SIGNIFICANT CHANGE UP (ref 135–145)
SP GR SPEC: 1.02 — SIGNIFICANT CHANGE UP (ref 1–1.05)
TROPONIN T, HIGH SENSITIVITY RESULT: 7 NG/L — SIGNIFICANT CHANGE UP
TROPONIN T, HIGH SENSITIVITY RESULT: 9 NG/L — SIGNIFICANT CHANGE UP
UROBILINOGEN FLD QL: SIGNIFICANT CHANGE UP
WBC # BLD: 10.67 K/UL — HIGH (ref 3.8–10.5)
WBC # FLD AUTO: 10.67 K/UL — HIGH (ref 3.8–10.5)
WBC UR QL: 4 /HPF — SIGNIFICANT CHANGE UP (ref 0–5)

## 2022-03-12 PROCEDURE — 71045 X-RAY EXAM CHEST 1 VIEW: CPT | Mod: 26

## 2022-03-12 PROCEDURE — 99285 EMERGENCY DEPT VISIT HI MDM: CPT

## 2022-03-12 RX ORDER — OXYCODONE AND ACETAMINOPHEN 5; 325 MG/1; MG/1
1 TABLET ORAL ONCE
Refills: 0 | Status: DISCONTINUED | OUTPATIENT
Start: 2022-03-12 | End: 2022-03-12

## 2022-03-12 RX ORDER — OXYCODONE AND ACETAMINOPHEN 5; 325 MG/1; MG/1
1 TABLET ORAL
Qty: 6 | Refills: 0
Start: 2022-03-12 | End: 2022-03-13

## 2022-03-12 RX ORDER — KETOROLAC TROMETHAMINE 30 MG/ML
15 SYRINGE (ML) INJECTION ONCE
Refills: 0 | Status: DISCONTINUED | OUTPATIENT
Start: 2022-03-12 | End: 2022-03-12

## 2022-03-12 RX ADMIN — OXYCODONE AND ACETAMINOPHEN 1 TABLET(S): 5; 325 TABLET ORAL at 17:57

## 2022-03-12 RX ADMIN — OXYCODONE AND ACETAMINOPHEN 1 TABLET(S): 5; 325 TABLET ORAL at 15:40

## 2022-03-12 RX ADMIN — Medication 15 MILLIGRAM(S): at 15:40

## 2022-03-12 NOTE — ED PROVIDER NOTE - PATIENT PORTAL LINK FT
You can access the FollowMyHealth Patient Portal offered by [facility] by registering at the following website: [email or web address]. By joining [employer]’s FollowMyHealth portal, you will also be able to view your health information using other applications (apps) compatible with our system.

## 2022-03-12 NOTE — ED PROVIDER NOTE - CLINICAL SUMMARY MEDICAL DECISION MAKING FREE TEXT BOX
56 year old female with PMH oh HTN, pre-DM, and HLD presents to the ED complaining of head pressure, dizziness, chest tightness and generalized weakness for 2 days. HD stable, BP stable. Imp: Hypertension/ chest paresthesias/ Acute on chronic low back pain exacerbation without any red flags. Plan for labs including cardiac enzymes, analgesics, reassess.

## 2022-03-12 NOTE — ED ADULT NURSE NOTE - NSIMPLEMENTINTERV_GEN_ALL_ED
Implemented All Universal Safety Interventions:  [place] to call system. Call bell, personal items and telephone within reach. Instruct patient to call for assistance. Room bathroom lighting operational. Non-slip footwear when patient is off stretcher. Physically safe environment: no spills, clutter or unnecessary equipment. Stretcher in lowest position, wheels locked, appropriate side rails in place.

## 2022-03-12 NOTE — ED PROVIDER NOTE - NSFOLLOWUPINSTRUCTIONS_ED_ALL_ED_FT
Please take Percocet 1 tablet every 6 hrs as needed for breakthrough discomfort- caution drowsiness while taking this medication- do not drive or operate heavy machinery.    Follow up with your PMD within 48-72 hrs. Show copies of your reports given to you. Take all of your medications as previously prescribed.    If you have any new, worsened or concerning symptoms, please return to the emergency department immediately.

## 2022-03-12 NOTE — ED PROVIDER NOTE - ATTENDING CONTRIBUTION TO CARE
I have evaluated the patient and agree with the documentation and assessment as made by the PA. We have discussed plan of care and work up for the patient.    Brief HPI:   55 yo F PMH oh HTN, pre-DM, and HLD presents to the ED complaining of head pressure, dizziness, chest tightness and generalized weakness for 2 days.  CP is non-exertional, non-radiating, non-pleuritic.  Denies fever, chills, nausea, vomiting, diarrhea.      Vitals:   Reviewed    Exam:    GEN:  Non-toxic appearing, non-distressed, speaking full sentences, non-diaphoretic, AAOx3  HEENT:  NCAT, neck supple, EOMI, PERRLA, sclera anicteric, no conjunctival pallor or injection, no stridor, normal voice, no tonsillar exudate, uvula midline  CV:  regular rhythm and rate, s1/s2 audible, no murmurs, rubs or gallops, peripheral pulses 2+ and symmetric  PULM:  non-labored respirations, lungs clear to auscultation bilaterally, no wheezes, crackles or rales  ABD:  non distended, non-tender, no rebound, no guarding, negative Marino's sign, bowel sounds normal, no cvat  MSK:  no gross deformity, non-tender extremities and joints, range of motion grossly normal appearing, no extremity edema, extremities warm and well perfused   NEURO:  AAOx3, CN II-XII intact, motor 5/5 in upper and lower extremities bilaterally, sensation grossly intact in extremities and trunk, finger to nose testing wnl, no nystagmus, negative Romberg, no pronator drift, no gait deficit  SKIN:  warm, dry, no rash or vesicles     A/P:  55 yo F PMH oh HTN, pre-DM, and HLD presents to the ED complaining of head pressure, dizziness, chest tightness and generalized weakness for 2 days.  VSS.  EKG non-ischemic.  Low c/f acs, pe, dissection.  Neuro exam non-focal, no c/f acute cva at this time.  No obvious infectious etiology on exam or per history.  Plan for labs, ua, supportive care.  Dispo pending.

## 2022-03-12 NOTE — ED PROVIDER NOTE - NS ED ATTENDING STATEMENT MOD
Attending with This was a shared visit with the JEANNE. I reviewed and verified the documentation and independently performed the documented:

## 2022-03-12 NOTE — ED ADULT NURSE NOTE - OBJECTIVE STATEMENT
Pt awake, alert and oriented x 4 with PMH arthritis and HTN.   Pt presents with tingling in chest, flare up of arthritis pain, headache and nausea.    Denies SOB, denies fever.    Denies v/d.   Respirations even and unlabored.   Denies change in vision or dizziness.   IV and blood work complete.

## 2022-03-12 NOTE — ED PROVIDER NOTE - OBJECTIVE STATEMENT
56 year old female with PMH oh HTN, pre-DM, and HLD presents to the ED complaining of head pressure, dizziness, chest tightness and generalized weakness for 2 days. Pt states she has been checking her blood pressure and a few times noted systolic of 180's which made her concern, reports compliance with meds, worried that her BP remains high. She reports head pressure, no worsening or progressive headache. Associated tingling in her chest. Also has pain to right lower extremity due to chronic sciatica; denies focal weakness, numbness or tingling sensation, abdominal pain, nausea, vomiting, diplopia, slurred speech, ataxia, fevers, chills or any other associated complaints.

## 2022-03-12 NOTE — ED ADULT TRIAGE NOTE - CHIEF COMPLAINT QUOTE
Pt c/o HTN and taking meds as prescribed.  C/O dizziness, nausea, and weakness x2 days.  Denies CP or HA.  .  Also c/o chronic RLE pain from arthritis.  Appears comfortable

## 2022-03-12 NOTE — ED PROVIDER NOTE - NSICDXPASTMEDICALHX_GEN_ALL_CORE_FT
PAST MEDICAL HISTORY:  Asthma     Benign hypertension     Cigarette smoker     GERD (gastroesophageal reflux disease)     H/O abdominal hysterectomy     HIV disease     HPV (human papilloma virus) infection     Knee pain, bilateral     Osteoarthritis     Pruritus (chronic)    Vitamin D deficiency

## 2022-03-12 NOTE — ED PROVIDER NOTE - PROGRESS NOTE DETAILS
KRISTYN Vázquez: Labs reviewed, Trop 9>7 with non-ischemic EKG, no active chest pain at present. BP stable. Pt reassessed, states she feels better. Stable for dc home. PMD follow up. Strict return precautions.

## 2022-03-12 NOTE — ED PROVIDER NOTE - NSICDXFAMILYHX_GEN_ALL_CORE_FT
FAMILY HISTORY:  Grandparent  Still living? Unknown  Family history of diabetes mellitus, Age at diagnosis: Age Unknown  Family history of hypertension, Age at diagnosis: Age Unknown    Aunt  Still living? Unknown  Family history of breast cancer, Age at diagnosis: Age Unknown  Family history of cerebrovascular accident (CVA), Age at diagnosis: Age Unknown  Family history of myocardial infarction, Age at diagnosis: Age Unknown    Uncle  Still living? Unknown  Family history of cancer, Age at diagnosis: Age Unknown  Family history of diabetes mellitus, Age at diagnosis: Age Unknown  Family history of cancer, Age at diagnosis: Age Unknown  Family history of diabetes mellitus, Age at diagnosis: Age Unknown

## 2022-04-13 ENCOUNTER — EMERGENCY (EMERGENCY)
Facility: HOSPITAL | Age: 57
LOS: 1 days | Discharge: HOME | End: 2022-04-13
Attending: EMERGENCY MEDICINE | Admitting: EMERGENCY MEDICINE
Payer: MEDICAID

## 2022-04-13 VITALS
TEMPERATURE: 98 F | RESPIRATION RATE: 18 BRPM | SYSTOLIC BLOOD PRESSURE: 134 MMHG | OXYGEN SATURATION: 98 % | HEIGHT: 67 IN | HEART RATE: 73 BPM | DIASTOLIC BLOOD PRESSURE: 70 MMHG

## 2022-04-13 VITALS
RESPIRATION RATE: 18 BRPM | SYSTOLIC BLOOD PRESSURE: 111 MMHG | DIASTOLIC BLOOD PRESSURE: 70 MMHG | HEART RATE: 75 BPM | OXYGEN SATURATION: 100 %

## 2022-04-13 DIAGNOSIS — Z90.710 ACQUIRED ABSENCE OF BOTH CERVIX AND UTERUS: Chronic | ICD-10-CM

## 2022-04-13 LAB
ALBUMIN SERPL ELPH-MCNC: 4.3 G/DL — SIGNIFICANT CHANGE UP (ref 3.3–5)
ALP SERPL-CCNC: 184 U/L — HIGH (ref 40–120)
ALT FLD-CCNC: 74 U/L — HIGH (ref 4–33)
ANION GAP SERPL CALC-SCNC: 13 MMOL/L — SIGNIFICANT CHANGE UP (ref 7–14)
AST SERPL-CCNC: 52 U/L — HIGH (ref 4–32)
BILIRUB SERPL-MCNC: 0.4 MG/DL — SIGNIFICANT CHANGE UP (ref 0.2–1.2)
BUN SERPL-MCNC: 18 MG/DL — SIGNIFICANT CHANGE UP (ref 7–23)
CALCIUM SERPL-MCNC: 9.2 MG/DL — SIGNIFICANT CHANGE UP (ref 8.4–10.5)
CHLORIDE SERPL-SCNC: 101 MMOL/L — SIGNIFICANT CHANGE UP (ref 98–107)
CO2 SERPL-SCNC: 22 MMOL/L — SIGNIFICANT CHANGE UP (ref 22–31)
CREAT SERPL-MCNC: 0.94 MG/DL — SIGNIFICANT CHANGE UP (ref 0.5–1.3)
EGFR: 71 ML/MIN/1.73M2 — SIGNIFICANT CHANGE UP
GLUCOSE SERPL-MCNC: 108 MG/DL — HIGH (ref 70–99)
HCT VFR BLD CALC: 39.8 % — SIGNIFICANT CHANGE UP (ref 34.5–45)
HGB BLD-MCNC: 13 G/DL — SIGNIFICANT CHANGE UP (ref 11.5–15.5)
MCHC RBC-ENTMCNC: 29 PG — SIGNIFICANT CHANGE UP (ref 27–34)
MCHC RBC-ENTMCNC: 32.7 GM/DL — SIGNIFICANT CHANGE UP (ref 32–36)
MCV RBC AUTO: 88.6 FL — SIGNIFICANT CHANGE UP (ref 80–100)
NRBC # BLD: 0 /100 WBCS — SIGNIFICANT CHANGE UP
NRBC # FLD: 0 K/UL — SIGNIFICANT CHANGE UP
PLATELET # BLD AUTO: 472 K/UL — HIGH (ref 150–400)
POTASSIUM SERPL-MCNC: 3.2 MMOL/L — LOW (ref 3.5–5.3)
POTASSIUM SERPL-SCNC: 3.2 MMOL/L — LOW (ref 3.5–5.3)
PROT SERPL-MCNC: 8.1 G/DL — SIGNIFICANT CHANGE UP (ref 6–8.3)
RBC # BLD: 4.49 M/UL — SIGNIFICANT CHANGE UP (ref 3.8–5.2)
RBC # FLD: 14.1 % — SIGNIFICANT CHANGE UP (ref 10.3–14.5)
SODIUM SERPL-SCNC: 136 MMOL/L — SIGNIFICANT CHANGE UP (ref 135–145)
WBC # BLD: 7.38 K/UL — SIGNIFICANT CHANGE UP (ref 3.8–10.5)
WBC # FLD AUTO: 7.38 K/UL — SIGNIFICANT CHANGE UP (ref 3.8–10.5)

## 2022-04-13 PROCEDURE — 99284 EMERGENCY DEPT VISIT MOD MDM: CPT

## 2022-04-13 PROCEDURE — 72100 X-RAY EXAM L-S SPINE 2/3 VWS: CPT | Mod: 26

## 2022-04-13 RX ORDER — LIDOCAINE 4 G/100G
1 CREAM TOPICAL ONCE
Refills: 0 | Status: COMPLETED | OUTPATIENT
Start: 2022-04-13 | End: 2022-04-13

## 2022-04-13 RX ORDER — KETOROLAC TROMETHAMINE 30 MG/ML
30 SYRINGE (ML) INJECTION ONCE
Refills: 0 | Status: DISCONTINUED | OUTPATIENT
Start: 2022-04-13 | End: 2022-04-13

## 2022-04-13 RX ORDER — ACETAMINOPHEN 500 MG
650 TABLET ORAL ONCE
Refills: 0 | Status: COMPLETED | OUTPATIENT
Start: 2022-04-13 | End: 2022-04-13

## 2022-04-13 RX ORDER — MORPHINE SULFATE 50 MG/1
4 CAPSULE, EXTENDED RELEASE ORAL ONCE
Refills: 0 | Status: DISCONTINUED | OUTPATIENT
Start: 2022-04-13 | End: 2022-04-13

## 2022-04-13 RX ORDER — OXYCODONE AND ACETAMINOPHEN 5; 325 MG/1; MG/1
1 TABLET ORAL ONCE
Refills: 0 | Status: DISCONTINUED | OUTPATIENT
Start: 2022-04-13 | End: 2022-04-13

## 2022-04-13 RX ORDER — OXYCODONE AND ACETAMINOPHEN 5; 325 MG/1; MG/1
1 TABLET ORAL
Qty: 8 | Refills: 0
Start: 2022-04-13 | End: 2022-04-14

## 2022-04-13 RX ORDER — DIAZEPAM 5 MG
5 TABLET ORAL ONCE
Refills: 0 | Status: DISCONTINUED | OUTPATIENT
Start: 2022-04-13 | End: 2022-04-13

## 2022-04-13 RX ADMIN — OXYCODONE AND ACETAMINOPHEN 1 TABLET(S): 5; 325 TABLET ORAL at 11:22

## 2022-04-13 RX ADMIN — LIDOCAINE 1 PATCH: 4 CREAM TOPICAL at 08:46

## 2022-04-13 RX ADMIN — MORPHINE SULFATE 4 MILLIGRAM(S): 50 CAPSULE, EXTENDED RELEASE ORAL at 11:21

## 2022-04-13 RX ADMIN — Medication 30 MILLIGRAM(S): at 11:21

## 2022-04-13 RX ADMIN — Medication 30 MILLIGRAM(S): at 08:46

## 2022-04-13 RX ADMIN — OXYCODONE AND ACETAMINOPHEN 1 TABLET(S): 5; 325 TABLET ORAL at 08:45

## 2022-04-13 RX ADMIN — Medication 650 MILLIGRAM(S): at 11:21

## 2022-04-13 RX ADMIN — Medication 650 MILLIGRAM(S): at 10:25

## 2022-04-13 RX ADMIN — Medication 5 MILLIGRAM(S): at 10:25

## 2022-04-13 RX ADMIN — MORPHINE SULFATE 4 MILLIGRAM(S): 50 CAPSULE, EXTENDED RELEASE ORAL at 10:25

## 2022-04-13 NOTE — ED PROVIDER NOTE - CLINICAL SUMMARY MEDICAL DECISION MAKING FREE TEXT BOX
[place]: Worsening sciatica. IV pain meds. Re-assess. May need CDU for pain meds. As pt. requires MRI, may need admission or transfer if not able to get pain control.

## 2022-04-13 NOTE — ED PROVIDER NOTE - NS ED ATTENDING STATEMENT MOD
This was a shared visit with the JEANNE. I reviewed and verified the documentation and independently performed the documented:

## 2022-04-13 NOTE — ED PROVIDER NOTE - PATIENT PORTAL LINK FT
You can access the FollowMyHealth Patient Portal offered by [facility] by registering at the following website: [email or web address]. By joining [employer]’s FollowMyHealth portal, you will also be able to view your health information using other applications (apps) compatible with our system.

## 2022-04-13 NOTE — ED PROVIDER NOTE - PHYSICAL EXAMINATION
Well appearing, well nourished, awake, alert, oriented to person, place, time/situation and in no apparent distress.    Airway patent    Eyes without scleral injection. No jaundice.    Strong pulse.    Respirations unlabored.    Abdomen soft, non-tender, no guarding.    Spine: R low back TTP. No midline tenderness.    Alert and oriented, no gross motor or sensory deficits. 5/5 strength (B) LE, but ROM R leg limited by pain.    Skin normal color for race, warm, dry and intact. No evidence of rash.    No SI/HI.

## 2022-04-13 NOTE — ED PROVIDER NOTE - ATTENDING CONTRIBUTION TO CARE
I performed a face-to-face evaluation of the patient and performed a history and physical examination. I agree with the history and physical examination. If this was a PA visit, I personally saw the patient with the PA and performed a substantive portion of the visit including all aspects of the medical decision making.    Worsening sciatica. IV pain meds. Re-assess. May need CDU for pain meds. As pt. requires MRI, may need admission or transfer if not able to get pain control.

## 2022-04-13 NOTE — ED ADULT NURSE NOTE - OBJECTIVE STATEMENT
pt received in rm 7 AAO x 3. pt c/o lower back pain x 3 days. pt denies any other complaints. respirations even and unlabored. 22g iv placed to top of left hand.

## 2022-04-13 NOTE — ED PROVIDER NOTE - OBJECTIVE STATEMENT
[place]: Osteoporosis. HIV (CD4 >2,000, H/o R sciatica. Worse the last few days after twisting in kitchen. Ran out of pain meds last night (oxycodone). No neuro deficits or bowel/bladder incontinence. No cancer/fever/IVDA/trauma. Therefore, not likely epidural abscess, traumatic fracture, osteomyelitis, or spinal cord compression. Goes to PT weekly. Did not respond fully to muscle relaxants and NSAIDs. VL undetectable). Last month: stand-up MRI in [place]. Has a pain med doctor.

## 2022-04-13 NOTE — ED PROVIDER NOTE - PROGRESS NOTE DETAILS
[place]: Still a lot of pain. Her pain doctor will re-initiate order for PT. Will give IV pain meds, check labs; likely admit for pain control and open MRI.

## 2022-04-13 NOTE — ED ADULT NURSE NOTE - INTERVENTIONS DEFINITIONS
[place] to call system/Call bell, personal items and telephone within reach/Instruct patient to call for assistance/Non-slip footwear when patient is off stretcher/Physically safe environment: no spills, clutter or unnecessary equipment/Stretcher in lowest position, wheels locked, appropriate side rails in place/Monitor gait and stability/Reinforce activity limits and safety measures with patient and family

## 2022-06-07 NOTE — PROGRESS NOTE ADULT - SUBJECTIVE AND OBJECTIVE BOX
Patient is a 53y old  Female who presents with a chief complaint of Tachycardia, Acute kidney injury (2019 12:23)      SUBJECTIVE / OVERNIGHT EVENTS:    MEDICATIONS  (STANDING):  amLODIPine   Tablet 10 milliGRAM(s) Oral daily  cholecalciferol 1000 Unit(s) Oral daily  efavirenz 600/emtricitabine 200/tenofovir 300mg 1 Tablet(s) Oral at bedtime  ergocalciferol 96208 Unit(s) Oral <User Schedule>  lactated ringers. 1000 milliLiter(s) (100 mL/Hr) IV Continuous <Continuous>  multivitamin 1 Tablet(s) Oral daily  pantoprazole    Tablet 40 milliGRAM(s) Oral before breakfast    MEDICATIONS  (PRN):  ALBUTerol    90 MICROgram(s) HFA Inhaler 2 Puff(s) Inhalation every 6 hours PRN Shortness of Breath and/or Wheezing  HYDROmorphone  Injectable 0.5 milliGRAM(s) IV Push every 4 hours PRN Moderate Pain (4 - 6) or Severe Pain (7 - 10)      Vital Signs Last 24 Hrs  T(C): 36.7 (2019 05:30), Max: 36.7 (2019 21:21)  T(F): 98.1 (2019 05:30), Max: 98.1 (2019 21:21)  HR: 78 (2019 05:30) (75 - 83)  BP: 129/82 (2019 05:30) (129/82 - 149/81)  BP(mean): --  RR: 16 (2019 05:30) (16 - 17)  SpO2: 100% (2019 05:30) (97% - 100%)  CAPILLARY BLOOD GLUCOSE        I&O's Summary      PHYSICAL EXAM:  GENERAL: NAD, well-developed  HEAD:  Atraumatic, Normocephalic  EYES: EOMI, PERRLA, conjunctiva and sclera clear  NECK: Supple, No JVD  CHEST/LUNG: Clear to auscultation bilaterally; No wheeze  HEART: Regular rate and rhythm; No murmurs, rubs, or gallops  ABDOMEN: Soft, Nontender, Nondistended; Bowel sounds present  EXTREMITIES:  2+ Peripheral Pulses, No clubbing, cyanosis, or edema  PSYCH: AAOx3  NEUROLOGY: non-focal  SKIN: No rashes or lesions    LABS:                        12.2   7.05  )-----------( 427      ( 2019 07:00 )             38.7         136  |  103  |  16  ----------------------------<  117<H>  4.3   |  23  |  0.86    Ca    9.0      2019 07:00  Phos  2.1       Mg     2.1         TPro  6.9  /  Alb  3.8  /  TBili  0.4  /  DBili  x   /  AST  29  /  ALT  38<H>  /  AlkPhos  116      PT/INR - ( 2019 18:29 )   PT: 10.7 SEC;   INR: 0.97          PTT - ( 2019 18:29 )  PTT:22.0 SEC  CARDIAC MARKERS ( 2019 07:00 )  x     / x     / 208 u/L / x     / x      CARDIAC MARKERS ( 2019 06:54 )  x     / x     / 331 u/L / x     / x      CARDIAC MARKERS ( 2019 01:40 )  x     / x     / 328 u/L / 5.49 ng/mL / x          Urinalysis Basic - ( 2019 18:00 )    Color: LIGHT YELLOW / Appearance: CLEAR / S.014 / pH: 6.5  Gluc: NEGATIVE / Ketone: NEGATIVE  / Bili: NEGATIVE / Urobili: NORMAL   Blood: NEGATIVE / Protein: 10 / Nitrite: NEGATIVE   Leuk Esterase: NEGATIVE / RBC: x / WBC x   Sq Epi: x / Non Sq Epi: x / Bacteria: x        RADIOLOGY & ADDITIONAL TESTS:    Imaging Personally Reviewed: < from: NM Pulmonary Ventilation/Perfusion Scan (19 @ 14:47) >  IMPRESSION: Very low probability of pulmonary embolus.    < end of copied text >  < from: US Duplex Venous Lower Ext Complete, Bilateral (19 @ 20:49) >  Impression:     No evidence of deep vein thrombosis in either lower extremity.      < end of copied text >  < from: Transthoracic Echocardiogram (19 @ 10:34) >  CONCLUSIONS:  1. Normal mitral valve.  2. Normal trileaflet aortic valve.  3. Increased relative wall thickness with normal left  ventricular mass index, consistent with concentric left  ventricular remodeling.  4. Normal left ventricular systolic function. No segmental  wall motion abnormalities.  5. Normal right ventricular size and function.    < end of copied text >      Consultant(s) Notes Reviewed:      Care Discussed with Consultants/Other Providers: Patient is a 53y old  Female who presents with a chief complaint of Tachycardia, Acute kidney injury (2019 12:23)      SUBJECTIVE / OVERNIGHT EVENTS:  Patient says B/L rib pain resolved. Patient has no new complaints. Denies cp, SOB, abdominal pain, N/V/D     MEDICATIONS  (STANDING):  amLODIPine   Tablet 10 milliGRAM(s) Oral daily  cholecalciferol 1000 Unit(s) Oral daily  efavirenz 600/emtricitabine 200/tenofovir 300mg 1 Tablet(s) Oral at bedtime  ergocalciferol 03277 Unit(s) Oral <User Schedule>  lactated ringers. 1000 milliLiter(s) (100 mL/Hr) IV Continuous <Continuous>  multivitamin 1 Tablet(s) Oral daily  pantoprazole    Tablet 40 milliGRAM(s) Oral before breakfast    MEDICATIONS  (PRN):  ALBUTerol    90 MICROgram(s) HFA Inhaler 2 Puff(s) Inhalation every 6 hours PRN Shortness of Breath and/or Wheezing  HYDROmorphone  Injectable 0.5 milliGRAM(s) IV Push every 4 hours PRN Moderate Pain (4 - 6) or Severe Pain (7 - 10)      Vital Signs Last 24 Hrs  T(C): 36.7 (2019 05:30), Max: 36.7 (2019 21:21)  T(F): 98.1 (2019 05:30), Max: 98.1 (2019 21:21)  HR: 78 (2019 05:30) (75 - 83)  BP: 129/82 (2019 05:30) (129/82 - 149/81)  BP(mean): --  RR: 16 (2019 05:30) (16 - 17)  SpO2: 100% (2019 05:30) (97% - 100%)  CAPILLARY BLOOD GLUCOSE        I&O's Summary      PHYSICAL EXAM:  GENERAL: NAD, well-developed  HEAD:  Atraumatic, Normocephalic  EYES: EOMI, PERRLA, conjunctiva and sclera clear  NECK: Supple, No JVD  CHEST/LUNG: Clear to auscultation bilaterally; No wheeze  HEART: Regular rate and rhythm; No murmurs, rubs, or gallops  ABDOMEN: Soft, Nontender, Nondistended; Bowel sounds present  EXTREMITIES:  2+ Peripheral Pulses, No clubbing, cyanosis, or edema  PSYCH: AAOx3  NEUROLOGY: non-focal  SKIN: No rashes or lesions    LABS:                        12.2   7.05  )-----------( 427      ( 2019 07:00 )             38.7         136  |  103  |  16  ----------------------------<  117<H>  4.3   |  23  |  0.86    Ca    9.0      2019 07:00  Phos  2.1       Mg     2.1         TPro  6.9  /  Alb  3.8  /  TBili  0.4  /  DBili  x   /  AST  29  /  ALT  38<H>  /  AlkPhos  116      PT/INR - ( 2019 18:29 )   PT: 10.7 SEC;   INR: 0.97          PTT - ( 2019 18:29 )  PTT:22.0 SEC  CARDIAC MARKERS ( 2019 07:00 )  x     / x     / 208 u/L / x     / x      CARDIAC MARKERS ( 2019 06:54 )  x     / x     / 331 u/L / x     / x      CARDIAC MARKERS ( 2019 01:40 )  x     / x     / 328 u/L / 5.49 ng/mL / x          Urinalysis Basic - ( 2019 18:00 )    Color: LIGHT YELLOW / Appearance: CLEAR / S.014 / pH: 6.5  Gluc: NEGATIVE / Ketone: NEGATIVE  / Bili: NEGATIVE / Urobili: NORMAL   Blood: NEGATIVE / Protein: 10 / Nitrite: NEGATIVE   Leuk Esterase: NEGATIVE / RBC: x / WBC x   Sq Epi: x / Non Sq Epi: x / Bacteria: x        RADIOLOGY & ADDITIONAL TESTS:    Imaging Personally Reviewed: < from: NM Pulmonary Ventilation/Perfusion Scan (19 @ 14:47) >  IMPRESSION: Very low probability of pulmonary embolus.    < end of copied text >  < from: US Duplex Venous Lower Ext Complete, Bilateral (19 @ 20:49) >  Impression:     No evidence of deep vein thrombosis in either lower extremity.      < end of copied text >  < from: Transthoracic Echocardiogram (19 @ 10:34) >  CONCLUSIONS:  1. Normal mitral valve.  2. Normal trileaflet aortic valve.  3. Increased relative wall thickness with normal left  ventricular mass index, consistent with concentric left  ventricular remodeling.  4. Normal left ventricular systolic function. No segmental  wall motion abnormalities.  5. Normal right ventricular size and function.    < end of copied text >      Consultant(s) Notes Reviewed:      Care Discussed with Consultants/Other Providers: Dupixent Counseling: I discussed with the patient the risks of dupilumab including but not limited to eye infection and irritation, cold sores, injection site reactions, worsening of asthma, allergic reactions and increased risk of parasitic infection.  Live vaccines should be avoided while taking dupilumab. Dupilumab will also interact with certain medications such as warfarin and cyclosporine. The patient understands that monitoring is required and they must alert us or the primary physician if symptoms of infection or other concerning signs are noted.

## 2022-06-16 ENCOUNTER — EMERGENCY (EMERGENCY)
Facility: HOSPITAL | Age: 57
LOS: 1 days | Discharge: HOME | End: 2022-06-16
Attending: STUDENT IN AN ORGANIZED HEALTH CARE EDUCATION/TRAINING PROGRAM | Admitting: EMERGENCY MEDICINE
Payer: MEDICAID

## 2022-06-16 VITALS
DIASTOLIC BLOOD PRESSURE: 79 MMHG | HEART RATE: 91 BPM | RESPIRATION RATE: 20 BRPM | SYSTOLIC BLOOD PRESSURE: 145 MMHG | TEMPERATURE: 98 F | HEIGHT: 67 IN | OXYGEN SATURATION: 99 %

## 2022-06-16 DIAGNOSIS — Z90.710 ACQUIRED ABSENCE OF BOTH CERVIX AND UTERUS: Chronic | ICD-10-CM

## 2022-06-16 LAB
ALBUMIN SERPL ELPH-MCNC: 4.4 G/DL — SIGNIFICANT CHANGE UP (ref 3.3–5)
ALP SERPL-CCNC: 201 U/L — HIGH (ref 40–120)
ALT FLD-CCNC: 57 U/L — HIGH (ref 4–33)
ANION GAP SERPL CALC-SCNC: 13 MMOL/L — SIGNIFICANT CHANGE UP (ref 7–14)
AST SERPL-CCNC: 31 U/L — SIGNIFICANT CHANGE UP (ref 4–32)
BASOPHILS # BLD AUTO: 0.05 K/UL — SIGNIFICANT CHANGE UP (ref 0–0.2)
BASOPHILS NFR BLD AUTO: 0.5 % — SIGNIFICANT CHANGE UP (ref 0–2)
BILIRUB SERPL-MCNC: 0.2 MG/DL — SIGNIFICANT CHANGE UP (ref 0.2–1.2)
BUN SERPL-MCNC: 18 MG/DL — SIGNIFICANT CHANGE UP (ref 7–23)
CALCIUM SERPL-MCNC: 9.7 MG/DL — SIGNIFICANT CHANGE UP (ref 8.4–10.5)
CHLORIDE SERPL-SCNC: 100 MMOL/L — SIGNIFICANT CHANGE UP (ref 98–107)
CO2 SERPL-SCNC: 25 MMOL/L — SIGNIFICANT CHANGE UP (ref 22–31)
CREAT SERPL-MCNC: 0.92 MG/DL — SIGNIFICANT CHANGE UP (ref 0.5–1.3)
EGFR: 73 ML/MIN/1.73M2 — SIGNIFICANT CHANGE UP
EOSINOPHIL # BLD AUTO: 0.24 K/UL — SIGNIFICANT CHANGE UP (ref 0–0.5)
EOSINOPHIL NFR BLD AUTO: 2.2 % — SIGNIFICANT CHANGE UP (ref 0–6)
FLUAV AG NPH QL: SIGNIFICANT CHANGE UP
FLUBV AG NPH QL: SIGNIFICANT CHANGE UP
GLUCOSE SERPL-MCNC: 134 MG/DL — HIGH (ref 70–99)
HCT VFR BLD CALC: 39.5 % — SIGNIFICANT CHANGE UP (ref 34.5–45)
HGB BLD-MCNC: 12.9 G/DL — SIGNIFICANT CHANGE UP (ref 11.5–15.5)
IANC: 6.62 K/UL — SIGNIFICANT CHANGE UP (ref 1.8–7.4)
IMM GRANULOCYTES NFR BLD AUTO: 0.4 % — SIGNIFICANT CHANGE UP (ref 0–1.5)
LYMPHOCYTES # BLD AUTO: 28.9 % — SIGNIFICANT CHANGE UP (ref 13–44)
LYMPHOCYTES # BLD AUTO: 3.09 K/UL — SIGNIFICANT CHANGE UP (ref 1–3.3)
MCHC RBC-ENTMCNC: 29.3 PG — SIGNIFICANT CHANGE UP (ref 27–34)
MCHC RBC-ENTMCNC: 32.7 GM/DL — SIGNIFICANT CHANGE UP (ref 32–36)
MCV RBC AUTO: 89.8 FL — SIGNIFICANT CHANGE UP (ref 80–100)
MONOCYTES # BLD AUTO: 0.67 K/UL — SIGNIFICANT CHANGE UP (ref 0–0.9)
MONOCYTES NFR BLD AUTO: 6.3 % — SIGNIFICANT CHANGE UP (ref 2–14)
NEUTROPHILS # BLD AUTO: 6.62 K/UL — SIGNIFICANT CHANGE UP (ref 1.8–7.4)
NEUTROPHILS NFR BLD AUTO: 61.7 % — SIGNIFICANT CHANGE UP (ref 43–77)
NRBC # BLD: 0 /100 WBCS — SIGNIFICANT CHANGE UP
NRBC # FLD: 0 K/UL — SIGNIFICANT CHANGE UP
PLATELET # BLD AUTO: 469 K/UL — HIGH (ref 150–400)
POTASSIUM SERPL-MCNC: 3.1 MMOL/L — LOW (ref 3.5–5.3)
POTASSIUM SERPL-SCNC: 3.1 MMOL/L — LOW (ref 3.5–5.3)
PROT SERPL-MCNC: 8.2 G/DL — SIGNIFICANT CHANGE UP (ref 6–8.3)
RBC # BLD: 4.4 M/UL — SIGNIFICANT CHANGE UP (ref 3.8–5.2)
RBC # FLD: 14.3 % — SIGNIFICANT CHANGE UP (ref 10.3–14.5)
RSV RNA NPH QL NAA+NON-PROBE: SIGNIFICANT CHANGE UP
SARS-COV-2 RNA SPEC QL NAA+PROBE: SIGNIFICANT CHANGE UP
SODIUM SERPL-SCNC: 138 MMOL/L — SIGNIFICANT CHANGE UP (ref 135–145)
TROPONIN T, HIGH SENSITIVITY RESULT: 8 NG/L — SIGNIFICANT CHANGE UP
WBC # BLD: 10.71 K/UL — HIGH (ref 3.8–10.5)
WBC # FLD AUTO: 10.71 K/UL — HIGH (ref 3.8–10.5)

## 2022-06-16 PROCEDURE — 93010 ELECTROCARDIOGRAM REPORT: CPT

## 2022-06-16 PROCEDURE — 71046 X-RAY EXAM CHEST 2 VIEWS: CPT | Mod: 26

## 2022-06-16 PROCEDURE — 99220: CPT | Mod: 25

## 2022-06-16 RX ORDER — KETOROLAC TROMETHAMINE 30 MG/ML
30 SYRINGE (ML) INJECTION ONCE
Refills: 0 | Status: DISCONTINUED | OUTPATIENT
Start: 2022-06-16 | End: 2022-06-16

## 2022-06-16 RX ORDER — LIDOCAINE 4 G/100G
1 CREAM TOPICAL ONCE
Refills: 0 | Status: COMPLETED | OUTPATIENT
Start: 2022-06-16 | End: 2022-06-16

## 2022-06-16 RX ORDER — ACETAMINOPHEN 500 MG
1000 TABLET ORAL ONCE
Refills: 0 | Status: COMPLETED | OUTPATIENT
Start: 2022-06-16 | End: 2022-06-16

## 2022-06-16 RX ORDER — POTASSIUM CHLORIDE 20 MEQ
10 PACKET (EA) ORAL
Refills: 0 | Status: COMPLETED | OUTPATIENT
Start: 2022-06-16 | End: 2022-06-17

## 2022-06-16 RX ORDER — POTASSIUM CHLORIDE 20 MEQ
40 PACKET (EA) ORAL ONCE
Refills: 0 | Status: COMPLETED | OUTPATIENT
Start: 2022-06-16 | End: 2022-06-16

## 2022-06-16 RX ORDER — MAGNESIUM SULFATE 500 MG/ML
2 VIAL (ML) INJECTION ONCE
Refills: 0 | Status: COMPLETED | OUTPATIENT
Start: 2022-06-16 | End: 2022-06-16

## 2022-06-16 RX ADMIN — Medication 30 MILLIGRAM(S): at 21:06

## 2022-06-16 RX ADMIN — Medication 25 GRAM(S): at 23:59

## 2022-06-16 RX ADMIN — Medication 400 MILLIGRAM(S): at 23:33

## 2022-06-16 RX ADMIN — Medication 40 MILLIEQUIVALENT(S): at 23:39

## 2022-06-16 RX ADMIN — LIDOCAINE 1 PATCH: 4 CREAM TOPICAL at 21:08

## 2022-06-16 NOTE — ED PROVIDER NOTE - OBJECTIVE STATEMENT
56 Y/O M Y/O F PMH HIV HTN GERD Fibroids S/P hysterectomy C/O CP. P 56 Y/O M Y/O F PMH HIV HTN GERD Fibroids S/P hysterectomy C/O CP. Pt states the pain is in her chest and back and is constant. Pt statres that she 56 Y/O M Y/O F PMH HIV HTN GERD Fibroids S/P hysterectomy C/O CP. Pt states the pain is in her chest and back and is constant. Pt states that she has been having constant pain. Pt states it is difficult to sleep due to the pain. Pt denies SOB, recent travel or leg swelling. Pt states she has had stress testing but states it was years ago. States her aunt had a fatal heart attack but states she was in her 70s. Pt denies any other sx or acute complaints.

## 2022-06-16 NOTE — ED ADULT NURSE NOTE - OBJECTIVE STATEMENT
pt a&ox4 c/o radiating chest pain to back for the last 4-5 days interrupting sleep, bringing her to the ED today. pt denies sob, headache, nausea, vomiting, dizziness at this time. pt respirations even and unlabored with no accessory muscle use. 20g to the Left wrist. labs collected and sent. pt medicated as per md orders. ekg completed. pt in NAD and stable taken to XR at this time. pt pmhx of GERD, HTN, HIV.

## 2022-06-16 NOTE — ED PROVIDER NOTE - MUSCULOSKELETAL, MLM
Spine appears normal, range of motion is not limited, no muscle or joint tenderness Spine appears normal, range of motion is not limited. + Chest wall and trapezius area tenderness to palpation.

## 2022-06-16 NOTE — ED PROVIDER NOTE - ATTENDING APP SHARED VISIT CONTRIBUTION OF CARE
I have personally performed a face to face medical and diagnostic evaluation of the patient. I have discussed with and reviewed the ACP's note and agree with the History, ROS, Physical Exam and MDM unless otherwise indicated. A brief summary of my personal evaluation and impression can be found below.    58yo F hx hiv undetectable viral load, htn, gerd, p/w midsternal cp that wraps around her neck. Worse with some positional changes but is also exertional. No fever, chills, sob, abd pain, pleuritic sx, n/v/d, leg pain/swelling. No recent cardiac testing or family hx of early onset CAD.  VITALS: Initial triage and subsequent vitals have been reviewed by me.  Gen: Well appearing, NAD, alert, non-toxic  Head: NCAT  HEENT: MMM, normal conjunctiva, anicteric, neck supple  Lung: CTAB, no adventitious sounds  CV: RRR, no murmurs, 2+symmetric peripheral pulses  Abd: soft, NTND, no rebound or guarding, no palpable masses  MSK: No edema, no visible deformities  Neuro: Moving all extremity grossly, following commands appropriately, fluid speech  Skin: Warm and dry, no evidence of rash  Psych: normal mood and affect  CP c/f med risk ACS given exertional component, age, and risk factors. Will proceed with acs w/u and plan on cdu for further w/u

## 2022-06-16 NOTE — ED CDU PROVIDER INITIAL DAY NOTE - OBJECTIVE STATEMENT
58 Y/O M Y/O F PMH HIV HTN GERD Fibroids S/P hysterectomy C/O CP. Pt states the pain is in her chest and back and is constant. Pt states that she has been having constant pain. Pt states it is difficult to sleep due to the pain. Pt denies SOB, recent travel or leg swelling. Pt states she has had stress testing but states it was years ago. States her aunt had a fatal heart attack but states she was in her 70s. Pt denies any other sx or acute complaints.    CDU KRISTYN Manzo Note----  ED Provider HPI as above, reviewed.  Pt is a 58 yo female, PMH HIV (viral load reported undetectable), HTN, asthma, hx/o cigarette smoking, GERD, fibroids, presenting to the ED c/o chest/back pain, as above.  In the ED, VSS, EKG with no acute/ischemic findings; labs: WBC 10.71, Hb 12.9, CMP: potassium 3.1, , ALT 57; CMP otherwise grossly unremarkable.  Trop 8 ---> 10.  RSV/FLU/COVID: NotDetected.  CXR showed blunting of left costophrenic angle; read by radiology (prelim report status) as "trace left pleural effusion"; CT chest was ordered for further evaluation.  Pt was dispo'd to CDU for continued care plan:    1.  Chest pain:  Plan:  Tele monitoring, CT chest (for further eval of finding of trace left pleural effusion on CXR), stress test, echo, Tele Doc of Day evaluation, general observation care / monitoring.  2.  Hypokalemia:  Tele monitoring, electrolyte repletion, repeat labs.

## 2022-06-16 NOTE — ED PROVIDER NOTE - NSCAREINITIATED _GEN_ER
[address] [facility] [place] SOTELO MD  [phone]                         IDENTIFICATION:    Patient Name  Shanda Coffey   Date of Birth 1956   [facility] [phone]   Medical Record Number  [id number]      Age  61 y.o. PCP Rupert Thompson NP   Admit date:  5/21/2017    Room Number  425/01  @ [address] [facility]   Date of Service  5/22/2017            HISTORY         REASON FOR CONSULTATION: AMS, history of bipolar   HISTORY OF PRESENT ILLNESS:    The patient Shanda Coffey is a 61 y.o.  female with a past psychiatric history of Bipolar d/o, who presented for the principle diagnosis of encepaholpathy. Pt herself is not a good historian. She was also noted to be confused and does not answer any questions. Per records pt started to have some confusion about 5 days ago.  made sure she took her meds including lithium. Pt's lithium level was at 2.66 on admission and she was found to be in renal failure. This writer attempted to reach pt's  at [phone] and LM to obtain collateral info. No other details of psych history are available. ALLERGIES:  No Known Allergies   MEDICATIONS PRIOR TO ADMISSION:  Prescriptions Prior to Admission   Medication Sig    atorvastatin (LIPITOR) 40 mg tablet Take 1 Tab by mouth daily.  amLODIPine (NORVASC) 10 mg tablet TAKE ONE TABLET BY MOUTH DAILY    metoprolol succinate (TOPROL-XL) 25 mg XL tablet Take 1 Tab by mouth daily.  lisinopril (PRINIVIL, ZESTRIL) 40 mg tablet Take 1 Tab by mouth daily.  sertraline (ZOLOFT) 100 mg tablet TAKE ONE TABLET BY MOUTH DAILY    QUEtiapine (SEROQUEL) 100 mg tablet TAKE ONE-HALF TABLET BY MOUTH EVERY NIGHT AT BEDTIME    LORazepam (ATIVAN) 0.5 mg tablet TAKE ONE TABLET BY MOUTH TWICE A DAY FOR ANXIETY    lithium carbonate SR (LITHOBID) 300 mg CR tablet Take 1 Tab by mouth nightly.       PAST MEDICAL HISTORY:  Active Ambulatory Problems     Diagnosis Date Noted    Gallstones 07/12/2012    Common bile duct calculus 07/23/2012    Hydradenitis 07/23/2012    Abscess of buttock 07/23/2012    Bipolar 1 disorder ([facility] [address] 06/01/2016    Essential hypertension with goal blood pressure less than 130/80 07/13/2016    Prediabetes 09/20/2016    Hypercholesteremia 03/15/2017     Resolved Ambulatory Problems     Diagnosis Date Noted    No Resolved Ambulatory Problems     Past Medical History:   Diagnosis Date    Abscess of buttock 7/23/2012    Bronchitis     Common bile duct calculus 7/23/2012    Gallstones 7/12/2012    GERD (gastroesophageal reflux disease)     High cholesterol     Hypercholesterolemia     Hypertension     Ill-defined condition     Ill-defined condition     Insomnia     Other ill-defined conditions(799.89)     Paranoia ([facility] [address]     Psychiatric disorder       Past Medical History:   Diagnosis Date    Abscess of buttock 7/23/2012    Bronchitis     Common bile duct calculus 7/23/2012    Gallstones 7/12/2012    GERD (gastroesophageal reflux disease)     High cholesterol     Hypercholesterolemia     Hypertension     Ill-defined condition     missing upper front tooth    Ill-defined condition     lower leg bilateral reddened rash.  Insomnia     Other ill-defined conditions(799.89)     Large boil under right arm-pit.  Paranoia ([facility] [address]     Psychiatric disorder     bipolar     Past Surgical History:   Procedure Laterality Date    ABDOMEN SURGERY PROC UNLISTED      cholecystectomy 7/23/2012      SOCIAL HISTORY:  Lives with . Social History     Social History Narrative     Social History   Substance Use Topics    Smoking status: Current Every Day Smoker     Packs/day: 0.50    Smokeless tobacco: Never Used    Alcohol use No      FAMILY HISTORY:  History reviewed.    Family History   Problem Relation Age of Onset    Cancer Father      lung    Diabetes Brother     Heart Disease Brother     Malignant Hyperthermia Neg Hx     Pseudocholinesterase Deficiency Neg Hx     Delayed Awakening Neg Hx     Post-op Nausea/Vomiting Neg Hx     Emergence Delirium Neg Hx     Post-op Cognitive Dysfunction Neg Hx     Other Neg Hx        REVIEW of SYSTEMS:   As noted in history of present illness           MENTAL STATUS EXAM & VITALS     Oriented to person, month and year. Can't  tell day. Can't do serial sevens. Can't spell WORLD Memory (Registration 0/3, Recall 0/3, Mood/ Affect: Constricted. Limited speech. Denies SI/HI. No delusions. No hallucinations. Some psychomotor retardation. Concentration limited. Insight/judgement  Limited.              VITALS:     Patient Vitals for the past 24 hrs:   Temp Pulse Resp BP SpO2   05/22/17 2011 98.3 °F (36.8 °C) 75 18 148/72 99 %   05/22/17 1955 - 75 - - -   05/22/17 1657 98.2 °F (36.8 °C) 75 18 141/68 99 %   05/22/17 1500 - 76 - - -   05/22/17 1212 98.8 °F (37.1 °C) 80 16 122/67 99 %   05/22/17 0842 98.2 °F (36.8 °C) 89 16 142/69 95 %   05/22/17 0700 - 88 - - -   05/22/17 0300 97.9 °F (36.6 °C) 79 16 134/74 (!) 79 %   05/21/17 2238 - 82 - - -              DATA     Labs reviewed    MEDICATIONS       ALL MEDICATIONS  Current Facility-Administered Medications   Medication Dose Route Frequency    metoprolol succinate (TOPROL-XL) XL tablet 25 mg  25 mg Oral DAILY    sodium chloride (NS) flush 5-10 mL  5-10 mL IntraVENous Q8H    sodium chloride (NS) flush 5-10 mL  5-10 mL IntraVENous PRN    naloxone (NARCAN) injection 0.4 mg  0.4 mg IntraVENous PRN    0.9% sodium chloride infusion  150 mL/hr IntraVENous CONTINUOUS    atorvastatin (LIPITOR) tablet 40 mg  40 mg Oral DAILY    LORazepam (ATIVAN) tablet 0.5 mg  0.5 mg Oral BID PRN    heparin (porcine) injection 5,000 Units  5,000 Units SubCUTAneous Q8H      SCHEDULED MEDICATIONS  Current Facility-Administered Medications   Medication Dose Route Frequency    metoprolol succinate (TOPROL-XL) XL tablet 25 mg  25 mg Oral DAILY    sodium chloride (NS) flush 5-10 mL  5-10 mL IntraVENous Q8H    0.9% sodium chloride infusion  150 mL/hr IntraVENous CONTINUOUS    atorvastatin (LIPITOR) tablet 40 mg  40 mg Oral DAILY    heparin (porcine) injection 5,000 Units  5,000 Units SubCUTAneous Q8H                ASSESSMENT & PLAN              The patient Vera Spencer is a 61 y.o.  female who presents at this time for the following psychiatric diagnoses:    Delirium secondary to kidney injury and lithium toxicity  Bipolar d/o by hx. Plan:  1. Hold Lithium  2. Medical Management    Will attempt to obtain collateral info. Will follow patient's course along with you as necessary. Thank you for the opportunity to participate in the care of your patient.                         SIGNED:    Myles Beltran MD  5/22/2017 Rhonda Donovan)

## 2022-06-16 NOTE — ED CDU PROVIDER INITIAL DAY NOTE - MEDICAL DECISION MAKING DETAILS
1.  Chest pain:  Plan:  Tele monitoring, CT chest, stress test, echo, Tele Doc of Day evaluation, general observation care / monitoring.  2.  Hypokalemia:  Tele monitoring, electrolyte repletion, repeat labs. 1.  Chest pain:  Plan:  Tele monitoring, CT chest (for further eval of finding of trace left pleural effusion on CXR), stress test, echo, Tele Doc of Day evaluation, general observation care / monitoring.  2.  Hypokalemia:  Tele monitoring, electrolyte repletion, repeat labs.

## 2022-06-16 NOTE — ED CDU PROVIDER INITIAL DAY NOTE - PHYSICAL EXAMINATION
CONSTITUTIONAL:  Well appearing, awake, alert, oriented to person, place, time/situation and in no apparent distress.  Pt. is objectively comfortable appearing and verbalizing in full, clear, effortless sentences.  ENMT: NC/AT.  Airway patent.  Nasal mucosa clear.  Moist mucous membranes.  Neck supple.  EYES:  Clear OU.  CARDIAC:  Normal rate, regular rhythm.  Heart sounds S1 S2.  No murmurs, gallops, or rubs.  RESPIRATORY:  Breath sounds clear and equal bilaterally.  No wheezes, no rales, no rhonchi.  GASTROINTESTINAL:  Abdomen soft, non-distended, non-tender.  No rebound, no guarding.  NEUROLOGICAL:  Alert and oriented to person/place/time/situation.  No focal deficits; no tremors noted.   MUSCULOSKELETAL:  Range of motion is not limited.  SKIN:  Skin color unremarkable.  Skin warm, dry, and intact.    PSYCHIATRIC:  Alert and oriented to person/place/time/situation.  Mood and affect WNL.  No apparent risk to self or others.

## 2022-06-16 NOTE — ED CDU PROVIDER INITIAL DAY NOTE - DETAILS
1.  Chest pain:  Plan:  Tele monitoring, CT chest (for further eval of finding of trace left pleural effusion on CXR), stress test, echo, Tele Doc of Day evaluation, general observation care / monitoring.  2.  Hypokalemia:  Tele monitoring, electrolyte repletion, repeat labs.

## 2022-06-16 NOTE — ED PROVIDER NOTE - VIRAL LOAD DATE - SELF REPORTED
Child exposed to sick siblings  Unvaccinated parents   severe cough and rhinorrhea  Pt swabbed curb side at bath location at 8 am 23-Feb-2019 08:56

## 2022-06-16 NOTE — ED PROVIDER NOTE - CLINICAL SUMMARY MEDICAL DECISION MAKING FREE TEXT BOX
58 Y/O M Y/O F PMH HIV HTN GERD Fibroids S/P hysterectomy C/O CP. Pt states the pain is in her chest and back and is constant. Pt states that she has been having constant pain. Pt states it is difficult to sleep due to the pain. Plan is CXR to eval for consolidation, labs to eval for anemia or electrolyte disturbance, pain control, possible CDU placement for echo and provocative testing to eval for a cardiogenic etiology of the patient's pain.

## 2022-06-16 NOTE — ED PROVIDER NOTE - CARE PLAN
Principal Discharge DX:	Chest pain  Secondary Diagnosis:	Pleural effusion  Secondary Diagnosis:	Hypokalemia   1

## 2022-06-17 VITALS
HEART RATE: 81 BPM | TEMPERATURE: 98 F | SYSTOLIC BLOOD PRESSURE: 135 MMHG | DIASTOLIC BLOOD PRESSURE: 83 MMHG | RESPIRATION RATE: 19 BRPM | OXYGEN SATURATION: 99 %

## 2022-06-17 LAB
ANION GAP SERPL CALC-SCNC: 13 MMOL/L — SIGNIFICANT CHANGE UP (ref 7–14)
BASOPHILS # BLD AUTO: 0.03 K/UL — SIGNIFICANT CHANGE UP (ref 0–0.2)
BASOPHILS NFR BLD AUTO: 0.4 % — SIGNIFICANT CHANGE UP (ref 0–2)
BUN SERPL-MCNC: 15 MG/DL — SIGNIFICANT CHANGE UP (ref 7–23)
CALCIUM SERPL-MCNC: 8.9 MG/DL — SIGNIFICANT CHANGE UP (ref 8.4–10.5)
CHLORIDE SERPL-SCNC: 99 MMOL/L — SIGNIFICANT CHANGE UP (ref 98–107)
CHOLEST SERPL-MCNC: 201 MG/DL — HIGH
CO2 SERPL-SCNC: 22 MMOL/L — SIGNIFICANT CHANGE UP (ref 22–31)
CREAT SERPL-MCNC: 0.79 MG/DL — SIGNIFICANT CHANGE UP (ref 0.5–1.3)
EGFR: 87 ML/MIN/1.73M2 — SIGNIFICANT CHANGE UP
EOSINOPHIL # BLD AUTO: 0.17 K/UL — SIGNIFICANT CHANGE UP (ref 0–0.5)
EOSINOPHIL NFR BLD AUTO: 2.2 % — SIGNIFICANT CHANGE UP (ref 0–6)
GLUCOSE SERPL-MCNC: 144 MG/DL — HIGH (ref 70–99)
HCT VFR BLD CALC: 38.9 % — SIGNIFICANT CHANGE UP (ref 34.5–45)
HDLC SERPL-MCNC: 66 MG/DL — SIGNIFICANT CHANGE UP
HGB BLD-MCNC: 12.4 G/DL — SIGNIFICANT CHANGE UP (ref 11.5–15.5)
IANC: 4.67 K/UL — SIGNIFICANT CHANGE UP (ref 1.8–7.4)
IMM GRANULOCYTES NFR BLD AUTO: 0.3 % — SIGNIFICANT CHANGE UP (ref 0–1.5)
LIPID PNL WITH DIRECT LDL SERPL: 110 MG/DL — HIGH
LYMPHOCYTES # BLD AUTO: 2.4 K/UL — SIGNIFICANT CHANGE UP (ref 1–3.3)
LYMPHOCYTES # BLD AUTO: 31.2 % — SIGNIFICANT CHANGE UP (ref 13–44)
MAGNESIUM SERPL-MCNC: 2 MG/DL — SIGNIFICANT CHANGE UP (ref 1.6–2.6)
MCHC RBC-ENTMCNC: 29 PG — SIGNIFICANT CHANGE UP (ref 27–34)
MCHC RBC-ENTMCNC: 31.9 GM/DL — LOW (ref 32–36)
MCV RBC AUTO: 91.1 FL — SIGNIFICANT CHANGE UP (ref 80–100)
MONOCYTES # BLD AUTO: 0.4 K/UL — SIGNIFICANT CHANGE UP (ref 0–0.9)
MONOCYTES NFR BLD AUTO: 5.2 % — SIGNIFICANT CHANGE UP (ref 2–14)
NEUTROPHILS # BLD AUTO: 4.67 K/UL — SIGNIFICANT CHANGE UP (ref 1.8–7.4)
NEUTROPHILS NFR BLD AUTO: 60.7 % — SIGNIFICANT CHANGE UP (ref 43–77)
NON HDL CHOLESTEROL: 135 MG/DL — HIGH
NRBC # BLD: 0 /100 WBCS — SIGNIFICANT CHANGE UP
NRBC # FLD: 0 K/UL — SIGNIFICANT CHANGE UP
PHOSPHATE SERPL-MCNC: 2.2 MG/DL — LOW (ref 2.5–4.5)
PLATELET # BLD AUTO: 466 K/UL — HIGH (ref 150–400)
POTASSIUM SERPL-MCNC: 3.8 MMOL/L — SIGNIFICANT CHANGE UP (ref 3.5–5.3)
POTASSIUM SERPL-SCNC: 3.8 MMOL/L — SIGNIFICANT CHANGE UP (ref 3.5–5.3)
RBC # BLD: 4.27 M/UL — SIGNIFICANT CHANGE UP (ref 3.8–5.2)
RBC # FLD: 14.5 % — SIGNIFICANT CHANGE UP (ref 10.3–14.5)
SODIUM SERPL-SCNC: 134 MMOL/L — LOW (ref 135–145)
TRIGL SERPL-MCNC: 126 MG/DL — SIGNIFICANT CHANGE UP
TROPONIN T, HIGH SENSITIVITY RESULT: 10 NG/L — SIGNIFICANT CHANGE UP
WBC # BLD: 7.69 K/UL — SIGNIFICANT CHANGE UP (ref 3.8–10.5)
WBC # FLD AUTO: 7.69 K/UL — SIGNIFICANT CHANGE UP (ref 3.8–10.5)

## 2022-06-17 PROCEDURE — 93016 CV STRESS TEST SUPVJ ONLY: CPT | Mod: GC

## 2022-06-17 PROCEDURE — 93306 TTE W/DOPPLER COMPLETE: CPT | Mod: 26

## 2022-06-17 PROCEDURE — 99217: CPT

## 2022-06-17 PROCEDURE — 71275 CT ANGIOGRAPHY CHEST: CPT | Mod: 26,MA

## 2022-06-17 PROCEDURE — 93018 CV STRESS TEST I&R ONLY: CPT | Mod: GC

## 2022-06-17 PROCEDURE — 78452 HT MUSCLE IMAGE SPECT MULT: CPT | Mod: 26,MA

## 2022-06-17 RX ORDER — SODIUM,POTASSIUM PHOSPHATES 278-250MG
1 POWDER IN PACKET (EA) ORAL ONCE
Refills: 0 | Status: COMPLETED | OUTPATIENT
Start: 2022-06-17 | End: 2022-06-17

## 2022-06-17 RX ORDER — EFAVIRENZ, EMTRICITABINE AND TENOFOVIR DISOPROXIL FUMARATE 600; 200; 300 MG/1; MG/1; MG/1
1 TABLET, FILM COATED ORAL DAILY
Refills: 0 | Status: DISCONTINUED | OUTPATIENT
Start: 2022-06-17 | End: 2022-06-20

## 2022-06-17 RX ORDER — AMLODIPINE BESYLATE 2.5 MG/1
10 TABLET ORAL AT BEDTIME
Refills: 0 | Status: DISCONTINUED | OUTPATIENT
Start: 2022-06-17 | End: 2022-06-20

## 2022-06-17 RX ORDER — SODIUM CHLORIDE 9 MG/ML
1000 INJECTION INTRAMUSCULAR; INTRAVENOUS; SUBCUTANEOUS
Refills: 0 | Status: DISCONTINUED | OUTPATIENT
Start: 2022-06-17 | End: 2022-06-17

## 2022-06-17 RX ORDER — FAMOTIDINE 10 MG/ML
20 INJECTION INTRAVENOUS ONCE
Refills: 0 | Status: COMPLETED | OUTPATIENT
Start: 2022-06-17 | End: 2022-06-17

## 2022-06-17 RX ADMIN — Medication 100 MILLIEQUIVALENT(S): at 03:00

## 2022-06-17 RX ADMIN — LIDOCAINE 1 PATCH: 4 CREAM TOPICAL at 06:52

## 2022-06-17 RX ADMIN — Medication 1000 MILLIGRAM(S): at 00:27

## 2022-06-17 RX ADMIN — Medication 30 MILLILITER(S): at 11:55

## 2022-06-17 RX ADMIN — SODIUM CHLORIDE 150 MILLILITER(S): 9 INJECTION INTRAMUSCULAR; INTRAVENOUS; SUBCUTANEOUS at 02:03

## 2022-06-17 RX ADMIN — FAMOTIDINE 20 MILLIGRAM(S): 10 INJECTION INTRAVENOUS at 12:20

## 2022-06-17 RX ADMIN — Medication 100 MILLIEQUIVALENT(S): at 04:38

## 2022-06-17 RX ADMIN — EFAVIRENZ, EMTRICITABINE AND TENOFOVIR DISOPROXIL FUMARATE 1 TABLET(S): 600; 200; 300 TABLET, FILM COATED ORAL at 14:14

## 2022-06-17 RX ADMIN — Medication 1 PACKET(S): at 14:10

## 2022-06-17 RX ADMIN — Medication 100 MILLIEQUIVALENT(S): at 02:04

## 2022-06-17 NOTE — ED CDU PROVIDER DISPOSITION NOTE - CARE PROVIDER_API CALL
Blaine Moreno (DO)  Cardiovascular Disease; Internal Medicine; Nuclear Cardiology  [address] [place] [address], [place] [address]  Phone: [phone]  Fax: [phone]  Follow Up Time:

## 2022-06-17 NOTE — CONSULT NOTE ADULT - SUBJECTIVE AND OBJECTIVE BOX
Date of Service :   06-17-22 @ 10:16  CHIEF COMPLAINT:Patient is a 57y old  Female who presents with a chief complaint of     HISTORY OF PRESENT ILLNESS:HPI:     56 Y/O M Y/O F PMH HIV HTN GERD Fibroids S/P hysterectomy C/O CP. Pt states the pain is in her chest and back and is constant. Pt states that she has been having constant pain. Pt states it is difficult to sleep due to the pain. Pt denies SOB, recent travel or leg swelling. Pt states she has had stress testing but states it was years ago. States her aunt had a fatal heart attack but states she was in her 70s. Pt denies any other sx or acute complaints.    PAST MEDICAL & SURGICAL HISTORY:  Benign hypertension      H/O abdominal hysterectomy      Asthma      HIV disease      Osteoarthritis      GERD (gastroesophageal reflux disease)      HPV (human papilloma virus) infection      Cigarette smoker      Pruritus  (chronic)      Vitamin D deficiency      Knee pain, bilateral      S/P hysterectomy              MEDICATIONS:              sodium chloride 0.9%. 1000 milliLiter(s) IV Continuous <Continuous>      FAMILY HISTORY:  Family history of hypertension (Grandparent)  grandmother    Family history of diabetes mellitus (Grandparent, Uncle, Uncle)  grandmother, uncles x 2    Family history of breast cancer (Aunt)  aunt    Family history of cancer (Uncle, Uncle)  uncles x 2    Family history of myocardial infarction (Aunt)  aunt    Family history of cerebrovascular accident (CVA) (Aunt)  aunt        Non-contributory    SOCIAL HISTORY:    [ ] Tobacco  [ ] Drugs  [ ] Alcohol    Allergies    No Known Allergies    Intolerances    	    REVIEW OF SYSTEMS:  CONSTITUTIONAL: No fever  EYES: No eye pain, visual disturbances, or discharge  ENMT:  No difficulty hearing, tinnitus  NECK: No pain or stiffness  RESPIRATORY: No cough, wheezing,  CARDIOVASCULAR: No chest pain, palpitations, passing out, dizziness, or leg swelling  GASTROINTESTINAL:  No nausea, vomiting, diarrhea or constipation. No melena.  GENITOURINARY: No dysuria, hematuria  NEUROLOGICAL: No stroke like symptoms  SKIN: No burning or lesions   ENDOCRINE: No heat or cold intolerance  MUSCULOSKELETAL: No joint pain or swelling  PSYCHIATRIC: No  anxiety, mood swings  HEME/LYMPH: No bleeding gums  ALLERGY AND IMMUNOLOGIC: No hives or eczema	    All other ROS negative    PHYSICAL EXAM:  T(C): 36.5 (06-17-22 @ 06:51), Max: 36.9 (06-16-22 @ 18:52)  HR: 79 (06-17-22 @ 06:51) (79 - 91)  BP: 154/86 (06-17-22 @ 06:51) (130/74 - 154/86)  RR: 16 (06-17-22 @ 06:51) (16 - 20)  SpO2: 100% (06-17-22 @ 06:51) (97% - 100%)  Wt(kg): --  I&O's Summary      Appearance: Normal	  HEENT:   Normal oral mucosa, EOMI	  Cardiovascular:  S1 S2, No JVD,    Respiratory: Lungs clear to auscultation	  Psychiatry: Alert  Gastrointestinal:  Soft, Non-tender, + BS	  Skin: No rashes   Neurologic: Non-focal  Extremities:  No edema  Vascular: Peripheral pulses palpable    	    	  	  CARDIAC MARKERS:  Labs personally reviewed by me                                  12.4   7.69  )-----------( 466      ( 17 Jun 2022 08:34 )             38.9     06-16    138  |  100  |  18  ----------------------------<  134<H>  3.1<L>   |  25  |  0.92    Ca    9.7      16 Jun 2022 21:10  Mg     2.10     06-16    TPro  8.2  /  Alb  4.4  /  TBili  0.2  /  DBili  x   /  AST  31  /  ALT  57<H>  /  AlkPhos  201<H>  06-16          EKG: Personally reviewed by me - 89 bpm SR   Radiology: Personally reviewed by me -   < from: CT Angio Chest PE Protocol w/ IV Cont (06.17.22 @ 01:54) >    No pulmonary embolism.    New 1.5 cm spiculated right upper lobe nodule highly suspicious for   malignancy.    Indeterminant 7 mm lingular groundglass nodule, also new.    Few scattered nonspecific right apical groundglass opacities.    Recommend outpatient thoracic surgery consult, at the very least,   short-term follow-up chest CT; also consider contrast-enhanced CT, PET   CT, and/or needle biopsy.    < end of copied text >  < from: Xray Chest 2 Views PA/Lat (06.16.22 @ 21:20) >  IMPRESSION: Clear lungs.    < end of copied text >  < from: Transthoracic Echocardiogram (06.17.22 @ 06:37) >  Technically difficult study.  1. Normal mitral valve. Minimal mitral regurgitation.  2. Endocardium not well visualized; grossly normal left  ventricular systolic function.  3. The right ventricle is not well visualized; grossly  normal right ventricular systolic function.    < end of copied text >      Assessment /Plan:   56 Y/O M Y/O F PMH HIV HTN GERD Fibroids S/P hysterectomy C/O CP. Pt states the pain is in her chest and back and is constant. Pt states that she has been having constant pain. Pt states it is difficult to sleep due to the pain. Pt reports feeling tired after she wakes up and hasn't been getting restful sleep, maybe snores at night per pt. She notices BAHENA with walking up stairs and 2-4 blocks when she would need to stop to catch her breath. Pt denies SOB, recent travel or leg swelling. Pt states she has had stress testing but states it was years ago. States her aunt had a fatal heart attack but states she was in her 70s. Pt denies any other sx or acute complaints. Pt reports hx of smoking1-3 packs of cigarettes for about 16 years and quit 2.5 years ago.      Problem/Recommendation - 1:  ·  Problem: Chest Pain   - Pt has risk factors for CAD such as HTN, HLD. high BMI and hx of smoking   - TTE results as noted above shows grossly normal LV/RVF   - CXR negative   - EKG noted   - Trops negative   - CT chest negative for PE but shows lung nodule  - sat well on RA   - Pain is now 6/10 compared to 10/10 during admission   - NST results pending      Problem/Recommendation - 2:  ·  Problem: HTN   - c/w home meds  - monitor BP      Problem/Recommendation - 3:  ·  Problem: HLD  - c/w statin   - Lipid panel noted      Problem/Recommendation - 4:  ·  Problem: Hypokalemia   - K+ 3.8 now wnl    - replete electrolytes to maintain K+>4         Chris SIMONS-BC   Blaine Moreno DO [facility]  Cardiovascular Medicine  [address] [place] [address], [address]  Office [phone]

## 2022-06-17 NOTE — ED CDU PROVIDER DISPOSITION NOTE - CLINICAL COURSE
Pt is a 56 y/o F PMhx HIV, HTN GERD, fibroids p/w chest pain x months.  Pt was placed in CDU for stress test and echocardiogram.  Pt hat CTA of chest with iv contrast with following impression: "No pulmonary embolism. New 1.5 cm spiculated right upper lobe nodule highly suspicious for malignancy. Indeterminant 7 mm lingular groundglass nodule, also new. Few scattered nonspecific right apical groundglass opacities.Recommend outpatient thoracic surgery consult, at the very least, short-term follow-up chest CT; also consider contrast-enhanced CT, PET CT, and/or needle biopsy."  Echocardiogram was unremarkable.  Stress test was -----.   Cardiology recommends ------.  Pt was discharged with instructions to follow up with pmd, cardiology, GI and cardiothoracic surgery. Pt is a 56 y/o F PMhx HIV, HTN GERD, fibroids p/w chest pain x months.  Pt was placed in CDU for stress test and echocardiogram.  Pt hat CTA of chest with iv contrast with following impression: "No pulmonary embolism. New 1.5 cm spiculated right upper lobe nodule highly suspicious for malignancy. Indeterminant 7 mm lingular groundglass nodule, also new. Few scattered nonspecific right apical groundglass opacities.Recommend outpatient thoracic surgery consult, at the very least, short-term follow-up chest CT; also consider contrast-enhanced CT, PET CT, and/or needle biopsy."  Echocardiogram was unremarkable.  Stress test negative.  Case discussed with MARY LOU Perez who agrees with outpatient cardiology follow up. Pt was discharged with instructions to follow up with pmd, cardiology, GI and cardiothoracic surgery.

## 2022-06-17 NOTE — ED CDU PROVIDER DISPOSITION NOTE - NSFOLLOWUPINSTRUCTIONS_ED_ALL_ED_FT
Advance activity as tolerated.  Continue all previously prescribed medications as directed unless otherwise instructed.  Follow up with your primary care physician, gastroenterology, cardiology (referral list provided or you may call [phone] to schedule an appointment ASAP. Most patients can be seen within 48 hours. Mention that you were just discharged from the emergency room and need to be seen immediately.; or you may call [phone] to make an appointment with the cardiology clinic) and cardiothoracic surgery (referral list provided) in 48-72 hours- bring copies of your results.  Return to the ER for worsening or persistent symptoms, including but not limited to worsening/persistent pain, shortness of breath, lightheadedness, passing out, coughing up blood, and/or ANY NEW OR CONCERNING SYMPTOMS. If you have issues obtaining follow up, please call: [phone] to obtain a doctor or specialist who takes your insurance in your area.  You may call [phone] to make an appointment with the internal medicine clinic.

## 2022-06-17 NOTE — ED CDU PROVIDER SUBSEQUENT DAY NOTE - PROGRESS NOTE DETAILS
CT findings of "New 1.5 cm spiculated right upper lobe nodule highly suspicious for   malignancy" discussed with patient at this time.  Pt was directed to have close outpatient follow up with CT surgery.  Pt agrees with outpatient plan to evaluate nodule. Stress test negative.  Case discussed with MARY LOU Perez who agrees with outpatient cardiology follow up.  Pt medically stable for discharge.  Strict return precautions given.  pt to follow up with PMD, cards, and ct surgery.

## 2022-06-17 NOTE — ED CDU PROVIDER DISPOSITION NOTE - PATIENT PORTAL LINK FT
You can access the FollowMyHealth Patient Portal offered by [facility] by registering at the following website: [email or web address]. By joining [employer]’s FollowMyHealth portal, you will also be able to view your health information using other applications (apps) compatible with our system.

## 2022-06-17 NOTE — ED CDU PROVIDER SUBSEQUENT DAY NOTE - MEDICAL DECISION MAKING DETAILS
Pt is a 56 y/o F PMhx HIV, HTN GERD, fibroids p/w chest pain x months. -- ACS ruled out; possibly due to known diagnosis of GERD; CT with following impression: "New 1.5 cm spiculated right upper lobe nodule highly suspicious for   malignancy" -- follow up stress test results, pepcid, outpatient CT surgery evaluation for lung nodule

## 2022-06-17 NOTE — ED CDU PROVIDER SUBSEQUENT DAY NOTE - ATTENDING APP SHARED VISIT CONTRIBUTION OF CARE
davide: pt awake and alert and pleasantly interactive.  sent to CDU for eval of ongoing chest pain, cards involved.  of note, pt wiuth HIV and new finding ct detailed above concerning for malignancy.  pt aware of this information and will have outpt f/u.  TB test also sent given HIV dx and chest finding.  cards eval neg    I performed a history and physical exam of the patient and discussed their management with the resident and /or advanced care provider. I reviewed the resident and /or ACP's note and agree with the documented findings and plan of care. My medical decison making and observations are found above.

## 2022-06-17 NOTE — ED CDU PROVIDER SUBSEQUENT DAY NOTE - HISTORY
Pt is a 58 y/o F PMhx HIV, HTN GERD, fibroids p/w chest pain x months.  Pt reports she develops midsternal nonradiating, nonpleuritic, nonexertional chest burning associated with feeling of heartburn, which occurs at times when laying down and at times with PO intake.  Pt reports she was told by a gastroenterologist she has "acid reflux" for which she was prescribed omeprazole.  Pt denies any fevers, chills, jaw/arm/neck/back/abdominal pain, SOB, palpitations, numbness, weakness.

## 2022-06-20 LAB
GAMMA INTERFERON BACKGROUND BLD IA-ACNC: 0.52 IU/ML — SIGNIFICANT CHANGE UP
M TB IFN-G BLD-IMP: NEGATIVE — SIGNIFICANT CHANGE UP
M TB IFN-G CD4+ BCKGRND COR BLD-ACNC: 0.05 IU/ML — SIGNIFICANT CHANGE UP
M TB IFN-G CD4+CD8+ BCKGRND COR BLD-ACNC: 0.03 IU/ML — SIGNIFICANT CHANGE UP
QUANT TB PLUS MITOGEN MINUS NIL: 9.93 IU/ML — SIGNIFICANT CHANGE UP

## 2022-08-30 ENCOUNTER — EMERGENCY (EMERGENCY)
Facility: HOSPITAL | Age: 57
LOS: 1 days | Discharge: HOME | End: 2022-08-30
Attending: EMERGENCY MEDICINE | Admitting: EMERGENCY MEDICINE

## 2022-08-30 VITALS
TEMPERATURE: 98 F | DIASTOLIC BLOOD PRESSURE: 68 MMHG | RESPIRATION RATE: 17 BRPM | SYSTOLIC BLOOD PRESSURE: 116 MMHG | OXYGEN SATURATION: 100 % | HEART RATE: 68 BPM

## 2022-08-30 VITALS
RESPIRATION RATE: 18 BRPM | OXYGEN SATURATION: 99 % | DIASTOLIC BLOOD PRESSURE: 67 MMHG | HEIGHT: 67 IN | TEMPERATURE: 98 F | HEART RATE: 85 BPM | SYSTOLIC BLOOD PRESSURE: 121 MMHG

## 2022-08-30 DIAGNOSIS — Z90.710 ACQUIRED ABSENCE OF BOTH CERVIX AND UTERUS: Chronic | ICD-10-CM

## 2022-08-30 LAB
ALBUMIN SERPL ELPH-MCNC: 4.1 G/DL — SIGNIFICANT CHANGE UP (ref 3.3–5)
ALP SERPL-CCNC: 162 U/L — HIGH (ref 40–120)
ALT FLD-CCNC: 54 U/L — HIGH (ref 4–33)
ANION GAP SERPL CALC-SCNC: 11 MMOL/L — SIGNIFICANT CHANGE UP (ref 7–14)
AST SERPL-CCNC: 41 U/L — HIGH (ref 4–32)
BASOPHILS # BLD AUTO: 0.06 K/UL — SIGNIFICANT CHANGE UP (ref 0–0.2)
BASOPHILS NFR BLD AUTO: 0.7 % — SIGNIFICANT CHANGE UP (ref 0–2)
BILIRUB SERPL-MCNC: 0.3 MG/DL — SIGNIFICANT CHANGE UP (ref 0.2–1.2)
BUN SERPL-MCNC: 14 MG/DL — SIGNIFICANT CHANGE UP (ref 7–23)
CALCIUM SERPL-MCNC: 9.4 MG/DL — SIGNIFICANT CHANGE UP (ref 8.4–10.5)
CHLORIDE SERPL-SCNC: 100 MMOL/L — SIGNIFICANT CHANGE UP (ref 98–107)
CO2 SERPL-SCNC: 25 MMOL/L — SIGNIFICANT CHANGE UP (ref 22–31)
CREAT SERPL-MCNC: 0.86 MG/DL — SIGNIFICANT CHANGE UP (ref 0.5–1.3)
EGFR: 79 ML/MIN/1.73M2 — SIGNIFICANT CHANGE UP
EOSINOPHIL # BLD AUTO: 0.17 K/UL — SIGNIFICANT CHANGE UP (ref 0–0.5)
EOSINOPHIL NFR BLD AUTO: 1.9 % — SIGNIFICANT CHANGE UP (ref 0–6)
FLUAV AG NPH QL: SIGNIFICANT CHANGE UP
FLUBV AG NPH QL: SIGNIFICANT CHANGE UP
GLUCOSE SERPL-MCNC: 86 MG/DL — SIGNIFICANT CHANGE UP (ref 70–99)
HCT VFR BLD CALC: 38.9 % — SIGNIFICANT CHANGE UP (ref 34.5–45)
HGB BLD-MCNC: 12.5 G/DL — SIGNIFICANT CHANGE UP (ref 11.5–15.5)
IANC: 4.54 K/UL — SIGNIFICANT CHANGE UP (ref 1.8–7.4)
IMM GRANULOCYTES NFR BLD AUTO: 0.2 % — SIGNIFICANT CHANGE UP (ref 0–1.5)
LIDOCAIN IGE QN: 25 U/L — SIGNIFICANT CHANGE UP (ref 7–60)
LYMPHOCYTES # BLD AUTO: 3.41 K/UL — HIGH (ref 1–3.3)
LYMPHOCYTES # BLD AUTO: 39.1 % — SIGNIFICANT CHANGE UP (ref 13–44)
MCHC RBC-ENTMCNC: 28.3 PG — SIGNIFICANT CHANGE UP (ref 27–34)
MCHC RBC-ENTMCNC: 32.1 GM/DL — SIGNIFICANT CHANGE UP (ref 32–36)
MCV RBC AUTO: 88 FL — SIGNIFICANT CHANGE UP (ref 80–100)
MONOCYTES # BLD AUTO: 0.53 K/UL — SIGNIFICANT CHANGE UP (ref 0–0.9)
MONOCYTES NFR BLD AUTO: 6.1 % — SIGNIFICANT CHANGE UP (ref 2–14)
NEUTROPHILS # BLD AUTO: 4.54 K/UL — SIGNIFICANT CHANGE UP (ref 1.8–7.4)
NEUTROPHILS NFR BLD AUTO: 52 % — SIGNIFICANT CHANGE UP (ref 43–77)
NRBC # BLD: 0 /100 WBCS — SIGNIFICANT CHANGE UP (ref 0–0)
NRBC # FLD: 0 K/UL — SIGNIFICANT CHANGE UP (ref 0–0)
PLATELET # BLD AUTO: 470 K/UL — HIGH (ref 150–400)
POTASSIUM SERPL-MCNC: 3.3 MMOL/L — LOW (ref 3.5–5.3)
POTASSIUM SERPL-SCNC: 3.3 MMOL/L — LOW (ref 3.5–5.3)
PROT SERPL-MCNC: 7.7 G/DL — SIGNIFICANT CHANGE UP (ref 6–8.3)
RBC # BLD: 4.42 M/UL — SIGNIFICANT CHANGE UP (ref 3.8–5.2)
RBC # FLD: 14.5 % — SIGNIFICANT CHANGE UP (ref 10.3–14.5)
RSV RNA NPH QL NAA+NON-PROBE: SIGNIFICANT CHANGE UP
SARS-COV-2 RNA SPEC QL NAA+PROBE: SIGNIFICANT CHANGE UP
SODIUM SERPL-SCNC: 136 MMOL/L — SIGNIFICANT CHANGE UP (ref 135–145)
WBC # BLD: 8.73 K/UL — SIGNIFICANT CHANGE UP (ref 3.8–10.5)
WBC # FLD AUTO: 8.73 K/UL — SIGNIFICANT CHANGE UP (ref 3.8–10.5)

## 2022-08-30 PROCEDURE — 99285 EMERGENCY DEPT VISIT HI MDM: CPT

## 2022-08-30 PROCEDURE — 74177 CT ABD & PELVIS W/CONTRAST: CPT | Mod: 26,MA

## 2022-08-30 PROCEDURE — 76705 ECHO EXAM OF ABDOMEN: CPT | Mod: 26

## 2022-08-30 RX ORDER — ONDANSETRON 8 MG/1
4 TABLET, FILM COATED ORAL ONCE
Refills: 0 | Status: COMPLETED | OUTPATIENT
Start: 2022-08-30 | End: 2022-08-30

## 2022-08-30 RX ORDER — MORPHINE SULFATE 50 MG/1
4 CAPSULE, EXTENDED RELEASE ORAL ONCE
Refills: 0 | Status: DISCONTINUED | OUTPATIENT
Start: 2022-08-30 | End: 2022-08-30

## 2022-08-30 RX ORDER — KETOROLAC TROMETHAMINE 30 MG/ML
15 SYRINGE (ML) INJECTION ONCE
Refills: 0 | Status: DISCONTINUED | OUTPATIENT
Start: 2022-08-30 | End: 2022-08-30

## 2022-08-30 RX ADMIN — MORPHINE SULFATE 4 MILLIGRAM(S): 50 CAPSULE, EXTENDED RELEASE ORAL at 15:09

## 2022-08-30 RX ADMIN — ONDANSETRON 4 MILLIGRAM(S): 8 TABLET, FILM COATED ORAL at 12:52

## 2022-08-30 RX ADMIN — Medication 15 MILLIGRAM(S): at 12:51

## 2022-08-30 RX ADMIN — Medication 15 MILLIGRAM(S): at 14:15

## 2022-08-30 NOTE — ED PROVIDER NOTE - OBJECTIVE STATEMENT
57yF pmhx HIV, HTN, HLD, GERD, currently undergoing workup for suspicious lung lesion awaiting biopsy presenting with right sided abdominal pain x 3 days. Pt states for the past 3 days she has intermittent RUQ pain, worse with movement and deep inspiration, right side pain associated with nausea. She also reports intermittent mild posterior headache, slight dry cough, chills and night sweats. Pt states for the past few months she also has pain in the right groin/lower abdomen, was told by her oncologist "there may be something there from her hysterectomy"? Denies fever, sore throat, rhinorrhea, dizziness, cp, sob, palpitations, vomiting, diarrhea, dysuria, hematuria, vaginal discharge, recent travel or illness, leg pain or swelling or any other concerns.

## 2022-08-30 NOTE — ED ADULT TRIAGE NOTE - CHIEF COMPLAINT QUOTE
p/.t c/o of rt sided abd pain with nausea and headaches for 3 days, denies any vomiting, denies dysuria

## 2022-08-30 NOTE — ED PROVIDER NOTE - NSFOLLOWUPINSTRUCTIONS_ED_ALL_ED_FT
Follow up with your primary care doctor within 1 week  Take Ibuprofen 600mg every 6-8 hours as needed for pain, take with food  -You can also take Tylenol 650mg every 6 hours  Drink plenty of fluids  Return to the ER with any worsening or concerning symptoms, increased pain, nausea, vomiting, fever/chills, weakness or any other concerns.

## 2022-08-30 NOTE — ED PROVIDER NOTE - CLINICAL SUMMARY MEDICAL DECISION MAKING FREE TEXT BOX
57yF pmhx HIV, HTN, HLD, GERD, currently undergoing workup for suspicious lung lesion awaiting biopsy presenting with right sided abdominal pain x 3 days a/w nausea, headache, chills. On exam pt is well appearing, afebrile, +ttp RUQ. Concern for gallstones, acute pedro, viral illness, given complaints of intermittent RLQ pain will r/o appy, abd met? Plan: cbc/cmp/lipase, ua/ucx, RUQ US, CT abd/pelvis, pain control.

## 2022-08-30 NOTE — ED PROVIDER NOTE - PATIENT PORTAL LINK FT
You can access the FollowMyHealth Patient Portal offered by [facility] by registering at the following website: [email or web address]. By joining [employer]’s FollowMyHealth portal, you will also be able to view your health information using other applications (apps) compatible with our system.

## 2022-08-30 NOTE — ED ADULT NURSE NOTE - NSFALLRSKASSESSTYPE_ED_ALL_ED
[FreeTextEntry1] : 67 yr F with hiatal hernia, HTN referred for R adrenal lesion incidentally found on CT Abdomen\par CT Apr 2021: 3.9 X 3.7 cm mass with eccentric calcification which may represent adenoma vs myolipoma\par Patient has never previously been told of adrenal disorder\par No history of uncontrolled HTN, does have LE swelling\par No Hx of palpitations diaphoresis, headaches in episodic spells\par Has some central obesity but no easy bruising or bleeding, mood changes, proximal muscle weakness\par She does not have a history of thyroid disorder\par No hair growth, acne or change in voice\par Her current meds include: cardizem, prilosec, sucralfate, Vit C, D, glucosamine Initial (On Arrival)

## 2022-08-30 NOTE — ED PROVIDER NOTE - PROGRESS NOTE DETAILS
KRISTYN Nicole: CT without acute abnormality, US normal, labs reviewed. Pt reports improvement in pain, will d/c home with PMD follow up. Pt will return to the ER with any worsening or concerning symptoms.

## 2022-12-05 ENCOUNTER — EMERGENCY (EMERGENCY)
Facility: HOSPITAL | Age: 57
LOS: 1 days | Discharge: HOME | End: 2022-12-05
Attending: EMERGENCY MEDICINE | Admitting: EMERGENCY MEDICINE

## 2022-12-05 VITALS
RESPIRATION RATE: 18 BRPM | DIASTOLIC BLOOD PRESSURE: 81 MMHG | OXYGEN SATURATION: 99 % | TEMPERATURE: 98 F | HEART RATE: 91 BPM | SYSTOLIC BLOOD PRESSURE: 138 MMHG

## 2022-12-05 VITALS
SYSTOLIC BLOOD PRESSURE: 126 MMHG | OXYGEN SATURATION: 97 % | DIASTOLIC BLOOD PRESSURE: 86 MMHG | HEART RATE: 96 BPM | RESPIRATION RATE: 17 BRPM

## 2022-12-05 DIAGNOSIS — Z90.710 ACQUIRED ABSENCE OF BOTH CERVIX AND UTERUS: Chronic | ICD-10-CM

## 2022-12-05 LAB
ALBUMIN SERPL ELPH-MCNC: 4.1 G/DL — SIGNIFICANT CHANGE UP (ref 3.3–5)
ALP SERPL-CCNC: 227 U/L — HIGH (ref 40–120)
ALT FLD-CCNC: 31 U/L — SIGNIFICANT CHANGE UP (ref 4–33)
ANION GAP SERPL CALC-SCNC: 14 MMOL/L — SIGNIFICANT CHANGE UP (ref 7–14)
AST SERPL-CCNC: 20 U/L — SIGNIFICANT CHANGE UP (ref 4–32)
BASE EXCESS BLDV CALC-SCNC: 3.7 MMOL/L — HIGH (ref -2–3)
BASOPHILS # BLD AUTO: 0.08 K/UL — SIGNIFICANT CHANGE UP (ref 0–0.2)
BASOPHILS NFR BLD AUTO: 0.6 % — SIGNIFICANT CHANGE UP (ref 0–2)
BILIRUB SERPL-MCNC: 0.4 MG/DL — SIGNIFICANT CHANGE UP (ref 0.2–1.2)
BLOOD GAS VENOUS COMPREHENSIVE RESULT: SIGNIFICANT CHANGE UP
BUN SERPL-MCNC: 15 MG/DL — SIGNIFICANT CHANGE UP (ref 7–23)
CALCIUM SERPL-MCNC: 10.1 MG/DL — SIGNIFICANT CHANGE UP (ref 8.4–10.5)
CHLORIDE BLDV-SCNC: 101 MMOL/L — SIGNIFICANT CHANGE UP (ref 96–108)
CHLORIDE SERPL-SCNC: 95 MMOL/L — LOW (ref 98–107)
CO2 BLDV-SCNC: 29.8 MMOL/L — HIGH (ref 22–26)
CO2 SERPL-SCNC: 23 MMOL/L — SIGNIFICANT CHANGE UP (ref 22–31)
CREAT SERPL-MCNC: 0.81 MG/DL — SIGNIFICANT CHANGE UP (ref 0.5–1.3)
EGFR: 85 ML/MIN/1.73M2 — SIGNIFICANT CHANGE UP
EOSINOPHIL # BLD AUTO: 0.24 K/UL — SIGNIFICANT CHANGE UP (ref 0–0.5)
EOSINOPHIL NFR BLD AUTO: 1.9 % — SIGNIFICANT CHANGE UP (ref 0–6)
GAS PNL BLDV: 132 MMOL/L — LOW (ref 136–145)
GLUCOSE BLDV-MCNC: 109 MG/DL — HIGH (ref 70–99)
GLUCOSE SERPL-MCNC: 108 MG/DL — HIGH (ref 70–99)
HCO3 BLDV-SCNC: 28 MMOL/L — SIGNIFICANT CHANGE UP (ref 22–29)
HCT VFR BLD CALC: 39.7 % — SIGNIFICANT CHANGE UP (ref 34.5–45)
HCT VFR BLDA CALC: 39 % — SIGNIFICANT CHANGE UP (ref 34.5–46.5)
HGB BLD CALC-MCNC: 13 G/DL — SIGNIFICANT CHANGE UP (ref 11.5–15.5)
HGB BLD-MCNC: 12.8 G/DL — SIGNIFICANT CHANGE UP (ref 11.5–15.5)
IANC: 8.63 K/UL — HIGH (ref 1.8–7.4)
IMM GRANULOCYTES NFR BLD AUTO: 0.3 % — SIGNIFICANT CHANGE UP (ref 0–0.9)
LACTATE BLDV-MCNC: 1.6 MMOL/L — SIGNIFICANT CHANGE UP (ref 0.5–2)
LYMPHOCYTES # BLD AUTO: 2.71 K/UL — SIGNIFICANT CHANGE UP (ref 1–3.3)
LYMPHOCYTES # BLD AUTO: 22 % — SIGNIFICANT CHANGE UP (ref 13–44)
MCHC RBC-ENTMCNC: 28.1 PG — SIGNIFICANT CHANGE UP (ref 27–34)
MCHC RBC-ENTMCNC: 32.2 GM/DL — SIGNIFICANT CHANGE UP (ref 32–36)
MCV RBC AUTO: 87.1 FL — SIGNIFICANT CHANGE UP (ref 80–100)
MONOCYTES # BLD AUTO: 0.64 K/UL — SIGNIFICANT CHANGE UP (ref 0–0.9)
MONOCYTES NFR BLD AUTO: 5.2 % — SIGNIFICANT CHANGE UP (ref 2–14)
NEUTROPHILS # BLD AUTO: 8.63 K/UL — HIGH (ref 1.8–7.4)
NEUTROPHILS NFR BLD AUTO: 70 % — SIGNIFICANT CHANGE UP (ref 43–77)
NRBC # BLD: 0 /100 WBCS — SIGNIFICANT CHANGE UP (ref 0–0)
NRBC # FLD: 0 K/UL — SIGNIFICANT CHANGE UP (ref 0–0)
NT-PROBNP SERPL-SCNC: 337 PG/ML — HIGH
PCO2 BLDV: 43 MMHG — HIGH (ref 39–42)
PH BLDV: 7.43 — SIGNIFICANT CHANGE UP (ref 7.32–7.43)
PLATELET # BLD AUTO: 612 K/UL — HIGH (ref 150–400)
PO2 BLDV: 53 MMHG — SIGNIFICANT CHANGE UP
POTASSIUM BLDV-SCNC: 4.2 MMOL/L — SIGNIFICANT CHANGE UP (ref 3.5–5.1)
POTASSIUM SERPL-MCNC: 3.5 MMOL/L — SIGNIFICANT CHANGE UP (ref 3.5–5.3)
POTASSIUM SERPL-SCNC: 3.5 MMOL/L — SIGNIFICANT CHANGE UP (ref 3.5–5.3)
PROT SERPL-MCNC: 8.6 G/DL — HIGH (ref 6–8.3)
RBC # BLD: 4.56 M/UL — SIGNIFICANT CHANGE UP (ref 3.8–5.2)
RBC # FLD: 14.3 % — SIGNIFICANT CHANGE UP (ref 10.3–14.5)
SAO2 % BLDV: 83.1 % — SIGNIFICANT CHANGE UP
SODIUM SERPL-SCNC: 132 MMOL/L — LOW (ref 135–145)
TROPONIN T, HIGH SENSITIVITY RESULT: 10 NG/L — SIGNIFICANT CHANGE UP
WBC # BLD: 12.34 K/UL — HIGH (ref 3.8–10.5)
WBC # FLD AUTO: 12.34 K/UL — HIGH (ref 3.8–10.5)

## 2022-12-05 PROCEDURE — 99284 EMERGENCY DEPT VISIT MOD MDM: CPT

## 2022-12-05 PROCEDURE — 71275 CT ANGIOGRAPHY CHEST: CPT | Mod: 26,MA

## 2022-12-05 RX ORDER — FAMOTIDINE 10 MG/ML
40 INJECTION INTRAVENOUS ONCE
Refills: 0 | Status: COMPLETED | OUTPATIENT
Start: 2022-12-05 | End: 2022-12-05

## 2022-12-05 RX ORDER — KETOROLAC TROMETHAMINE 30 MG/ML
15 SYRINGE (ML) INJECTION ONCE
Refills: 0 | Status: DISCONTINUED | OUTPATIENT
Start: 2022-12-05 | End: 2022-12-05

## 2022-12-05 RX ORDER — OXYCODONE AND ACETAMINOPHEN 5; 325 MG/1; MG/1
1 TABLET ORAL
Qty: 8 | Refills: 0
Start: 2022-12-05 | End: 2022-12-06

## 2022-12-05 RX ORDER — IPRATROPIUM/ALBUTEROL SULFATE 18-103MCG
3 AEROSOL WITH ADAPTER (GRAM) INHALATION ONCE
Refills: 0 | Status: COMPLETED | OUTPATIENT
Start: 2022-12-05 | End: 2022-12-05

## 2022-12-05 RX ORDER — ALBUTEROL 90 UG/1
2 AEROSOL, METERED ORAL ONCE
Refills: 0 | Status: DISCONTINUED | OUTPATIENT
Start: 2022-12-05 | End: 2022-12-05

## 2022-12-05 RX ADMIN — Medication 3 MILLILITER(S): at 12:08

## 2022-12-05 RX ADMIN — FAMOTIDINE 40 MILLIGRAM(S): 10 INJECTION INTRAVENOUS at 15:08

## 2022-12-05 RX ADMIN — Medication 15 MILLIGRAM(S): at 11:43

## 2022-12-05 NOTE — ED PROVIDER NOTE - CLINICAL SUMMARY MEDICAL DECISION MAKING FREE TEXT BOX
Martine CAMEJO PGY-3: 58 yo F hx asthma, former smoker with lung cancer s/p recent lobectomy p/w SOB and mild chest pain, with feeling of mucus stuck in chest. VSS, well-appearing. No hemoptysis to suggest concerning post-op etiologies such as pulmonary hemorrhage or AV malformation. Low suspicion for PE given no leg pain/swelling/recent travel, and pt w/ normal ambulatory status. WIll evalute for ACS given midsternal chest pain, although diagnosis most likely 2/2 recent operation. Will obtain ACS workup, CT chest, dispo pending results. Anticipate d/c with outpatietn follow up if testign wnl. Martine CAMEJO PGY-3: 56 yo F hx asthma, former smoker with lung cancer s/p recent lobectomy p/w SOB and mild chest pain, with feeling of mucus stuck in chest. VSS, well-appearing. No hemoptysis to suggest concerning post-op etiologies such as pulmonary hemorrhage or AV malformation. Will evaluate for PE with CTA given hx maliganancy and recent surgery. WIll evalute for ACS given midsternal chest pain, although diagnosis most likely 2/2 recent operation. Will obtain ACS workup, CT chest, dispo pending results. Anticipate d/c with outpatietn follow up if testign wnl.

## 2022-12-05 NOTE — ED PROVIDER NOTE - PATIENT PORTAL LINK FT
You can access the FollowMyHealth Patient Portal offered by [facility] by registering at the following website: [email or web address]. By joining [employer]’s FollowMyHealth portal, you will also be able to view your health information using other applications (apps) compatible with our system.

## 2022-12-05 NOTE — ED PROVIDER NOTE - OBJECTIVE STATEMENT
58 yo F hx asthma, HTN, former smoker, lung cancer s/p lobectomy 11/22/22 at [facility], HIV on ART (last CD4 1,000 last month w/undetectable viral load) presenting with SOB and chest pain x 2  days. Patient complaining of mild midsternal, non-exertional chest pain and feels like there is some mucus stuck in her chest. She denies any hemoptysis. +mild cough. No nausea/vomiting, fever or chills. Pt had surgery at [place] Cat Norris [phone].     ESTELADA No

## 2022-12-05 NOTE — ED ADULT TRIAGE NOTE - CHIEF COMPLAINT QUOTE
Pt. c/o sob, cough and chest congestion x few days. h/o Lung Cancer, asthma, HTN. States she had a lobectomy on 11/22.

## 2022-12-05 NOTE — ED PROVIDER NOTE - ATTENDING CONTRIBUTION TO CARE
57 year old with ru lobectomy 2 weeks ago presents with sob. concern for pe vs pna vs abscess vs covid vs viral syndrome. labs cta cxr reassess

## 2022-12-05 NOTE — ED PROVIDER NOTE - PROGRESS NOTE DETAILS
Martine CAMEJO PGY-3: Pt was re-evaluated at bedside, VSS, feeling well overall. Return precautions and follow up plan with PCP and/or specialist were discussed. Time was taken to answer any questions that the patient had before providing them with discharge paperwork.

## 2022-12-05 NOTE — ED PROVIDER NOTE - NSFOLLOWUPINSTRUCTIONS_ED_ALL_ED_FT
- Follow up with primary care doctor in 3-5days. Bring paperwork with you to appointment.   - Take pepcid 20 mg twice a day for acid reflux  - You can take tylenol 650 mg every 6 hours for chest pain. If tylenol insufficient to control your pain you can take percocet - do not drink or drive while using this medication.     [facility]  General Internal Medicine  [address] [place] [address]  Phone: [phone]  Fax:     [facility]  Internal Medicine  [address] [place] [address]  Phone: [phone]  Fax: [phone]    [facility]  Cardiology  [address] [place] [address]  Phone: [phone]    Chest Pain  WHAT YOU NEED TO KNOW:  Chest pain can be caused by a range of conditions, from not serious to life-threatening. Chest pain can be a symptom of a digestive problem, such as acid reflux or a stomach ulcer. An anxiety attack or a strong emotion, such as anger, can also cause chest pain. Infection, inflammation, or a fracture in the bones or cartilage in your chest can cause pain or discomfort. Sometimes chest pain or pressure is caused by poor blood flow to your heart (angina). Chest pain may also be caused by life-threatening conditions such as a heart attack or blood clot in your lungs.   DISCHARGE INSTRUCTIONS:  Call 911 if:   •You have any of the following signs of a heart attack: ?Squeezing, pressure, or pain in your chest  ?You may also have any of the following: ?Discomfort or pain in your back, neck, jaw, stomach, or arm  ?Shortness of breath  ?Nausea or vomiting  ?Lightheadedness or a sudden cold sweat  Seek care immediately if:   •You have chest discomfort that gets worse, even with medicine.  •You cough or vomit blood.   •Your bowel movements are black or bloody.   •You cannot stop vomiting, or it hurts to swallow.   Contact your healthcare provider if:   •You have questions or concerns about your condition or care.  Medicines:   •Medicines may be given to treat the cause of your chest pain. Examples include pain medicine, anxiety medicine, or medicines to increase blood flow to your heart.   •Do not take certain medicines without asking your healthcare provider first. These include NSAIDs, herbal or vitamin supplements, or hormones (estrogen or progestin).   •Take your medicine as directed. Contact your healthcare provider if you think your medicine is not helping or if you have side effects. Tell him or her if you are allergic to any medicine. Keep a list of the medicines, vitamins, and herbs you take. Include the amounts, and when and why you take them. Bring the list or the pill bottles to follow-up visits. Carry your medicine list with you in case of an emergency.  Follow up with your healthcare provider within 72 hours, or as directed: You may need to return for more tests to find the cause of your chest pain. You may be referred to a specialist, such as a cardiologist or gastroenterologist. Write down your questions so you remember to ask them during your visits.   Healthy living tips: The following are general healthy guidelines. If your chest pain is caused by a heart problem, your healthcare provider will give you specific guidelines to follow.  •Do not smoke. Nicotine and other chemicals in cigarettes and cigars can cause lung and heart damage. Ask your healthcare provider for information if you currently smoke and need help to quit. E-cigarettes or smokeless tobacco still contain nicotine. Talk to your healthcare provider before you use these products.   •Eat a variety of healthy, low-fat, low-salt foods. Healthy foods include fruits, vegetables, whole-grain breads, low-fat dairy products, beans, lean meats, and fish. Ask for more information about a heart healthy diet.  •Drink plenty of water every day. Your body is made of mostly water. Water helps your body to control temperature and blood pressure. Ask your healthcare provider how much water you should drink every day.  •Ask about activity. Your healthcare provider will tell you which activities to limit or avoid. Ask when you can drive, return to work, and have sex. Ask about the best exercise plan for you.  •Maintain a healthy weight. Ask your healthcare provider how much you should weigh. Ask him or her to help you create a weight loss plan if you are overweight.   If you have a stent:   •Carry your stent card with you at all times.   •Let all healthcare providers know that you have a stent.     [facility] Internal Medicine  General Internal Medicine  [address] [place] [address]  Phone: [phone]  Fax:     [facility]  Internal Medicine  [address] [place] [address]  Phone: [phone]  Fax: [phone]    [facility]  Cardiology  [address] [place] [address]  Phone: [phone]    Chest Pain  WHAT YOU NEED TO KNOW:  Chest pain can be caused by a range of conditions, from not serious to life-threatening. Chest pain can be a symptom of a digestive problem, such as acid reflux or a stomach ulcer. An anxiety attack or a strong emotion, such as anger, can also cause chest pain. Infection, inflammation, or a fracture in the bones or cartilage in your chest can cause pain or discomfort. Sometimes chest pain or pressure is caused by poor blood flow to your heart (angina). Chest pain may also be caused by life-threatening conditions such as a heart attack or blood clot in your lungs.   DISCHARGE INSTRUCTIONS:  Call 911 if:   •You have any of the following signs of a heart attack: ?Squeezing, pressure, or pain in your chest  ?You may also have any of the following: ?Discomfort or pain in your back, neck, jaw, stomach, or arm  ?Shortness of breath  ?Nausea or vomiting  ?Lightheadedness or a sudden cold sweat  Seek care immediately if:   •You have chest discomfort that gets worse, even with medicine.  •You cough or vomit blood.   •Your bowel movements are black or bloody.   •You cannot stop vomiting, or it hurts to swallow.   Contact your healthcare provider if:   •You have questions or concerns about your condition or care.  Medicines:   •Medicines may be given to treat the cause of your chest pain. Examples include pain medicine, anxiety medicine, or medicines to increase blood flow to your heart.   •Do not take certain medicines without asking your healthcare provider first. These include NSAIDs, herbal or vitamin supplements, or hormones (estrogen or progestin).   •Take your medicine as directed. Contact your healthcare provider if you think your medicine is not helping or if you have side effects. Tell him or her if you are allergic to any medicine. Keep a list of the medicines, vitamins, and herbs you take. Include the amounts, and when and why you take them. Bring the list or the pill bottles to follow-up visits. Carry your medicine list with you in case of an emergency.  Follow up with your healthcare provider within 72 hours, or as directed: You may need to return for more tests to find the cause of your chest pain. You may be referred to a specialist, such as a cardiologist or gastroenterologist. Write down your questions so you remember to ask them during your visits.   Healthy living tips: The following are general healthy guidelines. If your chest pain is caused by a heart problem, your healthcare provider will give you specific guidelines to follow.  •Do not smoke. Nicotine and other chemicals in cigarettes and cigars can cause lung and heart damage. Ask your healthcare provider for information if you currently smoke and need help to quit. E-cigarettes or smokeless tobacco still contain nicotine. Talk to your healthcare provider before you use these products.   •Eat a variety of healthy, low-fat, low-salt foods. Healthy foods include fruits, vegetables, whole-grain breads, low-fat dairy products, beans, lean meats, and fish. Ask for more information about a heart healthy diet.  •Drink plenty of water every day. Your body is made of mostly water. Water helps your body to control temperature and blood pressure. Ask your healthcare provider how much water you should drink every day.  •Ask about activity. Your healthcare provider will tell you which activities to limit or avoid. Ask when you can drive, return to work, and have sex. Ask about the best exercise plan for you.  •Maintain a healthy weight. Ask your healthcare provider how much you should weigh. Ask him or her to help you create a weight loss plan if you are overweight.   If you have a stent:   •Carry your stent card with you at all times.   •Let all healthcare providers know that you have a stent.

## 2022-12-05 NOTE — ED ADULT NURSE REASSESSMENT NOTE - NS ED NURSE REASSESS COMMENT FT1
Pt ambulatory with steady gait. Breathing even and unlabored on RA. NSR on CM. Pt discharged. IV removed. Awaiting papers.

## 2022-12-05 NOTE — ED ADULT NURSE NOTE - OBJECTIVE STATEMENT
Pt received to room 1 A&OX4 ambulatory c/o SOB x 1 week. Pt s/p R lobectomy 11/2022. Pt denies CP, chills/fevers, SOB at rest. Breathing even and unlabored at rest. O2 sat 99% on RA. NSR on CM. 20G IV placed in L forearm. Labs drawn and sent. Medicated as per EMAR. Family at bedside. Awaiting CTA.

## 2023-01-30 ENCOUNTER — EMERGENCY (EMERGENCY)
Facility: HOSPITAL | Age: 58
LOS: 1 days | Discharge: HOME | End: 2023-01-30
Attending: STUDENT IN AN ORGANIZED HEALTH CARE EDUCATION/TRAINING PROGRAM | Admitting: STUDENT IN AN ORGANIZED HEALTH CARE EDUCATION/TRAINING PROGRAM
Payer: MEDICAID

## 2023-01-30 VITALS
OXYGEN SATURATION: 100 % | HEART RATE: 81 BPM | DIASTOLIC BLOOD PRESSURE: 77 MMHG | RESPIRATION RATE: 20 BRPM | SYSTOLIC BLOOD PRESSURE: 140 MMHG | TEMPERATURE: 98 F

## 2023-01-30 VITALS
OXYGEN SATURATION: 99 % | HEART RATE: 89 BPM | RESPIRATION RATE: 18 BRPM | DIASTOLIC BLOOD PRESSURE: 101 MMHG | SYSTOLIC BLOOD PRESSURE: 156 MMHG | TEMPERATURE: 99 F

## 2023-01-30 DIAGNOSIS — Z90.710 ACQUIRED ABSENCE OF BOTH CERVIX AND UTERUS: Chronic | ICD-10-CM

## 2023-01-30 DIAGNOSIS — Z90.2 ACQUIRED ABSENCE OF LUNG [PART OF]: Chronic | ICD-10-CM

## 2023-01-30 LAB
ALBUMIN SERPL ELPH-MCNC: 4.2 G/DL — SIGNIFICANT CHANGE UP (ref 3.3–5)
ALP SERPL-CCNC: 181 U/L — HIGH (ref 40–120)
ALT FLD-CCNC: 35 U/L — HIGH (ref 4–33)
ANION GAP SERPL CALC-SCNC: 10 MMOL/L — SIGNIFICANT CHANGE UP (ref 7–14)
AST SERPL-CCNC: 26 U/L — SIGNIFICANT CHANGE UP (ref 4–32)
B PERT DNA SPEC QL NAA+PROBE: SIGNIFICANT CHANGE UP
B PERT+PARAPERT DNA PNL SPEC NAA+PROBE: SIGNIFICANT CHANGE UP
BASE EXCESS BLDV CALC-SCNC: 3 MMOL/L — SIGNIFICANT CHANGE UP (ref -2–3)
BASOPHILS # BLD AUTO: 0.05 K/UL — SIGNIFICANT CHANGE UP (ref 0–0.2)
BASOPHILS NFR BLD AUTO: 0.6 % — SIGNIFICANT CHANGE UP (ref 0–2)
BILIRUB SERPL-MCNC: 0.3 MG/DL — SIGNIFICANT CHANGE UP (ref 0.2–1.2)
BLOOD GAS VENOUS COMPREHENSIVE RESULT: SIGNIFICANT CHANGE UP
BORDETELLA PARAPERTUSSIS (RAPRVP): SIGNIFICANT CHANGE UP
BUN SERPL-MCNC: 15 MG/DL — SIGNIFICANT CHANGE UP (ref 7–23)
C PNEUM DNA SPEC QL NAA+PROBE: SIGNIFICANT CHANGE UP
CALCIUM SERPL-MCNC: 9.5 MG/DL — SIGNIFICANT CHANGE UP (ref 8.4–10.5)
CHLORIDE BLDV-SCNC: 102 MMOL/L — SIGNIFICANT CHANGE UP (ref 96–108)
CHLORIDE SERPL-SCNC: 100 MMOL/L — SIGNIFICANT CHANGE UP (ref 98–107)
CO2 BLDV-SCNC: 29.2 MMOL/L — HIGH (ref 22–26)
CO2 SERPL-SCNC: 25 MMOL/L — SIGNIFICANT CHANGE UP (ref 22–31)
CREAT SERPL-MCNC: 0.81 MG/DL — SIGNIFICANT CHANGE UP (ref 0.5–1.3)
D DIMER BLD IA.RAPID-MCNC: 158 NG/ML DDU — SIGNIFICANT CHANGE UP
EGFR: 85 ML/MIN/1.73M2 — SIGNIFICANT CHANGE UP
EOSINOPHIL # BLD AUTO: 0.15 K/UL — SIGNIFICANT CHANGE UP (ref 0–0.5)
EOSINOPHIL NFR BLD AUTO: 1.7 % — SIGNIFICANT CHANGE UP (ref 0–6)
FLUAV SUBTYP SPEC NAA+PROBE: SIGNIFICANT CHANGE UP
FLUBV RNA SPEC QL NAA+PROBE: SIGNIFICANT CHANGE UP
GAS PNL BLDV: 134 MMOL/L — LOW (ref 136–145)
GAS PNL BLDV: SIGNIFICANT CHANGE UP
GLUCOSE BLDV-MCNC: 118 MG/DL — HIGH (ref 70–99)
GLUCOSE SERPL-MCNC: 117 MG/DL — HIGH (ref 70–99)
HADV DNA SPEC QL NAA+PROBE: SIGNIFICANT CHANGE UP
HCO3 BLDV-SCNC: 28 MMOL/L — SIGNIFICANT CHANGE UP (ref 22–29)
HCOV 229E RNA SPEC QL NAA+PROBE: SIGNIFICANT CHANGE UP
HCOV HKU1 RNA SPEC QL NAA+PROBE: SIGNIFICANT CHANGE UP
HCOV NL63 RNA SPEC QL NAA+PROBE: SIGNIFICANT CHANGE UP
HCOV OC43 RNA SPEC QL NAA+PROBE: SIGNIFICANT CHANGE UP
HCT VFR BLD CALC: 39.5 % — SIGNIFICANT CHANGE UP (ref 34.5–45)
HCT VFR BLDA CALC: 41 % — SIGNIFICANT CHANGE UP (ref 34.5–46.5)
HGB BLD CALC-MCNC: 13.5 G/DL — SIGNIFICANT CHANGE UP (ref 11.5–15.5)
HGB BLD-MCNC: 12.7 G/DL — SIGNIFICANT CHANGE UP (ref 11.5–15.5)
HMPV RNA SPEC QL NAA+PROBE: SIGNIFICANT CHANGE UP
HPIV1 RNA SPEC QL NAA+PROBE: SIGNIFICANT CHANGE UP
HPIV2 RNA SPEC QL NAA+PROBE: SIGNIFICANT CHANGE UP
HPIV3 RNA SPEC QL NAA+PROBE: SIGNIFICANT CHANGE UP
HPIV4 RNA SPEC QL NAA+PROBE: SIGNIFICANT CHANGE UP
IANC: 5.51 K/UL — SIGNIFICANT CHANGE UP (ref 1.8–7.4)
IMM GRANULOCYTES NFR BLD AUTO: 0.3 % — SIGNIFICANT CHANGE UP (ref 0–0.9)
LACTATE BLDV-MCNC: 1.3 MMOL/L — SIGNIFICANT CHANGE UP (ref 0.5–2)
LYMPHOCYTES # BLD AUTO: 2.62 K/UL — SIGNIFICANT CHANGE UP (ref 1–3.3)
LYMPHOCYTES # BLD AUTO: 29.6 % — SIGNIFICANT CHANGE UP (ref 13–44)
M PNEUMO DNA SPEC QL NAA+PROBE: SIGNIFICANT CHANGE UP
MCHC RBC-ENTMCNC: 27.9 PG — SIGNIFICANT CHANGE UP (ref 27–34)
MCHC RBC-ENTMCNC: 32.2 GM/DL — SIGNIFICANT CHANGE UP (ref 32–36)
MCV RBC AUTO: 86.6 FL — SIGNIFICANT CHANGE UP (ref 80–100)
MONOCYTES # BLD AUTO: 0.48 K/UL — SIGNIFICANT CHANGE UP (ref 0–0.9)
MONOCYTES NFR BLD AUTO: 5.4 % — SIGNIFICANT CHANGE UP (ref 2–14)
NEUTROPHILS # BLD AUTO: 5.51 K/UL — SIGNIFICANT CHANGE UP (ref 1.8–7.4)
NEUTROPHILS NFR BLD AUTO: 62.4 % — SIGNIFICANT CHANGE UP (ref 43–77)
NRBC # BLD: 0 /100 WBCS — SIGNIFICANT CHANGE UP (ref 0–0)
NRBC # FLD: 0 K/UL — SIGNIFICANT CHANGE UP (ref 0–0)
NT-PROBNP SERPL-SCNC: 181 PG/ML — SIGNIFICANT CHANGE UP
PCO2 BLDV: 43 MMHG — HIGH (ref 39–42)
PH BLDV: 7.42 — SIGNIFICANT CHANGE UP (ref 7.32–7.43)
PLATELET # BLD AUTO: 514 K/UL — HIGH (ref 150–400)
PO2 BLDV: 61 MMHG — SIGNIFICANT CHANGE UP
POTASSIUM BLDV-SCNC: 3 MMOL/L — LOW (ref 3.5–5.1)
POTASSIUM SERPL-MCNC: 3.1 MMOL/L — LOW (ref 3.5–5.3)
POTASSIUM SERPL-SCNC: 3.1 MMOL/L — LOW (ref 3.5–5.3)
PROT SERPL-MCNC: 8 G/DL — SIGNIFICANT CHANGE UP (ref 6–8.3)
RAPID RVP RESULT: SIGNIFICANT CHANGE UP
RBC # BLD: 4.56 M/UL — SIGNIFICANT CHANGE UP (ref 3.8–5.2)
RBC # FLD: 14 % — SIGNIFICANT CHANGE UP (ref 10.3–14.5)
RSV RNA SPEC QL NAA+PROBE: SIGNIFICANT CHANGE UP
RV+EV RNA SPEC QL NAA+PROBE: SIGNIFICANT CHANGE UP
SAO2 % BLDV: 91.5 % — SIGNIFICANT CHANGE UP
SARS-COV-2 RNA SPEC QL NAA+PROBE: SIGNIFICANT CHANGE UP
SODIUM SERPL-SCNC: 135 MMOL/L — SIGNIFICANT CHANGE UP (ref 135–145)
TROPONIN T, HIGH SENSITIVITY RESULT: 10 NG/L — SIGNIFICANT CHANGE UP
TROPONIN T, HIGH SENSITIVITY RESULT: 9 NG/L — SIGNIFICANT CHANGE UP
WBC # BLD: 8.84 K/UL — SIGNIFICANT CHANGE UP (ref 3.8–10.5)
WBC # FLD AUTO: 8.84 K/UL — SIGNIFICANT CHANGE UP (ref 3.8–10.5)

## 2023-01-30 PROCEDURE — 99285 EMERGENCY DEPT VISIT HI MDM: CPT

## 2023-01-30 PROCEDURE — 71046 X-RAY EXAM CHEST 2 VIEWS: CPT | Mod: 26

## 2023-01-30 RX ORDER — POTASSIUM CHLORIDE 20 MEQ
40 PACKET (EA) ORAL ONCE
Refills: 0 | Status: COMPLETED | OUTPATIENT
Start: 2023-01-30 | End: 2023-01-30

## 2023-01-30 RX ORDER — POTASSIUM CHLORIDE 20 MEQ
10 PACKET (EA) ORAL
Refills: 0 | Status: DISCONTINUED | OUTPATIENT
Start: 2023-01-30 | End: 2023-01-30

## 2023-01-30 RX ORDER — SODIUM CHLORIDE 9 MG/ML
1000 INJECTION INTRAMUSCULAR; INTRAVENOUS; SUBCUTANEOUS ONCE
Refills: 0 | Status: COMPLETED | OUTPATIENT
Start: 2023-01-30 | End: 2023-01-30

## 2023-01-30 RX ORDER — IPRATROPIUM/ALBUTEROL SULFATE 18-103MCG
3 AEROSOL WITH ADAPTER (GRAM) INHALATION
Qty: 180 | Refills: 0
Start: 2023-01-30 | End: 2023-02-28

## 2023-01-30 RX ORDER — IPRATROPIUM/ALBUTEROL SULFATE 18-103MCG
3 AEROSOL WITH ADAPTER (GRAM) INHALATION EVERY 6 HOURS
Refills: 0 | Status: DISCONTINUED | OUTPATIENT
Start: 2023-01-30 | End: 2023-02-02

## 2023-01-30 RX ORDER — POTASSIUM CHLORIDE 20 MEQ
10 PACKET (EA) ORAL ONCE
Refills: 0 | Status: COMPLETED | OUTPATIENT
Start: 2023-01-30 | End: 2023-01-30

## 2023-01-30 RX ORDER — POTASSIUM CHLORIDE 20 MEQ
40 PACKET (EA) ORAL ONCE
Refills: 0 | Status: DISCONTINUED | OUTPATIENT
Start: 2023-01-30 | End: 2023-01-30

## 2023-01-30 RX ORDER — ALBUTEROL 90 UG/1
2 AEROSOL, METERED ORAL
Qty: 120 | Refills: 0
Start: 2023-01-30 | End: 2023-02-28

## 2023-01-30 RX ADMIN — Medication 40 MILLIEQUIVALENT(S): at 11:26

## 2023-01-30 RX ADMIN — Medication 100 MILLIEQUIVALENT(S): at 11:59

## 2023-01-30 RX ADMIN — Medication 3 MILLILITER(S): at 12:38

## 2023-01-30 RX ADMIN — Medication 3 MILLILITER(S): at 09:02

## 2023-01-30 RX ADMIN — SODIUM CHLORIDE 1000 MILLILITER(S): 9 INJECTION INTRAMUSCULAR; INTRAVENOUS; SUBCUTANEOUS at 12:39

## 2023-01-30 NOTE — ED PROVIDER NOTE - PHYSICAL EXAMINATION
GENERAL: well appearing in no acute distress, non-toxic appearing  HEAD: normocephalic, atraumatic  HEENT: normal conjunctiva, oral mucosa moist,   CARDIAC: regular rate and rhythm, no appreciable murmurs, 2+ pulses in UE/LE b/l  PULM: normal breath sounds, clear to ascultation bilaterally, no rales, rhonchi, wheezing  GI: abdomen nondistended, soft, nontender, no guarding, rebound tenderness  NEURO: no focal motor or sensory deficits, normal gait, AAOx3  MSK: no peripheral edema, no calf tenderness b/l  SKIN: well-perfused, extremities warm, dark areas of discoloration on her right elbow. no erythema or edema.   PSYCH: appropriate mood and affect

## 2023-01-30 NOTE — ED ADULT NURSE NOTE - NS_SISCREENINGSR_GEN_ALL_ED
THORACIC SURGERY NEW PATIENT VISIT    Patient Name:  Asia Vines  YOB: 1959  MRN:  [id number]    History:  Asia Vines is a 62 year old female referred by Dr. Madden and Dr. Soria for biopsy proven right middle lobe adenocarcinoma.     Mrs. Vines has a past medical history significant for fibromyalgia, osteoarthritis, hypercholesterolemia, rheumatoid arthritis, hypothyroidism and history of tobacco use.     She underwent a CT chest 7/30/2021 demonstrating spiculated nodule in the right middle lobe with some tethering/retraction of the adjacent minor fissure.  Other findings included two indeterminate pulmonary nodules in the right lower lobe.      She proceed to a PET 8/17/2021 revealing mildly FDG avid right middle lobe nodule, no FDG avid mediastinal or hilar lymph nodules and other pulmonary nodules are not further characterized.     Updated CT chest on 9/28/2021 again reveals spiculated right middle lobe pulmonary nodules and two indeterminate pulmonary nodules in the right lower lobe.      She then underwent bronchoscopy/EBUS with Dr. Soria with pathology confirming adenocarcinoma with predominantly acinar growth of the right middle lobe lung nodule and 4R lymph node negative for malignant cells.     She presents today and reports really no physical limitation. Can walk several blocks without any issue, can climb stairs without issue.     Pulmonary Function Testing 10/16/21:  FVC: 3.61, 105%  FEV1: 2.77, 103%  DLCO: 20.8, 96%    Radiology:   CT Chest:  9/28/21:  -Spiculated nodule in the medial segment of the right middle lobe with some tethering/retraction of the adjacent minor fissure is stable to slightly increased (differences in slice location may account for slight differences  in measurement). This remains concerning for malignancy.  -Other indeterminate pulmonary nodules, including a 7 mm part solid nodule in the right lower lobe with a 2 mm solid component, appear  similar.  -Sequelae of old granulomatous disease.  -Cholelithiasis.     7/30/21:  -Spiculated nodule in the right middle lobe with some tethering/retraction of the adjacent minor fissure which measures approximately 19 x 14 mm in  the axial plane. This is concerning for malignancy, PET/CT and/or tissue sampling recommended.  -Other indeterminate pulmonary nodules, including a 7 mm part solid nodule in the right lower lobe with a 2 mm solid component.  -Sequelae of old granulomatous disease.  -Cholelithiasis.     PET CT 8/17/21:  1.  The suspicious right middle lobe pulmonary nodule is mildly FDG avid. Could represent an adenocarcinoma.  No significantly FDG avid mediastinal or hilar lymph nodes.  2.  The other pulmonary nodules are not further characterized.  3.  Cholelithiasis.  4.  Additional ancillary findings as above     Pathology Results 10/19/21:  Lung, right middle lobe, biopsy:   -Adenocarcinoma with predominantly acinar growth (see microscopic description)     A.   Lung, right middle lobe, fine-needle aspiration:  -Non-small cell carcinoma, favor adenocarcinoma (see microscopic description)     B.   Lymph node, station 4R, fine-needle aspiration:  -Negative for malignant cells     EKG 10/6/21:  Normal sinus rhythm   Rightward axis   Borderline ECG   No previous ECGs available    Assessment/Plan:  Asia Vines is 62 year old female with biopsy proven right middle lobe adenocarcinoma (19 x 13 mm) without evidence of isabel or distant disease.     I reviewed her history and imaging. Middle lobe lesion abutting upper lobe.    Clinically, Asia Vines is a T1bN0 Stage IA2. It was discussed with the patient that the standard of care for early stage lung cancer is surgical resection. A robotic assisted right middle lobectomy was recommended to the patient. We discussed the conduct of the operation, expected post operative course, as well as the major risks of the procedure.      Risks (including bleeding,  death, infection, stroke, injury to surrounding structures, prolonged airleak, conversion to open surgery, chyle leak, and potential use of blood products), benefits and alternatives were discussed with the patient and she agrees to proceed.     Will will send her to her PCP for pre-op clearance.    I spent a total of 45 minutes on this patient's clinic visit.  Greater than 50 percent of this was spent on counseling and coordination of care.    Carole Pace MD      Negative

## 2023-01-30 NOTE — ED PROVIDER NOTE - NSFOLLOWUPINSTRUCTIONS_ED_ALL_ED_FT
Please follow up with your PCP and your surgeon who did your lobectomy.    Also follow up with pulmonology.       Return to the ER immediately for new or worsening shortness of breath, chest pain, or any other concerning symptoms.   See attached information on shortness of breath.  Shortness of Breath  WHAT YOU NEED TO KNOW:  Shortness of breath is a feeling that you cannot get enough air when you breathe in. You may have this feeling only during activity, or all the time. Your symptoms can range from mild to severe. Shortness of breath may be a sign of a serious health condition that needs immediate care.  DISCHARGE INSTRUCTIONS:  Seek care immediately if:   •Your signs and symptoms are the same or worse within 24 hours of treatment.   •The skin over your ribs or on your neck sinks in when you breathe.   •You feel confused or dizzy.  Contact your healthcare provider if:   •You have new or worsening symptoms.  •You have questions or concerns about your condition or care.  Medicines:    •Medicines may be used to treat the cause of your symptoms. You may need medicine to treat a bacterial infection or reduce anxiety. Other medicines may be used to open your airway, reduce swelling, or remove extra fluid. If you have a heart condition, you may need medicine to help your heart beat more strongly or regularly.  •Take your medicine as directed. Contact your healthcare provider if you think your medicine is not helping or if you have side effects. Tell him or her if you are allergic to any medicine. Keep a list of the medicines, vitamins, and herbs you take. Include the amounts, and when and why you take them. Bring the list or the pill bottles to follow-up visits. Carry your medicine list with you in case of an emergency.  Manage shortness of breath:   •Create an action plan. You and your healthcare provider can work together to create a plan for how to handle shortness of breath. The plan can include daily activities, treatment changes, and what to do if you have severe breathing problems.  •Lean forward on your elbows when you sit. This helps your lungs expand and may make it easier to breathe.  •Use pursed-lip breathing any time you feel short of breath. Breathe in through your nose and then slowly breathe out through your mouth with your lips slightly puckered. It should take you twice as long to breathe out as it did to breathe in.  Breathe in Breathe out  •Do not smoke. Nicotine and other chemicals in cigarettes and cigars can cause lung damage and make shortness of breath worse. Ask your healthcare provider for information if you currently smoke and need help to quit. E-cigarettes or smokeless tobacco still contain nicotine. Talk to your healthcare provider before you use these products.  •Reach or maintain a healthy weight. Your healthcare provider can help you create a safe weight loss plan if you are overweight.  •Exercise as directed. Exercise can help your lungs work more easily. Exercise can also help you lose weight if needed. Try to get at least 30 minutes of exercise most days of the week.  Follow up with your healthcare provider or specialist as directed: Write down your questions so you remember to ask them during your visits.    Falta de aliento  LO QUE NECESITA SABER:  La falta de aliento es la sensación de que no puede obtener suficiente aire cuando respira. Usted puede tener zhou sentimiento solo efren la actividad, o todo el tiempo. Los síntomas pueden variar de leves a severos. La falta de aliento puede ser un signo de lopez condición de lizet grave que requiere atención inmediata.  INSTRUCCIONES SOBRE EL BETTY HOSPITALARIA:  Busque atención médica de inmediato si:  •Patty signos y síntomas están igual o empeoran en las siguientes 24 horas del tratamiento.  •La piel sobre patty costillas o en kidd megan se hunden cuando usted respira.  •Usted se siente confundido o mareado  Comuníquese con kidd médico si:  •Usted tiene nuevos síntomas o patty síntomas empeoran.  •Usted tiene preguntas o inquietudes acerca de kidd condición o cuidado.  Medicamentos:  •Los medicamentosLos medicamentos se pueden usar para encontrar la causa de los síntomas. Usted podría necesitar medicamentos para tratar lopez infección bacteriana o para reducir la ansiedad. Otros medicamentos pueden utilizarse para abrir las vías respiratorias, desinflamar o quitar líquido extra. Si usted tiene lopez condición del corazón, puede necesitar medicamentos para ayudar a que kidd corazón palpite más nona o regularmente.  •[place] patty medicamentos farzad se le haya indicado.Consulte con kidd médico si usted bren que kidd medicamento no le está ayudando o si presenta efectos secundarios. Infórmele si es alérgico a cualquier medicamento. Mantenga lopez lista actualizada de los medicamentos, las vitaminas y los productos herbales que xochilt. Incluya los siguientes datos de los medicamentos: cantidad, frecuencia y motivo de administración. Traiga con usted la lista o los envases de las píldoras a patty citas de seguimiento. Lleve la lista de los medicamentos con usted en familia de lopez emergencia.  Maneje la falta de aliento:  •[place] un plan de acción.Usted y kidd médico pueden trabajar juntos a fin de crear un plan para manejar la falta de aliento. El plan puede incluir las actividades diarias, cambios de tratamiento y qué hacer si usted tiene problemas respiratorios graves.  •Al jerzy asiento inclínese hacia adelante en patty codos.[place] ayuda a expandir los pulmones y puede que le sea más fácil respirar.  •Use la respiración con los labios fruncidos cada vez que se sienta corto de respiración.Respire por la nariz y muy despacio exhale por kidd boca con los labios ligeramente fruncidos o en forma de ''U''. Se debería demorar el doble de tiempo al expulsar el aire de lo que le griselda en inhalarlo.  Inhalar y exhalar  •No fume.La nicotina y otras sustancias químicas que contienen los cigarrillos y puros pueden causar daño pulmonar y empeorar la falta de aliento. Pida información a kidd médico si usted actualmente fuma y necesita ayuda para dejar de fumar. Los cigarrillos electrónicos o el tabaco sin humo igualmente contienen nicotina. Consulte con kidd médico antes de utilizar estos productos.  •Alcance o mantenga un peso saludable.Kidd médico le puede ayudar a elaborar un plan para perder peso de lopez forma villanueva si usted tiene sobrepeso.  •Ejercítese según indicaciones.El ejercicio puede ayudar a los pulmones a trabajar más fácilmente. El ejercicio también puede ayudar a perder peso, si es necesario. Trate de hacer unos 30 minutos de ejercicio la mayoría de los días de la semana.  Korina lopez claudia de control con kidd médico o especialista farzad se lo indicaron:Anote aptty preguntas para que se acuerde de hacerlas efren patty visitas.

## 2023-01-30 NOTE — ED PROVIDER NOTE - CLINICAL SUMMARY MEDICAL DECISION MAKING FREE TEXT BOX
57 year old female hx of HTN, asthma, GERD, HIV, (per pt, undetectable viral load, high CD4 count), s/p right lobectomy on 11/22/22 for stage 1 lung ca, curative not needing chemo or radiation, presenting with nocturnal wheezing, exertional dyspnea, and shortness of breathe onset 2 days ago not improving with home albuterol and nebulizer. Afebrile, hemodynamically stable with clear lungs on ascultation, normal respiratory effort,  no LE swelling or active chest pain.     Concern for asthma exacerbation 2/2 poss viral URI vs post surgical changes vs PNA vs recurrent lung ca vs ACS. will get CBC. CMP, CXR, EKG, rvp, trop, bnp, vbg, and give duonebs here in ED. will hold off on steroids for now. Pt with hx of lung ca tx within the last 6 months, no hx of dvt or pe in the past, pt with negative d-dimers previously, no recent immobilization, pt's well's score calculated to be 1, will screen for PE with d-dimer as pt is evaluated to be low risk at this time. 57 year old female hx of HTN, asthma, GERD, HIV, (per pt, undetectable viral load, high CD4 count), s/p right lobectomy on 11/22/22 for stage 1 lung ca, curative not needing chemo or radiation, presenting with nocturnal wheezing, exertional dyspnea, and shortness of breathe onset 2 days ago not improving with home albuterol and nebulizer. Afebrile, hemodynamically stable with clear lungs on ascultation, normal respiratory effort,  no LE swelling or active chest pain.     Concern for asthma exacerbation 2/2 poss viral URI vs post surgical changes vs PNA vs recurrent lung ca vs ACS. will get CBC. CMP, CXR, EKG, rvp, trop, bnp, vbg, and give duonebs here in ED. will hold off on steroids for now. Pt with hx of lung ca tx within the last 6 months, no hx of dvt or pe in the past, pt with negative d-dimers previously, no recent immobilization, pt's well's score calculated to be 1, will screen for PE with d-dimer as pt is evaluated to be low risk at this time.      ===================================  attending mdm (Wyatt Krause MD; attending emergency medicine and medical toxicology)    Patient is a 57-year-old female past medical history of HIV on HAART ART, asthma, GERD, hypertension, low back to me 11/22/2022 for stage I lung CA which was curative.  She is presenting to the emergency department with difficulty breathing and dyspnea on exertion.  Has been going on for 2 days.  Has been taking albuterol inhaler and nebulizer at home without resolution.  Patient also has a pinching type chest pain where the lobectomy surgical scar was.  Lobectomy surgical scar is well-appearing.  Patient has no hypoxia, no respiratory distress, no wheezing on exam.  However given her history of asthma we decided to treat her with nebulizers and she had some improvement.  CBC was within normal limits, no evidence of anemia.  D-dimer is 158 therefore effectively ruling out pulmonary embolus in this low risk patient.  CMP was significant for potassium to 3.1.  We gave her p.o. repletion as well as 1 IV K rider.  BNP was 181 and chest x-ray there is no focal consolidation no fluid overload effectively ruling out CHF.  Patient's blood gas had a PCO2 of 43 which indicates no retention.  Viral testing with respiratory viral panel was negative.  EKG was nonischemic.  At time of writing patient is pending a second troponin.  If troponin is lateral she will be stable for discharge with follow-up with her PMDs.

## 2023-01-30 NOTE — ED PROVIDER NOTE - PROGRESS NOTE DETAILS
Iliana Evangelista, PGY1, MD: pt did not desat during walking desat test, however had increased work of breathing. pt's lungs CTAB, however pt states she feels tight. will give duoneb now. pending repeat trop. Iliana Evangelista, PGY1, MD: all lab results reviewed and all findings discussed with patient. No findings at this time that would warrant admission, pt reports feeling better at this time and would like to be d/ana paula. pt instructed to f/u with [facility] and pulmonology. Pt understands and agrees with plan, all questions answered at this time.

## 2023-01-30 NOTE — ED PROVIDER NOTE - NSICDXPASTMEDICALHX_GEN_ALL_CORE_FT
PAST MEDICAL HISTORY:  Asthma     Benign hypertension     Cigarette smoker quit 2019    GERD (gastroesophageal reflux disease)     H/O abdominal hysterectomy     HIV disease     HPV (human papilloma virus) infection     Knee pain, bilateral     Osteoarthritis     Pruritus (chronic)    Vitamin D deficiency

## 2023-01-30 NOTE — ED ADULT NURSE REASSESSMENT NOTE - NS ED NURSE REASSESS COMMENT FT1
Break Rn: Pt in NAd, denies chest pain or SOB, pt resting in stretcher. Receiving Duoneb treatment.. Bed in lowest position will continue to monitor.
Pt lying in stretcher eating breakfast. No signs of any acute distress. States she feels wheezing in her chest. Respirations even and unlabored. Audible wheezes heard bilaterally. Will continue to monitor.

## 2023-01-30 NOTE — ED PROVIDER NOTE - PATIENT PORTAL LINK FT
You can access the FollowMyHealth Patient Portal offered by [facility] by registering at the following website: [email or web address]. By joining [employer] [facility]’s FollowMyHealth portal, you will also be able to view your health information using other applications (apps) compatible with our system.

## 2023-01-30 NOTE — ED ADULT NURSE NOTE - OBJECTIVE STATEMENT
[facility]. Ambulatory at baseline. States having difficulty breathing since right lobectomy in november 2022. Respirations even and unlabored. No audible wheezes heard. States taking nebulizer treatments and inhaler when she feels SOB with no relief. Endorses SOB with exertion. Denies Chest pain. NSR on monitor. PMH: stage 1 lung CA, asthma, HTN.

## 2023-01-30 NOTE — ED PROVIDER NOTE - NSICDXPASTSURGICALHX_GEN_ALL_CORE_FT
PAST SURGICAL HISTORY:  S/P hysterectomy     S/P partial lobectomy of lung right lung for stage 1 lung ca, on 11/22/22

## 2023-01-30 NOTE — ED ADULT TRIAGE NOTE - CHIEF COMPLAINT QUOTE
difficulty breathing    had right lobectomy for stage 1 lung ca in november.  having breathing issues since.   has had for follow up and told was part of healing but feels she's not improving, has occasional wheezing, laying down, has audible wheezing.  states  nebulizer not working.  slight headache. no chemo or radiation.  pmhx HIV+, gerd, asthma

## 2023-01-30 NOTE — ED PROVIDER NOTE - ATTENDING CONTRIBUTION TO CARE
I (Nelida) agree with above, I performed a history and physical. Counseled kylah medical staff, physician assistant, and/or medical student on medical decision making as documented. Medical decisions and treatment interventions were made in real time during the patient encounter. Additionally and/or with the following exceptions: Patient is a 57-year-old female past medical history of HIV on HAART ART, asthma, GERD, hypertension, low back to me 11/22/2022 for stage I lung CA which was curative.  She is presenting to the emergency department with difficulty breathing and dyspnea on exertion.  Has been going on for 2 days.  Has been taking albuterol inhaler and nebulizer at home without resolution.  Patient also has a pinching type chest pain where the lobectomy surgical scar was.  Lobectomy surgical scar is well-appearing.  Patient has no hypoxia, no respiratory distress, no wheezing on exam.  However given her history of asthma we decided to treat her with nebulizers and she had some improvement.  CBC was within normal limits, no evidence of anemia.  D-dimer is 158 therefore effectively ruling out pulmonary embolus in this low risk patient.  CMP was significant for potassium to 3.1.  We gave her p.o. repletion as well as 1 IV K rider.  BNP was 181 and chest x-ray there is no focal consolidation no fluid overload effectively ruling out CHF.  Patient's blood gas had a PCO2 of 43 which indicates no retention.  Viral testing with respiratory viral panel was negative.  EKG was nonischemic.  At time of writing patient is pending a second troponin.  If troponin is lateral she will be stable for discharge with follow-up with her PMDs.

## 2023-01-30 NOTE — ED PROVIDER NOTE - OBJECTIVE STATEMENT
57 year old female hx of HTN, asthma, GERD, HIV, (per pt, undetectable viral load, high CD4 count), s/p right lobectomy on 11/22/22 for stage 1 lung ca, curative not needing chemo or radiation, presenting with wheezing and shortness of breathe onset 2 days ago. Pt states the wheezing is worse at night, occurs every night, and has not responded to her albuterol and nebulizers which she takes twice daily every day. She states that prior to her lobectomy her asthma was well controlled but since the lobectomy she has had more wheezing and exertional dyspnea. Pt also reports occasional "twinges" of chest pain on her right side around her surgical site, nausea, and flair of her ecezma. Pt denies fever, chills, sick contacts, LE leg swelling, cough, vomiting, diarrhea, dizziness, smoke or environmental allergy exposure.

## 2023-01-30 NOTE — ED PROVIDER NOTE - NSFOLLOWUPCLINICS_GEN_ALL_ED_FT
[facility] Pulmonolgy and Sleep Medicine  Pulmonology  [address] [place] [address], [facility] [address]  [place] [address]  Phone: [phone]  Fax:   Follow Up Time: 4-6 Days

## 2023-04-01 ENCOUNTER — EMERGENCY (EMERGENCY)
Facility: HOSPITAL | Age: 58
LOS: 1 days | Discharge: HOME | End: 2023-04-01
Admitting: EMERGENCY MEDICINE
Payer: MEDICAID

## 2023-04-01 VITALS
SYSTOLIC BLOOD PRESSURE: 127 MMHG | HEART RATE: 81 BPM | TEMPERATURE: 99 F | RESPIRATION RATE: 18 BRPM | OXYGEN SATURATION: 100 % | DIASTOLIC BLOOD PRESSURE: 78 MMHG

## 2023-04-01 DIAGNOSIS — Z90.2 ACQUIRED ABSENCE OF LUNG [PART OF]: Chronic | ICD-10-CM

## 2023-04-01 DIAGNOSIS — Z90.710 ACQUIRED ABSENCE OF BOTH CERVIX AND UTERUS: Chronic | ICD-10-CM

## 2023-04-01 PROBLEM — F17.210 NICOTINE DEPENDENCE, CIGARETTES, UNCOMPLICATED: Chronic | Status: ACTIVE | Noted: 2018-08-29

## 2023-04-01 PROCEDURE — 99284 EMERGENCY DEPT VISIT MOD MDM: CPT

## 2023-04-01 PROCEDURE — 71046 X-RAY EXAM CHEST 2 VIEWS: CPT | Mod: 26

## 2023-04-01 RX ORDER — ALBUTEROL 90 UG/1
1 AEROSOL, METERED ORAL ONCE
Refills: 0 | Status: COMPLETED | OUTPATIENT
Start: 2023-04-01 | End: 2023-04-01

## 2023-04-01 RX ORDER — IPRATROPIUM/ALBUTEROL SULFATE 18-103MCG
3 AEROSOL WITH ADAPTER (GRAM) INHALATION ONCE
Refills: 0 | Status: COMPLETED | OUTPATIENT
Start: 2023-04-01 | End: 2023-04-01

## 2023-04-01 RX ADMIN — Medication 3 MILLILITER(S): at 14:18

## 2023-04-01 RX ADMIN — ALBUTEROL 1 PUFF(S): 90 AEROSOL, METERED ORAL at 15:40

## 2023-04-01 NOTE — ED PROVIDER NOTE - OBJECTIVE STATEMENT
58 y/o female pmh asthma Lung CA s/p resection x6 months ago c/o sob. Pt admits to sob since her surgery. Pt has seen her surgeon and pulmonologist multiple times for this and is told that it is 2/2 her surgery combined with her asthma. Pt is concerned that she might have fluid in her lung or PNA and wanted to be eval. Pt denies cehst pain, cough, congestion, exertional symptoms, n/v/d, dizziness, syncope, fever or chills.  Pt admits to using her inhaler 1-2 times per day

## 2023-04-01 NOTE — ED PROVIDER NOTE - CLINICAL SUMMARY MEDICAL DECISION MAKING FREE TEXT BOX
58 y/o female pmh asthma, Lunga CA s/p resection x6 months ago c/o sob x6 months. Symptoms were slightly worse last night prompting ER visit today. Pt used her inhaler with minimal relief. Pt appears well and is in NAD, vitals wnl, lungs w/ b/l UL expiratory wheeze. no leg swelling. Symptoms likely 2/2 asthma, due to lung surgery will obtain CXR to r/o effusion vs PNA vs pneumothorax. WIll give duoneb and reassess.    Witnessed pt use her inhaler and has poor technique. Pt educated on proper use and a spacer was given and instructed how to use. Pt feels much better after neb treatment. will dc, rec to f/u with her pulmonologist. stable for dc.

## 2023-04-01 NOTE — ED ADULT NURSE NOTE - OBJECTIVE STATEMENT
Pt received to intake presents with sob, reports had lung cancer 6 months ago and now c/o sob while lying flat. Pt a&ox4, ambulatory at baseline, skin intact, respirations even and unlabored. Denies CP, fever, chills or any other symptoms. Pt medicated as per ordered, will continue to monitor.

## 2023-04-01 NOTE — ED PROVIDER NOTE - PROGRESS NOTE DETAILS
KRISTYN Mcpherson Pt made aware of trace R pleural effusion, which is improved from small R effusion in January

## 2023-04-01 NOTE — ED PROVIDER NOTE - PATIENT PORTAL LINK FT
You can access the FollowMyHealth Patient Portal offered by [facility] by registering at the following website: [email or web address]. By joining "[employer]’s FollowMyHealth portal, you will also be able to view your health information using other applications (apps) compatible with our system.

## 2023-04-01 NOTE — ED PROVIDER NOTE - IV ALTEPLASE ADMIN OUTSIDE HIDDEN
SW/CM Ongoing Pediatric Plan of Care Note     Support Systems   Family members, Parent, Siblings    Parent/Family Goals  Patient/Family Discharge Goal: Home          Identified Barriers to Discharge   Pt will have speciality formula     Recommendations for Discharge       Disposition  Home with parent    Progress Note  ARA rec'd a call from Jessica at [employer] -she is the Metabolic representative for all metabolic formula- #[phone]- she can send a complimentary case of RCF formula to the home. The case includes 12 cans. CM provided Jessica with the patient's address.- she will send the case out for a Monday 4/24/23 delivery. CM will update the parents     CM will continue to follow patient for any ongoing needs during the current hospitalization   show

## 2023-04-03 NOTE — ED ADULT TRIAGE NOTE - CHIEF COMPLAINT QUOTE
pt brought from home by EMS c/o b/l rib pain x multiple days associated w/ HA, recently was on bactrim for "congestion" denies urinary symptoms, appears in no resp distress, able to complete sentences, denies long car rides, smoking or OCPs [FreeTextEntry1] : Here for follow up

## 2023-04-07 ENCOUNTER — EMERGENCY (EMERGENCY)
Facility: HOSPITAL | Age: 58
LOS: 1 days | Discharge: HOME | End: 2023-04-07
Attending: EMERGENCY MEDICINE | Admitting: STUDENT IN AN ORGANIZED HEALTH CARE EDUCATION/TRAINING PROGRAM
Payer: MEDICAID

## 2023-04-07 VITALS
HEART RATE: 76 BPM | RESPIRATION RATE: 19 BRPM | DIASTOLIC BLOOD PRESSURE: 77 MMHG | SYSTOLIC BLOOD PRESSURE: 142 MMHG | TEMPERATURE: 98 F | OXYGEN SATURATION: 100 %

## 2023-04-07 DIAGNOSIS — Z90.710 ACQUIRED ABSENCE OF BOTH CERVIX AND UTERUS: Chronic | ICD-10-CM

## 2023-04-07 DIAGNOSIS — Z90.2 ACQUIRED ABSENCE OF LUNG [PART OF]: Chronic | ICD-10-CM

## 2023-04-07 LAB
ALBUMIN SERPL ELPH-MCNC: 4.2 G/DL — SIGNIFICANT CHANGE UP (ref 3.3–5)
ALP SERPL-CCNC: 158 U/L — HIGH (ref 40–120)
ALT FLD-CCNC: 41 U/L — HIGH (ref 4–33)
ANION GAP SERPL CALC-SCNC: 16 MMOL/L — HIGH (ref 7–14)
AST SERPL-CCNC: 37 U/L — HIGH (ref 4–32)
B PERT DNA SPEC QL NAA+PROBE: SIGNIFICANT CHANGE UP
B PERT+PARAPERT DNA PNL SPEC NAA+PROBE: SIGNIFICANT CHANGE UP
BASOPHILS # BLD AUTO: 0.1 K/UL — SIGNIFICANT CHANGE UP (ref 0–0.2)
BASOPHILS NFR BLD AUTO: 0.9 % — SIGNIFICANT CHANGE UP (ref 0–2)
BILIRUB SERPL-MCNC: 0.3 MG/DL — SIGNIFICANT CHANGE UP (ref 0.2–1.2)
BORDETELLA PARAPERTUSSIS (RAPRVP): SIGNIFICANT CHANGE UP
BUN SERPL-MCNC: 28 MG/DL — HIGH (ref 7–23)
C PNEUM DNA SPEC QL NAA+PROBE: SIGNIFICANT CHANGE UP
CALCIUM SERPL-MCNC: 9.7 MG/DL — SIGNIFICANT CHANGE UP (ref 8.4–10.5)
CHLORIDE SERPL-SCNC: 96 MMOL/L — LOW (ref 98–107)
CO2 SERPL-SCNC: 22 MMOL/L — SIGNIFICANT CHANGE UP (ref 22–31)
CREAT SERPL-MCNC: 0.85 MG/DL — SIGNIFICANT CHANGE UP (ref 0.5–1.3)
D DIMER BLD IA.RAPID-MCNC: <150 NG/ML DDU — SIGNIFICANT CHANGE UP
EGFR: 80 ML/MIN/1.73M2 — SIGNIFICANT CHANGE UP
EOSINOPHIL # BLD AUTO: 0.1 K/UL — SIGNIFICANT CHANGE UP (ref 0–0.5)
EOSINOPHIL NFR BLD AUTO: 0.9 % — SIGNIFICANT CHANGE UP (ref 0–6)
FLUAV SUBTYP SPEC NAA+PROBE: SIGNIFICANT CHANGE UP
FLUBV RNA SPEC QL NAA+PROBE: SIGNIFICANT CHANGE UP
GLUCOSE SERPL-MCNC: 122 MG/DL — HIGH (ref 70–99)
HADV DNA SPEC QL NAA+PROBE: SIGNIFICANT CHANGE UP
HCOV 229E RNA SPEC QL NAA+PROBE: SIGNIFICANT CHANGE UP
HCOV HKU1 RNA SPEC QL NAA+PROBE: SIGNIFICANT CHANGE UP
HCOV NL63 RNA SPEC QL NAA+PROBE: SIGNIFICANT CHANGE UP
HCOV OC43 RNA SPEC QL NAA+PROBE: SIGNIFICANT CHANGE UP
HCT VFR BLD CALC: 41.6 % — SIGNIFICANT CHANGE UP (ref 34.5–45)
HGB BLD-MCNC: 13.5 G/DL — SIGNIFICANT CHANGE UP (ref 11.5–15.5)
HMPV RNA SPEC QL NAA+PROBE: SIGNIFICANT CHANGE UP
HPIV1 RNA SPEC QL NAA+PROBE: SIGNIFICANT CHANGE UP
HPIV2 RNA SPEC QL NAA+PROBE: SIGNIFICANT CHANGE UP
HPIV3 RNA SPEC QL NAA+PROBE: SIGNIFICANT CHANGE UP
HPIV4 RNA SPEC QL NAA+PROBE: SIGNIFICANT CHANGE UP
IANC: 6.37 K/UL — SIGNIFICANT CHANGE UP (ref 1.8–7.4)
LYMPHOCYTES # BLD AUTO: 37.5 % — SIGNIFICANT CHANGE UP (ref 13–44)
LYMPHOCYTES # BLD AUTO: 4.3 K/UL — HIGH (ref 1–3.3)
M PNEUMO DNA SPEC QL NAA+PROBE: SIGNIFICANT CHANGE UP
MANUAL SMEAR VERIFICATION: SIGNIFICANT CHANGE UP
MCHC RBC-ENTMCNC: 27.9 PG — SIGNIFICANT CHANGE UP (ref 27–34)
MCHC RBC-ENTMCNC: 32.5 GM/DL — SIGNIFICANT CHANGE UP (ref 32–36)
MCV RBC AUTO: 86 FL — SIGNIFICANT CHANGE UP (ref 80–100)
MONOCYTES # BLD AUTO: 1.12 K/UL — HIGH (ref 0–0.9)
MONOCYTES NFR BLD AUTO: 9.8 % — SIGNIFICANT CHANGE UP (ref 2–14)
NEUTROPHILS # BLD AUTO: 5.63 K/UL — SIGNIFICANT CHANGE UP (ref 1.8–7.4)
NEUTROPHILS NFR BLD AUTO: 49.1 % — SIGNIFICANT CHANGE UP (ref 43–77)
PLAT MORPH BLD: ABNORMAL
PLATELET # BLD AUTO: 496 K/UL — HIGH (ref 150–400)
PLATELET COUNT - ESTIMATE: NORMAL — SIGNIFICANT CHANGE UP
POLYCHROMASIA BLD QL SMEAR: SLIGHT — SIGNIFICANT CHANGE UP
POTASSIUM SERPL-MCNC: 3.4 MMOL/L — LOW (ref 3.5–5.3)
POTASSIUM SERPL-SCNC: 3.4 MMOL/L — LOW (ref 3.5–5.3)
PROT SERPL-MCNC: 8 G/DL — SIGNIFICANT CHANGE UP (ref 6–8.3)
RAPID RVP RESULT: SIGNIFICANT CHANGE UP
RBC # BLD: 4.84 M/UL — SIGNIFICANT CHANGE UP (ref 3.8–5.2)
RBC # FLD: 14.8 % — HIGH (ref 10.3–14.5)
RBC BLD AUTO: ABNORMAL
RSV RNA SPEC QL NAA+PROBE: SIGNIFICANT CHANGE UP
RV+EV RNA SPEC QL NAA+PROBE: SIGNIFICANT CHANGE UP
SARS-COV-2 RNA SPEC QL NAA+PROBE: SIGNIFICANT CHANGE UP
SMUDGE CELLS # BLD: PRESENT — SIGNIFICANT CHANGE UP
SODIUM SERPL-SCNC: 134 MMOL/L — LOW (ref 135–145)
TROPONIN T, HIGH SENSITIVITY RESULT: 8 NG/L — SIGNIFICANT CHANGE UP
TROPONIN T, HIGH SENSITIVITY RESULT: 8 NG/L — SIGNIFICANT CHANGE UP
VARIANT LYMPHS # BLD: 1.8 % — SIGNIFICANT CHANGE UP (ref 0–6)
WBC # BLD: 11.46 K/UL — HIGH (ref 3.8–10.5)
WBC # FLD AUTO: 11.46 K/UL — HIGH (ref 3.8–10.5)

## 2023-04-07 PROCEDURE — 71046 X-RAY EXAM CHEST 2 VIEWS: CPT | Mod: 26

## 2023-04-07 PROCEDURE — 99223 1ST HOSP IP/OBS HIGH 75: CPT

## 2023-04-07 PROCEDURE — 71260 CT THORAX DX C+: CPT | Mod: 26,MA

## 2023-04-07 RX ORDER — SODIUM CHLORIDE 9 MG/ML
1000 INJECTION INTRAMUSCULAR; INTRAVENOUS; SUBCUTANEOUS
Refills: 0 | Status: DISCONTINUED | OUTPATIENT
Start: 2023-04-07 | End: 2023-04-10

## 2023-04-07 RX ORDER — ONDANSETRON 8 MG/1
4 TABLET, FILM COATED ORAL ONCE
Refills: 0 | Status: COMPLETED | OUTPATIENT
Start: 2023-04-07 | End: 2023-04-07

## 2023-04-07 RX ORDER — AMLODIPINE BESYLATE 2.5 MG/1
10 TABLET ORAL AT BEDTIME
Refills: 0 | Status: DISCONTINUED | OUTPATIENT
Start: 2023-04-07 | End: 2023-04-10

## 2023-04-07 RX ORDER — POLYETHYLENE GLYCOL 3350 17 G/17G
17 POWDER, FOR SOLUTION ORAL ONCE
Refills: 0 | Status: COMPLETED | OUTPATIENT
Start: 2023-04-07 | End: 2023-04-07

## 2023-04-07 RX ORDER — IPRATROPIUM/ALBUTEROL SULFATE 18-103MCG
3 AEROSOL WITH ADAPTER (GRAM) INHALATION ONCE
Refills: 0 | Status: DISCONTINUED | OUTPATIENT
Start: 2023-04-07 | End: 2023-04-07

## 2023-04-07 RX ORDER — SODIUM CHLORIDE 9 MG/ML
1000 INJECTION INTRAMUSCULAR; INTRAVENOUS; SUBCUTANEOUS ONCE
Refills: 0 | Status: COMPLETED | OUTPATIENT
Start: 2023-04-07 | End: 2023-04-07

## 2023-04-07 RX ORDER — PANTOPRAZOLE SODIUM 20 MG/1
40 TABLET, DELAYED RELEASE ORAL
Refills: 0 | Status: DISCONTINUED | OUTPATIENT
Start: 2023-04-07 | End: 2023-04-10

## 2023-04-07 RX ORDER — EFAVIRENZ, EMTRICITABINE AND TENOFOVIR DISOPROXIL FUMARATE 600; 200; 300 MG/1; MG/1; MG/1
1 TABLET, FILM COATED ORAL AT BEDTIME
Refills: 0 | Status: DISCONTINUED | OUTPATIENT
Start: 2023-04-07 | End: 2023-04-07

## 2023-04-07 RX ORDER — MAGNESIUM SULFATE 500 MG/ML
2 VIAL (ML) INJECTION ONCE
Refills: 0 | Status: COMPLETED | OUTPATIENT
Start: 2023-04-07 | End: 2023-04-07

## 2023-04-07 RX ORDER — AMLODIPINE BESYLATE 2.5 MG/1
10 TABLET ORAL AT BEDTIME
Refills: 0 | Status: DISCONTINUED | OUTPATIENT
Start: 2023-04-07 | End: 2023-04-07

## 2023-04-07 RX ORDER — POTASSIUM CHLORIDE 20 MEQ
40 PACKET (EA) ORAL ONCE
Refills: 0 | Status: COMPLETED | OUTPATIENT
Start: 2023-04-07 | End: 2023-04-07

## 2023-04-07 RX ORDER — ALBUTEROL 90 UG/1
2.5 AEROSOL, METERED ORAL EVERY 4 HOURS
Refills: 0 | Status: DISCONTINUED | OUTPATIENT
Start: 2023-04-07 | End: 2023-04-07

## 2023-04-07 RX ORDER — IPRATROPIUM/ALBUTEROL SULFATE 18-103MCG
3 AEROSOL WITH ADAPTER (GRAM) INHALATION
Refills: 0 | Status: COMPLETED | OUTPATIENT
Start: 2023-04-07 | End: 2023-04-07

## 2023-04-07 RX ORDER — METOPROLOL TARTRATE 50 MG
50 TABLET ORAL AT BEDTIME
Refills: 0 | Status: DISCONTINUED | OUTPATIENT
Start: 2023-04-07 | End: 2023-04-10

## 2023-04-07 RX ORDER — DEXAMETHASONE 0.5 MG/5ML
10 ELIXIR ORAL ONCE
Refills: 0 | Status: COMPLETED | OUTPATIENT
Start: 2023-04-07 | End: 2023-04-07

## 2023-04-07 RX ORDER — IPRATROPIUM/ALBUTEROL SULFATE 18-103MCG
3 AEROSOL WITH ADAPTER (GRAM) INHALATION EVERY 4 HOURS
Refills: 0 | Status: DISCONTINUED | OUTPATIENT
Start: 2023-04-07 | End: 2023-04-10

## 2023-04-07 RX ORDER — SENNA PLUS 8.6 MG/1
2 TABLET ORAL AT BEDTIME
Refills: 0 | Status: DISCONTINUED | OUTPATIENT
Start: 2023-04-07 | End: 2023-04-10

## 2023-04-07 RX ADMIN — ONDANSETRON 4 MILLIGRAM(S): 8 TABLET, FILM COATED ORAL at 11:59

## 2023-04-07 RX ADMIN — ALBUTEROL 2.5 MILLIGRAM(S): 90 AEROSOL, METERED ORAL at 18:37

## 2023-04-07 RX ADMIN — POLYETHYLENE GLYCOL 3350 17 GRAM(S): 17 POWDER, FOR SOLUTION ORAL at 12:00

## 2023-04-07 RX ADMIN — AMLODIPINE BESYLATE 10 MILLIGRAM(S): 2.5 TABLET ORAL at 21:51

## 2023-04-07 RX ADMIN — Medication 50 MILLIGRAM(S): at 21:51

## 2023-04-07 RX ADMIN — ONDANSETRON 4 MILLIGRAM(S): 8 TABLET, FILM COATED ORAL at 23:23

## 2023-04-07 RX ADMIN — Medication 150 GRAM(S): at 15:48

## 2023-04-07 RX ADMIN — SODIUM CHLORIDE 125 MILLILITER(S): 9 INJECTION INTRAMUSCULAR; INTRAVENOUS; SUBCUTANEOUS at 18:35

## 2023-04-07 RX ADMIN — Medication 3 MILLILITER(S): at 12:49

## 2023-04-07 RX ADMIN — SODIUM CHLORIDE 1000 MILLILITER(S): 9 INJECTION INTRAMUSCULAR; INTRAVENOUS; SUBCUTANEOUS at 11:59

## 2023-04-07 RX ADMIN — Medication 3 MILLILITER(S): at 12:00

## 2023-04-07 RX ADMIN — Medication 3 MILLILITER(S): at 12:21

## 2023-04-07 RX ADMIN — Medication 102 MILLIGRAM(S): at 11:59

## 2023-04-07 RX ADMIN — Medication 3 MILLILITER(S): at 21:51

## 2023-04-07 RX ADMIN — SENNA PLUS 2 TABLET(S): 8.6 TABLET ORAL at 22:42

## 2023-04-07 RX ADMIN — Medication 40 MILLIEQUIVALENT(S): at 13:17

## 2023-04-07 RX ADMIN — Medication 40 MILLIGRAM(S): at 21:50

## 2023-04-07 NOTE — ED ADULT TRIAGE NOTE - CHIEF COMPLAINT QUOTE
Presents to ED c/o shortness of breath x 6 am this morning associated with dizziness, pt ambulatory with steady gait, no neuro deficits noted. Pt states she used her inhalers and nebulizers at home with no relief and states she has been taking prednisone for 5 days, yesterday was last dose. PMH of asthma and HTN. Presents to ED c/o shortness of breath x 6 am this morning associated with dizziness, pt ambulatory with steady gait, no neuro deficits noted. Pt states she used her inhalers and nebulizers at home with no relief and states she has been taking prednisone for 5 days, yesterday was last dose. PMH of asthma and HTN. Pt received 1 duoneb en route.

## 2023-04-07 NOTE — ED PROVIDER NOTE - CLINICAL SUMMARY MEDICAL DECISION MAKING FREE TEXT BOX
57-year-old female with past medical history of lung cancer status post lung surgery 6 months ago, HIV, on medication, asthma, hypertension, GERD, presenting with shortness of breath.    Vital signs show hypertension 142/77.  Otherwise normal vital signs.  Physical exam shows expiratory wheezing bilaterally.  Abdomen is soft nontender nondistended.  Moist mucous membranes.  No lower extremity swelling or tenderness to palpation.  High suspicion for shortness of breath secondary to combination of asthma and decreased lung volumes from surgery.  Consider ACS versus pneumonia versus anemia.  Plan: CBC, CMP, troponin, EKG, chest x-ray.  Symptomatic treatment with DuoNeb's, Decadron, Zofran, MiraLAX,

## 2023-04-07 NOTE — ED CDU PROVIDER INITIAL DAY NOTE - PHYSICAL EXAMINATION
LOS:     VITALS:   T(C): 36.6 (04-07-23 @ 10:34), Max: 36.6 (04-07-23 @ 10:34)  HR: 76 (04-07-23 @ 10:34) (76 - 76)  BP: 142/77 (04-07-23 @ 10:34) (142/77 - 142/77)  RR: 19 (04-07-23 @ 10:34) (19 - 19)  SpO2: 100% (04-07-23 @ 10:34) (100% - 100%)    GENERAL: NAD, lying in bed comfortably  HEAD:  Atraumatic, Normocephalic  EYES: EOMI, PERRLA, conjunctiva and sclera clear  ENT: Moist mucous membranes  NECK: Supple, No JVD  CHEST/LUNG:   Physical exam shows expiratory wheezing bilaterally.   HEART: Regular rate and rhythm; No murmurs, rubs, or gallops  ABDOMEN: Soft, nontender, nondistended  EXTREMITIES:  No edema, erythema or tenderness on LE  NERVOUS SYSTEM:  A&Ox3, no focal deficits   SKIN: No rashes or lesions

## 2023-04-07 NOTE — ED PROVIDER NOTE - PHYSICAL EXAMINATION
LOS:     VITALS:   T(C): 36.6 (04-07-23 @ 10:34), Max: 36.6 (04-07-23 @ 10:34)  HR: 76 (04-07-23 @ 10:34) (76 - 76)  BP: 142/77 (04-07-23 @ 10:34) (142/77 - 142/77)  RR: 19 (04-07-23 @ 10:34) (19 - 19)  SpO2: 100% (04-07-23 @ 10:34) (100% - 100%)    GENERAL: NAD, lying in bed comfortably  HEAD:  Atraumatic, Normocephalic  EYES: EOMI, PERRLA, conjunctiva and sclera clear  ENT: Moist mucous membranes  NECK: Supple, No JVD  CHEST/LUNG:   Physical exam shows expiratory wheezing bilaterally. +dyspnea  HEART: Regular rate and rhythm; No murmurs, rubs, or gallops  ABDOMEN: Soft, nontender, nondistended  EXTREMITIES:  No edema, erythema or tenderness on LE  NERVOUS SYSTEM:  A&Ox3, no focal deficits   SKIN: No rashes or lesions

## 2023-04-07 NOTE — ED ADULT NURSE NOTE - OBJECTIVE STATEMENT
Patient presents to ED for SOB since AM, constipation. States used inhaler and nebulizers at home with little relief. Reports taking prednisone for 5 days and just finished course yesterday. She is AA&Ox4, in no acute distress, calm, cooperative. Respirations even, unlabored on room air. PMH asthma, HTN, lung CA with surgery in November 2022, HIV. Former smoker. 22G IV placed left hand, labs drawn and sent.

## 2023-04-07 NOTE — ED CDU PROVIDER INITIAL DAY NOTE - OBJECTIVE STATEMENT
57-year-old female with past medical history of lung cancer status post lung surgery 6 months ago, HIV, on medication, asthma, hypertension, GERD, presenting with shortness of breath.  Patient reports that she has been feeling short of breath ever since her surgery.  Patient was here 1 week ago for similar symptoms, was given albuterol and prednisone course and sent home.  Patient reports that shortness of breath has not improved.  She has been taking albuterol via nebulizer twice a day and completed her prednisone course.  Patient has been feeling lightheaded.  Denies any fever/chills, phlegm, hemoptysis, diarrhea, abdominal pain, chest pain.  Patient reports constipation and 1 episode of NBNB emesis earlier today.    KRISTYN Mcpherson: This is a 57-year-old female with a history of lung cancer status post after surgery not on any chemo, HIV, on meds, asthma, hypertension, GERD, never has been intubated for her asthma presented to the ED having wheezing for the last several days she was here about about approximately a week ago where she got an albuterol and prednisone course and was sent home she denies having any chest pain exertional dyspnea dysuria hematuria urinary urgency frequency denies having any fever chills or body aches.  She states that she still wheezing had mild improvement        Attendin-year-old female presents with shortness of breath and wheezing.  Patient states that about 6 months ago she had a lung resection and since then she has been having shortness of breath.  She has no chest pain.  She has no nausea or vomiting.  She was here for the same about 1 week ago and placed on prednisone but has no improvement of her symptoms.  She has an appointment next week for her pulmonary follow-up.  No fever.

## 2023-04-07 NOTE — ED ADULT NURSE NOTE - CHIEF COMPLAINT QUOTE
Presents to ED c/o shortness of breath x 6 am this morning associated with dizziness, pt ambulatory with steady gait, no neuro deficits noted. Pt states she used her inhalers and nebulizers at home with no relief and states she has been taking prednisone for 5 days, yesterday was last dose. PMH of asthma and HTN. Pt received 1 duoneb en route.

## 2023-04-07 NOTE — ED ADULT NURSE REASSESSMENT NOTE - NS ED NURSE REASSESS COMMENT FT1
Pt still c/o SOB after 3 duoneb treatments. Pt respirations are slightly labored at times, but pt does not appear to be in any distress. MD Pereyra notified and aware. Awaiting orders. Safety maintained.

## 2023-04-07 NOTE — ED PROVIDER NOTE - OBJECTIVE STATEMENT
57-year-old female with past medical history of lung cancer status post lung surgery 6 months ago, HIV, on medication, asthma, hypertension, GERD, presenting with shortness of breath.  Patient reports that she has been feeling short of breath ever since her surgery.  Patient was here 1 week ago for similar symptoms, was given albuterol and prednisone course and sent home.  Patient reports that shortness of breath has not improved.  She has been taking albuterol via nebulizer twice a day and completed her prednisone course.  Patient has been feeling lightheaded.  Denies any fever/chills, phlegm, hemoptysis, diarrhea, abdominal pain, chest pain.  Patient reports constipation and 1 episode of NBNB emesis earlier today. 57-year-old female with past medical history of lung cancer status post lung surgery 6 months ago, HIV, on medication, asthma, hypertension, GERD, presenting with shortness of breath.  Patient reports that she has been feeling short of breath ever since her surgery.  Patient was here 1 week ago for similar symptoms, was given albuterol and prednisone course and sent home.  Patient reports that shortness of breath has not improved.  She has been taking albuterol via nebulizer twice a day and completed her prednisone course.  Patient has been feeling lightheaded.  Denies any fever/chills, phlegm, hemoptysis, diarrhea, abdominal pain, chest pain.  Patient reports constipation and 1 episode of NBNB emesis earlier today.    Attendin-year-old female presents with shortness of breath and wheezing.  Patient states that about 6 months ago she had a lung resection and since then she has been having shortness of breath.  She has no chest pain.  She has no nausea or vomiting.  She was here for the same about 1 week ago and placed on prednisone but has no improvement of her symptoms.  She has an appointment next week for her pulmonary follow-up.  No fever.

## 2023-04-07 NOTE — ED CDU PROVIDER INITIAL DAY NOTE - CLINICAL SUMMARY MEDICAL DECISION MAKING FREE TEXT BOX
57-year-old female with past medical history of lung cancer status post lung surgery 6 months ago, HIV, on medication, asthma, hypertension, GERD, presenting with shortness of breath.    Vital signs show hypertension 142/77.  Otherwise normal vital signs.  Physical exam shows expiratory wheezing bilaterally.  Abdomen is soft nontender nondistended.  Moist mucous membranes.  No lower extremity swelling or tenderness to palpation.  High suspicion for shortness of breath secondary to combination of asthma and decreased lung volumes from surgery.  Consider ACS versus pneumonia versus anemia.  Plan: CBC, CMP, troponin, EKG, chest x-ray.  Symptomatic treatment with DuoNeb's, Decadron, Zofran, MiraLAX,    Pt placed in CDU for continuous duonebs and steroids will continue to monitor and do a CT chest.

## 2023-04-08 VITALS
DIASTOLIC BLOOD PRESSURE: 78 MMHG | SYSTOLIC BLOOD PRESSURE: 129 MMHG | RESPIRATION RATE: 20 BRPM | OXYGEN SATURATION: 99 % | HEART RATE: 78 BPM | TEMPERATURE: 98 F

## 2023-04-08 PROCEDURE — 99238 HOSP IP/OBS DSCHRG MGMT 30/<: CPT

## 2023-04-08 RX ORDER — METOCLOPRAMIDE HCL 10 MG
10 TABLET ORAL ONCE
Refills: 0 | Status: COMPLETED | OUTPATIENT
Start: 2023-04-08 | End: 2023-04-08

## 2023-04-08 RX ORDER — AMLODIPINE BESYLATE 2.5 MG/1
1 TABLET ORAL
Qty: 30 | Refills: 0
Start: 2023-04-08

## 2023-04-08 RX ORDER — ALBUTEROL 90 UG/1
2 AEROSOL, METERED ORAL
Qty: 1 | Refills: 0
Start: 2023-04-08

## 2023-04-08 RX ORDER — FAMOTIDINE 10 MG/ML
20 INJECTION INTRAVENOUS ONCE
Refills: 0 | Status: COMPLETED | OUTPATIENT
Start: 2023-04-08 | End: 2023-04-08

## 2023-04-08 RX ORDER — IPRATROPIUM/ALBUTEROL SULFATE 18-103MCG
3 AEROSOL WITH ADAPTER (GRAM) INHALATION
Qty: 540 | Refills: 0
Start: 2023-04-08

## 2023-04-08 RX ADMIN — Medication 40 MILLIGRAM(S): at 06:38

## 2023-04-08 RX ADMIN — Medication 3 MILLILITER(S): at 09:22

## 2023-04-08 RX ADMIN — Medication 3 MILLILITER(S): at 06:38

## 2023-04-08 RX ADMIN — FAMOTIDINE 20 MILLIGRAM(S): 10 INJECTION INTRAVENOUS at 01:18

## 2023-04-08 RX ADMIN — Medication 3 MILLILITER(S): at 01:44

## 2023-04-08 RX ADMIN — Medication 10 MILLIGRAM(S): at 01:17

## 2023-04-08 RX ADMIN — PANTOPRAZOLE SODIUM 40 MILLIGRAM(S): 20 TABLET, DELAYED RELEASE ORAL at 06:38

## 2023-04-08 NOTE — ED CDU PROVIDER DISPOSITION NOTE - NSPTACCESSSVCSAPPTDETAILS_ED_ALL_ED_FT
Patient with hx of asthma, with hospital stay for asthma exacerbation needs close pulmonology f/u within 1 week

## 2023-04-08 NOTE — ED CDU PROVIDER DISPOSITION NOTE - PATIENT PORTAL LINK FT
You can access the FollowMyHealth Patient Portal offered by [facility] by registering at the following website: [email or web address]. By joining [employer]’s FollowMyHealth portal, you will also be able to view your health information using other applications (apps) compatible with our system.

## 2023-04-08 NOTE — ED CDU PROVIDER SUBSEQUENT DAY NOTE - CLINICAL SUMMARY MEDICAL DECISION MAKING FREE TEXT BOX
Meds per orders for management of reactive airway disease, supportive care, general observation care / monitoring.

## 2023-04-08 NOTE — ED CDU PROVIDER DISPOSITION NOTE - NSFOLLOWUPINSTRUCTIONS_ED_ALL_ED_FT
- Lab and imaging results, if performed, were discussed with you along with your discharge diagnosis    - Follow up with your doctor in 1-3 days for reassessment     - Return to the ED for any new, worsening, or concerning symptoms to you including worsening shortness of breath, new chest pain, fever with cough    - Continue all prescribed medications, please  your refilled prescriptions at your pharmacy     - Rest and keep yourself hydrated with fluids    -Take your albuterol inhaler/nebulizer every 4 hours as needed

## 2023-04-08 NOTE — ED CDU PROVIDER SUBSEQUENT DAY NOTE - ATTENDING APP SHARED VISIT CONTRIBUTION OF CARE
I have personally performed a face to face medical and diagnostic evaluation of the patient. I have discussed with and reviewed the Resident's and/or ACP's and/or Medical/PA/NP student's note and agree with the History, ROS, Physical Exam and MDM unless otherwise indicated. A brief summary of my personal evaluation and impression can be found below.    57y Fw/ PMHx lung cancer tx with surgery not on any chemo, HIV (on Atripla), asthma (no hx/o intubation), HTN, GERD, presented to the ED c/o wheezing for the last several days. Pt was in the ED 4/1 for same, pt was treated with albuterol and prednisone.  In the ED, pt was wheezing, CXR with no acute findings; pt was treated for reactive airway disease / asthma exacerbation; pt was dispo'd to CDU for continued care. In CDU, pt had CT chest performed: "...LUNGS AND AIRWAYS: Patent central airways.  Minimal emphysematous changes within the lungs.  No focal alveolar infiltrate/consolidation. Minimal bibasilar subsegmental atelectasis.  Stable 7 mm ground-glass nodule within the lingula. No new mass lesions or nodules.  PLEURA: No pleural effusion....".     On exam this morning: patient asymptomatic, no audible wheezing on auscultation, no stridor, speaking in clear sentences. Complains of her typical arthritic back pain, medication administered. Patient ambulated without difficulty. Labs otherwise non-actionable. Patient is stable for discharge, will refill all home medications.

## 2023-04-08 NOTE — ED CDU PROVIDER DISPOSITION NOTE - CLINICAL COURSE
56 yo female PMH lung cancer tx with surgery not on any chemo, HIV (on Atripla), asthma (no hx/o intubation), HTN, GERD, presented to the ED c/o wheezing for the last several days.  Pt was in the ED 4/1 for same, pt was treated with albuterol and prednisone.  In the ED, pt was wheezing, CXR with no acute findings; pt was treated for reactive airway disease / asthma exacerbation; pt was dispo'd to CDU for continued care.  In CDU, pt had CT chest performed: "...LUNGS AND AIRWAYS: Patent central airways.  Minimal emphysematous changes within the lungs.  No focal alveolar infiltrate/consolidation. Minimal bibasilar subsegmental atelectasis.  Stable 7 mm groundglass nodule within the lingula. No new mass lesions or nodules.  PLEURA: No pleural effusion....".  Pt with improving symptoms; no new c/o or issues thus far. Patient with significant improvement after steroids, duonebs. Labs non-actionable. Patient is stable for discharge with follow-up to PCP.

## 2023-04-08 NOTE — ED CDU PROVIDER SUBSEQUENT DAY NOTE - HISTORY
58 yo female PMH lung cancer tx with surgery not on any chemo, HIV (on Atripla), asthma (no hx/o intubation), HTN, GERD, presented to the ED c/o wheezing for the last several days.  Pt was in the ED 4/1 for same, pt was treated with albuterol and prednisone.  In the ED, pt was wheezing, CXR with no acute findings; pt was treated for reactive airway disease / asthma exacerbation; pt was dispo'd to CDU for continued care.  In CDU, pt had CT chest performed: "...LUNGS AND AIRWAYS: Patent central airways.  Minimal emphysematous changes within the lungs.  No focal alveolar infiltrate/consolidation. Minimal bibasilar subsegmental atelectasis.  Stable 7 mm groundglass nodule within the lingula. No new mass lesions or nodules.  PLEURA: No pleural effusion....".  Pt with improving symptoms; no new c/o or issues thus far.

## 2023-04-08 NOTE — ED CDU PROVIDER SUBSEQUENT DAY NOTE - PHYSICAL EXAMINATION
CONSTITUTIONAL:  Well appearing, awake, alert, oriented to person, place, time/situation and in no apparent distress.  Pt. is objectively comfortable appearing and verbalizing in full, clear, effortless sentences.  ENMT: NC/AT.  Airway patent.  Nasal mucosa clear.  Moist mucous membranes.  Neck supple.  EYES:  Clear OU.  CARDIAC:  Normal rate, regular rhythm.  Heart sounds S1 S2.  No murmurs, gallops, or rubs.  RESPIRATORY:  Breath sounds equal bilaterally; +expiratory wheezing, mild bilaterally. No rales, no rhonchi, no retractions.  GASTROINTESTINAL:  Abdomen soft, non-distended, non-tender.  No rebound, no guarding.  NEUROLOGICAL:  Alert and oriented to person/place/time/situation.  No focal deficits; no tremors noted.   MUSCULOSKELETAL:  Range of motion is not limited.  Gait stable.    SKIN:  Skin color unremarkable.  Skin warm, dry, and intact.    PSYCHIATRIC:  Alert and oriented to person/place/time/situation.  Mood and affect WNL.  No apparent risk to self or others.

## 2023-04-08 NOTE — ED CDU PROVIDER DISPOSITION NOTE - NSFOLLOWUPCLINICS_GEN_ALL_ED_FT
[facility] Pulmonolgy and Sleep Medicine  Pulmonology  [address] [place] [address], [place] [address]  Phone: [phone]  Fax:

## 2023-04-20 ENCOUNTER — APPOINTMENT (OUTPATIENT)
Dept: PULMONOLOGY | Facility: CLINIC | Age: 58
End: 2023-04-20
Payer: MEDICAID

## 2023-04-20 VITALS
HEART RATE: 89 BPM | DIASTOLIC BLOOD PRESSURE: 80 MMHG | TEMPERATURE: 97.1 F | WEIGHT: 191 LBS | BODY MASS INDEX: 30.83 KG/M2 | SYSTOLIC BLOOD PRESSURE: 130 MMHG | OXYGEN SATURATION: 98 %

## 2023-04-20 DIAGNOSIS — J44.9 CHRONIC OBSTRUCTIVE PULMONARY DISEASE, UNSPECIFIED: ICD-10-CM

## 2023-04-20 DIAGNOSIS — J43.9 EMPHYSEMA, UNSPECIFIED: ICD-10-CM

## 2023-04-20 DIAGNOSIS — C34.90 MALIGNANT NEOPLASM OF UNSPECIFIED PART OF UNSPECIFIED BRONCHUS OR LUNG: ICD-10-CM

## 2023-04-20 DIAGNOSIS — J98.4 EMPHYSEMA, UNSPECIFIED: ICD-10-CM

## 2023-04-20 PROCEDURE — 94729 DIFFUSING CAPACITY: CPT

## 2023-04-20 PROCEDURE — 99203 OFFICE O/P NEW LOW 30 MIN: CPT | Mod: 25

## 2023-04-20 PROCEDURE — 94060 EVALUATION OF WHEEZING: CPT

## 2023-04-20 PROCEDURE — 94727 GAS DIL/WSHOT DETER LNG VOL: CPT

## 2023-04-20 RX ORDER — UMECLIDINIUM BROMIDE AND VILANTEROL TRIFENATATE 62.5; 25 UG/1; UG/1
62.5-25 POWDER RESPIRATORY (INHALATION)
Qty: 1 | Refills: 3 | Status: ACTIVE | COMMUNITY
Start: 2023-04-20 | End: 1900-01-01

## 2023-05-23 NOTE — ED CDU PROVIDER DISPOSITION NOTE - DISCHARGE DATE
Is The Patient Presenting As Previously Scheduled?: Yes How Severe Is Your Rash?: moderate Is This A New Presentation, Or A Follow-Up?: Rash Additional History: Patient states he was treated by a doctor in [place] with dermofix/setraconazole and has used cream for 10 days and rash is better. 17-Jun-2022

## 2023-06-20 ENCOUNTER — EMERGENCY (EMERGENCY)
Facility: HOSPITAL | Age: 58
LOS: 1 days | Discharge: HOME | End: 2023-06-20
Admitting: EMERGENCY MEDICINE
Payer: MEDICAID

## 2023-06-20 VITALS
RESPIRATION RATE: 18 BRPM | HEART RATE: 67 BPM | TEMPERATURE: 98 F | OXYGEN SATURATION: 100 % | SYSTOLIC BLOOD PRESSURE: 147 MMHG | DIASTOLIC BLOOD PRESSURE: 88 MMHG

## 2023-06-20 DIAGNOSIS — Z90.2 ACQUIRED ABSENCE OF LUNG [PART OF]: Chronic | ICD-10-CM

## 2023-06-20 DIAGNOSIS — Z90.710 ACQUIRED ABSENCE OF BOTH CERVIX AND UTERUS: Chronic | ICD-10-CM

## 2023-06-20 PROCEDURE — 99284 EMERGENCY DEPT VISIT MOD MDM: CPT

## 2023-06-20 PROCEDURE — 71046 X-RAY EXAM CHEST 2 VIEWS: CPT | Mod: 26

## 2023-06-20 RX ORDER — ALBUTEROL 90 UG/1
3 AEROSOL, METERED ORAL
Qty: 1 | Refills: 1
Start: 2023-06-20 | End: 2023-06-29

## 2023-06-20 RX ORDER — KETOROLAC TROMETHAMINE 30 MG/ML
1 SYRINGE (ML) INJECTION
Qty: 15 | Refills: 0
Start: 2023-06-20 | End: 2023-06-24

## 2023-06-20 RX ORDER — IPRATROPIUM/ALBUTEROL SULFATE 18-103MCG
3 AEROSOL WITH ADAPTER (GRAM) INHALATION ONCE
Refills: 0 | Status: COMPLETED | OUTPATIENT
Start: 2023-06-20 | End: 2023-06-20

## 2023-06-20 RX ORDER — KETOROLAC TROMETHAMINE 30 MG/ML
30 SYRINGE (ML) INJECTION ONCE
Refills: 0 | Status: DISCONTINUED | OUTPATIENT
Start: 2023-06-20 | End: 2023-06-20

## 2023-06-20 RX ADMIN — Medication 30 MILLIGRAM(S): at 08:01

## 2023-06-20 RX ADMIN — Medication 3 MILLILITER(S): at 08:58

## 2023-06-20 RX ADMIN — Medication 30 MILLIGRAM(S): at 09:03

## 2023-06-20 RX ADMIN — Medication 50 MILLIGRAM(S): at 08:58

## 2023-06-20 NOTE — ED ADULT TRIAGE NOTE - CHIEF COMPLAINT QUOTE
Pt c/o right upper tooth and ear pain X 2 weeks. Denies discharge, fevers or chills. No chest pain, sob, abd pain, n/v/d verbalized. Hx: lung cancer(in remission), HIV, asthma, hypertension, GERD

## 2023-06-20 NOTE — ED ADULT NURSE NOTE - OBJECTIVE STATEMENT
Patient received in ED intake room 7. A&Ox4 and ambulatory. C/o upper teeth/gum pain, radiating to right ear x 1 month. Reports 8/10 pain level. Medicated as ordered. Respirations even and unlabored. Speaking in full and complete sentences. No acute distress noted. Denies CP, SOB, nausea, vomiting, headache, lightheadedness, dizziness, fever or chills. Bed in lowest position, call bell in reach, wheels locked, safety maintained. Awaiting further orders.

## 2023-06-20 NOTE — ED PROVIDER NOTE - PROGRESS NOTE DETAILS
PA Ali: patient feels better, tolerating PO, states that pain and wheezing has resolved, lungs no wheezing heard. will d/c and recommend f/u with ENT and Dental.

## 2023-06-20 NOTE — ED PROVIDER NOTE - OBJECTIVE STATEMENT
This is a 57-year-old female with a past medical history of lung cancer currently and remission status post lung surgery 6 months prior, HIV on medication, asthma never intubated or hospitalized, hypertension, hyper GERD came into the ED complaining of having right-sided ear ache that is been going on for the past few weeks.  She states that the symptoms actually been going on since May she went to a urgent care where they gave her amoxicillin however did not see any sort of ear infection and was told to go to a dentist for the tooth ache.  She went to her PCP who gave her eardrops as well and she did not have any improvement after she completed the eardrops.  She states that the pain has been persistent she says the pain is inside the mouth on the upper right tooth.  She denies having any fever chills body aches no shortness of breath or trouble breathing no chest pain exertional dyspnea dysuria hematuria urinary urgency or frequency she denies having any difficulty eating or drinking.  She denies having any difficulty hearing or discharge from the ear.  She does admit to having some discomfort in the ear as well however has been on amoxicillin and eardrops.

## 2023-06-20 NOTE — ED PROVIDER NOTE - PATIENT PORTAL LINK FT
You can access the FollowMyHealth Patient Portal offered by [facility] by registering at the following website: [email or web address]. By joining [employer]’s FollowMyHealth portal, you will also be able to view your health information using other applications (apps) compatible with our system.

## 2023-06-20 NOTE — ED PROVIDER NOTE - TOOTH FINDINGS
no obvious signs of erythema warmth or tenderness, no signs of abscess. no swelling./no visible abnormalities

## 2023-06-20 NOTE — ED PROVIDER NOTE - CLINICAL SUMMARY MEDICAL DECISION MAKING FREE TEXT BOX
This is a 57-year-old female with a past medical history of lung cancer currently and remission status post lung surgery 6 months prior, HIV on medication, asthma never intubated or hospitalized, hypertension, hyper GERD came into the ED complaining of having right-sided ear ache that is been going on for the past few weeks. Earache/toothache, discharge lounge for dentist and ent, patient also complaining of wheezing will give prednisone and duonebs.

## 2023-06-20 NOTE — ED ADULT TRIAGE NOTE - INTERNATIONAL TRAVEL
No - currently afib rate control   - metoprolol 12.5 q6 hr for rate control can increased to 25 mg q6 if RVR  - c/w eliquis  - interrogation in AM - currently afib rate control   - metoprolol 12.5 q6 hr for rate control can increased to 25 mg q6 if RVR  - c/w eliquis  - interrogation in AM  - appreciate EP recs

## 2023-06-23 ENCOUNTER — APPOINTMENT (OUTPATIENT)
Dept: OTOLARYNGOLOGY | Facility: CLINIC | Age: 58
End: 2023-06-23
Payer: MEDICAID

## 2023-06-23 VITALS
DIASTOLIC BLOOD PRESSURE: 83 MMHG | HEART RATE: 81 BPM | HEIGHT: 67 IN | BODY MASS INDEX: 29.76 KG/M2 | WEIGHT: 189.59 LBS | SYSTOLIC BLOOD PRESSURE: 139 MMHG

## 2023-06-23 DIAGNOSIS — Z78.9 OTHER SPECIFIED HEALTH STATUS: ICD-10-CM

## 2023-06-23 DIAGNOSIS — Z87.891 PERSONAL HISTORY OF NICOTINE DEPENDENCE: ICD-10-CM

## 2023-06-23 DIAGNOSIS — H92.01 OTALGIA, RIGHT EAR: ICD-10-CM

## 2023-06-23 DIAGNOSIS — H61.22 IMPACTED CERUMEN, LEFT EAR: ICD-10-CM

## 2023-06-23 PROCEDURE — 99203 OFFICE O/P NEW LOW 30 MIN: CPT | Mod: 25

## 2023-06-23 RX ORDER — AMLODIPINE BESYLATE 5 MG/1
TABLET ORAL
Refills: 0 | Status: ACTIVE | COMMUNITY

## 2023-06-23 RX ORDER — HYDROCHLOROTHIAZIDE 12.5 MG/1
TABLET ORAL
Refills: 0 | Status: ACTIVE | COMMUNITY

## 2023-06-23 RX ORDER — METOPROLOL TARTRATE 75 MG/1
TABLET, FILM COATED ORAL
Refills: 0 | Status: ACTIVE | COMMUNITY

## 2023-06-26 NOTE — H&P ADULT - GASTROINTESTINAL COMMENTS
Called daughter to discuss, patient has been feeling short of breath for the last 3 days. Sent a transmission on Friday and Saturday. Patient has been in AF during those transmissions. Rates in AF at times rapid.  Device auto alerted on 6/24/23. Patient sent on 6/24 two times.      Will call patient to get BP reading and confirm rate control meds at home. No answer on home #.    non-bloody vomiting some days prior

## 2023-06-30 PROBLEM — H61.22 IMPACTED CERUMEN OF LEFT EAR: Status: ACTIVE | Noted: 2023-06-30

## 2023-06-30 PROBLEM — H92.01 RIGHT EAR PAIN: Status: ACTIVE | Noted: 2023-06-30

## 2023-06-30 PROBLEM — Z87.891 FORMER SMOKER: Status: ACTIVE | Noted: 2023-06-23

## 2023-06-30 NOTE — ASSESSMENT
[FreeTextEntry1] : 58 year F  with left cerumen impaction and right otalgia 2/2 TMJ from grind teeth at night. Patient tolerated cerumen removal without complaints. Patient states hearing is baseline after cerumen removal not interested in hearing test.\par \par Physical exam shows bilateral  ears normal EAC/TM. Tenderness to palpation at TMJ joint Right worse than left \par \par Recommend:\par Cerumen Impaction\par -Discussed not using q-tips or instruments to remove wax\par -Olive or mineral oil 1x per month to keep ear canal lubricated. Discussed that the ear is a self cleaning structure and just allow it clean itself. If wax builds up can try debrox. Once it gets impacted recommend return to get it cleaned out.\par \par TMJ\par - Recommended applying moist heat or cold packs on areas of acute pain\par - Trial of Soft foods diet for 2 weeks during acute episodes\par - Trial of (NSAIDs), such as aspirin/ibuprofen if patient tolerates\par - Follow up Dentist for for .\par - Avoid extreme jaw movements.\par - Can consider Physical therapy for Jaw if none of the above intervention improve symptoms\par \par Return to clinic 3 month or sooner if new/worsen symptoms present

## 2023-06-30 NOTE — PHYSICAL EXAM
[de-identified] : tenderness to palpation at TMJ joint Right worse than left [FreeTextEntry9] : cerumen impaction. removed [Normal] : mucosa is normal [Midline] : trachea located in midline position

## 2023-06-30 NOTE — HISTORY OF PRESENT ILLNESS
[de-identified] : 58 year old female presents for right otalgia. \par History of lung cancer, s/p lung surgery 11/14/22.\par States right otalgia and right sided facial pain for about 1-2 months, went to [facility] 6/20/23 treated with ketorolac and prednisone with some relief. \par States went to dentist 6/22/23 and was told not to grind her teeth at night, and there was gum disease and plan to do deep clean\par Denies otorrhea, ear infections, hearing loss, tinnitus, dizziness, vertigo, headaches related to hearing.

## 2023-07-05 PROBLEM — C34.90 CARCINOMA, LUNG: Status: ACTIVE | Noted: 2023-07-05

## 2023-07-05 PROBLEM — J43.9 MIXED RESTRICTIVE AND OBSTRUCTIVE LUNG DISEASE: Status: ACTIVE | Noted: 2023-07-05

## 2023-07-05 NOTE — HISTORY OF PRESENT ILLNESS
[Former] : former [>= 20 pack years] : >= 20 pack years [TextBox_4] : 58 yo female with hx of "asthma for a long time" presents for evaluation. The patient was treated twice in 2 weeks in the emergency room for symptoms.  Presently she is on nebulized albuterol.  She feels "better" without cough chest pain or hemoptysis.  She is postop stage I lung carcinoma in November 2022 at [facility]. [YearQuit] : 2019 [TextBox_29] : Denies snoring, daytime somnolence, apneic episodes, AM headaches

## 2023-07-05 NOTE — PHYSICAL EXAM
[No Acute Distress] : no acute distress [Normal Oropharynx] : normal oropharynx [Normal Appearance] : normal appearance [No Neck Mass] : no neck mass [Normal Rate/Rhythm] : normal rate/rhythm [Normal S1, S2] : normal s1, s2 [No Murmurs] : no murmurs [No Resp Distress] : no resp distress [Clear to Auscultation Bilaterally] : clear to auscultation bilaterally [No Abnormalities] : no abnormalities [Benign] : benign [Normal Gait] : normal gait [No Clubbing] : no clubbing [No Cyanosis] : no cyanosis [No Edema] : no edema [FROM] : FROM [Normal Color/ Pigmentation] : normal color/ pigmentation [No Focal Deficits] : no focal deficits [Oriented x3] : oriented x3 [Normal Affect] : normal affect [TextBox_105] : Right arm scar

## 2023-07-05 NOTE — DISCUSSION/SUMMARY
[FreeTextEntry1] : 57-year-old female with mixed abnormality on PFT, postop early-stage lung cancer last year at pulmonary baseline.  She is to use anoro daily and continue use of albuterol as before.  Treatment adjustment will depend on symptomatic needs.  Follow-up chest CT as per MSK.  She is to follow-up with her PMD as before.

## 2023-07-05 NOTE — PROCEDURE
[FreeTextEntry1] : PFT results: Mild mixed obstructive/restrictive disease with a bronchodilator response.  Decreased diffusion.

## 2023-07-16 ENCOUNTER — EMERGENCY (EMERGENCY)
Facility: HOSPITAL | Age: 58
LOS: 1 days | Discharge: HOME | End: 2023-07-16
Attending: EMERGENCY MEDICINE | Admitting: EMERGENCY MEDICINE
Payer: MEDICAID

## 2023-07-16 VITALS
DIASTOLIC BLOOD PRESSURE: 81 MMHG | TEMPERATURE: 98 F | HEART RATE: 70 BPM | OXYGEN SATURATION: 99 % | SYSTOLIC BLOOD PRESSURE: 132 MMHG | RESPIRATION RATE: 18 BRPM

## 2023-07-16 VITALS
OXYGEN SATURATION: 100 % | RESPIRATION RATE: 18 BRPM | DIASTOLIC BLOOD PRESSURE: 77 MMHG | HEART RATE: 71 BPM | TEMPERATURE: 98 F | SYSTOLIC BLOOD PRESSURE: 139 MMHG

## 2023-07-16 DIAGNOSIS — Z90.710 ACQUIRED ABSENCE OF BOTH CERVIX AND UTERUS: Chronic | ICD-10-CM

## 2023-07-16 DIAGNOSIS — Z90.2 ACQUIRED ABSENCE OF LUNG [PART OF]: Chronic | ICD-10-CM

## 2023-07-16 LAB
ALBUMIN SERPL ELPH-MCNC: 4.3 G/DL — SIGNIFICANT CHANGE UP (ref 3.3–5)
ALP SERPL-CCNC: 167 U/L — HIGH (ref 40–120)
ALT FLD-CCNC: 34 U/L — HIGH (ref 4–33)
ANION GAP SERPL CALC-SCNC: 12 MMOL/L — SIGNIFICANT CHANGE UP (ref 7–14)
APPEARANCE UR: ABNORMAL
AST SERPL-CCNC: 25 U/L — SIGNIFICANT CHANGE UP (ref 4–32)
BACTERIA # UR AUTO: ABNORMAL /HPF
BASOPHILS # BLD AUTO: 0.05 K/UL — SIGNIFICANT CHANGE UP (ref 0–0.2)
BASOPHILS NFR BLD AUTO: 0.6 % — SIGNIFICANT CHANGE UP (ref 0–2)
BILIRUB SERPL-MCNC: 0.3 MG/DL — SIGNIFICANT CHANGE UP (ref 0.2–1.2)
BILIRUB UR-MCNC: NEGATIVE — SIGNIFICANT CHANGE UP
BUN SERPL-MCNC: 20 MG/DL — SIGNIFICANT CHANGE UP (ref 7–23)
CALCIUM SERPL-MCNC: 10.1 MG/DL — SIGNIFICANT CHANGE UP (ref 8.4–10.5)
CAST: 0 /LPF — SIGNIFICANT CHANGE UP (ref 0–4)
CHLORIDE SERPL-SCNC: 101 MMOL/L — SIGNIFICANT CHANGE UP (ref 98–107)
CO2 SERPL-SCNC: 25 MMOL/L — SIGNIFICANT CHANGE UP (ref 22–31)
COLOR SPEC: YELLOW — SIGNIFICANT CHANGE UP
CREAT SERPL-MCNC: 0.91 MG/DL — SIGNIFICANT CHANGE UP (ref 0.5–1.3)
DIFF PNL FLD: NEGATIVE — SIGNIFICANT CHANGE UP
EGFR: 73 ML/MIN/1.73M2 — SIGNIFICANT CHANGE UP
EOSINOPHIL # BLD AUTO: 0.21 K/UL — SIGNIFICANT CHANGE UP (ref 0–0.5)
EOSINOPHIL NFR BLD AUTO: 2.6 % — SIGNIFICANT CHANGE UP (ref 0–6)
FLUAV AG NPH QL: SIGNIFICANT CHANGE UP
FLUBV AG NPH QL: SIGNIFICANT CHANGE UP
GLUCOSE SERPL-MCNC: 117 MG/DL — HIGH (ref 70–99)
GLUCOSE UR QL: NEGATIVE MG/DL — SIGNIFICANT CHANGE UP
HCT VFR BLD CALC: 39.5 % — SIGNIFICANT CHANGE UP (ref 34.5–45)
HGB BLD-MCNC: 12.8 G/DL — SIGNIFICANT CHANGE UP (ref 11.5–15.5)
IANC: 4.88 K/UL — SIGNIFICANT CHANGE UP (ref 1.8–7.4)
IMM GRANULOCYTES NFR BLD AUTO: 0.1 % — SIGNIFICANT CHANGE UP (ref 0–0.9)
KETONES UR-MCNC: NEGATIVE MG/DL — SIGNIFICANT CHANGE UP
LEUKOCYTE ESTERASE UR-ACNC: NEGATIVE — SIGNIFICANT CHANGE UP
LYMPHOCYTES # BLD AUTO: 2.46 K/UL — SIGNIFICANT CHANGE UP (ref 1–3.3)
LYMPHOCYTES # BLD AUTO: 30.6 % — SIGNIFICANT CHANGE UP (ref 13–44)
MAGNESIUM SERPL-MCNC: 2.2 MG/DL — SIGNIFICANT CHANGE UP (ref 1.6–2.6)
MCHC RBC-ENTMCNC: 28.6 PG — SIGNIFICANT CHANGE UP (ref 27–34)
MCHC RBC-ENTMCNC: 32.4 GM/DL — SIGNIFICANT CHANGE UP (ref 32–36)
MCV RBC AUTO: 88.4 FL — SIGNIFICANT CHANGE UP (ref 80–100)
MONOCYTES # BLD AUTO: 0.44 K/UL — SIGNIFICANT CHANGE UP (ref 0–0.9)
MONOCYTES NFR BLD AUTO: 5.5 % — SIGNIFICANT CHANGE UP (ref 2–14)
NEUTROPHILS # BLD AUTO: 4.88 K/UL — SIGNIFICANT CHANGE UP (ref 1.8–7.4)
NEUTROPHILS NFR BLD AUTO: 60.6 % — SIGNIFICANT CHANGE UP (ref 43–77)
NITRITE UR-MCNC: NEGATIVE — SIGNIFICANT CHANGE UP
NRBC # BLD: 0 /100 WBCS — SIGNIFICANT CHANGE UP (ref 0–0)
NRBC # FLD: 0 K/UL — SIGNIFICANT CHANGE UP (ref 0–0)
PH UR: 7 — SIGNIFICANT CHANGE UP (ref 5–8)
PHOSPHATE SERPL-MCNC: 2.5 MG/DL — SIGNIFICANT CHANGE UP (ref 2.5–4.5)
PLATELET # BLD AUTO: 465 K/UL — HIGH (ref 150–400)
POTASSIUM SERPL-MCNC: 3.5 MMOL/L — SIGNIFICANT CHANGE UP (ref 3.5–5.3)
POTASSIUM SERPL-SCNC: 3.5 MMOL/L — SIGNIFICANT CHANGE UP (ref 3.5–5.3)
PROT SERPL-MCNC: 8 G/DL — SIGNIFICANT CHANGE UP (ref 6–8.3)
PROT UR-MCNC: SIGNIFICANT CHANGE UP MG/DL
RBC # BLD: 4.47 M/UL — SIGNIFICANT CHANGE UP (ref 3.8–5.2)
RBC # FLD: 14.1 % — SIGNIFICANT CHANGE UP (ref 10.3–14.5)
RBC CASTS # UR COMP ASSIST: 0 /HPF — SIGNIFICANT CHANGE UP (ref 0–4)
RSV RNA NPH QL NAA+NON-PROBE: SIGNIFICANT CHANGE UP
SARS-COV-2 RNA SPEC QL NAA+PROBE: SIGNIFICANT CHANGE UP
SODIUM SERPL-SCNC: 138 MMOL/L — SIGNIFICANT CHANGE UP (ref 135–145)
SP GR SPEC: 1.02 — SIGNIFICANT CHANGE UP (ref 1–1.03)
SQUAMOUS # UR AUTO: 7 /HPF — HIGH (ref 0–5)
UROBILINOGEN FLD QL: 0.2 MG/DL — SIGNIFICANT CHANGE UP (ref 0.2–1)
WBC # BLD: 8.05 K/UL — SIGNIFICANT CHANGE UP (ref 3.8–10.5)
WBC # FLD AUTO: 8.05 K/UL — SIGNIFICANT CHANGE UP (ref 3.8–10.5)
WBC UR QL: 2 /HPF — SIGNIFICANT CHANGE UP (ref 0–5)

## 2023-07-16 PROCEDURE — 99284 EMERGENCY DEPT VISIT MOD MDM: CPT

## 2023-07-16 PROCEDURE — 93010 ELECTROCARDIOGRAM REPORT: CPT

## 2023-07-16 RX ORDER — ALBUTEROL 90 UG/1
2 AEROSOL, METERED ORAL
Qty: 1 | Refills: 0
Start: 2023-07-16

## 2023-07-16 RX ORDER — METOCLOPRAMIDE HCL 10 MG
10 TABLET ORAL ONCE
Refills: 0 | Status: COMPLETED | OUTPATIENT
Start: 2023-07-16 | End: 2023-07-16

## 2023-07-16 RX ORDER — SODIUM CHLORIDE 9 MG/ML
1000 INJECTION INTRAMUSCULAR; INTRAVENOUS; SUBCUTANEOUS ONCE
Refills: 0 | Status: COMPLETED | OUTPATIENT
Start: 2023-07-16 | End: 2023-07-16

## 2023-07-16 RX ORDER — KETOROLAC TROMETHAMINE 30 MG/ML
15 SYRINGE (ML) INJECTION ONCE
Refills: 0 | Status: DISCONTINUED | OUTPATIENT
Start: 2023-07-16 | End: 2023-07-16

## 2023-07-16 RX ORDER — ALBUTEROL 90 UG/1
2 AEROSOL, METERED ORAL
Refills: 0
Start: 2023-07-16

## 2023-07-16 RX ADMIN — SODIUM CHLORIDE 1000 MILLILITER(S): 9 INJECTION INTRAMUSCULAR; INTRAVENOUS; SUBCUTANEOUS at 09:13

## 2023-07-16 RX ADMIN — Medication 10 MILLIGRAM(S): at 09:13

## 2023-07-16 RX ADMIN — Medication 15 MILLIGRAM(S): at 10:25

## 2023-07-16 NOTE — ED PROVIDER NOTE - CLINICAL SUMMARY MEDICAL DECISION MAKING FREE TEXT BOX
This is a 59y/o F pt presenting with 5 days of diarrhea and positional dizziness following recent domestic travel, RLQ tenderness on examination otherwise normal exam, concern for infectious diarrhea vs appendicitis, possible orthostatic hypotension.  Plan to check blood work, EKG, and CT abdomen.  Will give IV fluids and antiemetics, follow-up results and reassess, likely discharge home with return precautions. This is a 57y/o F pt presenting with 5 days of diarrhea and positional dizziness following recent domestic travel, RLQ tenderness on examination otherwise normal exam, concern for infectious diarrhea vs appendicitis, possible orthostatic hypotension vs vertigo vs central dizziness.  Plan to check blood work, EKG, and CT abdomen.  Will give IV fluids and antiemetics, follow-up results and reassess, likely discharge home with return precautions.

## 2023-07-16 NOTE — ED PROVIDER NOTE - NS ED ATTENDING STATEMENT MOD
This was a shared visit with the JEANNE. I reviewed and verified the documentation and independently performed the documented: Attending with

## 2023-07-16 NOTE — ED ADULT NURSE NOTE - OBJECTIVE STATEMENT
Pt. A&Ox4, c/o dizziness, abdominal pain and non-bloody diarrhea x 5 days since returning from a trip to [place]. Denies vomiting or fevers.   h/o lung ca with lobectomy in 2014, no longer needing treatment. #20g IV placed to right hand, labs sent. MD at bedside for eval. Vitals stable, breathing well on RA. Respirations even and unlabored. Will continue to monitor.

## 2023-07-16 NOTE — ED PROVIDER NOTE - ATTENDING CONTRIBUTION TO CARE
Agree with resident note  58-year-old female with past medical history of hypertension, asthma, lung cancer, HIV last viral load undetectable CD4 count above 2000 presents after recent trip to [place] with nonbloody diarrhea and dizziness.  Patient states went shopping with her mother in 90 degree heat felt dizzy 2 days ago.  Went home went to sleep at night felt some diaphoresis.  Denies any chest pain, shortness of breath, fevers, urinary symptoms.  Denies abdominal abdominal pain.  States today still felt somewhat lightheaded.  Denies any vertiginous symptoms.  States due to diarrhea has ate and drank a little bit less than normal.  Physical exam  Well-appearing female in no respiratory distress  Vital signs stable  Moist mucous membranes  Clear to auscultation bilaterally  S1-S2 no murmurs rubs or gallops  Abdomen soft nontender nondistended  Neuro cranial nerves intact, 5/5 motor upper and lower extremities, sensation intact, gait stable, no drift, finger-nose within normal limits, no visual field deficits  Impression  EKG normal sinus rhythm no ischemic changes  Neuro exam yields no neurodeficits, cerebellar exam within normal limits  Given history and presentation likely fluid loss from dehydration from heat, diarrhea we will hydrate check labs and reassess

## 2023-07-16 NOTE — ED PROVIDER NOTE - PROGRESS NOTE DETAILS
Bacilio Matute PGY-1: pt feeling better following fluids and reglan, pt asking for food in the ED and ambulating well without assistance or difficulties, ECG, blood work, and urine normal. Bacilio Matute PGY-1: pt feeling better following fluids and reglan, pt asking for food in the ED and ambulating well without assistance or difficulties, tolerating PO in the ED, results reviewed, ECG, blood work, and urine normal. Bacilio Matute PGY-1: pt feeling better following fluids and reglan, pt asking for food in the ED and ambulating well without assistance or difficulties, tolerating PO in the ED, abdomen non-tender on reassessment, results reviewed, ECG, blood work, and urine normal. Bacilio Matute PGY-1: pt agreeable for discharge home, feeling better following fluids and wanting to go home, results discussed with pt.

## 2023-07-16 NOTE — ED PROVIDER NOTE - PATIENT PORTAL LINK FT
You can access the FollowMyHealth Patient Portal offered by [facility] by registering at the following website: [email or web address]. By joining [employer]’s FollowMyHealth portal, you will also be able to view your health information using other applications (apps) compatible with our system.

## 2023-07-16 NOTE — ED ADULT NURSE NOTE - NSFALLUNIVINTERV_ED_ALL_ED
Bed/Stretcher in lowest position, wheels locked, appropriate side rails in place/Call bell, personal items and telephone in reach/Instruct patient to call for assistance before getting out of bed/chair/stretcher/Non-slip footwear applied when patient is off stretcher/[place] to call system/Physically safe environment - no spills, clutter or unnecessary equipment/Purposeful proactive rounding/Room/bathroom lighting operational, light cord in reach

## 2023-07-16 NOTE — ED PROVIDER NOTE - OBJECTIVE STATEMENT
57 y/o F pt with PMHx HTN, asthma, lung cancer s/p lobectomy (2014), HIV (currently undetectable, last CD4 count 2,000 1mo ago, on triple ART) presenting into [facility] ED c/o several days of diarrhea and episodes of dizziness.  Pt returned home from a trip to [place] 5 days ago and since then has been having 3-5 episodes of nonbloody diarrhea (describing soft stools, non-watery) per day and intermittent episodes of positional dizziness (occurring in the morning when standing up).  She also notes associated abdominal bloating and nausea.  Denies abdominal pain, vomiting 57 y/o F pt with PMHx HTN, asthma, lung cancer s/p lobectomy (2014), HIV (currently undetectable, last CD4 count 2,000 1mo ago, on triple ART) presenting into [facility] ED c/o several days of diarrhea and episodes of dizziness.  Pt returned home from a trip to [place] 5 days ago and since then has been having 3-5 episodes of nonbloody diarrhea (describing soft stools, non-watery) per day and intermittent episodes of positional dizziness (describing lightheadedness and off-balance feeling, occurring in the morning when standing up).  She also notes associated abdominal bloating and nausea.  Pt has been able to eat full meals and drink water as normal.  Denies abdominal pain, vomiting, syncope, HA, fevers, cough, throat pain, dysuria, hematuria, urinary frequency, CP, SOB, calf pain, rashes.  Former smoker quit 5yrs ago, no EtOH intake, no drug usage.  No known sick contacts, no family at home with similar symptoms.

## 2023-07-16 NOTE — ED PROVIDER NOTE - NSFOLLOWUPINSTRUCTIONS_ED_ALL_ED_FT
Diarrhea    Diarrhea is frequent loose or watery bowel movements that has many causes. Diarrhea can make you feel weak and cause you to become dehydrated. Diarrhea typically lasts 2–3 days, but can last longer if it is a sign of something more serious. Drink clear fluids to prevent dehydration. Eat bland, easy-to-digest foods as tolerated.     SEEK IMMEDIATE MEDICAL CARE IF YOU HAVE ANY OF THE FOLLOWING SYMPTOMS: high fevers, lightheadedness/dizziness, chest pain, black or bloody stools, shortness of breath, severe abdominal or back pain, or any signs of dehydration.      Dizziness    Dizziness can manifest as a feeling of unsteadiness or light-headedness. You may feel like you are about to faint. This condition can be caused by a number of things, including medicines, dehydration, or illness. Drink enough fluid to keep your urine clear or pale yellow. Do not drink alcohol and limit your caffeine intake. Avoid quick or sudden movements.  Rise slowly from chairs and steady yourself until you feel okay. In the morning, first sit up on the side of the bed before standing up.    SEEK IMMEDIATE MEDICAL CARE IF YOU HAVE ANY OF THE FOLLOWING SYMPTOMS: vomiting, changes in your vision or speech, weakness in your arms or legs, trouble speaking or swallowing, chest pain, abdominal pain, shortness of breath, sweating, bleeding, headache, neck pain, or fever.

## 2023-07-16 NOTE — ED PROVIDER NOTE - PHYSICAL EXAMINATION
Const: Awake, alert, no acute distress.  Well appearing.  Moving comfortably on stretcher.  HEENT: NC/AT.  Moist mucous membranes.  No pharyngeal erythema, no exudates.  Eyes: Extraocular movements intact b/l.  No scleral icterus.  Neck: Neck supple, full ROM without pain.  Cardiac: Regular rate and regular rhythm. S1 S2 present.  No LE edema.  Resp: Speaking in full sentences. No evidence of respiratory distress.  Breath sounds clear to auscultation b/l.  Abd: Non-distended, no overlying skin changes.  Soft, (+) RLQ tenderness, no guarding, no rigidity, no rebound tenderness.  No palpable masses.  Normal bowel sounds in all 4 quadrants.  Skin: Normal coloration.  No rashes, abrasions or lacerations.  Neuro: Awake, alert & oriented x 3.  Moves all extremities symmetrically.  No focal deficits. Const: Awake, alert, no acute distress.  Well appearing.  Moving comfortably on stretcher.  HEENT: NC/AT.  Moist mucous membranes.  No pharyngeal erythema, no exudates.  Eyes: Extraocular movements intact b/l.  No scleral icterus.  Neck: Neck supple, full ROM without pain.  Cardiac: Regular rate and regular rhythm. S1 S2 present.  No LE edema.  Resp: Speaking in full sentences. No evidence of respiratory distress.  Breath sounds clear to auscultation b/l.  Abd: Non-distended, no overlying skin changes.  Soft, (+) RLQ tenderness, no guarding, no rigidity, no rebound tenderness.  No palpable masses.  Normal bowel sounds in all 4 quadrants.  Skin: Normal coloration.  No rashes, abrasions or lacerations.  Neuro: Awake, alert & oriented x 3.  Moves all extremities symmetrically.  No focal deficits.  Normal finger to nose, no dysdiadochokinesis, normal gait.  Normal visual fields.

## 2023-07-16 NOTE — ED ADULT TRIAGE NOTE - CHIEF COMPLAINT QUOTE
pt c/o dizziness, nausea, GI upset, diarrhea. returned from domestic trip Tuesday. history of asthma, hypertension.

## 2023-07-16 NOTE — ED ADULT NURSE NOTE - NSFALLRISKASMT_ED_ALL_ED_DT
Quality 110: Preventive Care And Screening: Influenza Immunization: Influenza Immunization Administered during Influenza season Quality 226: Preventive Care And Screening: Tobacco Use: Screening And Cessation Intervention: Patient screened for tobacco use and is an ex/non-smoker Detail Level: Detailed 16-Jul-2023 12:27

## 2023-07-16 NOTE — ED PROVIDER NOTE - ATTENDING APP SHARED VISIT CONTRIBUTION OF CARE
Agree with resident note  58-year-old female with past medical history of hypertension, asthma, lung cancer, HIV last viral load undetectable CD4 count above 2000 presents after recent trip to [place] with nonbloody diarrhea and dizziness.  Patient states went shopping with her mother in 90 degree heat felt dizzy 2 days ago.  Went home went to sleep at night felt some diaphoresis.  Denies any chest pain, shortness of breath, fevers, urinary symptoms.  Denies abdominal abdominal pain.  States today still felt somewhat lightheaded.  Denies any vertiginous symptoms.  States due to diarrhea has ate and drank a little bit less than normal.  Physical exam  Well-appearing female in no respiratory distress  Vital signs stable  Moist mucous membranes  Clear to auscultation bilaterally  S1-S2 no murmurs rubs or gallops  Abdomen soft nontender nondistended  Neuro cranial nerves intact, 5/5 motor upper and lower extremities, sensation intact, gait stable, no drift, finger-nose within normal limits, no visual field deficits  Impression  EKG normal sinus rhythm no ischemic changes  Neuro exam yields no neurodeficits, cerebellar exam within normal limits  Given history and presentation likely fluid loss from dehydration from heat, diarrhea we will hydrate check labs and reassess

## 2023-07-18 LAB
CULTURE RESULTS: SIGNIFICANT CHANGE UP
SPECIMEN SOURCE: SIGNIFICANT CHANGE UP

## 2023-07-18 NOTE — ED PROVIDER NOTE - NEUROLOGICAL, MLM
[facility]  Progress Note    Patient: Josef Uriostegui Age: 52 y.o. : 1974 MR#: [id number] SSN: [id number]  Date: 2023         Assessment/Plan:   Symptomatic Anemia possibly due to Aspirin Overuse - Induced Gastric Ulcer    - Transfused a total of 4 units this hospitalization, completed 1 unit today, will recheck [facility]    - Monitor HH every 8 hours     - Transfuse if Hgb < 7    - Holding anticoagulation     - Repeat tox screen, can have EGD once negative for cocaine. Severe QT prolongation    - Continue to monitor     - Avoid QT prolonging agents    Hypertension    - Continue home antihypertensives     - Monitor BP    Hyperlipidemia    - Not on any home medications       Depression/ADD    - Not on any home medications     Opioid Addiction with Methadone Use    - Continue methadone     - UDS positive for cocaine     Tobacco Abuse    - Smoking Cessation advised   Hospital Problems             Last Modified POA    * (Principal) Symptomatic anemia 2023 Yes    QT prolongation 2023 Yes    Overview Signed 2023  6:23 AM by Jennifer Zamarripa DO     QTc Prolongation (646 ms) by EKG performed on 2023. Tobacco abuse (Chronic) 2023 Yes    Hypertension (Chronic) 2023 Yes    Hyperlipidemia (Chronic) 2023 Yes    Depression (Chronic) 2023 Yes    ADD (attention deficit disorder) (Chronic) 2023 Yes    Methadone use (Chronic) 2023 Yes    Overview Signed 2023  6:26 AM by Jennifer Zamarripa DO     For Opioid Addiction (H/O Heroin Abuse). NSAID induced gastritis 2023 Yes           DVT Prophylaxis:  []Lovenox  []Hep SQ  []SCDs  []Coumadin   []On Heparin gtt []PO anticoagulant Hold anticoagulation due to GIB    Anticipated discharge: > 2 days    Subjective:     Pt s/e @ bedside. No major events overnight. Pt offers no complaints this AM. Denies CP, SOB, cough, abd pain, nvd, headache, dysuria.     Hospital Course: Alert and oriented, no focal deficits, no motor or sensory deficits.

## 2023-07-20 ENCOUNTER — APPOINTMENT (OUTPATIENT)
Dept: PULMONOLOGY | Facility: CLINIC | Age: 58
End: 2023-07-20

## 2023-08-20 ENCOUNTER — EMERGENCY (EMERGENCY)
Facility: HOSPITAL | Age: 58
LOS: 1 days | Discharge: HOME | End: 2023-08-20
Attending: STUDENT IN AN ORGANIZED HEALTH CARE EDUCATION/TRAINING PROGRAM | Admitting: STUDENT IN AN ORGANIZED HEALTH CARE EDUCATION/TRAINING PROGRAM
Payer: MEDICAID

## 2023-08-20 VITALS
TEMPERATURE: 98 F | OXYGEN SATURATION: 100 % | RESPIRATION RATE: 16 BRPM | SYSTOLIC BLOOD PRESSURE: 118 MMHG | DIASTOLIC BLOOD PRESSURE: 92 MMHG | HEART RATE: 79 BPM

## 2023-08-20 VITALS
SYSTOLIC BLOOD PRESSURE: 117 MMHG | RESPIRATION RATE: 18 BRPM | TEMPERATURE: 98 F | DIASTOLIC BLOOD PRESSURE: 67 MMHG | OXYGEN SATURATION: 99 % | HEART RATE: 85 BPM

## 2023-08-20 DIAGNOSIS — Z90.710 ACQUIRED ABSENCE OF BOTH CERVIX AND UTERUS: Chronic | ICD-10-CM

## 2023-08-20 DIAGNOSIS — Z90.2 ACQUIRED ABSENCE OF LUNG [PART OF]: Chronic | ICD-10-CM

## 2023-08-20 LAB
ALBUMIN SERPL ELPH-MCNC: 4.2 G/DL — SIGNIFICANT CHANGE UP (ref 3.3–5)
ALP SERPL-CCNC: 160 U/L — HIGH (ref 40–120)
ALT FLD-CCNC: SIGNIFICANT CHANGE UP U/L (ref 4–33)
ANION GAP SERPL CALC-SCNC: 10 MMOL/L — SIGNIFICANT CHANGE UP (ref 7–14)
ANION GAP SERPL CALC-SCNC: 12 MMOL/L — SIGNIFICANT CHANGE UP (ref 7–14)
ANION GAP SERPL CALC-SCNC: 12 MMOL/L — SIGNIFICANT CHANGE UP (ref 7–14)
APPEARANCE UR: CLEAR — SIGNIFICANT CHANGE UP
AST SERPL-CCNC: 75 U/L — HIGH (ref 4–32)
BACTERIA # UR AUTO: ABNORMAL /HPF
BASOPHILS # BLD AUTO: 0.04 K/UL — SIGNIFICANT CHANGE UP (ref 0–0.2)
BASOPHILS NFR BLD AUTO: 0.5 % — SIGNIFICANT CHANGE UP (ref 0–2)
BILIRUB SERPL-MCNC: 0.3 MG/DL — SIGNIFICANT CHANGE UP (ref 0.2–1.2)
BILIRUB UR-MCNC: NEGATIVE — SIGNIFICANT CHANGE UP
BUN SERPL-MCNC: 15 MG/DL — SIGNIFICANT CHANGE UP (ref 7–23)
BUN SERPL-MCNC: 16 MG/DL — SIGNIFICANT CHANGE UP (ref 7–23)
BUN SERPL-MCNC: 16 MG/DL — SIGNIFICANT CHANGE UP (ref 7–23)
CALCIUM SERPL-MCNC: 9.1 MG/DL — SIGNIFICANT CHANGE UP (ref 8.4–10.5)
CALCIUM SERPL-MCNC: 9.1 MG/DL — SIGNIFICANT CHANGE UP (ref 8.4–10.5)
CALCIUM SERPL-MCNC: 9.3 MG/DL — SIGNIFICANT CHANGE UP (ref 8.4–10.5)
CHLORIDE SERPL-SCNC: 94 MMOL/L — LOW (ref 98–107)
CHLORIDE SERPL-SCNC: 95 MMOL/L — LOW (ref 98–107)
CHLORIDE SERPL-SCNC: 99 MMOL/L — SIGNIFICANT CHANGE UP (ref 98–107)
CO2 SERPL-SCNC: 23 MMOL/L — SIGNIFICANT CHANGE UP (ref 22–31)
CO2 SERPL-SCNC: 23 MMOL/L — SIGNIFICANT CHANGE UP (ref 22–31)
CO2 SERPL-SCNC: 25 MMOL/L — SIGNIFICANT CHANGE UP (ref 22–31)
COLOR SPEC: YELLOW — SIGNIFICANT CHANGE UP
CREAT SERPL-MCNC: 0.76 MG/DL — SIGNIFICANT CHANGE UP (ref 0.5–1.3)
CREAT SERPL-MCNC: 0.79 MG/DL — SIGNIFICANT CHANGE UP (ref 0.5–1.3)
CREAT SERPL-MCNC: 0.9 MG/DL — SIGNIFICANT CHANGE UP (ref 0.5–1.3)
D DIMER BLD IA.RAPID-MCNC: <150 NG/ML DDU — SIGNIFICANT CHANGE UP
DIFF PNL FLD: NEGATIVE — SIGNIFICANT CHANGE UP
EGFR: 74 ML/MIN/1.73M2 — SIGNIFICANT CHANGE UP
EGFR: 87 ML/MIN/1.73M2 — SIGNIFICANT CHANGE UP
EGFR: 91 ML/MIN/1.73M2 — SIGNIFICANT CHANGE UP
EOSINOPHIL # BLD AUTO: 0.16 K/UL — SIGNIFICANT CHANGE UP (ref 0–0.5)
EOSINOPHIL NFR BLD AUTO: 2.2 % — SIGNIFICANT CHANGE UP (ref 0–6)
EPI CELLS # UR: PRESENT
FLUAV AG NPH QL: SIGNIFICANT CHANGE UP
FLUBV AG NPH QL: SIGNIFICANT CHANGE UP
GLUCOSE SERPL-MCNC: 133 MG/DL — HIGH (ref 70–99)
GLUCOSE SERPL-MCNC: 134 MG/DL — HIGH (ref 70–99)
GLUCOSE SERPL-MCNC: 134 MG/DL — HIGH (ref 70–99)
GLUCOSE UR QL: NEGATIVE MG/DL — SIGNIFICANT CHANGE UP
HCT VFR BLD CALC: 38 % — SIGNIFICANT CHANGE UP (ref 34.5–45)
HGB BLD-MCNC: 12.2 G/DL — SIGNIFICANT CHANGE UP (ref 11.5–15.5)
IANC: 4.26 K/UL — SIGNIFICANT CHANGE UP (ref 1.8–7.4)
IMM GRANULOCYTES NFR BLD AUTO: 0.3 % — SIGNIFICANT CHANGE UP (ref 0–0.9)
KETONES UR-MCNC: NEGATIVE MG/DL — SIGNIFICANT CHANGE UP
LEUKOCYTE ESTERASE UR-ACNC: NEGATIVE — SIGNIFICANT CHANGE UP
LYMPHOCYTES # BLD AUTO: 2.45 K/UL — SIGNIFICANT CHANGE UP (ref 1–3.3)
LYMPHOCYTES # BLD AUTO: 33.6 % — SIGNIFICANT CHANGE UP (ref 13–44)
MAGNESIUM SERPL-MCNC: 2 MG/DL — SIGNIFICANT CHANGE UP (ref 1.6–2.6)
MAGNESIUM SERPL-MCNC: 2.2 MG/DL — SIGNIFICANT CHANGE UP (ref 1.6–2.6)
MAGNESIUM SERPL-MCNC: 2.2 MG/DL — SIGNIFICANT CHANGE UP (ref 1.6–2.6)
MCHC RBC-ENTMCNC: 28.6 PG — SIGNIFICANT CHANGE UP (ref 27–34)
MCHC RBC-ENTMCNC: 32.1 GM/DL — SIGNIFICANT CHANGE UP (ref 32–36)
MCV RBC AUTO: 89 FL — SIGNIFICANT CHANGE UP (ref 80–100)
MONOCYTES # BLD AUTO: 0.36 K/UL — SIGNIFICANT CHANGE UP (ref 0–0.9)
MONOCYTES NFR BLD AUTO: 4.9 % — SIGNIFICANT CHANGE UP (ref 2–14)
NEUTROPHILS # BLD AUTO: 4.26 K/UL — SIGNIFICANT CHANGE UP (ref 1.8–7.4)
NEUTROPHILS NFR BLD AUTO: 58.5 % — SIGNIFICANT CHANGE UP (ref 43–77)
NITRITE UR-MCNC: NEGATIVE — SIGNIFICANT CHANGE UP
NRBC # BLD: 0 /100 WBCS — SIGNIFICANT CHANGE UP (ref 0–0)
NRBC # FLD: 0 K/UL — SIGNIFICANT CHANGE UP (ref 0–0)
NT-PROBNP SERPL-SCNC: 121 PG/ML — SIGNIFICANT CHANGE UP
PH UR: 7 — SIGNIFICANT CHANGE UP (ref 5–8)
PHOSPHATE SERPL-MCNC: 2.3 MG/DL — LOW (ref 2.5–4.5)
PHOSPHATE SERPL-MCNC: SIGNIFICANT CHANGE UP MG/DL (ref 2.5–4.5)
PHOSPHATE SERPL-MCNC: SIGNIFICANT CHANGE UP MG/DL (ref 2.5–4.5)
PLATELET # BLD AUTO: 444 K/UL — HIGH (ref 150–400)
POTASSIUM SERPL-MCNC: 3.7 MMOL/L — SIGNIFICANT CHANGE UP (ref 3.5–5.3)
POTASSIUM SERPL-MCNC: SIGNIFICANT CHANGE UP MMOL/L (ref 3.5–5.3)
POTASSIUM SERPL-MCNC: SIGNIFICANT CHANGE UP MMOL/L (ref 3.5–5.3)
POTASSIUM SERPL-SCNC: 3.7 MMOL/L — SIGNIFICANT CHANGE UP (ref 3.5–5.3)
POTASSIUM SERPL-SCNC: SIGNIFICANT CHANGE UP MMOL/L (ref 3.5–5.3)
POTASSIUM SERPL-SCNC: SIGNIFICANT CHANGE UP MMOL/L (ref 3.5–5.3)
PROT SERPL-MCNC: SIGNIFICANT CHANGE UP G/DL (ref 6–8.3)
PROT UR-MCNC: NEGATIVE MG/DL — SIGNIFICANT CHANGE UP
RBC # BLD: 4.27 M/UL — SIGNIFICANT CHANGE UP (ref 3.8–5.2)
RBC # FLD: 14.2 % — SIGNIFICANT CHANGE UP (ref 10.3–14.5)
RBC CASTS # UR COMP ASSIST: SIGNIFICANT CHANGE UP /HPF (ref 0–4)
RSV RNA NPH QL NAA+NON-PROBE: SIGNIFICANT CHANGE UP
SARS-COV-2 RNA SPEC QL NAA+PROBE: SIGNIFICANT CHANGE UP
SODIUM SERPL-SCNC: 127 MMOL/L — LOW (ref 135–145)
SODIUM SERPL-SCNC: 130 MMOL/L — LOW (ref 135–145)
SODIUM SERPL-SCNC: 136 MMOL/L — SIGNIFICANT CHANGE UP (ref 135–145)
SP GR SPEC: 1.01 — SIGNIFICANT CHANGE UP (ref 1–1.03)
TROPONIN T, HIGH SENSITIVITY RESULT: 7 NG/L — SIGNIFICANT CHANGE UP
TROPONIN T, HIGH SENSITIVITY RESULT: 7 NG/L — SIGNIFICANT CHANGE UP
UROBILINOGEN FLD QL: 0.2 MG/DL — SIGNIFICANT CHANGE UP (ref 0.2–1)
WBC # BLD: 7.29 K/UL — SIGNIFICANT CHANGE UP (ref 3.8–10.5)
WBC # FLD AUTO: 7.29 K/UL — SIGNIFICANT CHANGE UP (ref 3.8–10.5)
WBC UR QL: SIGNIFICANT CHANGE UP /HPF (ref 0–5)

## 2023-08-20 PROCEDURE — 71046 X-RAY EXAM CHEST 2 VIEWS: CPT | Mod: 26

## 2023-08-20 PROCEDURE — 70450 CT HEAD/BRAIN W/O DYE: CPT | Mod: 26,MA

## 2023-08-20 PROCEDURE — 99284 EMERGENCY DEPT VISIT MOD MDM: CPT

## 2023-08-20 PROCEDURE — 93010 ELECTROCARDIOGRAM REPORT: CPT

## 2023-08-20 RX ORDER — MECLIZINE HCL 12.5 MG
25 TABLET ORAL ONCE
Refills: 0 | Status: COMPLETED | OUTPATIENT
Start: 2023-08-20 | End: 2023-08-20

## 2023-08-20 RX ORDER — SODIUM,POTASSIUM PHOSPHATES 278-250MG
1 POWDER IN PACKET (EA) ORAL ONCE
Refills: 0 | Status: COMPLETED | OUTPATIENT
Start: 2023-08-20 | End: 2023-08-20

## 2023-08-20 RX ORDER — METOCLOPRAMIDE HCL 10 MG
10 TABLET ORAL ONCE
Refills: 0 | Status: COMPLETED | OUTPATIENT
Start: 2023-08-20 | End: 2023-08-20

## 2023-08-20 RX ORDER — SODIUM CHLORIDE 9 MG/ML
1000 INJECTION INTRAMUSCULAR; INTRAVENOUS; SUBCUTANEOUS ONCE
Refills: 0 | Status: COMPLETED | OUTPATIENT
Start: 2023-08-20 | End: 2023-08-20

## 2023-08-20 RX ADMIN — Medication 25 MILLIGRAM(S): at 11:07

## 2023-08-20 RX ADMIN — Medication 10 MILLIGRAM(S): at 11:07

## 2023-08-20 RX ADMIN — SODIUM CHLORIDE 1000 MILLILITER(S): 9 INJECTION INTRAMUSCULAR; INTRAVENOUS; SUBCUTANEOUS at 11:07

## 2023-08-20 RX ADMIN — Medication 1 PACKET(S): at 18:12

## 2023-08-20 NOTE — ED ADULT NURSE NOTE - NSFALLRISKINTERV_ED_ALL_ED

## 2023-08-20 NOTE — ED ADULT TRIAGE NOTE - CHIEF COMPLAINT QUOTE
pt c/o of rt ear discomfort and ringing sensation for few days, dizziness for 3 days with nausea as well, no neuro deficits noted

## 2023-08-20 NOTE — ED ADULT NURSE REASSESSMENT NOTE - NS ED NURSE REASSESS COMMENT FT1
Pt A&Ox4, resting on stretcher in RW. Repeat BMP collected per order. Respirations even and unlabored. Pt offers no complaints at this time. Vital signs stable. No acute distress noted. Pending results. Safety maintained. Pt aware to call for assistance prior to getting up.

## 2023-08-20 NOTE — ED PROVIDER NOTE - PATIENT PORTAL LINK FT
You can access the FollowMyHealth Patient Portal offered by [facility] by registering at the following website: [email or web address]. By joining [employer]’s FollowMyHealth portal, you will also be able to view your health information using other applications (apps) compatible with our system.

## 2023-08-20 NOTE — ED PROVIDER NOTE - NSFOLLOWUPINSTRUCTIONS_ED_ALL_ED_FT
You were seen for dizziness.     No signs of emergency medical condition on today's workup.  Your results are attached with your discharge instructions, please review them with your primary care physician. If there is a result pending, you will receive a call if test is positive.    A presumptive diagnosis is made today, but further evaluation may be required by your primary care doctor and/or specialist for a definitive diagnosis. Therefore, follow up as directed and if symptoms change/worsen or any emergency conditions, please return to the ER.    For pain or fever you can ibuprofen (motrin, advil) or tylenol as needed, as directed on packaging.    If needed, call patient access services at [phone] to find a primary care doctor, or call at [phone] to make an appointment at the clinic.

## 2023-08-20 NOTE — ED ADULT NURSE NOTE - OBJECTIVE STATEMENT
Patient is a 59 yo female, phx HIV, asthma, lung CA, presenting with dizziness and shortness of breath x 3 days. AAOx4, no signs of distress, denies chest pain, fever, cough, palpitations. Lung sounds clear, no wheezing. 20G PIV placed to R hand, labs sent per orders. Medicated for dizziness. Pending CXR. Fall precautions maintained.

## 2023-08-20 NOTE — ED PROVIDER NOTE - PROGRESS NOTE DETAILS
Whit Angeles,  pt reports her sxs are improved. Awaiting rpt bmp.  All of the other results are discussed with the pt. All ques are answered. Plan to have the pt follow up with primary care physician if bmp is normal. bmp results are discussed with the patient. follow up with pcp

## 2023-08-21 LAB
CULTURE RESULTS: SIGNIFICANT CHANGE UP
HIV-1 VIRAL LOAD RESULT: SIGNIFICANT CHANGE UP
HIV1 RNA # SERPL NAA+PROBE: SIGNIFICANT CHANGE UP COPIES/ML
HIV1 RNA SER-IMP: SIGNIFICANT CHANGE UP
HIV1 RNA SERPL NAA+PROBE-ACNC: SIGNIFICANT CHANGE UP
HIV1 RNA SERPL NAA+PROBE-LOG#: SIGNIFICANT CHANGE UP LG COP/ML
SPECIMEN SOURCE: SIGNIFICANT CHANGE UP

## 2023-09-12 ENCOUNTER — EMERGENCY (EMERGENCY)
Facility: HOSPITAL | Age: 58
LOS: 1 days | Discharge: HOME | End: 2023-09-12
Attending: EMERGENCY MEDICINE | Admitting: EMERGENCY MEDICINE
Payer: MEDICAID

## 2023-09-12 VITALS
SYSTOLIC BLOOD PRESSURE: 120 MMHG | TEMPERATURE: 99 F | OXYGEN SATURATION: 100 % | DIASTOLIC BLOOD PRESSURE: 71 MMHG | RESPIRATION RATE: 18 BRPM | HEART RATE: 67 BPM

## 2023-09-12 VITALS
TEMPERATURE: 98 F | OXYGEN SATURATION: 100 % | RESPIRATION RATE: 16 BRPM | DIASTOLIC BLOOD PRESSURE: 65 MMHG | HEART RATE: 75 BPM | SYSTOLIC BLOOD PRESSURE: 97 MMHG

## 2023-09-12 DIAGNOSIS — Z90.2 ACQUIRED ABSENCE OF LUNG [PART OF]: Chronic | ICD-10-CM

## 2023-09-12 DIAGNOSIS — Z90.710 ACQUIRED ABSENCE OF BOTH CERVIX AND UTERUS: Chronic | ICD-10-CM

## 2023-09-12 LAB
ALBUMIN SERPL ELPH-MCNC: 4.8 G/DL — SIGNIFICANT CHANGE UP (ref 3.3–5)
ALP SERPL-CCNC: 176 U/L — HIGH (ref 40–120)
ALT FLD-CCNC: 32 U/L — SIGNIFICANT CHANGE UP (ref 4–33)
ANION GAP SERPL CALC-SCNC: 14 MMOL/L — SIGNIFICANT CHANGE UP (ref 7–14)
AST SERPL-CCNC: 28 U/L — SIGNIFICANT CHANGE UP (ref 4–32)
BASOPHILS # BLD AUTO: 0.05 K/UL — SIGNIFICANT CHANGE UP (ref 0–0.2)
BASOPHILS NFR BLD AUTO: 0.6 % — SIGNIFICANT CHANGE UP (ref 0–2)
BILIRUB SERPL-MCNC: 0.3 MG/DL — SIGNIFICANT CHANGE UP (ref 0.2–1.2)
BUN SERPL-MCNC: 19 MG/DL — SIGNIFICANT CHANGE UP (ref 7–23)
CALCIUM SERPL-MCNC: 10 MG/DL — SIGNIFICANT CHANGE UP (ref 8.4–10.5)
CHLORIDE SERPL-SCNC: 96 MMOL/L — LOW (ref 98–107)
CO2 SERPL-SCNC: 26 MMOL/L — SIGNIFICANT CHANGE UP (ref 22–31)
CREAT SERPL-MCNC: 0.9 MG/DL — SIGNIFICANT CHANGE UP (ref 0.5–1.3)
EGFR: 74 ML/MIN/1.73M2 — SIGNIFICANT CHANGE UP
EOSINOPHIL # BLD AUTO: 0.13 K/UL — SIGNIFICANT CHANGE UP (ref 0–0.5)
EOSINOPHIL NFR BLD AUTO: 1.5 % — SIGNIFICANT CHANGE UP (ref 0–6)
GLUCOSE SERPL-MCNC: 108 MG/DL — HIGH (ref 70–99)
HCT VFR BLD CALC: 42.2 % — SIGNIFICANT CHANGE UP (ref 34.5–45)
HGB BLD-MCNC: 13.9 G/DL — SIGNIFICANT CHANGE UP (ref 11.5–15.5)
IANC: 5.77 K/UL — SIGNIFICANT CHANGE UP (ref 1.8–7.4)
IMM GRANULOCYTES NFR BLD AUTO: 0.2 % — SIGNIFICANT CHANGE UP (ref 0–0.9)
LYMPHOCYTES # BLD AUTO: 2.42 K/UL — SIGNIFICANT CHANGE UP (ref 1–3.3)
LYMPHOCYTES # BLD AUTO: 27.3 % — SIGNIFICANT CHANGE UP (ref 13–44)
MCHC RBC-ENTMCNC: 28.7 PG — SIGNIFICANT CHANGE UP (ref 27–34)
MCHC RBC-ENTMCNC: 32.9 GM/DL — SIGNIFICANT CHANGE UP (ref 32–36)
MCV RBC AUTO: 87.2 FL — SIGNIFICANT CHANGE UP (ref 80–100)
MONOCYTES # BLD AUTO: 0.46 K/UL — SIGNIFICANT CHANGE UP (ref 0–0.9)
MONOCYTES NFR BLD AUTO: 5.2 % — SIGNIFICANT CHANGE UP (ref 2–14)
NEUTROPHILS # BLD AUTO: 5.77 K/UL — SIGNIFICANT CHANGE UP (ref 1.8–7.4)
NEUTROPHILS NFR BLD AUTO: 65.2 % — SIGNIFICANT CHANGE UP (ref 43–77)
NRBC # BLD: 0 /100 WBCS — SIGNIFICANT CHANGE UP (ref 0–0)
NRBC # FLD: 0 K/UL — SIGNIFICANT CHANGE UP (ref 0–0)
PLATELET # BLD AUTO: 489 K/UL — HIGH (ref 150–400)
POTASSIUM SERPL-MCNC: 3.1 MMOL/L — LOW (ref 3.5–5.3)
POTASSIUM SERPL-SCNC: 3.1 MMOL/L — LOW (ref 3.5–5.3)
PROT SERPL-MCNC: 9 G/DL — HIGH (ref 6–8.3)
RBC # BLD: 4.84 M/UL — SIGNIFICANT CHANGE UP (ref 3.8–5.2)
RBC # FLD: 13.7 % — SIGNIFICANT CHANGE UP (ref 10.3–14.5)
SODIUM SERPL-SCNC: 136 MMOL/L — SIGNIFICANT CHANGE UP (ref 135–145)
TSH SERPL-MCNC: 0.9 UIU/ML — SIGNIFICANT CHANGE UP (ref 0.27–4.2)
WBC # BLD: 8.85 K/UL — SIGNIFICANT CHANGE UP (ref 3.8–10.5)
WBC # FLD AUTO: 8.85 K/UL — SIGNIFICANT CHANGE UP (ref 3.8–10.5)

## 2023-09-12 PROCEDURE — 71046 X-RAY EXAM CHEST 2 VIEWS: CPT | Mod: 26

## 2023-09-12 PROCEDURE — 93010 ELECTROCARDIOGRAM REPORT: CPT

## 2023-09-12 PROCEDURE — 99284 EMERGENCY DEPT VISIT MOD MDM: CPT

## 2023-09-12 RX ORDER — POTASSIUM CHLORIDE 20 MEQ
40 PACKET (EA) ORAL ONCE
Refills: 0 | Status: COMPLETED | OUTPATIENT
Start: 2023-09-12 | End: 2023-09-12

## 2023-09-12 RX ORDER — LIDOCAINE 4 G/100G
1 CREAM TOPICAL ONCE
Refills: 0 | Status: COMPLETED | OUTPATIENT
Start: 2023-09-12 | End: 2023-09-12

## 2023-09-12 RX ORDER — ACETAMINOPHEN 500 MG
975 TABLET ORAL ONCE
Refills: 0 | Status: COMPLETED | OUTPATIENT
Start: 2023-09-12 | End: 2023-09-12

## 2023-09-12 RX ORDER — ONDANSETRON 8 MG/1
4 TABLET, FILM COATED ORAL ONCE
Refills: 0 | Status: COMPLETED | OUTPATIENT
Start: 2023-09-12 | End: 2023-09-12

## 2023-09-12 RX ORDER — ALPRAZOLAM 0.25 MG
0.25 TABLET ORAL ONCE
Refills: 0 | Status: DISCONTINUED | OUTPATIENT
Start: 2023-09-12 | End: 2023-09-12

## 2023-09-12 RX ADMIN — LIDOCAINE 1 PATCH: 4 CREAM TOPICAL at 12:39

## 2023-09-12 RX ADMIN — Medication 975 MILLIGRAM(S): at 12:39

## 2023-09-12 RX ADMIN — Medication 0.25 MILLIGRAM(S): at 13:56

## 2023-09-12 RX ADMIN — Medication 40 MILLIEQUIVALENT(S): at 13:57

## 2023-09-12 RX ADMIN — ONDANSETRON 4 MILLIGRAM(S): 8 TABLET, FILM COATED ORAL at 12:38

## 2023-09-12 NOTE — ED PROVIDER NOTE - PHYSICAL EXAMINATION
GENERAL: appears tearful, non-toxic appearing  HEAD: normocephalic, atraumatic  HEENT: normal conjunctiva, oral mucosa moist,   CARDIAC: regular rate and rhythm, no appreciable murmurs, 2+ pulses in UE/LE b/l  PULM: normal breath sounds, clear to ascultation bilaterally, no rales, rhonchi, wheezing  GI: abdomen nondistended, soft, nontender, no guarding, rebound tenderness  NEURO: AAOx3  MSK: right anterior lower ribs tender to palpation without edema, ecchymosis, or palpable crepitus, no evidence of flail chest   SKIN: well-perfused, extremities warm, no visible rashes  PSYCH: tearful on exam

## 2023-09-12 NOTE — ED ADULT TRIAGE NOTE - CHIEF COMPLAINT QUOTE
Pt brought in by EMS from home states she is feeling anxious because she felt like her asthma was "acting up" Pt complaining of feeling nauseous. Pt denies chest pain, fever or chills.

## 2023-09-12 NOTE — ED ADULT NURSE NOTE - OBJECTIVE STATEMENT
pt with Hx lung CA not on Chemo, coming with some SOB, nasal congestion, pain to right side ribs area x few days, have been taking Asthma meds w/o relief. denies CP, no fever, no HA, labs sent, IV to right medial wrist 20g angio cath place, medicated as ordered.      Ludy Friedman RN

## 2023-09-12 NOTE — ED PROVIDER NOTE - ATTENDING CONTRIBUTION TO CARE
59 y/o F History of HIV, asthma/COPD, lung cancer s/p resection, hypertension presenting for evaluation of right anterior rib pain exacerbated by coughing and breathing and frequent anxiety attacks causing lightheadedness and nausea for the last month.  Patient states she gets nearly daily anxiety attacks that cause rapid breathing, lightheadedness, nausea, and sense of doom when she feels like she has difficulty breathing. She has been afraid to go to work or ride trains because of the anxiety attacks.  Her primary care doctor recently prescribed her fluticasone which initially worked however she stopped taking it because she was feeling well.  Patient started noticing that her right ribs hurt but denies trauma, forceful coughing abdominal pain, nausea, vomiting, left-sided chest pain or substernal chest pain.  no SOB at this time. Patient states she was prescribed Escitalopram by her PCP but was unable to start the medication because she lost the rx.

## 2023-09-12 NOTE — ED PROVIDER NOTE - OBJECTIVE STATEMENT
57 y/o F History of HIV, asthma/COPD, lung cancer s/p resection, hypertension presenting for evaluation of right anterior rib pain exacerbated by coughing and breathing and frequent anxiety attacks causing lightheadedness and nausea for the last month.  Patient states she gets nearly daily anxiety attacks that cause rapid breathing, lightheadedness, nausea, and sense of doom when she feels like she has difficulty breathing. She has been afraid to go to work or ride trains because of the anxiety attacks.  Her primary care doctor recently prescribed her fluticasone which initially worked however she stopped taking it because she was feeling well.  Patient started noticing that her right ribs hurt but denies trauma, forceful coughing abdominal pain, nausea, vomiting, left-sided chest pain or substernal chest pain.  no SOB at this time. Patient states she was prescribed Escitalopram by her PCP but was unable to start the medication because she lost the rx.

## 2023-09-12 NOTE — ED PROVIDER NOTE - CLINICAL SUMMARY MEDICAL DECISION MAKING FREE TEXT BOX
57 y/o F History of HIV, asthma/COPD, lung cancer s/p resection, hypertension presenting for evaluation of right anterior rib pain exacerbated by coughing and breathing and frequent anxiety attacks causing lightheadedness and nausea for the last month.  currently not taking her fluticasone, did not start escatilopram.  Afebrile hemodynamically stable with tenderness to the right anterior ribs on exam, no wheezing auscultated, no evidence of trauma.  Counseled patient on follow-up with [facility], given patient's tenderness on exam, concern for rib fractures will get a chest x-ray, given patient's history of HIV, asthma and COPD will also evaluate for possible pneumonia.  We will get basic labs, TSH.

## 2023-09-12 NOTE — ED PROVIDER NOTE - PATIENT PORTAL LINK FT
You can access the FollowMyHealth Patient Portal offered by [facility] by registering at the following website: [email or web address]. By joining [email or web address]’s FollowMyHealth portal, you will also be able to view your health information using other applications (apps) compatible with our system.

## 2023-09-12 NOTE — ED PROVIDER NOTE - NSFOLLOWUPINSTRUCTIONS_ED_ALL_ED_FT
You were seen in the emergency department for anxiety, right-sided rib pain that worsens with breathing.  Your chest x-ray was clear, did not show any pneumonia or fractures.  Your potassium was noted to be low at 3.1, we gave you replacement of your potassium.  There is no other abnormalities on your work-up.  We gave you Xanax to help your symptoms of anxiety.  We recommend that you follow-up with the [facility] tomorrow and please keep your primary care appointment tomorrow as well.    Please return to the emergency department if you develop difficulty breathing, chest pain, thoughts of harming yourself or others, visual or auditory hallucinations, worsening anxiety. You were seen in the emergency department for anxiety, right-sided rib pain that worsens with breathing.  Your chest x-ray was clear, did not show any pneumonia or fractures.  Your potassium was noted to be low at 3.1, we gave you replacement of your potassium.  There is no other abnormalities on your work-up.  We gave you Xanax to help your symptoms of anxiety.  We recommend that you follow-up with the [facility] tomorrow and please keep your primary care appointment tomorrow as well.    Please return to the emergency department if you develop difficulty breathing, chest pain, thoughts of harming yourself or others, visual or auditory hallucinations, worsening anxiety.    [facility]  [phone] [place] [phone]

## 2023-09-12 NOTE — ED PROVIDER NOTE - PROGRESS NOTE DETAILS
Iliana Evangelista MD, PGY2: pt c/o worsening feeling of panic, will give 0.25 of xanax now. Iliana Evangelista MD, PGY2:  Reviewed all results of labs and imaging, patient's potassium was noted to be low, gave repletion, chest x-ray shows no abnormalities.  Patient reassessed and feels better after Xanax.  Gave patient information regarding this after crisis center, instructed patient to follow-up tomorrow.  Patient reports that she also has primary care appointment tomorrow as well.  Patient given return precautions, all questions answered, patient ready for discharge.

## 2023-09-20 NOTE — PATIENT PROFILE ADULT - INFLUENZA IMMUNIZATION DATE (APPROXIMATE)
Name: Laureen Grullon      : 1975      MRN: [id number]  Encounter Provider: Medina Lr MD  Encounter Date: 2023   Encounter department: [facility]     1. DDD (degenerative disc disease), lumbar  Assessment & Plan:  Is going for back surgery on October 3.      2. Restless legs syndrome (RLS)  -     gabapentin (NEURONTIN) 300 mg capsule; Take 1 capsule in the morning and 2 at bedtime    3. Other insomnia  -     traZODone (DESYREL) 50 mg tablet; Take 2 tablets at bedtime    4. Pre-op examination  Assessment & Plan:  EKG came back in normal sinus rhythm. Patient is an acceptable risk for the proposed procedure. 5. Chronic midline low back pain without sciatica  -     ketorolac (TORADOL) 10 mg tablet; Take 1 tablet (10 mg total) by mouth every 6 (six) hours as needed for moderate pain           Subjective     She is here today for preop clearance for back surgery. She had an EKG done earlier today came back normal sinus rhythm. Her blood work is pending. She denies any complaint other than chronic back pain. Review of Systems   Constitutional: Negative for chills and fever. HENT: Negative for trouble swallowing. Eyes: Negative for visual disturbance. Respiratory: Negative for cough and shortness of breath. Cardiovascular: Negative for chest pain, palpitations and leg swelling. Gastrointestinal: Negative for abdominal pain, constipation and diarrhea. Endocrine: Negative for cold intolerance and heat intolerance. Genitourinary: Negative for difficulty urinating and dysuria. Musculoskeletal: Positive for arthralgias and back pain. Skin: Negative for rash. Neurological: Negative for dizziness, tremors, seizures and headaches. Hematological: Negative for adenopathy. Psychiatric/Behavioral: Negative for behavioral problems.        Past Medical History:   Diagnosis Date   • Abnormal Pap smear of cervix    • RAMIREZ positive    • Anxiety    • Basal cell carcinoma    • Depression    • Migraine    • Migraine    • Miscarriage    • MRSA colonization 2016   • Neck pain    • Pregnancy    • Vertebral artery dissection (HCC)    • Vulvovaginitis    • Yeast infection      Past Surgical History:   Procedure Laterality Date   •  SECTION, LOW TRANSVERSE     • COLONOSCOPY     • DILATION AND CURETTAGE OF UTERUS     • GYNECOLOGIC CRYOSURGERY      cervix   • LASIK     • SKIN LESION EXCISION      basal cell carcinoma of left cheek     Family History   Problem Relation Age of Onset   • Hypertension Mother    • Lung cancer Mother 64   • Brain cancer Mother 64   • Breast cancer Maternal Aunt         dx in 42's    • Multiple sclerosis Father    • No Known Problems Daughter    • No Known Problems Maternal Grandmother    • No Known Problems Maternal Grandfather    • No Known Problems Paternal Grandmother    • No Known Problems Paternal Grandfather    • No Known Problems Maternal Aunt      Social History     Socioeconomic History   • Marital status:      Spouse name: None   • Number of children: None   • Years of education: None   • Highest education level: None   Occupational History   • None   Tobacco Use   • Smoking status: Never   • Smokeless tobacco: Never   Vaping Use   • Vaping Use: Never used   Substance and Sexual Activity   • Alcohol use: No   • Drug use: No   • Sexual activity: Yes     Partners: Male     Birth control/protection: Male Sterilization   Other Topics Concern   • None   Social History Narrative    Feels safe at home      Social Determinants of Health     Financial Resource Strain: Not on file   Food Insecurity: Not on file   Transportation Needs: Not on file   Physical Activity: Not on file   Stress: Not on file   Social Connections: Not on file   Intimate Partner Violence: Not on file   Housing Stability: Not on file     Current Outpatient Medications on File Prior to Visit   Medication Sig   • ALPRAZolam [facility]) 0.25 mg tablet Take 1 tablet (0.25 mg total) by mouth daily at bedtime as needed for anxiety   • Botox 200 units SOLR    • butalbital-acetaminophen-caffeine (Esgic) -40 mg per tablet Take 1 tablet by mouth every 4 (four) hours as needed for headaches   • escitalopram (LEXAPRO) 20 mg tablet TAKE 1 TABLET BY MOUTH EVERY DAY   • hydrOXYzine HCL (ATARAX) 25 mg tablet Take 25 mg by mouth in the morning     • latanoprost (XALATAN) 0.005 % ophthalmic solution INSTILL 1 DROP IN AFFECTED EYE AT BEDTIME   • pantoprazole (PROTONIX) 40 mg tablet TAKE 1 TABLET BY MOUTH EVERY DAY IN THE MORNING   • Probiotic Product (PRO-BIOTIC BLEND PO) Take by mouth   • [DISCONTINUED] gabapentin (NEURONTIN) 300 mg capsule TAKE 2 CAPSULES BY MOUTH TWICE A DAY   • [DISCONTINUED] ketorolac (TORADOL) 10 mg tablet PLEASE SEE ATTACHED FOR DETAILED DIRECTIONS   • [DISCONTINUED] traZODone (DESYREL) 50 mg tablet TAKE 1.5 TABLTS (75 MG TOTAL) BY MOUTH DAILY AT BEDTIME (Patient taking differently: Take 100 mg by mouth daily at bedtime)   • hydrocortisone (ANUSOL-HC) 2.5 % rectal cream Apply topically 2 (two) times a day (Patient not taking: Reported on 8/15/2023)   • hydrocortisone (ANUSOL-HC) 25 mg suppository INSERT 1 SUPPOSITORY INTO THE RECTUM 2 TIMES A DAY AS NEEDED FOR HEMORRHOIDS.  (Patient not taking: Reported on 8/15/2023)   • sucralfate (CARAFATE) 1 g tablet Take 1 tablet (1 g total) by mouth 3 (three) times a day before meals (Patient not taking: Reported on 8/15/2023)     Allergies   Allergen Reactions   • Reglan [Metoclopramide] Confusion     Pt reports Reglan "makes me lose my mind and go crazy."     Immunization History   Administered Date(s) Administered   • COVID-19 MODERNA VACC 0.5 ML IM 03/25/2021, 04/22/2021, 12/05/2021       Objective     /70 (BP Location: Left arm, Patient Position: Sitting, Cuff Size: Adult)   Pulse 77   Temp 98.7 °F (37.1 °C) (Tympanic)   Resp 16   Ht 5' 5" (1.651 m)   Wt 81.6 kg (180 lb) SpO2 99%   BMI 29.95 kg/m²     Physical Exam  Vitals and nursing note reviewed. Constitutional:       Appearance: She is well-developed. HENT:      Head: Normocephalic and atraumatic. Eyes:      Pupils: Pupils are equal, round, and reactive to light. Cardiovascular:      Rate and Rhythm: Normal rate and regular rhythm. Heart sounds: Normal heart sounds. Pulmonary:      Effort: Pulmonary effort is normal.      Breath sounds: Normal breath sounds. Abdominal:      General: Bowel sounds are normal.      Palpations: Abdomen is soft. Musculoskeletal:      Cervical back: Normal range of motion and neck supple. Right lower leg: No edema. Left lower leg: No edema. Lymphadenopathy:      Cervical: No cervical adenopathy. Skin:     General: Skin is warm. Neurological:      Mental Status: She is alert and oriented to person, place, and time. Cranial Nerves: No cranial nerve deficit.        Isiah Ivy MD 19-Sep-2018

## 2023-10-08 NOTE — ED PROVIDER NOTE - PLAN OF CARE
Follow up with your PMD within 48-72 hours. Rest, increase fluids. Take Prednisone 40mg daily for 4 more days, Albuterol inhaler 2 inhalations every 4-6 hours as needed with the spacer as instructed. Take all of your other medications as previously prescribed. Any worsening wheezing, shortness of breath, chest pain, fever, chills return to the ED
VTE Assessment already completed for this visit

## 2023-11-22 NOTE — ED ADULT NURSE NOTE - NSIMPLEMENTINTERV_GEN_ALL_ED
Implemented All Universal Safety Interventions:  [place] to call system. Call bell, personal items and telephone within reach. Instruct patient to call for assistance. Room bathroom lighting operational. Non-slip footwear when patient is off stretcher. Physically safe environment: no spills, clutter or unnecessary equipment. Stretcher in lowest position, wheels locked, appropriate side rails in place. IV discontinued, cath removed intact

## 2023-12-22 ENCOUNTER — APPOINTMENT (OUTPATIENT)
Dept: OTOLARYNGOLOGY | Facility: CLINIC | Age: 58
End: 2023-12-22
Payer: MEDICAID

## 2023-12-22 VITALS — HEIGHT: 67 IN | BODY MASS INDEX: 29.66 KG/M2 | WEIGHT: 189 LBS

## 2023-12-22 DIAGNOSIS — H61.23 IMPACTED CERUMEN, BILATERAL: ICD-10-CM

## 2023-12-22 DIAGNOSIS — M26.609 UNSPECIFIED TEMPOROMANDIBULAR JOINT DISORDER: ICD-10-CM

## 2023-12-22 DIAGNOSIS — H93.293 OTHER ABNORMAL AUDITORY PERCEPTIONS, BILATERAL: ICD-10-CM

## 2023-12-22 PROCEDURE — 99213 OFFICE O/P EST LOW 20 MIN: CPT | Mod: 25

## 2023-12-28 PROBLEM — H93.293 OTHER ABNORMAL AUDITORY PERCEPTIONS, BILATERAL: Status: ACTIVE | Noted: 2023-12-28

## 2023-12-28 PROBLEM — H61.23 BILATERAL IMPACTED CERUMEN: Status: ACTIVE | Noted: 2023-12-28

## 2023-12-28 PROBLEM — M26.609 TMJ DYSFUNCTION: Status: ACTIVE | Noted: 2023-06-30

## 2023-12-28 NOTE — REASON FOR VISIT
[Subsequent Evaluation] : a subsequent evaluation for [FreeTextEntry2] : TMJ and bilateral clogged ears

## 2023-12-28 NOTE — HISTORY OF PRESENT ILLNESS
[de-identified] : 58Y F follow up for evaluation of TMJ and bilateral clogged ears. States TMJ associated ear pain has resolved.  Reports bilateral clogged ears. Denies hearing changes, otorrhea, tinnitus and dizziness.

## 2023-12-28 NOTE — ASSESSMENT
[FreeTextEntry1] : 58Y F follow up with right otalgia 2/2 TMJ from grind teeth at night present with bilateral cerumen. Patient tolerated cerumen removal without complaints. Patient states hearing is baseline after cerumen removal not interested in hearing test.  Physical exam shows bilateral ears normal EAC/TM.  Recommend: Cerumen Impaction -Discussed not using q-tips or instruments to remove wax -Olive or mineral oil 1x per month to keep ear canal lubricated. Discussed that the ear is a self cleaning structure and just allow it clean itself. If wax builds up can try debrox. Once it gets impacted recommend return to get it cleaned out.  TMJ -Improved - Recommended applying moist heat or cold packs on areas of acute pain - Trial of Soft foods diet for 2 weeks during acute episodes - Trial of (NSAIDs), such as aspirin/ibuprofen if patient tolerates - Follow up Dentist for for . - Avoid extreme jaw movements. - Can consider Physical therapy for Jaw if none of the above intervention improve symptoms  Return to clinic annually or sooner if new/worsen symptoms present.

## 2024-01-29 NOTE — ED ADULT TRIAGE NOTE - PATIENT ON (OXYGEN DELIVERY METHOD)
Goal Outcome Evaluation:  Plan of Care Reviewed With: patient        Progress: improving  Outcome Evaluation: PT MORE ALERT, USED HOME BIPAP DURING NIGHT.  UOP IMPROVED, MED YELLOW ,                                room air

## 2024-02-10 ENCOUNTER — EMERGENCY (EMERGENCY)
Facility: HOSPITAL | Age: 59
LOS: 1 days | Discharge: HOME | End: 2024-02-10
Attending: EMERGENCY MEDICINE | Admitting: EMERGENCY MEDICINE
Payer: MEDICAID

## 2024-02-10 VITALS
RESPIRATION RATE: 18 BRPM | SYSTOLIC BLOOD PRESSURE: 115 MMHG | OXYGEN SATURATION: 100 % | TEMPERATURE: 99 F | DIASTOLIC BLOOD PRESSURE: 78 MMHG | HEART RATE: 69 BPM

## 2024-02-10 DIAGNOSIS — Z90.710 ACQUIRED ABSENCE OF BOTH CERVIX AND UTERUS: Chronic | ICD-10-CM

## 2024-02-10 DIAGNOSIS — Z90.2 ACQUIRED ABSENCE OF LUNG [PART OF]: Chronic | ICD-10-CM

## 2024-02-10 LAB
ALBUMIN SERPL ELPH-MCNC: 3.9 G/DL — SIGNIFICANT CHANGE UP (ref 3.3–5)
ALP SERPL-CCNC: 173 U/L — HIGH (ref 40–120)
ALT FLD-CCNC: 34 U/L — HIGH (ref 4–33)
ANION GAP SERPL CALC-SCNC: 13 MMOL/L — SIGNIFICANT CHANGE UP (ref 7–14)
AST SERPL-CCNC: 28 U/L — SIGNIFICANT CHANGE UP (ref 4–32)
BASOPHILS # BLD AUTO: 0.06 K/UL — SIGNIFICANT CHANGE UP (ref 0–0.2)
BASOPHILS NFR BLD AUTO: 0.6 % — SIGNIFICANT CHANGE UP (ref 0–2)
BILIRUB SERPL-MCNC: 0.2 MG/DL — SIGNIFICANT CHANGE UP (ref 0.2–1.2)
BUN SERPL-MCNC: 16 MG/DL — SIGNIFICANT CHANGE UP (ref 7–23)
CALCIUM SERPL-MCNC: 9.6 MG/DL — SIGNIFICANT CHANGE UP (ref 8.4–10.5)
CHLORIDE SERPL-SCNC: 101 MMOL/L — SIGNIFICANT CHANGE UP (ref 98–107)
CO2 SERPL-SCNC: 24 MMOL/L — SIGNIFICANT CHANGE UP (ref 22–31)
CREAT SERPL-MCNC: 0.84 MG/DL — SIGNIFICANT CHANGE UP (ref 0.5–1.3)
EGFR: 80 ML/MIN/1.73M2 — SIGNIFICANT CHANGE UP
EOSINOPHIL # BLD AUTO: 0.24 K/UL — SIGNIFICANT CHANGE UP (ref 0–0.5)
EOSINOPHIL NFR BLD AUTO: 2.6 % — SIGNIFICANT CHANGE UP (ref 0–6)
GLUCOSE SERPL-MCNC: 100 MG/DL — HIGH (ref 70–99)
HCT VFR BLD CALC: 37.3 % — SIGNIFICANT CHANGE UP (ref 34.5–45)
HGB BLD-MCNC: 12.2 G/DL — SIGNIFICANT CHANGE UP (ref 11.5–15.5)
IANC: 5.05 K/UL — SIGNIFICANT CHANGE UP (ref 1.8–7.4)
IMM GRANULOCYTES NFR BLD AUTO: 0.3 % — SIGNIFICANT CHANGE UP (ref 0–0.9)
LYMPHOCYTES # BLD AUTO: 3.52 K/UL — HIGH (ref 1–3.3)
LYMPHOCYTES # BLD AUTO: 37.5 % — SIGNIFICANT CHANGE UP (ref 13–44)
MAGNESIUM SERPL-MCNC: 1.9 MG/DL — SIGNIFICANT CHANGE UP (ref 1.6–2.6)
MCHC RBC-ENTMCNC: 28.5 PG — SIGNIFICANT CHANGE UP (ref 27–34)
MCHC RBC-ENTMCNC: 32.7 GM/DL — SIGNIFICANT CHANGE UP (ref 32–36)
MCV RBC AUTO: 87.1 FL — SIGNIFICANT CHANGE UP (ref 80–100)
MONOCYTES # BLD AUTO: 0.48 K/UL — SIGNIFICANT CHANGE UP (ref 0–0.9)
MONOCYTES NFR BLD AUTO: 5.1 % — SIGNIFICANT CHANGE UP (ref 2–14)
NEUTROPHILS # BLD AUTO: 5.05 K/UL — SIGNIFICANT CHANGE UP (ref 1.8–7.4)
NEUTROPHILS NFR BLD AUTO: 53.9 % — SIGNIFICANT CHANGE UP (ref 43–77)
NRBC # BLD: 0 /100 WBCS — SIGNIFICANT CHANGE UP (ref 0–0)
NRBC # FLD: 0 K/UL — SIGNIFICANT CHANGE UP (ref 0–0)
PLATELET # BLD AUTO: 460 K/UL — HIGH (ref 150–400)
POTASSIUM SERPL-MCNC: 3.2 MMOL/L — LOW (ref 3.5–5.3)
POTASSIUM SERPL-SCNC: 3.2 MMOL/L — LOW (ref 3.5–5.3)
PROT SERPL-MCNC: 7.9 G/DL — SIGNIFICANT CHANGE UP (ref 6–8.3)
RBC # BLD: 4.28 M/UL — SIGNIFICANT CHANGE UP (ref 3.8–5.2)
RBC # FLD: 14.6 % — HIGH (ref 10.3–14.5)
SODIUM SERPL-SCNC: 138 MMOL/L — SIGNIFICANT CHANGE UP (ref 135–145)
TSH SERPL-MCNC: 1.09 UIU/ML — SIGNIFICANT CHANGE UP (ref 0.27–4.2)
WBC # BLD: 9.38 K/UL — SIGNIFICANT CHANGE UP (ref 3.8–10.5)
WBC # FLD AUTO: 9.38 K/UL — SIGNIFICANT CHANGE UP (ref 3.8–10.5)

## 2024-02-10 PROCEDURE — 99284 EMERGENCY DEPT VISIT MOD MDM: CPT

## 2024-02-10 PROCEDURE — 93010 ELECTROCARDIOGRAM REPORT: CPT

## 2024-02-10 RX ORDER — ONDANSETRON 8 MG/1
4 TABLET, FILM COATED ORAL ONCE
Refills: 0 | Status: COMPLETED | OUTPATIENT
Start: 2024-02-10 | End: 2024-02-10

## 2024-02-10 RX ORDER — POTASSIUM CHLORIDE 20 MEQ
40 PACKET (EA) ORAL ONCE
Refills: 0 | Status: COMPLETED | OUTPATIENT
Start: 2024-02-10 | End: 2024-02-10

## 2024-02-10 RX ORDER — IPRATROPIUM/ALBUTEROL SULFATE 18-103MCG
3 AEROSOL WITH ADAPTER (GRAM) INHALATION
Refills: 0 | Status: COMPLETED | OUTPATIENT
Start: 2024-02-10 | End: 2024-02-10

## 2024-02-10 RX ORDER — MECLIZINE HCL 12.5 MG
25 TABLET ORAL ONCE
Refills: 0 | Status: COMPLETED | OUTPATIENT
Start: 2024-02-10 | End: 2024-02-10

## 2024-02-10 RX ADMIN — Medication 40 MILLIEQUIVALENT(S): at 22:21

## 2024-02-10 RX ADMIN — Medication 3 MILLILITER(S): at 21:17

## 2024-02-10 RX ADMIN — Medication 3 MILLILITER(S): at 21:46

## 2024-02-10 RX ADMIN — ONDANSETRON 4 MILLIGRAM(S): 8 TABLET, FILM COATED ORAL at 20:53

## 2024-02-10 RX ADMIN — Medication 25 MILLIGRAM(S): at 20:53

## 2024-02-10 RX ADMIN — Medication 3 MILLILITER(S): at 20:54

## 2024-02-10 NOTE — ED PROVIDER NOTE - PHYSICAL EXAMINATION
Physical Exam:  Gen:  Well appearing, awake, alert, non-toxic appearing  Head: NCAT  HEENT: Normal conjunctiva, trachea midline, moist mucous membranes  Lung:  no respiratory distress speaking in full sentences, + wheezing b/l  CV: RRR, no murmurs, rubs or gallops  Abd: Soft, non-tender, non-distended  MSK: No visible deformities, moves all four extremities,   Neuro: No focal motor deficits. + nystagmus    Skin: Warm, well perfused,  Psych: appropriate affect and mood Physical Exam:  Gen:  Well appearing, awake, alert, non-toxic appearing  Head: NCAT  HEENT: Normal conjunctiva, trachea midline, moist mucous membranes  Lung:  no respiratory distress, speaking in full sentences, + wheezing b/l  CV: RRR, no murmurs, rubs or gallops  Abd: Soft, non-tender, non-distended  MSK: No visible deformities, moves all four extremities,   Neuro: No focal motor deficits. +eye horizontal nystagmus.  Skin: Warm, well perfused,  Psych: appropriate affect and mood

## 2024-02-10 NOTE — ED ADULT NURSE NOTE - OBJECTIVE STATEMENT
A&Ox4. ambulatory. c/o dizziness since this morning. NAD. pt denies SOB, chest pain, dizziness, weakness, urinary symptoms, HA, n/v/d, fevers, chills, pain. respirations are even and un labored. ABD is soft and non tender. skin intact. waiting provider orders. safety precautions maintained.

## 2024-02-10 NOTE — ED PROVIDER NOTE - PROGRESS NOTE DETAILS
Sanjuana Garcia PGY1: patient resting comfortably in stretcher. Sanjuana Garcia PGY1: re-assessed patient after duoneb treatments. No wheezing present. Patient states she feel much better then prior to arrival. Sanjuana Garcia PGY1: Pt stable.  Discussed results of workup, importance of follow-up with  PCP and return precautions with patient who verbalized understanding and agreement. Patient is medically clear for DC at this time.

## 2024-02-10 NOTE — ED PROVIDER NOTE - OBJECTIVE STATEMENT
59 y/o female with pmh HIV, asthma, HTN, partial lobectomy, stage 1 lung ca, presents to the ED for multiple episodes of dizziness lasting 30 minutes in duration, sudden onset and ongoing for the last few months.  Patient states during this episode she feels very weak like shes going to pass out, palpitations, shortness of breath, nausea but no vomiting.  This has been occurring for the last couple of months but she came in specifically today because it occurred twice in 1 day.  Patient denies any recent fevers, chills, headaches, abdominal pain, or any urinary symptoms.  No recent travel.

## 2024-02-10 NOTE — ED ADULT NURSE REASSESSMENT NOTE - NS ED NURSE REASSESS COMMENT FT1
Report given by previous RN. Pt lying in bed comfortably. Pt not in any distress or discomfort. pt safety maintained.

## 2024-02-10 NOTE — ED ADULT NURSE NOTE - NSFALLUNIVINTERV_ED_ALL_ED
Bed/Stretcher in lowest position, wheels locked, appropriate side rails in place/Call bell, personal items and telephone in reach/Instruct patient to call for assistance before getting out of bed/chair/stretcher/Non-slip footwear applied when patient is off stretcher/[place] to call system/Physically safe environment - no spills, clutter or unnecessary equipment/Purposeful proactive rounding/Room/bathroom lighting operational, light cord in reach

## 2024-02-10 NOTE — ED PROVIDER NOTE - PATIENT PORTAL LINK FT
You can access the FollowMyHealth Patient Portal offered by [facility] by registering at the following website: [email or web address]. By joining [facility]’s FollowMyHealth portal, you will also be able to view your health information using other applications (apps) compatible with our system.

## 2024-02-10 NOTE — ED ADULT TRIAGE NOTE - CHIEF COMPLAINT QUOTE
Pt [place] c/o intermittent episodes of dizziness since waking up this AM around 06:30 AM. Intermittent SOB noted.  on scene. PMHx: HIV, asthma, HTN, partial lobectomy, stage 1 lung ca. MD Cook contacted for stroke eval, no code called.

## 2024-02-10 NOTE — ED ADULT NURSE NOTE - DRUG PRE-SCREENING (DAST -1)
Detail Level (Bills Only For Genitals Or Anus, Choose Benign Destruction If You Are Treating A Different Area): Zone
Render Post-Care Instructions In Note?: no
Post-Care Instructions: I reviewed with the patient in detail post-care instructions. Patient is to wear sunprotection, and avoid picking at any of the treated lesions. Pt may apply Vaseline to crusted or scabbing areas.
Consent: The patient's consent was obtained including but not limited to risks of crusting, scabbing, blistering, scarring, darker or lighter pigmentary change, recurrence, incomplete removal and infection.
Statement Selected

## 2024-02-10 NOTE — ED PROVIDER NOTE - ATTENDING CONTRIBUTION TO CARE
With past medical history as above presents emergency department for evaluation of many months of intermittent lightheadedness that occurs mostly with position changes, if she stands up or sits up too quickly.  Lasts about 30 minutes and then goes away.  Has been seen in this ED for similar in the past.  Denies any room spinning sensation, blurry vision, vomiting, numbness, tingling, weakness to extremities.  Patient is overall well-appearing with a nonfocal neurological exam, no concern for CVA at this time, possible vertigo though less likely given patient denies any room spinning sensation, more lightheaded type sensation with position changes.  Will obtain basic labs, if no concerning findings will refer patient to follow-up with her primary care doctor for further evaluation.

## 2024-02-10 NOTE — ED PROVIDER NOTE - NSFOLLOWUPINSTRUCTIONS_ED_ALL_ED_FT
You have been evaluated in the Emergency Department today for dizziness. Your evaluation suggests that your symptoms are most likely due to peripheral vertigo.    You have been prescribed meclizine to help relieve your symptoms. Please take your prescription as directed. You can also try Epley Maneuvers at home to help relieve your symptoms- instructions are available online.    Please follow up with your primary care doctor in 2-3 days.    Return to the ER immediately for worsening or uncontrolled symptoms, worsening headache, chest pain, shortness of breath, persistent vomiting, vision changes, fainting, or for any other concerning symptoms. You have been evaluated in the Emergency Department today for dizziness. Your evaluation suggests that your symptoms are most likely due to peripheral vertigo.      Please follow up with your primary care doctor in 2-3 days.    Return to the ER immediately for worsening or uncontrolled symptoms, worsening headache, chest pain, shortness of breath, persistent vomiting, vision changes, fainting, or for any other concerning symptoms.

## 2024-02-10 NOTE — ED ADULT TRIAGE NOTE - RESPIRATORY RATE (BREATHS/MIN)
CARDIOLOGY FOLLOW UP NOTE - DR. PION    Patient Name: KWAME MOSQUERA  Date of Service: 04-23-22 @ 14:01    Patient seen and examined  feels better  less dyspnea    Subjective:    cv: denies chest pain, dyspnea, palpitations, dizziness  pulmonary: denies cough  GI: denies abdominal pain, nausea, vomiting  vascular/legs: no edema   skin: no rash  ROS: otherwise negative   overnight events:      PHYSICAL EXAM:  T(C): 36.4 (04-23-22 @ 11:48), Max: 36.6 (04-22-22 @ 18:40)  HR: 61 (04-23-22 @ 11:48) (60 - 69)  BP: 115/71 (04-23-22 @ 11:48) (112/50 - 148/58)  RR: 18 (04-23-22 @ 11:48) (18 - 18)  SpO2: 100% (04-23-22 @ 11:48) (96% - 100%)  Wt(kg): --  I&O's Summary    22 Apr 2022 07:01  -  23 Apr 2022 07:00  --------------------------------------------------------  IN: 530 mL / OUT: 3600 mL / NET: -3070 mL      Daily     Daily     Appearance: Normal	  Cardiovascular: Normal S1 S2,RRR, No JVD, No murmurs  Respiratory: Lungs clear to auscultation	  Gastrointestinal:  Soft, Non-tender, + BS	  Extremities: Normal range of motion, edema b/l      Home Medications:  amLODIPine 5 mg oral tablet: 1 tab(s) orally once a day (21 Apr 2022 17:11)  carvedilol 12.5 mg oral tablet: 1 tab(s) orally 2 times a day (21 Apr 2022 17:11)  escitalopram 10 mg oral tablet: 1 tab(s) orally once a day (21 Apr 2022 17:11)  guaiFENesin 100 mg/5 mL oral liquid: 10 milliliter(s) orally every 6 hours, As needed, Cough (21 Apr 2022 17:11)  Janumet 50 mg-500 mg oral tablet: 1 tab(s) orally 2 times a day (21 Apr 2022 17:11)  levothyroxine 175 mcg (0.175 mg) oral tablet: 1 tab(s) orally once a day (21 Apr 2022 17:11)  LORazepam 0.5 mg oral tablet: 1 tab(s) orally once a day, As Needed (21 Apr 2022 17:11)  Multaq 400 mg oral tablet: 1 tab(s) orally 2 times a day (21 Apr 2022 17:11)  repaglinide 1 mg oral tablet: 1 tab(s) orally 3 times a day (before meals) (21 Apr 2022 17:11)  Xarelto 20 mg oral tablet: 1 tab(s) orally once a day (in the evening) (21 Apr 2022 17:11)      MEDICATIONS  (STANDING):  amLODIPine   Tablet 5 milliGRAM(s) Oral daily  budesonide 160 MICROgram(s)/formoterol 4.5 MICROgram(s) Inhaler 2 Puff(s) Inhalation two times a day  carvedilol 12.5 milliGRAM(s) Oral every 12 hours  dextrose 5%. 1000 milliLiter(s) (50 mL/Hr) IV Continuous <Continuous>  dextrose 5%. 1000 milliLiter(s) (100 mL/Hr) IV Continuous <Continuous>  dextrose 50% Injectable 25 Gram(s) IV Push once  dextrose 50% Injectable 12.5 Gram(s) IV Push once  dextrose 50% Injectable 25 Gram(s) IV Push once  dronedarone 400 milliGRAM(s) Oral two times a day  escitalopram 10 milliGRAM(s) Oral daily  furosemide   Injectable 40 milliGRAM(s) IV Push two times a day  glucagon  Injectable 1 milliGRAM(s) IntraMuscular once  insulin lispro (ADMELOG) corrective regimen sliding scale   SubCutaneous at bedtime  insulin lispro (ADMELOG) corrective regimen sliding scale   SubCutaneous three times a day before meals  levothyroxine 175 MICROGram(s) Oral daily  losartan 50 milliGRAM(s) Oral daily  rivaroxaban 15 milliGRAM(s) Oral with dinner      TELEMETRY: 	    ECG:  	  RADIOLOGY:   DIAGNOSTIC TESTING:  [ ] Echocardiogram:  [ ] Catheterization:  [ ] Stress Test:    OTHER: 	    LABS:	 	    CARDIAC MARKERS:        Troponin T, High Sensitivity Result: 15 ng/L (04-21 @ 15:50)                                9.4    5.27  )-----------( 331      ( 23 Apr 2022 05:00 )             28.5     04-23    136  |  95<L>  |  18  ----------------------------<  175<H>  4.4   |  31  |  1.33<H>    Ca    9.1      23 Apr 2022 05:00  Phos  5.2     04-23  Mg     1.9     04-23    TPro  7.4  /  Alb  3.9  /  TBili  0.3  /  DBili  x   /  AST  26  /  ALT  28  /  AlkPhos  58  04-21    proBNP:     Lipid Profile:   HgA1c:     Creatinine, Serum: 1.33 mg/dL (04-23-22 @ 05:00)  Creatinine, Serum: 1.19 mg/dL (04-22-22 @ 08:17)  Creatinine, Serum: 1.23 mg/dL (04-21-22 @ 15:50)             18

## 2024-02-19 NOTE — ED ADULT NURSE NOTE - CAS EDN DISCHARGE INTERVENTIONS
[facility]  EMERGENCY DEPARTMENT ENCOUNTER      Pt Name: Tiana Ravi  MRN: [id number]  Birthdate 1997  Date of evaluation: 2/18/2024  Provider: Xiomara Pereira DO    CHIEF COMPLAINT       Chief Complaint   Patient presents with    Finger Laceration     Right first finger/thumb         HISTORY OF PRESENT ILLNESS   (Location/Symptom, Timing/Onset, Context/Setting, Quality, Duration, Modifying Factors, Severity)  Note limiting factors.   Cut right thumb while grating cheese, needs td update          Review of External Medical Records:     Nursing Notes were reviewed.    REVIEW OF SYSTEMS    (2-9 systems for level 4, 10 or more for level 5)     Review of Systems    Except as noted above the remainder of the review of systems was reviewed and negative.       PAST MEDICAL HISTORY   No past medical history on file.      SURGICAL HISTORY     No past surgical history on file.      CURRENT MEDICATIONS       There are no discharge medications for this patient.      ALLERGIES     Patient has no known allergies.    FAMILY HISTORY     No family history on file.       SOCIAL HISTORY       Social History     Socioeconomic History    Marital status:    Tobacco Use    Smoking status: Never   Substance and Sexual Activity    Alcohol use: Never    Drug use: Never    Sexual activity: Never           PHYSICAL EXAM    (up to 7 for level 4, 8 or more for level 5)     ED Triage Vitals [02/18/24 1959]   BP Temp Temp Source Pulse Respirations SpO2 Height Weight - Scale   (!) 149/78 97.2 °F (36.2 °C) Oral 90 16 99 % 1.702 m (5' 7\") 61.2 kg (135 lb)       Body mass index is 21.14 kg/m².    Physical Exam  Vitals and nursing note reviewed.   HENT:      Head: Normocephalic and atraumatic.   Cardiovascular:      Rate and Rhythm: Normal rate and regular rhythm.   Pulmonary:      Effort: Pulmonary effort is normal.   Musculoskeletal:      Comments: Full thickness avulsion with skin flap, horizontal over IP thumb, bleeding  IV discontinued, cath removed intact

## 2024-02-21 NOTE — ED PROVIDER NOTE - CROS ED GU ALL NEG
RN called to report elevated troponin, but trending down. Patient remains asymptomatic, without chest pain. No new orders given   negative...

## 2024-02-27 NOTE — ED PROVIDER NOTE - DISCHARGE REVIEW MATERIAL PRESENTED
Patient's wife called to r/s appt due to she was sick and she is his transportation. She also stated that he was needing a refill. I spoke with Dr Haynes and she stated that it was ok to give him a month supply.      Called patient left message needing to know what pharmacy he would like for me to send his Bactrim DS to.  
.

## 2024-02-29 NOTE — ED ADULT TRIAGE NOTE - BP NONINVASIVE SYSTOLIC (MM HG)
24H events:    Patient is a 89y old Female who presents with a chief complaint of Back Pain (29 Feb 2024 13:07)    Primary diagnosis of Inability to ambulate due to multiple joints       Today is hospital day 36d.      PAST MEDICAL & SURGICAL HISTORY  Chronic atrial fibrillation    GERD (gastroesophageal reflux disease)    Chronic lower back pain    No significant past surgical history      SOCIAL HISTORY:  Negative for smoking/alcohol/drug use.     ALLERGIES:  No Known Allergies    MEDICATIONS:  STANDING MEDICATIONS  acetaminophen     Tablet .. 1000 milliGRAM(s) Oral every 8 hours  apixaban 5 milliGRAM(s) Oral every 12 hours  calcium carbonate   1250 mG (OsCal) 1 Tablet(s) Oral daily  chlorhexidine 2% Cloths 1 Application(s) Topical <User Schedule>  chlorhexidine 2% Cloths 1 Application(s) Topical daily  cholecalciferol 5000 Unit(s) Oral daily  fluticasone propionate 50 MICROgram(s)/spray Nasal Spray 1 Spray(s) Both Nostrils two times a day  furosemide    Tablet 40 milliGRAM(s) Oral daily  gabapentin 600 milliGRAM(s) Oral at bedtime  lidocaine   4% Patch 1 Patch Transdermal every 24 hours  magnesium sulfate  IVPB 2 Gram(s) IV Intermittent every 2 hours  methocarbamol 750 milliGRAM(s) Oral every 8 hours  metoprolol tartrate 25 milliGRAM(s) Oral three times a day  multivitamin 1 Tablet(s) Oral daily  pantoprazole    Tablet 40 milliGRAM(s) Oral before breakfast  polyethylene glycol 3350 17 Gram(s) Oral two times a day  rOPINIRole 0.25 milliGRAM(s) Oral two times a day  senna 2 Tablet(s) Oral at bedtime  sodium chloride 1 Gram(s) Oral every 8 hours  tamsulosin 0.8 milliGRAM(s) Oral at bedtime    PRN MEDICATIONS  oxyCODONE    IR 5 milliGRAM(s) Oral every 8 hours PRN    VITALS:   T(F): 97.2  HR: 71  BP: 119/65  RR: 18  SpO2: --    LABS:                        11.5   3.40  )-----------( 149      ( 29 Feb 2024 09:16 )             33.0     02-29    131<L>  |  92<L>  |  16  ----------------------------<  102<H>  3.7   |  28  |  0.9    Ca    8.9      29 Feb 2024 09:16  Mg     1.5     02-29        Urinalysis Basic - ( 29 Feb 2024 09:16 )    Color: x / Appearance: x / SG: x / pH: x  Gluc: 102 mg/dL / Ketone: x  / Bili: x / Urobili: x   Blood: x / Protein: x / Nitrite: x   Leuk Esterase: x / RBC: x / WBC x   Sq Epi: x / Non Sq Epi: x / Bacteria: x                RADIOLOGY:    PHYSICAL EXAM:  GENERAL: NAD  NECK: Supple, No JVD, Normal thyroid  NERVOUS SYSTEM:  Alert & Oriented X3  CHEST/LUNG: Clear to auscultation bilaterally  HEART: Regular rate and rhythm  ABDOMEN: Soft, Nontender  EXTREMITIES:  + Peripheral Pulses   137

## 2024-03-07 NOTE — ED ADULT NURSE NOTE - CAS EDN DISCHARGE INTERVENTIONS
Weight Hx Per:  - Source: patient   - UBW: 136 pounds   - Reported weight changes: Pt suspects weight loss however is unsure of amount    Weight Hx Per Levi HIJORDON: no available weights to assess     Current Admission Weights:  - Dosing weight: 125 pounds/56.7 kg (03/05)  - RD obtained bed scale weight: 61 kg (03/07)    Weight Change:  - 8% weight loss x 2 weeks     **  Will continue to monitor weight trends as available/able.     %IBW: 104%
by RN/IV discontinued, cath removed intact

## 2024-03-21 ENCOUNTER — RESULT REVIEW (OUTPATIENT)
Age: 59
End: 2024-03-21

## 2024-03-21 ENCOUNTER — INPATIENT (INPATIENT)
Facility: HOSPITAL | Age: 59
LOS: 3 days | Discharge: HOME | End: 2024-03-25
Attending: STUDENT IN AN ORGANIZED HEALTH CARE EDUCATION/TRAINING PROGRAM | Admitting: STUDENT IN AN ORGANIZED HEALTH CARE EDUCATION/TRAINING PROGRAM
Payer: MEDICAID

## 2024-03-21 VITALS
OXYGEN SATURATION: 100 % | RESPIRATION RATE: 18 BRPM | SYSTOLIC BLOOD PRESSURE: 118 MMHG | TEMPERATURE: 98 F | HEART RATE: 71 BPM | DIASTOLIC BLOOD PRESSURE: 76 MMHG

## 2024-03-21 DIAGNOSIS — Z90.2 ACQUIRED ABSENCE OF LUNG [PART OF]: Chronic | ICD-10-CM

## 2024-03-21 DIAGNOSIS — Z90.710 ACQUIRED ABSENCE OF BOTH CERVIX AND UTERUS: Chronic | ICD-10-CM

## 2024-03-21 LAB
ADD ON TEST-SPECIMEN IN LAB: SIGNIFICANT CHANGE UP
ALBUMIN SERPL ELPH-MCNC: 4 G/DL — SIGNIFICANT CHANGE UP (ref 3.3–5)
ALP SERPL-CCNC: 179 U/L — HIGH (ref 40–120)
ALT FLD-CCNC: 27 U/L — SIGNIFICANT CHANGE UP (ref 4–33)
ANION GAP SERPL CALC-SCNC: 12 MMOL/L — SIGNIFICANT CHANGE UP (ref 7–14)
APTT BLD: 30.6 SEC — SIGNIFICANT CHANGE UP (ref 24.5–35.6)
AST SERPL-CCNC: 22 U/L — SIGNIFICANT CHANGE UP (ref 4–32)
BASOPHILS # BLD AUTO: 0.07 K/UL — SIGNIFICANT CHANGE UP (ref 0–0.2)
BASOPHILS NFR BLD AUTO: 0.8 % — SIGNIFICANT CHANGE UP (ref 0–2)
BILIRUB SERPL-MCNC: 0.2 MG/DL — SIGNIFICANT CHANGE UP (ref 0.2–1.2)
BUN SERPL-MCNC: 19 MG/DL — SIGNIFICANT CHANGE UP (ref 7–23)
CALCIUM SERPL-MCNC: 9.3 MG/DL — SIGNIFICANT CHANGE UP (ref 8.4–10.5)
CHLORIDE SERPL-SCNC: 104 MMOL/L — SIGNIFICANT CHANGE UP (ref 98–107)
CO2 SERPL-SCNC: 26 MMOL/L — SIGNIFICANT CHANGE UP (ref 22–31)
CREAT SERPL-MCNC: 0.9 MG/DL — SIGNIFICANT CHANGE UP (ref 0.5–1.3)
EGFR: 74 ML/MIN/1.73M2 — SIGNIFICANT CHANGE UP
EOSINOPHIL # BLD AUTO: 0.24 K/UL — SIGNIFICANT CHANGE UP (ref 0–0.5)
EOSINOPHIL NFR BLD AUTO: 2.6 % — SIGNIFICANT CHANGE UP (ref 0–6)
GLUCOSE SERPL-MCNC: 86 MG/DL — SIGNIFICANT CHANGE UP (ref 70–99)
HCT VFR BLD CALC: 37.9 % — SIGNIFICANT CHANGE UP (ref 34.5–45)
HGB BLD-MCNC: 12.7 G/DL — SIGNIFICANT CHANGE UP (ref 11.5–15.5)
IANC: 5.54 K/UL — SIGNIFICANT CHANGE UP (ref 1.8–7.4)
IMM GRANULOCYTES NFR BLD AUTO: 0.2 % — SIGNIFICANT CHANGE UP (ref 0–0.9)
INR BLD: 0.93 RATIO — SIGNIFICANT CHANGE UP (ref 0.85–1.18)
LYMPHOCYTES # BLD AUTO: 2.69 K/UL — SIGNIFICANT CHANGE UP (ref 1–3.3)
LYMPHOCYTES # BLD AUTO: 29.4 % — SIGNIFICANT CHANGE UP (ref 13–44)
MCHC RBC-ENTMCNC: 29.5 PG — SIGNIFICANT CHANGE UP (ref 27–34)
MCHC RBC-ENTMCNC: 33.5 GM/DL — SIGNIFICANT CHANGE UP (ref 32–36)
MCV RBC AUTO: 87.9 FL — SIGNIFICANT CHANGE UP (ref 80–100)
MONOCYTES # BLD AUTO: 0.6 K/UL — SIGNIFICANT CHANGE UP (ref 0–0.9)
MONOCYTES NFR BLD AUTO: 6.6 % — SIGNIFICANT CHANGE UP (ref 2–14)
NEUTROPHILS # BLD AUTO: 5.54 K/UL — SIGNIFICANT CHANGE UP (ref 1.8–7.4)
NEUTROPHILS NFR BLD AUTO: 60.4 % — SIGNIFICANT CHANGE UP (ref 43–77)
NRBC # BLD: 0 /100 WBCS — SIGNIFICANT CHANGE UP (ref 0–0)
NRBC # FLD: 0 K/UL — SIGNIFICANT CHANGE UP (ref 0–0)
PLATELET # BLD AUTO: 463 K/UL — HIGH (ref 150–400)
POTASSIUM SERPL-MCNC: 3.3 MMOL/L — LOW (ref 3.5–5.3)
POTASSIUM SERPL-SCNC: 3.3 MMOL/L — LOW (ref 3.5–5.3)
PROT SERPL-MCNC: 7.8 G/DL — SIGNIFICANT CHANGE UP (ref 6–8.3)
PROTHROM AB SERPL-ACNC: 10.5 SEC — SIGNIFICANT CHANGE UP (ref 9.5–13)
RBC # BLD: 4.31 M/UL — SIGNIFICANT CHANGE UP (ref 3.8–5.2)
RBC # FLD: 14.5 % — SIGNIFICANT CHANGE UP (ref 10.3–14.5)
SODIUM SERPL-SCNC: 142 MMOL/L — SIGNIFICANT CHANGE UP (ref 135–145)
TROPONIN T, HIGH SENSITIVITY RESULT: 8 NG/L — SIGNIFICANT CHANGE UP
WBC # BLD: 9.16 K/UL — SIGNIFICANT CHANGE UP (ref 3.8–10.5)
WBC # FLD AUTO: 9.16 K/UL — SIGNIFICANT CHANGE UP (ref 3.8–10.5)

## 2024-03-21 PROCEDURE — 71046 X-RAY EXAM CHEST 2 VIEWS: CPT | Mod: 26

## 2024-03-21 PROCEDURE — 99223 1ST HOSP IP/OBS HIGH 75: CPT

## 2024-03-21 RX ORDER — EFAVIRENZ, EMTRICITABINE AND TENOFOVIR DISOPROXIL FUMARATE 600; 200; 300 MG/1; MG/1; MG/1
1 TABLET, FILM COATED ORAL DAILY
Refills: 0 | Status: DISCONTINUED | OUTPATIENT
Start: 2024-03-21 | End: 2024-03-25

## 2024-03-21 RX ORDER — METOPROLOL TARTRATE 50 MG
50 TABLET ORAL DAILY
Refills: 0 | Status: DISCONTINUED | OUTPATIENT
Start: 2024-03-21 | End: 2024-03-21

## 2024-03-21 RX ORDER — ACETAMINOPHEN 500 MG
975 TABLET ORAL ONCE
Refills: 0 | Status: COMPLETED | OUTPATIENT
Start: 2024-03-21 | End: 2024-03-21

## 2024-03-21 RX ORDER — IPRATROPIUM/ALBUTEROL SULFATE 18-103MCG
3 AEROSOL WITH ADAPTER (GRAM) INHALATION ONCE
Refills: 0 | Status: COMPLETED | OUTPATIENT
Start: 2024-03-21 | End: 2024-03-21

## 2024-03-21 RX ORDER — AMLODIPINE BESYLATE 2.5 MG/1
10 TABLET ORAL DAILY
Refills: 0 | Status: DISCONTINUED | OUTPATIENT
Start: 2024-03-21 | End: 2024-03-25

## 2024-03-21 RX ORDER — ASPIRIN/CALCIUM CARB/MAGNESIUM 324 MG
325 TABLET ORAL ONCE
Refills: 0 | Status: COMPLETED | OUTPATIENT
Start: 2024-03-21 | End: 2024-03-21

## 2024-03-21 RX ORDER — FAMOTIDINE 10 MG/ML
20 INJECTION INTRAVENOUS ONCE
Refills: 0 | Status: COMPLETED | OUTPATIENT
Start: 2024-03-21 | End: 2024-03-21

## 2024-03-21 RX ORDER — LIDOCAINE 4 G/100G
1 CREAM TOPICAL ONCE
Refills: 0 | Status: COMPLETED | OUTPATIENT
Start: 2024-03-21 | End: 2024-03-21

## 2024-03-21 RX ORDER — ATORVASTATIN CALCIUM 80 MG/1
40 TABLET, FILM COATED ORAL AT BEDTIME
Refills: 0 | Status: DISCONTINUED | OUTPATIENT
Start: 2024-03-21 | End: 2024-03-25

## 2024-03-21 RX ORDER — KETOROLAC TROMETHAMINE 30 MG/ML
15 SYRINGE (ML) INJECTION ONCE
Refills: 0 | Status: DISCONTINUED | OUTPATIENT
Start: 2024-03-21 | End: 2024-03-21

## 2024-03-21 RX ORDER — POTASSIUM CHLORIDE 20 MEQ
40 PACKET (EA) ORAL ONCE
Refills: 0 | Status: DISCONTINUED | OUTPATIENT
Start: 2024-03-21 | End: 2024-03-21

## 2024-03-21 RX ORDER — CYCLOBENZAPRINE HYDROCHLORIDE 10 MG/1
5 TABLET, FILM COATED ORAL ONCE
Refills: 0 | Status: COMPLETED | OUTPATIENT
Start: 2024-03-21 | End: 2024-03-21

## 2024-03-21 RX ORDER — DIPHENHYDRAMINE HCL 50 MG
25 CAPSULE ORAL ONCE
Refills: 0 | Status: COMPLETED | OUTPATIENT
Start: 2024-03-21 | End: 2024-03-21

## 2024-03-21 RX ADMIN — Medication 15 MILLIGRAM(S): at 16:40

## 2024-03-21 RX ADMIN — LIDOCAINE 1 PATCH: 4 CREAM TOPICAL at 12:29

## 2024-03-21 RX ADMIN — FAMOTIDINE 20 MILLIGRAM(S): 10 INJECTION INTRAVENOUS at 12:42

## 2024-03-21 RX ADMIN — LIDOCAINE 1 PATCH: 4 CREAM TOPICAL at 19:00

## 2024-03-21 RX ADMIN — Medication 25 MILLIGRAM(S): at 20:56

## 2024-03-21 RX ADMIN — Medication 325 MILLIGRAM(S): at 12:28

## 2024-03-21 RX ADMIN — ATORVASTATIN CALCIUM 40 MILLIGRAM(S): 80 TABLET, FILM COATED ORAL at 21:43

## 2024-03-21 RX ADMIN — CYCLOBENZAPRINE HYDROCHLORIDE 5 MILLIGRAM(S): 10 TABLET, FILM COATED ORAL at 21:44

## 2024-03-21 RX ADMIN — Medication 3 MILLILITER(S): at 21:44

## 2024-03-21 RX ADMIN — Medication 15 MILLIGRAM(S): at 15:40

## 2024-03-21 RX ADMIN — AMLODIPINE BESYLATE 10 MILLIGRAM(S): 2.5 TABLET ORAL at 21:43

## 2024-03-21 RX ADMIN — Medication 975 MILLIGRAM(S): at 12:28

## 2024-03-21 RX ADMIN — EFAVIRENZ, EMTRICITABINE AND TENOFOVIR DISOPROXIL FUMARATE 1 TABLET(S): 600; 200; 300 TABLET, FILM COATED ORAL at 21:43

## 2024-03-21 RX ADMIN — Medication 975 MILLIGRAM(S): at 12:58

## 2024-03-21 RX ADMIN — Medication 3 MILLILITER(S): at 17:12

## 2024-03-21 NOTE — ED ADULT TRIAGE NOTE - CHIEF COMPLAINT QUOTE
Patient brought to ER by EMS from home for c/o chest pain radiating to her left shoulder and pain when moving her arm.

## 2024-03-21 NOTE — ED PROVIDER NOTE - OBJECTIVE STATEMENT
58-year-old female past medical history lung cancer status post lobectomy, hypertension, asthma, HIV presents with 2 to 3 days of left-sided shoulder pain, with radiation to left anterior chest.  Patient denies numbness tingling of extremities, diaphoresis.  Patient also endorses burning sensation in substernal region.  Additionally patient endorses wheezing.  Denies recent viral URI including fever, chills, body aches, cough.  Denies recent travel, recent surgeries, patient is has no active cancer most recent CT chest was December 2023 normal per patient.

## 2024-03-21 NOTE — ED CDU PROVIDER INITIAL DAY NOTE - PROGRESS NOTE DETAILS
Called to bedside, pt reports wheezing, she is speaking incomplete sentences, faint right sided wheezing, pt requesting nebulizer, ordered for duoneb

## 2024-03-21 NOTE — ED PROVIDER NOTE - PROGRESS NOTE DETAILS
Jovany YE, PGY-1;  Review of labs trop of , patient continues to endorse pain, will observe patient in CDU for stress and echo.

## 2024-03-21 NOTE — ED CDU PROVIDER INITIAL DAY NOTE - CLINICAL SUMMARY MEDICAL DECISION MAKING FREE TEXT BOX
58-year-old female with past medical history asthma, lung cancer s/p lobectomy, HTN, HIV presenting to the ER with 3 days of left shoulder pain, woken this morning with chest pressure. Also reports increase in wheeze the past few days.    In main ED EKG NSR, troponin 8 pending repeat, chest x-ray with clear lungs.  Sent to CDU for stress test, echo, telemetry doc evaluation in morning

## 2024-03-21 NOTE — ED ADULT NURSE NOTE - OBJECTIVE STATEMENT
Pt received to intake. Pt is A&Ox3, ambulatory, Hx of HTN, HIV, lobectomy. Pt presets to ED c/o left chest and arm pain x 2 days. endorses SOB. denies headache, dizziness, abdominal pain, n/v, fevers/chills, numbness/tingling. breathing is even and unlabored. right forearm 22G IV placed, labs collected and sent. medications administered as ordered. awaiting x-ray. Stretcher set in lowest position, call bell within reach, safety maintained.

## 2024-03-21 NOTE — ED CDU PROVIDER INITIAL DAY NOTE - OBJECTIVE STATEMENT
58-year-old female with past medical history asthma, lung cancer s/p lobectomy, HTN, HIV presenting to the ER with 2-3 days of left-sided shoulder pain.  Patient states for the past 3 days she has had left-sided total shoulder pain since today states she woke with left-sided chest pressure as well.  Patient does report increase in wheezing over the past few days as well.  Patient denies fever/chills, sore throat, cough, abdominal pain, N/V/D, palpitations, near-syncope, leg pain or swelling or any other concerns.  In main ED EKG NSR, troponin 8 pending repeat, chest x-ray with clear lungs.  Sent to CDU for stress test, echo, telemetry doc evaluation in morning

## 2024-03-21 NOTE — ED PROVIDER NOTE - CLINICAL SUMMARY MEDICAL DECISION MAKING FREE TEXT BOX
58-year-old female past medical history lung cancer status post lobectomy, hypertension, asthma, HIV presents with 2 to 3 days of left-sided shoulder pain, with radiation to left anterior chest.  Patient additionally endorses epigastric burning pain, as well as, wheezing. On arrival Pt. is HD stable. ECG indicates NSR, no ST wave abnormalities. PE significant for upper airway wheezing, no murmurs on auscultation, no b/l pitting edema, abdomen soft/nt/nd. Differential includes ACS, GERD, MSK shoulder pain, PNA, pneumothorax, myocarditis, pericarditis (less likely). Will order ECG, cbc, cmp, troponin, CXR, famotidine, ASA, tylenol. Yoni att: 58-year-old female past medical history lung cancer status post lobectomy, hypertension, asthma, HIV presents with 2 to 3 days of left-sided shoulder pain, with radiation to left anterior chest.  Patient additionally endorses epigastric burning pain, as well as, wheezing. On arrival Pt. is HD stable. ECG indicates NSR, no ST wave abnormalities. PE significant for upper airway wheezing, no murmurs on auscultation, no b/l pitting edema, abdomen soft/nt/nd. Differential includes ACS, GERD, MSK shoulder pain, PNA, pneumothorax, myocarditis, pericarditis (less likely). Will order ECG, cbc, cmp, troponin, CXR, famotidine, ASA, tylenol.

## 2024-03-21 NOTE — ED ADULT NURSE REASSESSMENT NOTE - NS ED NURSE REASSESS COMMENT FT1
Break coverage RN: Received patient in CDU bed 3 from Intake. Patient denies any pain, discomfort at this time. Patient is A&OX4, airway patent, breathing unlabored and even. Side rails up and safety maintained. Call bells within reach.

## 2024-03-22 DIAGNOSIS — R07.9 CHEST PAIN, UNSPECIFIED: ICD-10-CM

## 2024-03-22 DIAGNOSIS — I20.9 ANGINA PECTORIS, UNSPECIFIED: ICD-10-CM

## 2024-03-22 DIAGNOSIS — B20 HUMAN IMMUNODEFICIENCY VIRUS [HIV] DISEASE: ICD-10-CM

## 2024-03-22 DIAGNOSIS — K21.9 GASTRO-ESOPHAGEAL REFLUX DISEASE WITHOUT ESOPHAGITIS: ICD-10-CM

## 2024-03-22 DIAGNOSIS — R73.03 PREDIABETES: ICD-10-CM

## 2024-03-22 DIAGNOSIS — E78.5 HYPERLIPIDEMIA, UNSPECIFIED: ICD-10-CM

## 2024-03-22 DIAGNOSIS — R63.8 OTHER SYMPTOMS AND SIGNS CONCERNING FOOD AND FLUID INTAKE: ICD-10-CM

## 2024-03-22 DIAGNOSIS — J45.909 UNSPECIFIED ASTHMA, UNCOMPLICATED: ICD-10-CM

## 2024-03-22 DIAGNOSIS — I10 ESSENTIAL (PRIMARY) HYPERTENSION: ICD-10-CM

## 2024-03-22 LAB
ADD ON TEST-SPECIMEN IN LAB: SIGNIFICANT CHANGE UP
ALBUMIN SERPL ELPH-MCNC: 3.8 G/DL — SIGNIFICANT CHANGE UP (ref 3.3–5)
ALP SERPL-CCNC: 169 U/L — HIGH (ref 40–120)
ALT FLD-CCNC: 24 U/L — SIGNIFICANT CHANGE UP (ref 4–33)
ANION GAP SERPL CALC-SCNC: 11 MMOL/L — SIGNIFICANT CHANGE UP (ref 7–14)
AST SERPL-CCNC: 17 U/L — SIGNIFICANT CHANGE UP (ref 4–32)
BASOPHILS # BLD AUTO: 0.05 K/UL — SIGNIFICANT CHANGE UP (ref 0–0.2)
BASOPHILS NFR BLD AUTO: 0.7 % — SIGNIFICANT CHANGE UP (ref 0–2)
BILIRUB SERPL-MCNC: <0.2 MG/DL — SIGNIFICANT CHANGE UP (ref 0.2–1.2)
BUN SERPL-MCNC: 27 MG/DL — HIGH (ref 7–23)
CALCIUM SERPL-MCNC: 9.4 MG/DL — SIGNIFICANT CHANGE UP (ref 8.4–10.5)
CHLORIDE SERPL-SCNC: 103 MMOL/L — SIGNIFICANT CHANGE UP (ref 98–107)
CO2 SERPL-SCNC: 25 MMOL/L — SIGNIFICANT CHANGE UP (ref 22–31)
CREAT SERPL-MCNC: 0.96 MG/DL — SIGNIFICANT CHANGE UP (ref 0.5–1.3)
EGFR: 69 ML/MIN/1.73M2 — SIGNIFICANT CHANGE UP
EOSINOPHIL # BLD AUTO: 0.23 K/UL — SIGNIFICANT CHANGE UP (ref 0–0.5)
EOSINOPHIL NFR BLD AUTO: 3.1 % — SIGNIFICANT CHANGE UP (ref 0–6)
GLUCOSE SERPL-MCNC: 113 MG/DL — HIGH (ref 70–99)
HCG SERPL-ACNC: 1.2 MIU/ML — SIGNIFICANT CHANGE UP
HCT VFR BLD CALC: 37.9 % — SIGNIFICANT CHANGE UP (ref 34.5–45)
HGB BLD-MCNC: 12.4 G/DL — SIGNIFICANT CHANGE UP (ref 11.5–15.5)
IANC: 3.64 K/UL — SIGNIFICANT CHANGE UP (ref 1.8–7.4)
IMM GRANULOCYTES NFR BLD AUTO: 0.3 % — SIGNIFICANT CHANGE UP (ref 0–0.9)
LIDOCAIN IGE QN: 32 U/L — SIGNIFICANT CHANGE UP (ref 7–60)
LYMPHOCYTES # BLD AUTO: 3.03 K/UL — SIGNIFICANT CHANGE UP (ref 1–3.3)
LYMPHOCYTES # BLD AUTO: 40.7 % — SIGNIFICANT CHANGE UP (ref 13–44)
MCHC RBC-ENTMCNC: 28.8 PG — SIGNIFICANT CHANGE UP (ref 27–34)
MCHC RBC-ENTMCNC: 32.7 GM/DL — SIGNIFICANT CHANGE UP (ref 32–36)
MCV RBC AUTO: 87.9 FL — SIGNIFICANT CHANGE UP (ref 80–100)
MONOCYTES # BLD AUTO: 0.47 K/UL — SIGNIFICANT CHANGE UP (ref 0–0.9)
MONOCYTES NFR BLD AUTO: 6.3 % — SIGNIFICANT CHANGE UP (ref 2–14)
NEUTROPHILS # BLD AUTO: 3.64 K/UL — SIGNIFICANT CHANGE UP (ref 1.8–7.4)
NEUTROPHILS NFR BLD AUTO: 48.9 % — SIGNIFICANT CHANGE UP (ref 43–77)
NRBC # BLD: 0 /100 WBCS — SIGNIFICANT CHANGE UP (ref 0–0)
NRBC # FLD: 0 K/UL — SIGNIFICANT CHANGE UP (ref 0–0)
PLATELET # BLD AUTO: 443 K/UL — HIGH (ref 150–400)
POTASSIUM SERPL-MCNC: 3.5 MMOL/L — SIGNIFICANT CHANGE UP (ref 3.5–5.3)
POTASSIUM SERPL-SCNC: 3.5 MMOL/L — SIGNIFICANT CHANGE UP (ref 3.5–5.3)
PROT SERPL-MCNC: 7.3 G/DL — SIGNIFICANT CHANGE UP (ref 6–8.3)
RBC # BLD: 4.31 M/UL — SIGNIFICANT CHANGE UP (ref 3.8–5.2)
RBC # FLD: 14.4 % — SIGNIFICANT CHANGE UP (ref 10.3–14.5)
SODIUM SERPL-SCNC: 139 MMOL/L — SIGNIFICANT CHANGE UP (ref 135–145)
WBC # BLD: 7.44 K/UL — SIGNIFICANT CHANGE UP (ref 3.8–10.5)
WBC # FLD AUTO: 7.44 K/UL — SIGNIFICANT CHANGE UP (ref 3.8–10.5)

## 2024-03-22 PROCEDURE — 99223 1ST HOSP IP/OBS HIGH 75: CPT

## 2024-03-22 PROCEDURE — 99233 SBSQ HOSP IP/OBS HIGH 50: CPT

## 2024-03-22 RX ORDER — PANTOPRAZOLE SODIUM 20 MG/1
40 TABLET, DELAYED RELEASE ORAL
Refills: 0 | Status: DISCONTINUED | OUTPATIENT
Start: 2024-03-22 | End: 2024-03-25

## 2024-03-22 RX ORDER — ESCITALOPRAM OXALATE 10 MG/1
0 TABLET, FILM COATED ORAL
Qty: 0 | Refills: 1 | DISCHARGE

## 2024-03-22 RX ORDER — CYCLOBENZAPRINE HYDROCHLORIDE 10 MG/1
5 TABLET, FILM COATED ORAL THREE TIMES A DAY
Refills: 0 | Status: DISCONTINUED | OUTPATIENT
Start: 2024-03-22 | End: 2024-03-25

## 2024-03-22 RX ORDER — LANOLIN ALCOHOL/MO/W.PET/CERES
3 CREAM (GRAM) TOPICAL AT BEDTIME
Refills: 0 | Status: DISCONTINUED | OUTPATIENT
Start: 2024-03-22 | End: 2024-03-25

## 2024-03-22 RX ORDER — CHOLECALCIFEROL (VITAMIN D3) 125 MCG
400 CAPSULE ORAL DAILY
Refills: 0 | Status: DISCONTINUED | OUTPATIENT
Start: 2024-03-22 | End: 2024-03-25

## 2024-03-22 RX ORDER — ESCITALOPRAM OXALATE 10 MG/1
5 TABLET, FILM COATED ORAL DAILY
Refills: 0 | Status: DISCONTINUED | OUTPATIENT
Start: 2024-03-22 | End: 2024-03-25

## 2024-03-22 RX ORDER — ROSUVASTATIN CALCIUM 5 MG/1
0 TABLET ORAL
Qty: 0 | Refills: 0 | DISCHARGE

## 2024-03-22 RX ORDER — OMEPRAZOLE 10 MG/1
0 CAPSULE, DELAYED RELEASE ORAL
Qty: 0 | Refills: 1 | DISCHARGE

## 2024-03-22 RX ORDER — CYCLOBENZAPRINE HYDROCHLORIDE 10 MG/1
0 TABLET, FILM COATED ORAL
Qty: 0 | Refills: 0 | DISCHARGE

## 2024-03-22 RX ORDER — IPRATROPIUM/ALBUTEROL SULFATE 18-103MCG
3 AEROSOL WITH ADAPTER (GRAM) INHALATION EVERY 6 HOURS
Refills: 0 | Status: DISCONTINUED | OUTPATIENT
Start: 2024-03-22 | End: 2024-03-25

## 2024-03-22 RX ORDER — POTASSIUM CHLORIDE 20 MEQ
20 PACKET (EA) ORAL ONCE
Refills: 0 | Status: COMPLETED | OUTPATIENT
Start: 2024-03-22 | End: 2024-03-22

## 2024-03-22 RX ORDER — ASPIRIN/CALCIUM CARB/MAGNESIUM 324 MG
81 TABLET ORAL DAILY
Refills: 0 | Status: DISCONTINUED | OUTPATIENT
Start: 2024-03-22 | End: 2024-03-25

## 2024-03-22 RX ORDER — ONDANSETRON 8 MG/1
4 TABLET, FILM COATED ORAL THREE TIMES A DAY
Refills: 0 | Status: DISCONTINUED | OUTPATIENT
Start: 2024-03-22 | End: 2024-03-22

## 2024-03-22 RX ORDER — CHLORTHALIDONE 50 MG
0 TABLET ORAL
Qty: 0 | Refills: 1 | DISCHARGE

## 2024-03-22 RX ORDER — DEXAMETHASONE 0.5 MG/5ML
10 ELIXIR ORAL ONCE
Refills: 0 | Status: COMPLETED | OUTPATIENT
Start: 2024-03-22 | End: 2024-03-22

## 2024-03-22 RX ORDER — FLUTICASONE FUROATE, UMECLIDINIUM BROMIDE AND VILANTEROL TRIFENATATE 200; 62.5; 25 UG/1; UG/1; UG/1
0 POWDER RESPIRATORY (INHALATION)
Qty: 0 | Refills: 0 | DISCHARGE

## 2024-03-22 RX ORDER — ALBUTEROL 90 UG/1
2 AEROSOL, METERED ORAL EVERY 6 HOURS
Refills: 0 | Status: DISCONTINUED | OUTPATIENT
Start: 2024-03-22 | End: 2024-03-25

## 2024-03-22 RX ORDER — ACETAMINOPHEN 500 MG
650 TABLET ORAL ONCE
Refills: 0 | Status: COMPLETED | OUTPATIENT
Start: 2024-03-22 | End: 2024-03-22

## 2024-03-22 RX ORDER — ACETAMINOPHEN 500 MG
650 TABLET ORAL EVERY 6 HOURS
Refills: 0 | Status: DISCONTINUED | OUTPATIENT
Start: 2024-03-22 | End: 2024-03-25

## 2024-03-22 RX ORDER — ONDANSETRON 8 MG/1
4 TABLET, FILM COATED ORAL ONCE
Refills: 0 | Status: COMPLETED | OUTPATIENT
Start: 2024-03-22 | End: 2024-03-22

## 2024-03-22 RX ORDER — LIDOCAINE 4 G/100G
1 CREAM TOPICAL ONCE
Refills: 0 | Status: COMPLETED | OUTPATIENT
Start: 2024-03-22 | End: 2024-03-22

## 2024-03-22 RX ORDER — ACETAMINOPHEN 500 MG
650 TABLET ORAL EVERY 6 HOURS
Refills: 0 | Status: DISCONTINUED | OUTPATIENT
Start: 2024-03-22 | End: 2024-03-22

## 2024-03-22 RX ORDER — BUDESONIDE AND FORMOTEROL FUMARATE DIHYDRATE 160; 4.5 UG/1; UG/1
2 AEROSOL RESPIRATORY (INHALATION)
Refills: 0 | Status: DISCONTINUED | OUTPATIENT
Start: 2024-03-22 | End: 2024-03-25

## 2024-03-22 RX ORDER — ENOXAPARIN SODIUM 100 MG/ML
40 INJECTION SUBCUTANEOUS EVERY 24 HOURS
Refills: 0 | Status: DISCONTINUED | OUTPATIENT
Start: 2024-03-22 | End: 2024-03-25

## 2024-03-22 RX ORDER — METOPROLOL TARTRATE 50 MG
0 TABLET ORAL
Qty: 0 | Refills: 1 | DISCHARGE

## 2024-03-22 RX ADMIN — Medication 3 MILLIGRAM(S): at 21:41

## 2024-03-22 RX ADMIN — LIDOCAINE 1 PATCH: 4 CREAM TOPICAL at 21:39

## 2024-03-22 RX ADMIN — BUDESONIDE AND FORMOTEROL FUMARATE DIHYDRATE 2 PUFF(S): 160; 4.5 AEROSOL RESPIRATORY (INHALATION) at 21:39

## 2024-03-22 RX ADMIN — ONDANSETRON 4 MILLIGRAM(S): 8 TABLET, FILM COATED ORAL at 08:58

## 2024-03-22 RX ADMIN — Medication 20 MILLIEQUIVALENT(S): at 18:59

## 2024-03-22 RX ADMIN — Medication 3 MILLILITER(S): at 23:31

## 2024-03-22 RX ADMIN — Medication 3 MILLILITER(S): at 17:51

## 2024-03-22 RX ADMIN — Medication 260 MILLIGRAM(S): at 21:40

## 2024-03-22 RX ADMIN — Medication 102 MILLIGRAM(S): at 12:32

## 2024-03-22 RX ADMIN — ATORVASTATIN CALCIUM 40 MILLIGRAM(S): 80 TABLET, FILM COATED ORAL at 21:40

## 2024-03-22 RX ADMIN — AMLODIPINE BESYLATE 10 MILLIGRAM(S): 2.5 TABLET ORAL at 21:40

## 2024-03-22 RX ADMIN — ENOXAPARIN SODIUM 40 MILLIGRAM(S): 100 INJECTION SUBCUTANEOUS at 18:59

## 2024-03-22 RX ADMIN — Medication 3 MILLILITER(S): at 11:35

## 2024-03-22 RX ADMIN — EFAVIRENZ, EMTRICITABINE AND TENOFOVIR DISOPROXIL FUMARATE 1 TABLET(S): 600; 200; 300 TABLET, FILM COATED ORAL at 11:30

## 2024-03-22 RX ADMIN — LIDOCAINE 1 PATCH: 4 CREAM TOPICAL at 01:00

## 2024-03-22 RX ADMIN — Medication 650 MILLIGRAM(S): at 22:15

## 2024-03-22 NOTE — H&P ADULT - NSHPLABSRESULTS_GEN_ALL_CORE
LABS:                        12.4   7.44  )-----------( 443      ( 22 Mar 2024 09:45 )             37.9     03-22    139  |  103  |  27<H>  ----------------------------<  113<H>  3.5   |  25  |  0.96    Ca    9.4      22 Mar 2024 09:45    TPro  7.3  /  Alb  3.8  /  TBili  <0.2  /  DBili  x   /  AST  17  /  ALT  24  /  AlkPhos  169<H>  03-22    PT/INR - ( 21 Mar 2024 12:24 )   PT: 10.5 sec;   INR: 0.93 ratio         PTT - ( 21 Mar 2024 12:24 )  PTT:30.6 sec  Urinalysis Basic - ( 22 Mar 2024 09:45 )    Color: x / Appearance: x / SG: x / pH: x  Gluc: 113 mg/dL / Ketone: x  / Bili: x / Urobili: x   Blood: x / Protein: x / Nitrite: x   Leuk Esterase: x / RBC: x / WBC x   Sq Epi: x / Non Sq Epi: x / Bacteria: x      CAPILLARY BLOOD GLUCOSE          RADIOLOGY & ADDITIONAL TESTS: Reviewed.

## 2024-03-22 NOTE — ED CDU PROVIDER SUBSEQUENT DAY NOTE - CLINICAL SUMMARY MEDICAL DECISION MAKING FREE TEXT BOX
Patient is a 58-year-old female with past medical history of lung cancer with history of lobectomy, asthma, hypertension, HIV presents with 3 days of left shoulder pain that occasionally radiates to left anterior chest.  Patient reports shoulder pain worsens with ROM at left shoulder.  Patient also reporting wheezing yesterday, similar to symptoms she gets with asthma.  Patient reports after nebulizer treatment, wheezing significantly improved.  Patient denies any fevers, chills, hemoptysis, illicit drug use.  In the emergency department, troponin was 8.  Patient had chest x-ray showed clear lungs.  Patient was placed in the observation unit for stress test and echocardiogram.

## 2024-03-22 NOTE — H&P ADULT - HISTORY OF PRESENT ILLNESS
Pt is a 57 yo F with PMH lung CA (s/p lobectomy), HTN, HLD, asthma, HIV, former tobacco use, GERD, OA, and vitamin D deficiency p/w 2-3d L shoulder pain radiating to chest x1d. Has had decreased ROM L shoulder due to the pain, rated at 8/10. Also with burning epigastric/sternal sensation and increased wheezing. She denies recent asthma exacerbation, travel, or sick contacts. Also denies numbness/tingling in any extremities, diaphoresis, or shortness of breath. She has not had symptoms like this before but follows with cards and is due for f/u at [facility] next week. At present, denies additional concerns/complaints.     On arrival, hemodynamically stable with EKG NSR and no ischemic changes. Labs with K 3.5, A1C 6.3, and TSH WNL. Was initially observed in the CDU during which she developed wheezing. She was treated with decadron 10mg IV and duonebs. TTE showed difficult study but grossly normal LV systolic function. NST was deferred d/t bronchospasm and pt admitted for dobutamine stress test.

## 2024-03-22 NOTE — H&P ADULT - NSHPPHYSICALEXAM_GEN_ALL_CORE
VITAL SIGNS:  T(C): 36.7 (03-22-24 @ 17:37), Max: 36.8 (03-22-24 @ 10:39)  T(F): 98 (03-22-24 @ 17:37), Max: 98.2 (03-22-24 @ 10:39)  HR: 79 (03-22-24 @ 17:37) (66 - 79)  BP: 135/83 (03-22-24 @ 17:37) (119/72 - 135/83)  BP(mean): --  RR: 18 (03-22-24 @ 17:37) (18 - 19)  SpO2: 100% (03-22-24 @ 17:37) (96% - 100%)  Wt(kg): --    PHYSICAL EXAM:  Constitutional: resting comfortably in bed; NAD  Head: NC/AT  Eyes: PERRL, EOMI, anicteric sclera  ENT: no nasal discharge; MMM  Neck: supple; no JVD  Respiratory: CTA B/L; no W/R/R  Cardiac: +S1/S2; RRR; no M/R/G  Gastrointestinal: soft, NT/ND; no rebound or guarding; +BSx4  Extremities: WWP, no clubbing or cyanosis; no peripheral edema  Musculoskeletal: NROM x4; no joint swelling, tenderness or erythema  Vascular: 2+ radial, DP/PT pulses B/L  Dermatologic: skin warm, dry and intact; no rashes, wounds, or scars  Neurologic: AAOx3; CNII-XII grossly intact; no focal deficits  Psychiatric: affect and characteristics of appearance, verbalizations, behaviors are appropriate

## 2024-03-22 NOTE — ED CDU PROVIDER SUBSEQUENT DAY NOTE - ATTENDING APP SHARED VISIT CONTRIBUTION OF CARE
I (Nelida) agree with above, I performed a history and physical. Counseled kylah medical staff, physician assistant, and/or medical student on medical decision making as documented. Medical decisions and treatment interventions were made in real time during the patient encounter. Additionally and/or with the following exceptions:

## 2024-03-22 NOTE — ED CDU PROVIDER DISPOSITION NOTE - CLINICAL COURSE
58-year-old female with past medical history of lung cancer with history of lobectomy, asthma, hypertension, HIV presents with 3 days of left shoulder pain that occasionally radiates to left anterior chest.  Patient reports shoulder pain worsens with ROM at left shoulder.  Patient also reporting wheezing yesterday, similar to symptoms she gets with asthma.  Patient reports after nebulizer treatment, wheezing significantly improved.  Patient denies any fevers, chills, hemoptysis, illicit drug use.  In the emergency department, troponin was 8.  Patient had chest x-ray showed clear lungs.  Patient was placed in the observation unit for stress test and echocardiogram. ECHO wnl. Stress unable to be performed 2/2 acute wheezing, and c/f bronchospasm. pt given decadron and nebs with improvement, lungs CTA b/l. stress unable to perform NST today, and also due to persistent risk of bronchospasm. Spoke w/ Cardio Dr. Tho Sharp, can admit patient for dobutamine stress test to avoid risk of bronchospasm. pt amenable to procedure. will admit.

## 2024-03-22 NOTE — ED ADULT NURSE REASSESSMENT NOTE - NS ED NURSE REASSESS COMMENT FT1
PT is resting in stretcher, easily arousable to verbal stimuli. no apparent distress noted. pt plan of care on going.

## 2024-03-22 NOTE — H&P ADULT - NSHPREVIEWOFSYSTEMS_GEN_ALL_CORE
CONSTITUTIONAL: No weakness, fevers or chills.   EYES/ENT: No visual changes;  No vertigo or throat pain   NECK: No pain or stiffness  RESPIRATORY: No cough, wheezing, hemoptysis; No shortness of breath  CARDIOVASCULAR: as above  GASTROINTESTINAL: No abdominal or epigastric pain. No nausea, vomiting, or hematemesis; No diarrhea or constipation. No melena or hematochezia.  GENITOURINARY: No dysuria, frequency or hematuria  NEUROLOGICAL: No numbness or weakness  SKIN: No itching, burning, rashes, or lesions   All other review of systems is negative unless indicated above.

## 2024-03-22 NOTE — ED CDU PROVIDER SUBSEQUENT DAY NOTE - PHYSICAL EXAMINATION
Left shoulder:  No redness, no swelling, no bony tenderness; no crepitus; no joint laxity; 5/5 strength; pain reproduced with active ROM; no UE swelling; 2+ pulses; < 2 sec cap refill; sensation intact to light touch

## 2024-03-22 NOTE — ED CDU PROVIDER DISPOSITION NOTE - PRINCIPAL DIAGNOSIS
Final Anesthesia Post-op Assessment    Patient: Lizz Roque  Procedure(s) Performed: REMOVAL OF IMPLANT AND CAPSULECTOMY OF THE LEFT BREAST, AND PLACEMENT OF TISSUE EXPANDER AND USE OF ACELLULAR DERMAL MATRIX - LEFT  Anesthesia type: General    Vitals Value Taken Time   Temp 36.1 °C (97 °F) 12/14/21 1047   Pulse 70 12/14/21 1047   Resp 10 12/14/21 1047   SpO2 93 % 12/14/21 1047   /62 12/14/21 1047         Patient Location: PACU Phase 1  Post-op Vital Signs:stable  Level of Consciousness: participates in exam, answers questions appropriately, awake, alert and oriented  Respiratory Status: spontaneous ventilation  Cardiovascular blood pressure returned to baseline and stable  Hydration: euvolemic  Pain Management: adequately controlled  Nausea: None  Airway Patency:patent  Post-op Assessment: awake, alert, appropriately conversant, or baseline, no complications, patient tolerated procedure well with no complications and no evidence of recall      No complications documented.   
Chest pain

## 2024-03-22 NOTE — CONSULT NOTE ADULT - SUBJECTIVE AND OBJECTIVE BOX
Cardiovascular Disease Initial Evaluation  Date of service: 03-22-24 @ 13:01    CHIEF COMPLAINT:  Chest pain     HISTORY OF PRESENT ILLNESS:  58-year-old female past medical history lung cancer status post lobectomy, hypertension, asthma, HIV presents with  left-sided shoulder pain, with radiation to left anterior chest. Patient states she has had this pain for the past several weeks. Patient admits that the pain is reproducible with movement.  Patient denies numbness tingling of extremities, diaphoresis.  Patient also endorses burning sensation in substernal region.  Additionally patient endorses wheezing.  Denies recent viral URI including fever, chills, body aches, cough.  Denies recent travel, recent surgeries, patient is has no active cancer most recent CT chest was December 2023 normal per patient.      Allergies    No Known Allergies    Intolerances    	    MEDICATIONS:  amLODIPine   Tablet 10 milliGRAM(s) Oral daily    efavirenz 600/emtricitabine 200/tenofovir 300mg 1 Tablet(s) Oral daily    albuterol/ipratropium for Nebulization 3 milliLiter(s) Nebulizer every 6 hours    acetaminophen     Tablet .. 650 milliGRAM(s) Oral every 6 hours PRN  ondansetron Injectable 4 milliGRAM(s) IV Push three times a day PRN      atorvastatin 40 milliGRAM(s) Oral at bedtime        PAST MEDICAL & SURGICAL HISTORY:  Benign hypertension      H/O abdominal hysterectomy      Asthma      HIV disease      Osteoarthritis      GERD (gastroesophageal reflux disease)      HPV (human papilloma virus) infection      Cigarette smoker  quit 2019      Pruritus  (chronic)      Vitamin D deficiency      Knee pain, bilateral      S/P hysterectomy      S/P partial lobectomy of lung  right lung for stage 1 lung ca, on 11/22/22          FAMILY HISTORY:  Family history of hypertension (Grandparent)  grandmother    Family history of diabetes mellitus (Grandparent, Uncle, Uncle)  grandmother, uncles x 2    Family history of breast cancer (Aunt)  aunt    Family history of cancer (Uncle, Uncle)  uncles x 2    Family history of myocardial infarction (Aunt)  aunt    Family history of cerebrovascular accident (CVA) (Aunt)  aunt        SOCIAL HISTORY:    The patient is a nonsmoker       REVIEW OF SYSTEMS:  See HPI, otherwise complete 14 point review of systems negative    [x ] All others negative	  [ ] Unable to obtain    PHYSICAL EXAM:  T(C): 36.8 (03-22-24 @ 10:39), Max: 36.8 (03-22-24 @ 10:39)  HR: 76 (03-22-24 @ 10:39) (66 - 76)  BP: 119/72 (03-22-24 @ 10:39) (119/72 - 145/83)  RR: 18 (03-22-24 @ 10:39) (18 - 19)  SpO2: 96% (03-22-24 @ 10:39) (96% - 100%)  Wt(kg): --  I&O's Summary      Appearance: No Acute Distress; resting comfortably  HEENT:  Normal oral mucosa, PERRL, EOMI	  Cardiovascular: Normal S1 S2, No JVD, No murmurs/rubs/gallops  Respiratory: Normal respiratory effort; Expiratory wheezes  Gastrointestinal:  Soft, Non-tender, + BS	  Skin: No rashes, No ecchymoses, No cyanosis	  Neurologic: Non-focal; no weakness  Extremities: No clubbing, cyanosis or edema  Vascular: Peripheral pulses palpable 2+ bilaterally  Psychiatry: A & O x 3, Mood & affect appropriate    Laboratory Data:	 	    CBC Full  -  ( 22 Mar 2024 09:45 )  WBC Count : 7.44 K/uL  Hemoglobin : 12.4 g/dL  Hematocrit : 37.9 %  Platelet Count - Automated : 443 K/uL  Mean Cell Volume : 87.9 fL  Mean Cell Hemoglobin : 28.8 pg  Mean Cell Hemoglobin Concentration : 32.7 gm/dL  Auto Neutrophil # : 3.64 K/uL  Auto Lymphocyte # : 3.03 K/uL  Auto Monocyte # : 0.47 K/uL  Auto Eosinophil # : 0.23 K/uL  Auto Basophil # : 0.05 K/uL  Auto Neutrophil % : 48.9 %  Auto Lymphocyte % : 40.7 %  Auto Monocyte % : 6.3 %  Auto Eosinophil % : 3.1 %  Auto Basophil % : 0.7 %    03-22    139  |  103  |  27<H>  ----------------------------<  113<H>  3.5   |  25  |  0.96  03-21    142  |  104  |  19  ----------------------------<  86  3.3<L>   |  26  |  0.90    Ca    9.4      22 Mar 2024 09:45  Ca    9.3      21 Mar 2024 12:24    TPro  7.3  /  Alb  3.8  /  TBili  <0.2  /  DBili  x   /  AST  17  /  ALT  24  /  AlkPhos  169<H>  03-22  TPro  7.8  /  Alb  4.0  /  TBili  0.2  /  DBili  x   /  AST  22  /  ALT  27  /  AlkPhos  179<H>  03-21      proBNP:   Lipid Profile:   HgA1c:   TSH:       CARDIAC MARKERS: trop T 8            Interpretation of Telemetry: sinus 	    ECG: sinus with low voltage  RADIOLOGY:  OTHER: 	    PREVIOUS DIAGNOSTIC TESTING:    [x ] Echocardiogram:   1. Technically very difficult study performed with the patient in the supine position.   2. Technically difficult image quality.   3. Left ventricular endocardium is not well visualized; however, the left ventricular systolic function appears grossly normal.   4. The right ventricle is not well visualized.   5. Structurally normal mitral valve with normal leaflet excursion. There is trace mitral regurgitation.  [ ] Catheterization:  [ ] Stress Test:  	    Assessment:  58-year-old female past medical history lung cancer status post lobectomy, hypertension, asthma, HIV presents with  left-sided shoulder pain    Plan of Care:      #Chest pain   - No evidence of ACS  -EKG shows sinus rhythm with low voltage  - Echo shows grossly normal LV Systolic function  - NST pending  - If normal patient may be discharged from a cardiac standpoint  - Patient states she is scheduled to see a cardiologist next week from [facility]      # Lung Ca  S/p lobectomy  Patient with wheezes on exam  Defer to primary team    #HTN  - BP acceptable    #HLD  - Statin therapy      #ACP (advance care planning)-  Advanced care planning was discussed with the patient.  Risks, benefits and alternatives of medical treatment and procedures were discussed in detail and all questions were answered. 30 additional minutes spent addressing advance care plans.      77 minutes spent on total encounter; more than 50% of the visit was spent counseling and/or coordinating care by the attending physician.   	  Tho Sharp DO [facility]  Cardiovascular Diseases  [phone]

## 2024-03-22 NOTE — H&P ADULT - PROBLEM SELECTOR PLAN 1
- pt p/w 1d CP and 3d L shoulder pain; also with burning epigastric/sternal sensation; no numbness/tingling of extremities, shortness of breath, or diaphoresis; f/w cards at [facility] due for f/u in 1 wk  - hemodynamically stable on arrival  - EKG NSR without ischemic changes  - labs largely unremarkable  - lipid profile pending; A1C 6.3, TSH WNL  - CXR neg   - TTE difficult study but grossly normal LV systolic function  - s/p ASA 325mg in ER  - c/w ASA 81mg daily for now  - atorvastatin as below  - cards following, appreciate recs  - pending dobutamine stress test  - monitor on tele  - ?component of GERD --> treatment as below

## 2024-03-22 NOTE — H&P ADULT - PROBLEM SELECTOR PLAN 8
- F: none  - E: replete K<4, Mg<2  - N: DASH/TLC  - D: lovenox 40mg q24h  - G: protonix 40mg daily    code: full  dispo: pending medical optimization, anticipate return to home

## 2024-03-22 NOTE — H&P ADULT - PROBLEM SELECTOR PLAN 4
- home meds: amlodipine 10mg daily, toprol 50mg daily, chlorthalidone 25mg daily  - c/w amlodipine  - resume remaining meds as indicated

## 2024-03-22 NOTE — H&P ADULT - TIME BILLING
review of laboratory data, radiology results, consultants' recommendations, documentation in [place], discussion with patient/ACP and interdisciplinary staff (such as , social workers, etc). Interventions were performed as documented above.

## 2024-03-22 NOTE — H&P ADULT - PROBLEM SELECTOR PLAN 7
- home med: omeprazole 40mg daily  - therapeutic exchange with protonix 40mg daily  - ?contributing to presentation, as above

## 2024-03-22 NOTE — H&P ADULT - ASSESSMENT
Pt is a 59 yo F with PMH lung CA (s/p lobectomy), HTN, HLD, asthma, HIV, former tobacco use, GERD, OA, and vitamin D deficiency p/w 2-3d L shoulder pain radiating to chest x1d. On arrival, hemodynamically stable with EKG NSR and no ischemic changes and labs largely unremarkable. Was initially observed in the CDU during which she developed wheezing. She was treated with decadron 10mg IV and duonebs. TTE showed difficult study but grossly normal LV systolic function. NST was deferred d/t bronchospasm and pt admitted for dobutamine stress test.

## 2024-03-22 NOTE — H&P ADULT - PROBLEM SELECTOR PLAN 3
- not in acute exacerbation  - mild wheezing earlier --> now resolved   - s/p decadron 10mg IV and duonebs  - CXR neg  - no infectious s/s and not meeting SIRS criteria  - c/w ATC duonebs q6h  - start symbicort BID  - defer additional steroids at present

## 2024-03-23 LAB
ANION GAP SERPL CALC-SCNC: 15 MMOL/L — HIGH (ref 7–14)
BUN SERPL-MCNC: 25 MG/DL — HIGH (ref 7–23)
CALCIUM SERPL-MCNC: 9.6 MG/DL — SIGNIFICANT CHANGE UP (ref 8.4–10.5)
CHLORIDE SERPL-SCNC: 100 MMOL/L — SIGNIFICANT CHANGE UP (ref 98–107)
CHOLEST SERPL-MCNC: 220 MG/DL — HIGH
CO2 SERPL-SCNC: 23 MMOL/L — SIGNIFICANT CHANGE UP (ref 22–31)
CREAT SERPL-MCNC: 0.86 MG/DL — SIGNIFICANT CHANGE UP (ref 0.5–1.3)
EGFR: 78 ML/MIN/1.73M2 — SIGNIFICANT CHANGE UP
GLUCOSE BLDC GLUCOMTR-MCNC: 115 MG/DL — HIGH (ref 70–99)
GLUCOSE BLDC GLUCOMTR-MCNC: 270 MG/DL — HIGH (ref 70–99)
GLUCOSE SERPL-MCNC: 119 MG/DL — HIGH (ref 70–99)
HCT VFR BLD CALC: 37.8 % — SIGNIFICANT CHANGE UP (ref 34.5–45)
HDLC SERPL-MCNC: 72 MG/DL — SIGNIFICANT CHANGE UP
HGB BLD-MCNC: 12.7 G/DL — SIGNIFICANT CHANGE UP (ref 11.5–15.5)
LIPID PNL WITH DIRECT LDL SERPL: 125 MG/DL — HIGH
MAGNESIUM SERPL-MCNC: 2 MG/DL — SIGNIFICANT CHANGE UP (ref 1.6–2.6)
MCHC RBC-ENTMCNC: 29.3 PG — SIGNIFICANT CHANGE UP (ref 27–34)
MCHC RBC-ENTMCNC: 33.6 GM/DL — SIGNIFICANT CHANGE UP (ref 32–36)
MCV RBC AUTO: 87.1 FL — SIGNIFICANT CHANGE UP (ref 80–100)
NON HDL CHOLESTEROL: 148 MG/DL — HIGH
NRBC # BLD: 0 /100 WBCS — SIGNIFICANT CHANGE UP (ref 0–0)
NRBC # FLD: 0 K/UL — SIGNIFICANT CHANGE UP (ref 0–0)
PHOSPHATE SERPL-MCNC: 3.2 MG/DL — SIGNIFICANT CHANGE UP (ref 2.5–4.5)
PLATELET # BLD AUTO: 510 K/UL — HIGH (ref 150–400)
POTASSIUM SERPL-MCNC: 3.5 MMOL/L — SIGNIFICANT CHANGE UP (ref 3.5–5.3)
POTASSIUM SERPL-SCNC: 3.5 MMOL/L — SIGNIFICANT CHANGE UP (ref 3.5–5.3)
RBC # BLD: 4.34 M/UL — SIGNIFICANT CHANGE UP (ref 3.8–5.2)
RBC # FLD: 14.3 % — SIGNIFICANT CHANGE UP (ref 10.3–14.5)
SODIUM SERPL-SCNC: 138 MMOL/L — SIGNIFICANT CHANGE UP (ref 135–145)
TRIGL SERPL-MCNC: 116 MG/DL — SIGNIFICANT CHANGE UP
WBC # BLD: 17.11 K/UL — HIGH (ref 3.8–10.5)
WBC # FLD AUTO: 17.11 K/UL — HIGH (ref 3.8–10.5)

## 2024-03-23 RX ORDER — GLUCAGON INJECTION, SOLUTION 0.5 MG/.1ML
1 INJECTION, SOLUTION SUBCUTANEOUS ONCE
Refills: 0 | Status: DISCONTINUED | OUTPATIENT
Start: 2024-03-23 | End: 2024-03-25

## 2024-03-23 RX ORDER — SODIUM CHLORIDE 9 MG/ML
1000 INJECTION, SOLUTION INTRAVENOUS
Refills: 0 | Status: DISCONTINUED | OUTPATIENT
Start: 2024-03-23 | End: 2024-03-25

## 2024-03-23 RX ORDER — INSULIN LISPRO 100/ML
VIAL (ML) SUBCUTANEOUS AT BEDTIME
Refills: 0 | Status: DISCONTINUED | OUTPATIENT
Start: 2024-03-23 | End: 2024-03-25

## 2024-03-23 RX ORDER — DEXTROSE 50 % IN WATER 50 %
12.5 SYRINGE (ML) INTRAVENOUS ONCE
Refills: 0 | Status: DISCONTINUED | OUTPATIENT
Start: 2024-03-23 | End: 2024-03-25

## 2024-03-23 RX ORDER — POTASSIUM CHLORIDE 20 MEQ
20 PACKET (EA) ORAL ONCE
Refills: 0 | Status: COMPLETED | OUTPATIENT
Start: 2024-03-23 | End: 2024-03-23

## 2024-03-23 RX ORDER — DEXTROSE 50 % IN WATER 50 %
15 SYRINGE (ML) INTRAVENOUS ONCE
Refills: 0 | Status: DISCONTINUED | OUTPATIENT
Start: 2024-03-23 | End: 2024-03-25

## 2024-03-23 RX ORDER — INSULIN LISPRO 100/ML
VIAL (ML) SUBCUTANEOUS
Refills: 0 | Status: DISCONTINUED | OUTPATIENT
Start: 2024-03-23 | End: 2024-03-25

## 2024-03-23 RX ORDER — DEXTROSE 50 % IN WATER 50 %
25 SYRINGE (ML) INTRAVENOUS ONCE
Refills: 0 | Status: DISCONTINUED | OUTPATIENT
Start: 2024-03-23 | End: 2024-03-25

## 2024-03-23 RX ORDER — LIDOCAINE 4 G/100G
1 CREAM TOPICAL EVERY 24 HOURS
Refills: 0 | Status: DISCONTINUED | OUTPATIENT
Start: 2024-03-23 | End: 2024-03-25

## 2024-03-23 RX ORDER — MORPHINE SULFATE 50 MG/1
1 CAPSULE, EXTENDED RELEASE ORAL ONCE
Refills: 0 | Status: DISCONTINUED | OUTPATIENT
Start: 2024-03-23 | End: 2024-03-23

## 2024-03-23 RX ADMIN — Medication 1 TABLET(S): at 12:12

## 2024-03-23 RX ADMIN — EFAVIRENZ, EMTRICITABINE AND TENOFOVIR DISOPROXIL FUMARATE 1 TABLET(S): 600; 200; 300 TABLET, FILM COATED ORAL at 12:12

## 2024-03-23 RX ADMIN — Medication 400 UNIT(S): at 12:12

## 2024-03-23 RX ADMIN — ESCITALOPRAM OXALATE 5 MILLIGRAM(S): 10 TABLET, FILM COATED ORAL at 12:13

## 2024-03-23 RX ADMIN — Medication 40 MILLIGRAM(S): at 17:50

## 2024-03-23 RX ADMIN — AMLODIPINE BESYLATE 10 MILLIGRAM(S): 2.5 TABLET ORAL at 06:10

## 2024-03-23 RX ADMIN — PANTOPRAZOLE SODIUM 40 MILLIGRAM(S): 20 TABLET, DELAYED RELEASE ORAL at 06:09

## 2024-03-23 RX ADMIN — LIDOCAINE 1 PATCH: 4 CREAM TOPICAL at 08:18

## 2024-03-23 RX ADMIN — Medication 81 MILLIGRAM(S): at 12:12

## 2024-03-23 RX ADMIN — Medication 3 MILLILITER(S): at 10:31

## 2024-03-23 RX ADMIN — BUDESONIDE AND FORMOTEROL FUMARATE DIHYDRATE 2 PUFF(S): 160; 4.5 AEROSOL RESPIRATORY (INHALATION) at 09:28

## 2024-03-23 RX ADMIN — LIDOCAINE 1 PATCH: 4 CREAM TOPICAL at 10:42

## 2024-03-23 RX ADMIN — Medication 1: at 21:32

## 2024-03-23 RX ADMIN — ATORVASTATIN CALCIUM 40 MILLIGRAM(S): 80 TABLET, FILM COATED ORAL at 21:32

## 2024-03-23 RX ADMIN — ENOXAPARIN SODIUM 40 MILLIGRAM(S): 100 INJECTION SUBCUTANEOUS at 17:50

## 2024-03-23 RX ADMIN — MORPHINE SULFATE 1 MILLIGRAM(S): 50 CAPSULE, EXTENDED RELEASE ORAL at 02:15

## 2024-03-23 RX ADMIN — Medication 20 MILLIEQUIVALENT(S): at 14:37

## 2024-03-23 RX ADMIN — LIDOCAINE 1 PATCH: 4 CREAM TOPICAL at 21:32

## 2024-03-23 RX ADMIN — Medication 3 MILLILITER(S): at 15:45

## 2024-03-23 RX ADMIN — MORPHINE SULFATE 1 MILLIGRAM(S): 50 CAPSULE, EXTENDED RELEASE ORAL at 01:57

## 2024-03-23 RX ADMIN — LIDOCAINE 1 PATCH: 4 CREAM TOPICAL at 07:00

## 2024-03-23 NOTE — CHART NOTE - NSCHARTNOTEFT_GEN_A_CORE
Vital Signs Last 24 Hrs  T(C): 36.7 (23 Mar 2024 12:25), Max: 36.7 (22 Mar 2024 17:37)  T(F): 98 (23 Mar 2024 12:25), Max: 98 (22 Mar 2024 17:37)  HR: 90 (23 Mar 2024 12:25) (74 - 96)  BP: 112/76 (23 Mar 2024 12:25) (112/68 - 137/76)  BP(mean): --  RR: 18 (23 Mar 2024 12:25) (18 - 18)  SpO2: 97% (23 Mar 2024 12:25) (94% - 100%)    Parameters below as of 23 Mar 2024 12:25  Patient On (Oxygen Delivery Method): room air                          12.7   17.11 )-----------( 510      ( 23 Mar 2024 07:11 )             37.8   03-23    138  |  100  |  25<H>  ----------------------------<  119<H>  3.5   |  23  |  0.86    Ca    9.6      23 Mar 2024 07:11  Phos  3.2     03-23  Mg     2.00     03-23    TPro  7.3  /  Alb  3.8  /  TBili  <0.2  /  DBili  x   /  AST  17  /  ALT  24  /  AlkPhos  169<H>  03-22    Patient denies acute chest pain, SOB, chills, abdominal pain, noted with right upper expiratory wheezing, +BS, abdomen soft, reports passing flatus and BM this morning.  Case disucssed with Dr. Sharp >will started on short course IV solumedrol 40mg BID, monitor FS and started ISS. Patient for stress test tomorrow, discussed with patient and RN

## 2024-03-23 NOTE — PATIENT PROFILE ADULT - FUNCTIONAL ASSESSMENT - BASIC MOBILITY SECTION LABEL
This note was copied from the mother's chart.     19 1230   Maternal Assessment   Breast Shape Bilateral:;round   Breast Density Bilateral:;soft   Areola Bilateral:;elastic;other (see comments)  (firm cysts noted on Right areola)   Nipples Bilateral:;everted   Maternal Infant Feeding   Maternal Emotional State assist needed   Infant Positioning cross-cradle   Signs of Milk Transfer infant jaw motion present   Pain with Feeding no   Nipple Shape After Feeding, Right round   Latch Assistance yes   Breastfeeding Supplementation   Infant Indication for Supplementation prematurity   Breastfeeding Supplementation Type expressed breast milk   Method of Supplementation spoon   Equipment Type   Breast Pump Type other (see comments)  (to call for pump set up)   Breast Pumping   Breast Pumping Interventions post-feed pumping encouraged;frequent pumping encouraged;early pumping promoted   Breast Pumping other (see comments)  (hand expression encouraged post feeds)   Lactation Referrals   Lactation Referrals other (see comments)  (THS for pump)   Basic lactation education reviewed. Baby last ate at 0800, LC reinforced recommendation to nurse on cue 8 or more times in 24hrs OR wake for feedings at least every 3hrs due to late  infant. Patient's sister reports baby is 38 weeks, LC encouraged pt to discuss dating of baby with OB and pediatrician. LC recommendation to still feed on cue or at least every 3hrs due to blood sugar protocol. Assisted with waking baby, position and latch, baby sleepy at breast, on/off, breast compression/ stimulation used throughout feeding. Baby nursed for 5min, content on chest, LC demonstrated hand expression, 2.5ml hand expressed, spoonfed to baby. Encouraged STS. Pt appears overwhelmed (blood transfusing, family in room, photography, discharge education), LC encouraged STS and to call LC back when baby showing more cues or when feeling up to more education and [employer] breast pump set  up. Discussed plan to nurse baby first, hand express, spoonfeed EBM then donor milk if medically indicated. Donor milk consent signed by NP before LC arrival. Pump to be set up for extra stimulation after nursing.  Also discussed pump option and S number to call for pump through insurance. LC number on board. RN updated on contact and plan.   .

## 2024-03-23 NOTE — PROGRESS NOTE ADULT - SUBJECTIVE AND OBJECTIVE BOX
Cardiovascular Disease Progress Note  Date of service: 03-23-24 @ 09:46    Overnight events: No acute events overnight.  Patient denies chest pain or SOB.   Otherwise review of systems negative    Objective Findings:  T(C): 36.7 (03-23-24 @ 06:02), Max: 36.8 (03-22-24 @ 10:39)  HR: 95 (03-23-24 @ 06:02) (74 - 96)  BP: 134/92 (03-23-24 @ 06:02) (119/72 - 137/76)  RR: 18 (03-23-24 @ 06:02) (18 - 18)  SpO2: 94% (03-23-24 @ 06:02) (94% - 100%)  Wt(kg): --  Daily Height in cm: 170.18 (23 Mar 2024 01:45)    Daily       Physical Exam:  Gen: NAD; Patient resting comfortably  HEENT: EOMI, Normocephalic/ atraumatic  CV: RRR, normal S1 + S2, no m/r/g  Lungs:  Normal respiratory effort; expiratory wheezing.   Abd: soft, non-tender; bowel sounds present  Ext: No edema; warm and well perfused    Telemetry: Sinus     Laboratory Data:                        12.7   17.11 )-----------( 510      ( 23 Mar 2024 07:11 )             37.8     03-23    138  |  100  |  25<H>  ----------------------------<  119<H>  3.5   |  23  |  0.86    Ca    9.6      23 Mar 2024 07:11  Phos  3.2     03-23  Mg     2.00     03-23    TPro  7.3  /  Alb  3.8  /  TBili  <0.2  /  DBili  x   /  AST  17  /  ALT  24  /  AlkPhos  169<H>  03-22    PT/INR - ( 21 Mar 2024 12:24 )   PT: 10.5 sec;   INR: 0.93 ratio         PTT - ( 21 Mar 2024 12:24 )  PTT:30.6 sec          Inpatient Medications:  MEDICATIONS  (STANDING):  albuterol/ipratropium for Nebulization 3 milliLiter(s) Nebulizer every 6 hours  amLODIPine   Tablet 10 milliGRAM(s) Oral daily  aspirin enteric coated 81 milliGRAM(s) Oral daily  atorvastatin 40 milliGRAM(s) Oral at bedtime  budesonide  80 MICROgram(s)/formoterol 4.5 MICROgram(s) Inhaler 2 Puff(s) Inhalation two times a day  cholecalciferol 400 Unit(s) Oral daily  efavirenz 600/emtricitabine 200/tenofovir 300mg 1 Tablet(s) Oral daily  enoxaparin Injectable 40 milliGRAM(s) SubCutaneous every 24 hours  escitalopram 5 milliGRAM(s) Oral daily  lidocaine   4% Patch 1 Patch Transdermal every 24 hours  multivitamin 1 Tablet(s) Oral daily  pantoprazole    Tablet 40 milliGRAM(s) Oral before breakfast      Assessment:  58-year-old female past medical history lung cancer status post lobectomy, hypertension, asthma, HIV presents with  left-sided shoulder pain    Plan of Care:      #Chest pain   - No evidence of ACS  - EKG shows sinus rhythm with low voltage  - Echo shows grossly normal LV Systolic function  - NST pending, given active wheezing, will opt for Dobutamine stress test.   - If normal patient may be discharged from a cardiac standpoint  - Patient states she is scheduled to see a cardiologist next week from [facility]      # Lung Ca  S/p lobectomy  Patient with wheezes on exam  Defer to primary team    #HTN  - BP acceptable    #HLD  - Statin therapy    #ACP (advance care planning)-  Advanced care planning was discussed with the patient.  Risks, benefits and alternatives of medical treatment and procedures were discussed in detail and all questions were answered. 30 additional minutes spent addressing advance care plans.        Over 55 minutes spent on total encounter; more than 50% of the visit was spent counseling and/or coordinating care by the attending physician.      Tho Sharp DO [facility]  Cardiovascular Disease  [phone] Cardiovascular Disease Progress Note  Date of service: 03-23-24 @ 09:46    Overnight events: No acute events overnight.  Patient denies chest pain or SOB.   Otherwise review of systems negative    Objective Findings:  T(C): 36.7 (03-23-24 @ 06:02), Max: 36.8 (03-22-24 @ 10:39)  HR: 95 (03-23-24 @ 06:02) (74 - 96)  BP: 134/92 (03-23-24 @ 06:02) (119/72 - 137/76)  RR: 18 (03-23-24 @ 06:02) (18 - 18)  SpO2: 94% (03-23-24 @ 06:02) (94% - 100%)  Wt(kg): --  Daily Height in cm: 170.18 (23 Mar 2024 01:45)    Daily       Physical Exam:  Gen: NAD; Patient resting comfortably  HEENT: EOMI, Normocephalic/ atraumatic  CV: RRR, normal S1 + S2, no m/r/g  Lungs:  Normal respiratory effort; expiratory wheezing.   Abd: soft, non-tender; bowel sounds present  Ext: No edema; warm and well perfused    Telemetry: Sinus     Laboratory Data:                        12.7   17.11 )-----------( 510      ( 23 Mar 2024 07:11 )             37.8     03-23    138  |  100  |  25<H>  ----------------------------<  119<H>  3.5   |  23  |  0.86    Ca    9.6      23 Mar 2024 07:11  Phos  3.2     03-23  Mg     2.00     03-23    TPro  7.3  /  Alb  3.8  /  TBili  <0.2  /  DBili  x   /  AST  17  /  ALT  24  /  AlkPhos  169<H>  03-22    PT/INR - ( 21 Mar 2024 12:24 )   PT: 10.5 sec;   INR: 0.93 ratio         PTT - ( 21 Mar 2024 12:24 )  PTT:30.6 sec          Inpatient Medications:  MEDICATIONS  (STANDING):  albuterol/ipratropium for Nebulization 3 milliLiter(s) Nebulizer every 6 hours  amLODIPine   Tablet 10 milliGRAM(s) Oral daily  aspirin enteric coated 81 milliGRAM(s) Oral daily  atorvastatin 40 milliGRAM(s) Oral at bedtime  budesonide  80 MICROgram(s)/formoterol 4.5 MICROgram(s) Inhaler 2 Puff(s) Inhalation two times a day  cholecalciferol 400 Unit(s) Oral daily  efavirenz 600/emtricitabine 200/tenofovir 300mg 1 Tablet(s) Oral daily  enoxaparin Injectable 40 milliGRAM(s) SubCutaneous every 24 hours  escitalopram 5 milliGRAM(s) Oral daily  lidocaine   4% Patch 1 Patch Transdermal every 24 hours  multivitamin 1 Tablet(s) Oral daily  pantoprazole    Tablet 40 milliGRAM(s) Oral before breakfast      Assessment:  58-year-old female past medical history lung cancer status post lobectomy, hypertension, asthma, HIV presents with  left-sided shoulder pain    Plan of Care:      #Chest pain   - No evidence of ACS  - EKG shows sinus rhythm with low voltage  - Echo shows grossly normal LV Systolic function  - NST pending, given active wheezing, will opt for Dobutamine stress test. Please avoid caffeine on day of test.   - If normal patient may be discharged from a cardiac standpoint  - Patient states she is scheduled to see a cardiologist next week from [facility]      # Lung Ca  S/p lobectomy  Patient with wheezes on exam  Duonebs PRN  Defer to primary team    #HTN  - BP acceptable    #HLD  - Statin therapy    #DVT ppx  - On Lovenox    #ACP (advance care planning)-  Advanced care planning was discussed with the patient.  Risks, benefits and alternatives of medical treatment and procedures were discussed in detail and all questions were answered. 30 additional minutes spent addressing advance care plans.        Over 55 minutes spent on total encounter; more than 50% of the visit was spent counseling and/or coordinating care by the attending physician.      Tho Sharp, DO [facility]  Cardiovascular Disease  [phone]

## 2024-03-23 NOTE — PATIENT PROFILE ADULT - FALL HARM RISK - HARM RISK INTERVENTIONS

## 2024-03-24 LAB
ANION GAP SERPL CALC-SCNC: 11 MMOL/L — SIGNIFICANT CHANGE UP (ref 7–14)
BUN SERPL-MCNC: 26 MG/DL — HIGH (ref 7–23)
CALCIUM SERPL-MCNC: 9.4 MG/DL — SIGNIFICANT CHANGE UP (ref 8.4–10.5)
CHLORIDE SERPL-SCNC: 104 MMOL/L — SIGNIFICANT CHANGE UP (ref 98–107)
CO2 SERPL-SCNC: 25 MMOL/L — SIGNIFICANT CHANGE UP (ref 22–31)
CREAT SERPL-MCNC: 0.9 MG/DL — SIGNIFICANT CHANGE UP (ref 0.5–1.3)
EGFR: 74 ML/MIN/1.73M2 — SIGNIFICANT CHANGE UP
GLUCOSE BLDC GLUCOMTR-MCNC: 133 MG/DL — HIGH (ref 70–99)
GLUCOSE BLDC GLUCOMTR-MCNC: 161 MG/DL — HIGH (ref 70–99)
GLUCOSE BLDC GLUCOMTR-MCNC: 206 MG/DL — HIGH (ref 70–99)
GLUCOSE BLDC GLUCOMTR-MCNC: 247 MG/DL — HIGH (ref 70–99)
GLUCOSE SERPL-MCNC: 135 MG/DL — HIGH (ref 70–99)
HCT VFR BLD CALC: 34.6 % — SIGNIFICANT CHANGE UP (ref 34.5–45)
HGB BLD-MCNC: 11.6 G/DL — SIGNIFICANT CHANGE UP (ref 11.5–15.5)
MAGNESIUM SERPL-MCNC: 2 MG/DL — SIGNIFICANT CHANGE UP (ref 1.6–2.6)
MCHC RBC-ENTMCNC: 29.2 PG — SIGNIFICANT CHANGE UP (ref 27–34)
MCHC RBC-ENTMCNC: 33.5 GM/DL — SIGNIFICANT CHANGE UP (ref 32–36)
MCV RBC AUTO: 87.2 FL — SIGNIFICANT CHANGE UP (ref 80–100)
NRBC # BLD: 0 /100 WBCS — SIGNIFICANT CHANGE UP (ref 0–0)
NRBC # FLD: 0 K/UL — SIGNIFICANT CHANGE UP (ref 0–0)
PHOSPHATE SERPL-MCNC: 2 MG/DL — LOW (ref 2.5–4.5)
PLATELET # BLD AUTO: 457 K/UL — HIGH (ref 150–400)
POTASSIUM SERPL-MCNC: 3.5 MMOL/L — SIGNIFICANT CHANGE UP (ref 3.5–5.3)
POTASSIUM SERPL-SCNC: 3.5 MMOL/L — SIGNIFICANT CHANGE UP (ref 3.5–5.3)
RBC # BLD: 3.97 M/UL — SIGNIFICANT CHANGE UP (ref 3.8–5.2)
RBC # FLD: 14.6 % — HIGH (ref 10.3–14.5)
SODIUM SERPL-SCNC: 140 MMOL/L — SIGNIFICANT CHANGE UP (ref 135–145)
WBC # BLD: 14.88 K/UL — HIGH (ref 3.8–10.5)
WBC # FLD AUTO: 14.88 K/UL — HIGH (ref 3.8–10.5)

## 2024-03-24 RX ORDER — POTASSIUM PHOSPHATE, MONOBASIC POTASSIUM PHOSPHATE, DIBASIC 236; 224 MG/ML; MG/ML
15 INJECTION, SOLUTION INTRAVENOUS ONCE
Refills: 0 | Status: COMPLETED | OUTPATIENT
Start: 2024-03-24 | End: 2024-03-24

## 2024-03-24 RX ADMIN — Medication 3 MILLILITER(S): at 09:31

## 2024-03-24 RX ADMIN — LIDOCAINE 1 PATCH: 4 CREAM TOPICAL at 19:00

## 2024-03-24 RX ADMIN — POTASSIUM PHOSPHATE, MONOBASIC POTASSIUM PHOSPHATE, DIBASIC 62.5 MILLIMOLE(S): 236; 224 INJECTION, SOLUTION INTRAVENOUS at 10:26

## 2024-03-24 RX ADMIN — PANTOPRAZOLE SODIUM 40 MILLIGRAM(S): 20 TABLET, DELAYED RELEASE ORAL at 06:21

## 2024-03-24 RX ADMIN — Medication 2: at 12:09

## 2024-03-24 RX ADMIN — Medication 3 MILLILITER(S): at 03:49

## 2024-03-24 RX ADMIN — Medication 400 UNIT(S): at 11:37

## 2024-03-24 RX ADMIN — Medication 40 MILLIGRAM(S): at 17:08

## 2024-03-24 RX ADMIN — Medication 1 TABLET(S): at 11:37

## 2024-03-24 RX ADMIN — Medication 81 MILLIGRAM(S): at 11:37

## 2024-03-24 RX ADMIN — BUDESONIDE AND FORMOTEROL FUMARATE DIHYDRATE 2 PUFF(S): 160; 4.5 AEROSOL RESPIRATORY (INHALATION) at 21:30

## 2024-03-24 RX ADMIN — ATORVASTATIN CALCIUM 40 MILLIGRAM(S): 80 TABLET, FILM COATED ORAL at 21:29

## 2024-03-24 RX ADMIN — Medication 3 MILLILITER(S): at 16:57

## 2024-03-24 RX ADMIN — EFAVIRENZ, EMTRICITABINE AND TENOFOVIR DISOPROXIL FUMARATE 1 TABLET(S): 600; 200; 300 TABLET, FILM COATED ORAL at 11:37

## 2024-03-24 RX ADMIN — AMLODIPINE BESYLATE 10 MILLIGRAM(S): 2.5 TABLET ORAL at 06:21

## 2024-03-24 RX ADMIN — LIDOCAINE 1 PATCH: 4 CREAM TOPICAL at 20:12

## 2024-03-24 RX ADMIN — Medication 1: at 17:09

## 2024-03-24 RX ADMIN — LIDOCAINE 1 PATCH: 4 CREAM TOPICAL at 08:32

## 2024-03-24 RX ADMIN — ENOXAPARIN SODIUM 40 MILLIGRAM(S): 100 INJECTION SUBCUTANEOUS at 17:09

## 2024-03-24 RX ADMIN — ESCITALOPRAM OXALATE 5 MILLIGRAM(S): 10 TABLET, FILM COATED ORAL at 11:39

## 2024-03-24 RX ADMIN — Medication 40 MILLIGRAM(S): at 06:32

## 2024-03-24 RX ADMIN — BUDESONIDE AND FORMOTEROL FUMARATE DIHYDRATE 2 PUFF(S): 160; 4.5 AEROSOL RESPIRATORY (INHALATION) at 06:20

## 2024-03-24 NOTE — DIETITIAN INITIAL EVALUATION ADULT - PERTINENT LABORATORY DATA
03-24    140  |  104  |  26<H>  ----------------------------<  135<H>  3.5   |  25  |  0.90    Ca    9.4      24 Mar 2024 06:33  Phos  2.0     03-24  Mg     2.00     03-24    POCT Blood Glucose.: 247 mg/dL (03-24-24 @ 12:00)  A1C with Estimated Average Glucose Result: 6.3 % (03-21-24 @ 12:24)

## 2024-03-24 NOTE — DIETITIAN INITIAL EVALUATION ADULT - OTHER INFO
Patient is A&O x 3-4 at baseline.  Patient is receiving a CSTCHOSN, DASH/TLC diet order in house.  Pt is pending stress test today, has additional diet order of no caffeine, no carbonated beverage, no chocolate.  Pt reports adequate PO intake with good appetite.  Denies difficulty chewing or swallowing.  No active GI distress such as nausea, vomit, diarrhea, constipation.  Pt had BM on 3/22 per RN flowsheets.  Skin noted intact without edema, no pressure injury per RN flowsheets.  Admitted weight@ 90.72kg (3-23-24), height 170.2cm, BMI 31.3-obesity.  UBW reported 200lbs/90.9kg, likely having some weight gain.  Labs 3/24 reviewed with elevated BUN 26, encourage oral hydration.  Patient is on home med for HIV disease, on Vit D and MVI for micronutrient repletion.  Pre-diabetes noted A1c@ 6.3 (3-21-24) per chart, on insuline regimen, pt has no other question/concern for diet at this time.  RD to remain available.

## 2024-03-24 NOTE — DIETITIAN INITIAL EVALUATION ADULT - PERTINENT MEDS FT
MEDICATIONS  (STANDING):  albuterol/ipratropium for Nebulization 3 milliLiter(s) Nebulizer every 6 hours  amLODIPine   Tablet 10 milliGRAM(s) Oral daily  aspirin enteric coated 81 milliGRAM(s) Oral daily  atorvastatin 40 milliGRAM(s) Oral at bedtime  budesonide  80 MICROgram(s)/formoterol 4.5 MICROgram(s) Inhaler 2 Puff(s) Inhalation two times a day  cholecalciferol 400 Unit(s) Oral daily  dextrose 5%. 1000 milliLiter(s) (50 mL/Hr) IV Continuous <Continuous>  dextrose 5%. 1000 milliLiter(s) (100 mL/Hr) IV Continuous <Continuous>  dextrose 50% Injectable 25 Gram(s) IV Push once  dextrose 50% Injectable 12.5 Gram(s) IV Push once  dextrose 50% Injectable 25 Gram(s) IV Push once  efavirenz 600/emtricitabine 200/tenofovir 300mg 1 Tablet(s) Oral daily  enoxaparin Injectable 40 milliGRAM(s) SubCutaneous every 24 hours  escitalopram 5 milliGRAM(s) Oral daily  glucagon  Injectable 1 milliGRAM(s) IntraMuscular once  insulin lispro (ADMELOG) corrective regimen sliding scale   SubCutaneous three times a day before meals  insulin lispro (ADMELOG) corrective regimen sliding scale   SubCutaneous at bedtime  lidocaine   4% Patch 1 Patch Transdermal every 24 hours  methylPREDNISolone sodium succinate Injectable 40 milliGRAM(s) IV Push every 12 hours  multivitamin 1 Tablet(s) Oral daily  pantoprazole    Tablet 40 milliGRAM(s) Oral before breakfast    MEDICATIONS  (PRN):  acetaminophen     Tablet .. 650 milliGRAM(s) Oral every 6 hours PRN Temp greater or equal to 38C (100.4F), Mild Pain (1 - 3)  albuterol    90 MICROgram(s) HFA Inhaler 2 Puff(s) Inhalation every 6 hours PRN Shortness of Breath and/or Wheezing  aluminum hydroxide/magnesium hydroxide/simethicone Suspension 30 milliLiter(s) Oral every 4 hours PRN Dyspepsia  cyclobenzaprine 5 milliGRAM(s) Oral three times a day PRN Muscle Spasm  dextrose Oral Gel 15 Gram(s) Oral once PRN Blood Glucose LESS THAN 70 milliGRAM(s)/deciliter  guaiFENesin Oral Liquid (Sugar-Free) 100 milliGRAM(s) Oral every 6 hours PRN Cough  melatonin 3 milliGRAM(s) Oral at bedtime PRN Insomnia

## 2024-03-24 NOTE — PROGRESS NOTE ADULT - SUBJECTIVE AND OBJECTIVE BOX
Cardiovascular Disease Progress Note  Date of service: 03-24-24 @ 10:11    Overnight events: No acute events overnight.  Patient denies chest pain or SOB.   Otherwise review of systems negative    Objective Findings:  T(C): 36.7 (03-24-24 @ 08:30), Max: 36.9 (03-24-24 @ 00:40)  HR: 87 (03-24-24 @ 09:34) (74 - 97)  BP: 134/71 (03-24-24 @ 08:30) (107/64 - 134/71)  RR: 18 (03-24-24 @ 08:30) (17 - 18)  SpO2: 99% (03-24-24 @ 09:34) (96% - 100%)  Wt(kg): --  Daily     Daily       Physical Exam:  Gen: NAD; Patient resting comfortably  HEENT: EOMI, Normocephalic/ atraumatic  CV: RRR, normal S1 + S2, no m/r/g  Lungs:  Normal respiratory effort; clear to auscultation bilaterally  Abd: soft, non-tender; bowel sounds present  Ext: No edema; warm and well perfused    Telemetry: Sinus     Laboratory Data:                        11.6   14.88 )-----------( [id number]      ( 24 Mar 2024 06:33 )             34.6     03-24    140  |  104  |  26<H>  ----------------------------<  135<H>  3.5   |  25  |  0.90    Ca    9.4      24 Mar 2024 06:33  Phos  2.0     03-24  Mg     2.00     03-24                Inpatient Medications:  MEDICATIONS  (STANDING):  albuterol/ipratropium for Nebulization 3 milliLiter(s) Nebulizer every 6 hours  amLODIPine   Tablet 10 milliGRAM(s) Oral daily  aspirin enteric coated 81 milliGRAM(s) Oral daily  atorvastatin 40 milliGRAM(s) Oral at bedtime  budesonide  80 MICROgram(s)/formoterol 4.5 MICROgram(s) Inhaler 2 Puff(s) Inhalation two times a day  cholecalciferol 400 Unit(s) Oral daily  dextrose 5%. 1000 milliLiter(s) (50 mL/Hr) IV Continuous <Continuous>  dextrose 5%. 1000 milliLiter(s) (100 mL/Hr) IV Continuous <Continuous>  dextrose 50% Injectable 25 Gram(s) IV Push once  dextrose 50% Injectable 12.5 Gram(s) IV Push once  dextrose 50% Injectable 25 Gram(s) IV Push once  efavirenz 600/emtricitabine 200/tenofovir 300mg 1 Tablet(s) Oral daily  enoxaparin Injectable 40 milliGRAM(s) SubCutaneous every 24 hours  escitalopram 5 milliGRAM(s) Oral daily  glucagon  Injectable 1 milliGRAM(s) IntraMuscular once  insulin lispro (ADMELOG) corrective regimen sliding scale   SubCutaneous three times a day before meals  insulin lispro (ADMELOG) corrective regimen sliding scale   SubCutaneous at bedtime  lidocaine   4% Patch 1 Patch Transdermal every 24 hours  methylPREDNISolone sodium succinate Injectable 40 milliGRAM(s) IV Push every 12 hours  multivitamin 1 Tablet(s) Oral daily  pantoprazole    Tablet 40 milliGRAM(s) Oral before breakfast  potassium phosphate IVPB 15 milliMole(s) IV Intermittent once      Assessment:  58-year-old female past medical history lung cancer status post lobectomy, hypertension, asthma, HIV presents with  left-sided shoulder pain    Plan of Care:      #Chest pain   - No evidence of ACS  - EKG shows sinus rhythm with low voltage  - Echo shows grossly normal LV Systolic function  - NST pending, will opt for Dobutamine stress test. Discharge planning pending stress test results.   - If normal patient may be discharged from a cardiac standpoint  - Patient states she is scheduled to see a cardiologist next week from [facility]      # Lung Ca  S/p lobectomy  Patient with wheezes on exam, now improved with IV solumedrol  Will discharge on Medrol dose tino Correa PRN      #HTN  - BP acceptable    #HLD  - Statin therapy    #DVT ppx  - On Lovenox          Over 55 minutes spent on total encounter; more than 50% of the visit was spent counseling and/or coordinating care by the attending physician.      Tho Sharp,  [facility]  Cardiovascular Disease  [phone]

## 2024-03-24 NOTE — PHYSICAL THERAPY INITIAL EVALUATION ADULT - ACTIVE RANGE OF MOTION EXAMINATION, REHAB EVAL
jenn. upper extremity Active ROM was WNL (within normal limits)/bilateral lower extremity Active ROM was WNL (within normal limits)

## 2024-03-24 NOTE — DIETITIAN INITIAL EVALUATION ADULT - NS FNS DIET ORDER
Diet, DASH/TLC:   Sodium & Cholesterol Restricted  Consistent Carbohydrate {Evening Snack} (CSTCHOSN)  No Caffeine  No Carbonated Beverages  No Chocolate (03-23-24 @ 14:40)

## 2024-03-24 NOTE — PHYSICAL THERAPY INITIAL EVALUATION ADULT - MANUAL MUSCLE TESTING RESULTS, REHAB EVAL
Bilateral upper extremities and bilateral lower extremities 5/5/no strength deficits were identified

## 2024-03-24 NOTE — DIETITIAN INITIAL EVALUATION ADULT - PROBLEM SELECTOR PLAN 5
- home med: rosuvastatin 10mg daily  - therapeutic exchange with atorvastatin while inpt   - check lipid profile

## 2024-03-24 NOTE — PHYSICAL THERAPY INITIAL EVALUATION ADULT - PATIENT PROFILE REVIEW, REHAB EVAL
ACTIVITY ORDER: Ambulate as Tolerated; Spoke with RN Denis Oscar prior to PT evaluation-->Pt OK for PT consult/OOB activity; vitals taken; /73mmHg, heart rate 103bpm/yes

## 2024-03-24 NOTE — PHYSICAL THERAPY INITIAL EVALUATION ADULT - ADDITIONAL COMMENTS
Pt lives in a private house with her boyfriend with 2 steps to enter; (+)bilateral handrails; and a flight of stairs to negotiate inside; (+)1 handrail. Prior to hospital admission, pt was completely independent and used no assistive device with ambulation. Pt denies any recent falls.    Pt left comfortable in bed, NAD, all lines intact, all precautions maintained, with call bell in reach, and RN aware of PT evaluation.

## 2024-03-24 NOTE — DIETITIAN INITIAL EVALUATION ADULT - ORAL INTAKE PTA/DIET HISTORY
Patient endorse NKFA, nor food intolerance, denies weight loss and appetite change.      No significant weight change per [facility] weight history: 88.6kg (8-23-23), 86kg (7-23-23)

## 2024-03-24 NOTE — PHYSICAL THERAPY INITIAL EVALUATION ADULT - PERTINENT HX OF CURRENT PROBLEM, REHAB EVAL
59 yo F with PMH lung CA (s/p lobectomy), HTN, HLD, asthma, HIV, former tobacco use, GERD, OA, and vitamin D deficiency p/w 2-3days Left shoulder pain radiating to chest x1day. On arrival, hemodynamically stable with EKG NSR and no ischemic changes and labs largely unremarkable. Was initially observed in the CDU during which she developed wheezing. She was treated with decadron 10mg IV and duonebs. TTE showed difficult study but grossly normal LV systolic function. NST was deferred due to bronchospasm and pt admitted for dobutamine stress test.

## 2024-03-24 NOTE — DIETITIAN INITIAL EVALUATION ADULT - PROBLEM SELECTOR PLAN 1
- pt p/w 1d CP and 3d L shoulder pain; also with burning epigastric/sternal sensation; no numbness/tingling of extremities, shortness of breath, or diaphoresis; f/w cards at [facility] due for f/u in 1 wk  - hemodynamically stable on arrival  - EKG NSR without ischemic changes  - labs largely unremarkable  - lipid profile pending; A1C 6.3, TSH WNL  - CXR neg   - TTE difficult study but grossly normal LV systolic function  - s/p ASA 325mg in ER  - c/w ASA 81mg daily for now  - atorvastatin as below  - cards following, appreciate recs  - pending dobutamine stress test  - monitor on tele  - ?component of GERD --> treatment as below

## 2024-03-24 NOTE — DIETITIAN INITIAL EVALUATION ADULT - ADD RECOMMEND
1. Continue present diet order as it remains appropriate at this time.  2. Nursing to document PO in RN flow sheets and monitor weekly weights.  3. Continue to monitor skin integrity, bowel regimen, and nutrition pertinent labs.

## 2024-03-24 NOTE — DIETITIAN INITIAL EVALUATION ADULT - REASON FOR ADMISSION
Chest pain.  Per chart review, Patient is a 58-year-old female past medical history lung cancer status post lobectomy, hypertension, asthma, HIV presents with  left-sided shoulder pain

## 2024-03-25 ENCOUNTER — TRANSCRIPTION ENCOUNTER (OUTPATIENT)
Age: 59
End: 2024-03-25

## 2024-03-25 ENCOUNTER — RESULT REVIEW (OUTPATIENT)
Age: 59
End: 2024-03-25

## 2024-03-25 VITALS
TEMPERATURE: 98 F | SYSTOLIC BLOOD PRESSURE: 138 MMHG | DIASTOLIC BLOOD PRESSURE: 83 MMHG | OXYGEN SATURATION: 100 % | HEART RATE: 97 BPM | RESPIRATION RATE: 18 BRPM

## 2024-03-25 LAB
ANION GAP SERPL CALC-SCNC: 13 MMOL/L — SIGNIFICANT CHANGE UP (ref 7–14)
BUN SERPL-MCNC: 34 MG/DL — HIGH (ref 7–23)
CALCIUM SERPL-MCNC: 9.5 MG/DL — SIGNIFICANT CHANGE UP (ref 8.4–10.5)
CHLORIDE SERPL-SCNC: 103 MMOL/L — SIGNIFICANT CHANGE UP (ref 98–107)
CO2 SERPL-SCNC: 23 MMOL/L — SIGNIFICANT CHANGE UP (ref 22–31)
CREAT SERPL-MCNC: 0.86 MG/DL — SIGNIFICANT CHANGE UP (ref 0.5–1.3)
EGFR: 78 ML/MIN/1.73M2 — SIGNIFICANT CHANGE UP
GLUCOSE BLDC GLUCOMTR-MCNC: 135 MG/DL — HIGH (ref 70–99)
GLUCOSE BLDC GLUCOMTR-MCNC: 141 MG/DL — HIGH (ref 70–99)
GLUCOSE BLDC GLUCOMTR-MCNC: 218 MG/DL — HIGH (ref 70–99)
GLUCOSE SERPL-MCNC: 112 MG/DL — HIGH (ref 70–99)
HCT VFR BLD CALC: 34.6 % — SIGNIFICANT CHANGE UP (ref 34.5–45)
HGB BLD-MCNC: 11.5 G/DL — SIGNIFICANT CHANGE UP (ref 11.5–15.5)
MAGNESIUM SERPL-MCNC: 2.1 MG/DL — SIGNIFICANT CHANGE UP (ref 1.6–2.6)
MCHC RBC-ENTMCNC: 29.2 PG — SIGNIFICANT CHANGE UP (ref 27–34)
MCHC RBC-ENTMCNC: 33.2 GM/DL — SIGNIFICANT CHANGE UP (ref 32–36)
MCV RBC AUTO: 87.8 FL — SIGNIFICANT CHANGE UP (ref 80–100)
NRBC # BLD: 0 /100 WBCS — SIGNIFICANT CHANGE UP (ref 0–0)
NRBC # FLD: 0 K/UL — SIGNIFICANT CHANGE UP (ref 0–0)
PHOSPHATE SERPL-MCNC: 3.3 MG/DL — SIGNIFICANT CHANGE UP (ref 2.5–4.5)
PLATELET # BLD AUTO: 447 K/UL — HIGH (ref 150–400)
POTASSIUM SERPL-MCNC: 3.6 MMOL/L — SIGNIFICANT CHANGE UP (ref 3.5–5.3)
POTASSIUM SERPL-SCNC: 3.6 MMOL/L — SIGNIFICANT CHANGE UP (ref 3.5–5.3)
RBC # BLD: 3.94 M/UL — SIGNIFICANT CHANGE UP (ref 3.8–5.2)
RBC # FLD: 14.8 % — HIGH (ref 10.3–14.5)
SODIUM SERPL-SCNC: 139 MMOL/L — SIGNIFICANT CHANGE UP (ref 135–145)
WBC # BLD: 17.44 K/UL — HIGH (ref 3.8–10.5)
WBC # FLD AUTO: 17.44 K/UL — HIGH (ref 3.8–10.5)

## 2024-03-25 PROCEDURE — 93016 CV STRESS TEST SUPVJ ONLY: CPT | Mod: GC

## 2024-03-25 PROCEDURE — 78451 HT MUSCLE IMAGE SPECT SING: CPT | Mod: 26

## 2024-03-25 PROCEDURE — 93018 CV STRESS TEST I&R ONLY: CPT | Mod: GC

## 2024-03-25 RX ORDER — ASPIRIN/CALCIUM CARB/MAGNESIUM 324 MG
1 TABLET ORAL
Qty: 0 | Refills: 0 | DISCHARGE
Start: 2024-03-25

## 2024-03-25 RX ADMIN — AMLODIPINE BESYLATE 10 MILLIGRAM(S): 2.5 TABLET ORAL at 06:14

## 2024-03-25 RX ADMIN — EFAVIRENZ, EMTRICITABINE AND TENOFOVIR DISOPROXIL FUMARATE 1 TABLET(S): 600; 200; 300 TABLET, FILM COATED ORAL at 13:13

## 2024-03-25 RX ADMIN — Medication 3 MILLILITER(S): at 04:09

## 2024-03-25 RX ADMIN — Medication 1 TABLET(S): at 13:14

## 2024-03-25 RX ADMIN — Medication 3 MILLILITER(S): at 14:50

## 2024-03-25 RX ADMIN — Medication 81 MILLIGRAM(S): at 13:14

## 2024-03-25 RX ADMIN — Medication 650 MILLIGRAM(S): at 00:23

## 2024-03-25 RX ADMIN — PANTOPRAZOLE SODIUM 40 MILLIGRAM(S): 20 TABLET, DELAYED RELEASE ORAL at 06:14

## 2024-03-25 RX ADMIN — Medication 2: at 12:10

## 2024-03-25 RX ADMIN — ESCITALOPRAM OXALATE 5 MILLIGRAM(S): 10 TABLET, FILM COATED ORAL at 13:14

## 2024-03-25 RX ADMIN — LIDOCAINE 1 PATCH: 4 CREAM TOPICAL at 13:12

## 2024-03-25 RX ADMIN — Medication 40 MILLIGRAM(S): at 06:14

## 2024-03-25 RX ADMIN — Medication 650 MILLIGRAM(S): at 00:50

## 2024-03-25 RX ADMIN — BUDESONIDE AND FORMOTEROL FUMARATE DIHYDRATE 2 PUFF(S): 160; 4.5 AEROSOL RESPIRATORY (INHALATION) at 13:13

## 2024-03-25 RX ADMIN — Medication 400 UNIT(S): at 13:15

## 2024-03-25 NOTE — DISCHARGE NOTE PROVIDER - NSDCMRMEDTOKEN_GEN_ALL_CORE_FT
Albuterol (Eqv-Ventolin HFA) 90 mcg/inh inhalation aerosol: 2 puff(s) inhaled every 4 hours as needed for  shortness of breath and/or wheezing  amlodipine 10 mg oral tablet: 1 tab(s) orally once a day  aspirin 81 mg oral delayed release tablet: 1 tab(s) orally once a day  Atripla 600 mg-200 mg-300 mg oral tablet: 1 tab(s) orally once a day (at bedtime)  CHLORTHALIDONE 25 MG TABLET: TAKE 1 TABLET BY MOUTH EVERY DAY  CYCLOBENZAPRINE 10 MG TABLET: TAKE 1 TABLET BY MOUTH EVERYDAY AT BEDTIME  ESCITALOPRAM 5 MG TABLET: TAKE 1 TABLET BY MOUTH EVERY DAY  ipratropium-albuterol 0.5 mg-2.5 mg/3 mL inhalation solution: 3 milliliter(s) by nebulizer 2 times a day   Medrol Dosepak 4 mg oral tablet: 4 milligram(s) orally once Take as directed  METOPROLOL SUCC ER 50 MG TAB: TAKE 1 TABLET BY MOUTH EVERY DAY  Multiple Vitamins oral tablet: 1 tab(s) orally once a day  OMEPRAZOLE DR 40 MG CAPSULE: TAKE 1 CAPSULE BY MOUTH EVERY DAY FOR 1 MONTH  ROSUVASTATIN CALCIUM 10 MG TAB: TAKE 1 TABLET BY MOUTH EVERY DAY  TRELEGY ELLIPTA 200-62.5-25: 1 PUFF(S) INHALATION, DAILY EVERY DAY FOR 1 MONTH(S)  triamcinolone 0.025% topical cream: Apply topically to affected area 2 times a day   Vitamin D2 50,000 intl units (1.25 mg) oral capsule: 1 cap(s) orally once a week ~ on Monday

## 2024-03-25 NOTE — DISCHARGE NOTE PROVIDER - NSDCFUSCHEDAPPT_GEN_ALL_CORE_FT
[facility]  DENTAL  [address]  Scheduled Appointment: 04/10/2024

## 2024-03-25 NOTE — DISCHARGE NOTE PROVIDER - NSDCCPCAREPLAN_GEN_ALL_CORE_FT
PRINCIPAL DISCHARGE DIAGNOSIS  Diagnosis: Chest pain  Assessment and Plan of Treatment: your Echo and stress test were normal      SECONDARY DISCHARGE DIAGNOSES  Diagnosis: Acute asthma exacerbation  Assessment and Plan of Treatment: complete Medrol Pack as directed    Diagnosis: GERD (gastroesophageal reflux disease)  Assessment and Plan of Treatment: continue medication as directed    Diagnosis: HLD (hyperlipidemia)  Assessment and Plan of Treatment: continue medication as directed    Diagnosis: HTN (hypertension)  Assessment and Plan of Treatment: continue medication as directed    Diagnosis: HIV disease  Assessment and Plan of Treatment: continue medication as directed     PRINCIPAL DISCHARGE DIAGNOSIS  Diagnosis: Chest pain  Assessment and Plan of Treatment: your Echo and stress test were normal  Follow up with your cardiologist at [facility]      SECONDARY DISCHARGE DIAGNOSES  Diagnosis: Acute asthma exacerbation  Assessment and Plan of Treatment: complete Medrol Pack as directed    Diagnosis: GERD (gastroesophageal reflux disease)  Assessment and Plan of Treatment: continue medication as directed    Diagnosis: HLD (hyperlipidemia)  Assessment and Plan of Treatment: continue medication as directed    Diagnosis: HTN (hypertension)  Assessment and Plan of Treatment: continue medication as directed    Diagnosis: HIV disease  Assessment and Plan of Treatment: continue medication as directed

## 2024-03-25 NOTE — DISCHARGE NOTE PROVIDER - HOSPITAL COURSE
58-year-old female past medical history lung cancer status post lobectomy, hypertension, asthma, HIV presents with  left-sided shoulder pain    Chest pain   - No evidence of ACS  - EKG showed sinus rhythm with low voltage  - Echo shows grossly normal LV Systolic function  - NST w/Dobutamine done 3/25 normal   -Pt improved, no further chest pain, Pt cleared by cardiology for d/c  - Patient states she is scheduled to see a cardiologist this from [facility]    Lung Ca  -S/p lobectomy  -Patient with wheezes on exam, now improved with IV solumedrol  -d/c on Medrol dose pk   -Duonebs PRN    HTN  - BP acceptable    HLD  - Statin therapy    Pt is clinically improved, cleared for d/c, pt to f/u with her cardiologist at [facility].

## 2024-03-25 NOTE — DISCHARGE NOTE NURSING/CASE MANAGEMENT/SOCIAL WORK - NSDCPNINST_GEN_ALL_CORE
Reviewed discharge instructions with patient. Instructed patient to take all medications as prescribed, to follow up with healthcare provider and to call 911 if patient experiences chest pain, shortness of breath, difficulty breathing or fever.

## 2024-03-25 NOTE — DISCHARGE NOTE NURSING/CASE MANAGEMENT/SOCIAL WORK - PATIENT PORTAL LINK FT
You can access the FollowMyHealth Patient Portal offered by [facility] by registering at the following website: [email or web address]. By joining [facility]’s FollowMyHealth portal, you will also be able to view your health information using other applications (apps) compatible with our system.

## 2024-03-25 NOTE — PROGRESS NOTE ADULT - SUBJECTIVE AND OBJECTIVE BOX
Cardiovascular Disease Progress Note  Date of service: 03-25-24 @ 08:19    Overnight events: No acute events overnight.  Patient denies chest pain or SOB.   Otherwise review of systems negative    Objective Findings:  T(C): 36.4 (03-25-24 @ 04:55), Max: 36.8 (03-24-24 @ 12:28)  HR: 80 (03-25-24 @ 04:55) (67 - 100)  BP: 130/74 (03-25-24 @ 04:55) (120/75 - 141/75)  RR: 18 (03-25-24 @ 04:55) (18 - 19)  SpO2: 100% (03-25-24 @ 04:55) (96% - 100%)  Wt(kg): --  Daily     Daily       Physical Exam:  Gen: NAD; Patient resting comfortably  HEENT: EOMI, Normocephalic/ atraumatic  CV: RRR, normal S1 + S2, no m/r/g  Lungs:  Normal respiratory effort; clear to auscultation bilaterally  Abd: soft, non-tender; bowel sounds present  Ext: No edema; warm and well perfused    Telemetry: Sinus     Laboratory Data:                        11.5   17.44 )-----------( 447      ( 25 Mar 2024 07:05 )             34.6     03-24    140  |  104  |  26<H>  ----------------------------<  135<H>  3.5   |  25  |  0.90    Ca    9.4      24 Mar 2024 06:33  Phos  2.0     03-24  Mg     2.00     03-24                Inpatient Medications:  MEDICATIONS  (STANDING):  albuterol/ipratropium for Nebulization 3 milliLiter(s) Nebulizer every 6 hours  amLODIPine   Tablet 10 milliGRAM(s) Oral daily  aspirin enteric coated 81 milliGRAM(s) Oral daily  atorvastatin 40 milliGRAM(s) Oral at bedtime  budesonide  80 MICROgram(s)/formoterol 4.5 MICROgram(s) Inhaler 2 Puff(s) Inhalation two times a day  cholecalciferol 400 Unit(s) Oral daily  dextrose 5%. 1000 milliLiter(s) (50 mL/Hr) IV Continuous <Continuous>  dextrose 5%. 1000 milliLiter(s) (100 mL/Hr) IV Continuous <Continuous>  dextrose 50% Injectable 25 Gram(s) IV Push once  dextrose 50% Injectable 12.5 Gram(s) IV Push once  dextrose 50% Injectable 25 Gram(s) IV Push once  efavirenz 600/emtricitabine 200/tenofovir 300mg 1 Tablet(s) Oral daily  enoxaparin Injectable 40 milliGRAM(s) SubCutaneous every 24 hours  escitalopram 5 milliGRAM(s) Oral daily  glucagon  Injectable 1 milliGRAM(s) IntraMuscular once  insulin lispro (ADMELOG) corrective regimen sliding scale   SubCutaneous three times a day before meals  insulin lispro (ADMELOG) corrective regimen sliding scale   SubCutaneous at bedtime  lidocaine   4% Patch 1 Patch Transdermal every 24 hours  multivitamin 1 Tablet(s) Oral daily  pantoprazole    Tablet 40 milliGRAM(s) Oral before breakfast      Assessment:  58-year-old female past medical history lung cancer status post lobectomy, hypertension, asthma, HIV presents with  left-sided shoulder pain    Plan of Care:      #Chest pain   - No evidence of ACS  - EKG shows sinus rhythm with low voltage  - Echo shows grossly normal LV Systolic function  - NST pending, will opt for Dobutamine stress test. Discharge planning pending stress test results.   - If normal patient may be discharged from a cardiac standpoint  - Patient states she is scheduled to see a cardiologist this from [facility]      # Lung Ca  S/p lobectomy  Patient with wheezes on exam, now improved with IV solumedrol  Will discharge on Medrol dose tino   Duonebs PRN      #HTN  - BP acceptable    #HLD  - Statin therapy    #DVT ppx  - On Lovenox      #ACP (advance care planning)-  Advanced care planning was discussed with the patient.  Risks, benefits and alternatives of medical treatment and procedures were discussed in detail and all questions were answered. 30 additional minutes spent addressing advance care plans.          Over 55 minutes spent on total encounter; more than 50% of the visit was spent counseling and/or coordinating care by the attending physician.      Tho Sharp DO [facility]  Cardiovascular Disease  [phone]

## 2024-03-27 NOTE — ED ADULT TRIAGE NOTE - BP NONINVASIVE SYSTOLIC (MM HG)
Pt returned call to clinic. Notified pt of results showing A1c 7.1%; Microalbumin in urine has improved slightly and her ratio is WNL. Pt verbalized understanding   134

## 2024-04-10 ENCOUNTER — APPOINTMENT (OUTPATIENT)
Age: 59
End: 2024-04-10

## 2024-05-28 ENCOUNTER — EMERGENCY (EMERGENCY)
Facility: HOSPITAL | Age: 59
LOS: 1 days | Discharge: HOME | End: 2024-05-28
Attending: EMERGENCY MEDICINE | Admitting: EMERGENCY MEDICINE
Payer: MEDICAID

## 2024-05-28 VITALS
TEMPERATURE: 98 F | DIASTOLIC BLOOD PRESSURE: 80 MMHG | SYSTOLIC BLOOD PRESSURE: 136 MMHG | OXYGEN SATURATION: 97 % | RESPIRATION RATE: 18 BRPM | HEART RATE: 79 BPM

## 2024-05-28 VITALS
SYSTOLIC BLOOD PRESSURE: 130 MMHG | RESPIRATION RATE: 16 BRPM | TEMPERATURE: 98 F | DIASTOLIC BLOOD PRESSURE: 66 MMHG | OXYGEN SATURATION: 100 % | HEART RATE: 77 BPM

## 2024-05-28 DIAGNOSIS — Z90.2 ACQUIRED ABSENCE OF LUNG [PART OF]: Chronic | ICD-10-CM

## 2024-05-28 DIAGNOSIS — Z90.710 ACQUIRED ABSENCE OF BOTH CERVIX AND UTERUS: Chronic | ICD-10-CM

## 2024-05-28 LAB
ALBUMIN SERPL ELPH-MCNC: 4.2 G/DL — SIGNIFICANT CHANGE UP (ref 3.3–5)
ALP SERPL-CCNC: 161 U/L — HIGH (ref 40–120)
ALT FLD-CCNC: 26 U/L — SIGNIFICANT CHANGE UP (ref 4–33)
ANION GAP SERPL CALC-SCNC: 13 MMOL/L — SIGNIFICANT CHANGE UP (ref 7–14)
APPEARANCE UR: CLEAR — SIGNIFICANT CHANGE UP
APTT BLD: 29.7 SEC — SIGNIFICANT CHANGE UP (ref 24.5–35.6)
AST SERPL-CCNC: 17 U/L — SIGNIFICANT CHANGE UP (ref 4–32)
BACTERIA # UR AUTO: NEGATIVE /HPF — SIGNIFICANT CHANGE UP
BASOPHILS # BLD AUTO: 0.06 K/UL — SIGNIFICANT CHANGE UP (ref 0–0.2)
BASOPHILS NFR BLD AUTO: 0.6 % — SIGNIFICANT CHANGE UP (ref 0–2)
BILIRUB SERPL-MCNC: 0.3 MG/DL — SIGNIFICANT CHANGE UP (ref 0.2–1.2)
BILIRUB UR-MCNC: NEGATIVE — SIGNIFICANT CHANGE UP
BUN SERPL-MCNC: 20 MG/DL — SIGNIFICANT CHANGE UP (ref 7–23)
CALCIUM SERPL-MCNC: 9.5 MG/DL — SIGNIFICANT CHANGE UP (ref 8.4–10.5)
CAST: 0 /LPF — SIGNIFICANT CHANGE UP (ref 0–4)
CHLORIDE SERPL-SCNC: 101 MMOL/L — SIGNIFICANT CHANGE UP (ref 98–107)
CO2 SERPL-SCNC: 24 MMOL/L — SIGNIFICANT CHANGE UP (ref 22–31)
COLOR SPEC: YELLOW — SIGNIFICANT CHANGE UP
CREAT SERPL-MCNC: 1 MG/DL — SIGNIFICANT CHANGE UP (ref 0.5–1.3)
DIFF PNL FLD: NEGATIVE — SIGNIFICANT CHANGE UP
EGFR: 65 ML/MIN/1.73M2 — SIGNIFICANT CHANGE UP
EOSINOPHIL # BLD AUTO: 0.22 K/UL — SIGNIFICANT CHANGE UP (ref 0–0.5)
EOSINOPHIL NFR BLD AUTO: 2 % — SIGNIFICANT CHANGE UP (ref 0–6)
GLUCOSE SERPL-MCNC: 103 MG/DL — HIGH (ref 70–99)
GLUCOSE UR QL: NEGATIVE MG/DL — SIGNIFICANT CHANGE UP
HCT VFR BLD CALC: 38.4 % — SIGNIFICANT CHANGE UP (ref 34.5–45)
HGB BLD-MCNC: 12.8 G/DL — SIGNIFICANT CHANGE UP (ref 11.5–15.5)
IANC: 6.22 K/UL — SIGNIFICANT CHANGE UP (ref 1.8–7.4)
IMM GRANULOCYTES NFR BLD AUTO: 0.4 % — SIGNIFICANT CHANGE UP (ref 0–0.9)
INR BLD: 0.94 RATIO — SIGNIFICANT CHANGE UP (ref 0.85–1.18)
KETONES UR-MCNC: NEGATIVE MG/DL — SIGNIFICANT CHANGE UP
LEUKOCYTE ESTERASE UR-ACNC: NEGATIVE — SIGNIFICANT CHANGE UP
LYMPHOCYTES # BLD AUTO: 3.66 K/UL — HIGH (ref 1–3.3)
LYMPHOCYTES # BLD AUTO: 34 % — SIGNIFICANT CHANGE UP (ref 13–44)
MCHC RBC-ENTMCNC: 28.9 PG — SIGNIFICANT CHANGE UP (ref 27–34)
MCHC RBC-ENTMCNC: 33.3 GM/DL — SIGNIFICANT CHANGE UP (ref 32–36)
MCV RBC AUTO: 86.7 FL — SIGNIFICANT CHANGE UP (ref 80–100)
MONOCYTES # BLD AUTO: 0.57 K/UL — SIGNIFICANT CHANGE UP (ref 0–0.9)
MONOCYTES NFR BLD AUTO: 5.3 % — SIGNIFICANT CHANGE UP (ref 2–14)
NEUTROPHILS # BLD AUTO: 6.22 K/UL — SIGNIFICANT CHANGE UP (ref 1.8–7.4)
NEUTROPHILS NFR BLD AUTO: 57.7 % — SIGNIFICANT CHANGE UP (ref 43–77)
NITRITE UR-MCNC: NEGATIVE — SIGNIFICANT CHANGE UP
NRBC # BLD: 0 /100 WBCS — SIGNIFICANT CHANGE UP (ref 0–0)
NRBC # FLD: 0 K/UL — SIGNIFICANT CHANGE UP (ref 0–0)
PH UR: 6 — SIGNIFICANT CHANGE UP (ref 5–8)
PLATELET # BLD AUTO: 467 K/UL — HIGH (ref 150–400)
POTASSIUM SERPL-MCNC: 3.2 MMOL/L — LOW (ref 3.5–5.3)
POTASSIUM SERPL-SCNC: 3.2 MMOL/L — LOW (ref 3.5–5.3)
PROT SERPL-MCNC: 7.6 G/DL — SIGNIFICANT CHANGE UP (ref 6–8.3)
PROT UR-MCNC: NEGATIVE MG/DL — SIGNIFICANT CHANGE UP
PROTHROM AB SERPL-ACNC: 10.6 SEC — SIGNIFICANT CHANGE UP (ref 9.5–13)
RBC # BLD: 4.43 M/UL — SIGNIFICANT CHANGE UP (ref 3.8–5.2)
RBC # FLD: 15 % — HIGH (ref 10.3–14.5)
RBC CASTS # UR COMP ASSIST: 0 /HPF — SIGNIFICANT CHANGE UP (ref 0–4)
SODIUM SERPL-SCNC: 138 MMOL/L — SIGNIFICANT CHANGE UP (ref 135–145)
SP GR SPEC: 1.05 — HIGH (ref 1–1.03)
SQUAMOUS # UR AUTO: 2 /HPF — SIGNIFICANT CHANGE UP (ref 0–5)
TROPONIN T, HIGH SENSITIVITY RESULT: 11 NG/L — SIGNIFICANT CHANGE UP
TROPONIN T, HIGH SENSITIVITY RESULT: 11 NG/L — SIGNIFICANT CHANGE UP
UROBILINOGEN FLD QL: 0.2 MG/DL — SIGNIFICANT CHANGE UP (ref 0.2–1)
WBC # BLD: 10.77 K/UL — HIGH (ref 3.8–10.5)
WBC # FLD AUTO: 10.77 K/UL — HIGH (ref 3.8–10.5)
WBC UR QL: 1 /HPF — SIGNIFICANT CHANGE UP (ref 0–5)

## 2024-05-28 PROCEDURE — 70496 CT ANGIOGRAPHY HEAD: CPT | Mod: 26,MC

## 2024-05-28 PROCEDURE — 70498 CT ANGIOGRAPHY NECK: CPT | Mod: 26,MC

## 2024-05-28 PROCEDURE — 93010 ELECTROCARDIOGRAM REPORT: CPT | Mod: 76

## 2024-05-28 PROCEDURE — 99285 EMERGENCY DEPT VISIT HI MDM: CPT

## 2024-05-28 RX ORDER — METOCLOPRAMIDE HCL 10 MG
10 TABLET ORAL ONCE
Refills: 0 | Status: COMPLETED | OUTPATIENT
Start: 2024-05-28 | End: 2024-05-28

## 2024-05-28 RX ORDER — SODIUM CHLORIDE 9 MG/ML
1000 INJECTION INTRAMUSCULAR; INTRAVENOUS; SUBCUTANEOUS ONCE
Refills: 0 | Status: COMPLETED | OUTPATIENT
Start: 2024-05-28 | End: 2024-05-28

## 2024-05-28 RX ORDER — HALOPERIDOL DECANOATE 100 MG/ML
10 INJECTION INTRAMUSCULAR ONCE
Refills: 0 | Status: DISCONTINUED | OUTPATIENT
Start: 2024-05-28 | End: 2024-05-28

## 2024-05-28 RX ORDER — ACETAMINOPHEN 500 MG
1000 TABLET ORAL ONCE
Refills: 0 | Status: COMPLETED | OUTPATIENT
Start: 2024-05-28 | End: 2024-05-28

## 2024-05-28 RX ORDER — POTASSIUM CHLORIDE 20 MEQ
40 PACKET (EA) ORAL ONCE
Refills: 0 | Status: COMPLETED | OUTPATIENT
Start: 2024-05-28 | End: 2024-05-28

## 2024-05-28 RX ADMIN — Medication 400 MILLIGRAM(S): at 16:52

## 2024-05-28 RX ADMIN — Medication 40 MILLIEQUIVALENT(S): at 19:38

## 2024-05-28 RX ADMIN — SODIUM CHLORIDE 1000 MILLILITER(S): 9 INJECTION INTRAMUSCULAR; INTRAVENOUS; SUBCUTANEOUS at 16:52

## 2024-05-28 RX ADMIN — Medication 10 MILLIGRAM(S): at 16:52

## 2024-05-28 NOTE — ED PROVIDER NOTE - PROGRESS NOTE DETAILS
Glenn Liu MD PGY-2: code stroke canceled per neuro and ER attending. Pt feeling better. Symptoms completely resolved. Neuro exam normal. Given neuro follow up.

## 2024-05-28 NOTE — ED PROVIDER NOTE - OBJECTIVE STATEMENT
58-year-old female with PMH of lung cancer in remission, asthma, hypertension, sciatica presents brought in by EMS for lightheadedness and feeling off balance when walking since about 2 PM today when waking up from a nap.  Patient reports going to sleep at 7 PM last night feeling totally normal.  Then woke up this morning at 5 AM and had breakfast.  Later went back to sleep at 10 AM to take a nap and at that time also felt completely normal.  Patient then woke up from nap at around 2 PM and reported feeling lightheaded like she was going to faint when getting up from bed, slight headache, substernal chest pain, diaphoresis, and feeling off balance when walking different than she normally feels due to her sciatica pain.  Patient also reports her eyes feeling more heavy than usual.  Also felt short of breath and received 1 breathing treatment from the ambulance.  States that the breathing treatment helps with her shortness of breath but made her headache worse.  Denies recent fever, chills, cough, nausea, vomiting, constipation, diarrhea, dysuria.

## 2024-05-28 NOTE — ED PROVIDER NOTE - ATTENDING CONTRIBUTION TO CARE
58F h/o lung CA in remission, asthma, HTN, R sciatica p/w lightheadedness and feeling off balance starting at 2pm today when she woke up from a nap which started at 11AM.  Lightheadedness worse with walking.  Did feel some SOB and got albuterol by EMS.  Stroke code initiated in triage, but upon arrival to CT scan pt able to walk without imbalance even though was limping from R side sciatica (chronic).  Otherwise normal neuro exam.  In discussion with neuro stroke code cancelled.  Given h/o lung CA, sudden onset of dizziness will get CTA.  Symptoms improved with reglan and tylenol.  Ambulatory in ED w/o distress, prefers to go home at this point f/u PMD.  OK for d/c home f/u neuro as outpt.    VS:  unremarkable    GEN - malaise, mild distress HA;   A+O x3   HEAD - NC/AT     ENT - PEERL, EOMI, mucous membranes    moist , no discharge      NECK: Neck supple, non-tender without lymphadenopathy, no masses, no JVD  PULM - CTA b/l,  symmetric breath sounds  COR -  normal heart sounds    ABD - , ND, NT, soft,  BACK - no CVA tenderness, nontender spine     EXTREMS - no edema, no deformity, warm and well perfused    SKIN - no rash    or bruising      NEUROLOGIC - alert, face symmetric, speech fluent, sensation nl, motor no focal deficit. Pt able to walk without imbalance even though was limping from R side sciatica (chronic)

## 2024-05-28 NOTE — ED ADULT NURSE REASSESSMENT NOTE - NS ED NURSE REASSESS COMMENT FT1
Report received from stroke RN. Pt resting in room 26. Pt endorses mild headache at this time. Pt medicated per MAR. PCA at bedside completing EKG. Plan of care ongoing, safety maintained.

## 2024-05-28 NOTE — ED PROVIDER NOTE - PHYSICAL EXAMINATION
Gen: well appearing, NAD  HEENT: no conjunctivitis  Cardiac: regular rate rhythm, normal S1S2  Chest: CTA BL, no wheeze or crackles  Abdomen: normal BS, soft, NT  Extremity: no gross deformity, good perfusion  Skin: no rash  Neuro: no gross motor or sensory deficits, CN2-12 grossly intact, normal speech, PERRL, EOMI, limping gait 2/2 sciatica on RLE, AAOx3

## 2024-05-28 NOTE — ED PROVIDER NOTE - PATIENT PORTAL LINK FT
You can access the FollowMyHealth Patient Portal offered by [facility] by registering at the following website: [email or web address]. By joining [employer]’s FollowMyHealth portal, you will also be able to view your health information using other applications (apps) compatible with our system. spouse

## 2024-05-28 NOTE — ED PROVIDER NOTE - CARE PROVIDER_API CALL
Simon Crabtree  Neurology  [address], [address]  [place] [address]  Phone: [phone]  Fax: [phone]  Follow Up Time:     Tai Hussein  Neurology  [address], [address]  [place] [address]  Phone: [phone]  Fax: [phone]  Follow Up Time:

## 2024-05-28 NOTE — ED ADULT NURSE NOTE - OBJECTIVE STATEMENT
Stroke RN: Code STROKE Canceled as per ED attending and stroke team MD.    59 yo female, hx of HTN , Asthma, Lung CA in remission, woke up 5am in normal state of health. pt states she took a nap at 10 am and when she awoke at 230pm today, she had headache, dizziness, midsternal, non radiating chest pain and SOB "which was my main concern". as per patient, she was given a "breathing treatment in the ambulance that made my headache and dizziness worse". on exam pt with steady gait, no difficulty walking. no focal deficits. pt breathing even, non labored. 20g PIV to R forearm, labs sent. handoff report given to primary ED RN for full assessment. pt remains in CT for imaging.

## 2024-05-28 NOTE — ED ADULT TRIAGE NOTE - CHIEF COMPLAINT QUOTE
Referral signed. Please fax with last note and this note. Pt st"  on I woke up hot and dizzy and have headache at 2:30pm"  Hx htn, asthma, Lung CA in remission x 1 year.

## 2024-05-28 NOTE — ED PROVIDER NOTE - CLINICAL SUMMARY MEDICAL DECISION MAKING FREE TEXT BOX
58yF w lung ca in remission, htn, asthma presents for lightheadedness,  cp, sob, diaphoresis, feeling off balance. Initially code stroke called.  LKW 10am today  Exam shows no gross motor or sensory deficits, CN2-12 grossly intact, normal speech, PERRL, EOMI, limping gait 2/2 sciatica on RLE, AAOx3. Code stroke canceled. Will obtain labs, CTA head and neck w con,  migraine medications. Dispo pending results.

## 2024-05-30 LAB
CULTURE RESULTS: SIGNIFICANT CHANGE UP
SPECIMEN SOURCE: SIGNIFICANT CHANGE UP

## 2024-06-17 NOTE — ED ADULT TRIAGE NOTE - TEMPERATURE IN FAHRENHEIT (DEGREES F)
Chief Complaint   Patient presents with    New Patient Visit     NP RECURRING EAR INFECTIONS AUDIO DONE TODAY      Date of Evaluation: 6/17/2024   HPI  Fermin Fregoso is a 8 m.o. male seen in consultation at the request of Dr. Garcia for recurrent acute otitis media.  Since 2 months of age he has been dealing with recurrent otitis media.  He just finished Augmentin.  He is pulling at his ear still.  He has had some persistent fluid.  There is a strong family history of eustachian tube dysfunction with 2 older siblings requiring tubes.  He is in a  setting.  No secondhand smoke exposure.  An audiogram today shows decreased soundfield testing with flat tympanograms       History reviewed. No pertinent past medical history.   Past Surgical History:   Procedure Laterality Date    ARTERIAL PUNCTURE/CANNULATION  2023          Medications:   Current Outpatient Medications   Medication Instructions    azithromycin (Zithromax) 100 mg/5 mL suspension 2.5 mL p.o. daily for 5 days    desonide (DesOwen) 0.05 % cream Topical, 2 times daily    nystatin (Mycostatin) cream Topical, 2 times daily        Allergies:  No Known Allergies     Physical Exam:  Last Recorded Vitals  Temperature 36.4 °C (97.6 °F), height 65.8 cm, weight 7.938 kg.  []General appearance: Well-developed, well-nourished in no acute distress, conversant with normal voice quality    Head/face: No erythema or edema or facial tenderness, and normal facial nerve function bilaterally    External ear: Clear external auditory canals with normal pinnae, cerumen removed from the left ear  Tube status: N/A  Middle ear: Right purulent otitis media and left mucoid middle ear effusion  Tympanic membrane perforation: N/A  Mastoid bowl: N/A  Hearing: Normal conversational awareness at normal speech thresholds    Nose visualized using: Anterior rhinoscopy  Nasal dorsum: Nontraumatic midline appearance  Septum: Midline, nonobstructing  Inferior turbinates: Normal,  pink  Secretions: Dry    Oral cavity and oropharynx: Normal  Teeth: Good condition  Floor of mouth: without lesions  Palate: Normal hard palate, soft palate and uvula  Oropharynx: Clear, no lesions present  Buccal mucosa: Normal without masses or lesions  Lips: Normal    Nasopharynx: Inadequate mirror exam secondary to gag/anatomy    Neck:  Salivary glands: Normal bilateral parotid and submandibular glands by inspection and palpation.  Non-thyroid masses: No palpable masses or significant lymphadenopathy  Trachea: Midline  Thyroid: No thyromegaly or palpable nodules  Temporomandibular joint: Nontender  Cervical range of motion: Normal    Neurologic exam: Alert and oriented x3, appropriate affect.  Cranial nerves II-XII normal bilaterally  Extraocular movement: Extraocular movement intact, normal gaze alignment        Fermin was seen today for new patient visit.  Diagnoses and all orders for this visit:  Recurrent acute otitis media of both ears (Primary)  -     azithromycin (Zithromax) 100 mg/5 mL suspension; 2.5 mL p.o. daily for 5 days  Dysfunction of both eustachian tubes       PLAN  I have recommended we proceed with bilateral myringotomy with placement of PE tubes.  I discussed the surgery in detail including the risks and benefits and his mother would like to proceed with scheduling.  He has just completed Augmentin.  I will try Zithromax and a prescription was sent    Jeronimo Toledo MD     97.8

## 2024-07-16 NOTE — ED PROVIDER NOTE - WORK/EXCUSE FORM DATE
Quality 226: Preventive Care And Screening: Tobacco Use: Screening And Cessation Intervention: Patient screened for tobacco use and is an ex/non-smoker
Quality 47: Advance Care Plan: Advance Care Planning discussed and documented; advance care plan or surrogate decision maker documented in the medical record.
Detail Level: Detailed
17-Jul-2023

## 2024-08-09 NOTE — ED ADULT NURSE NOTE - CAS DISCH CONDITION
insulin, blood dyscrasia (Patient taking Eliquis (last taken on 8/8/24)): anticoagulation therapy:..                  ROS comment: Charcot's joint of left foot due to diabetes Abdominal:   (+) obese          Vascular:           ROS comment: Lymphedema of both lower extremities. Other Findings:       Anesthesia Plan      MAC     ASA 3       Induction: intravenous.    MIPS: Postoperative opioids intended and Prophylactic antiemetics administered.  Anesthetic plan and risks discussed with patient.      Plan discussed with CRNA.    Attending anesthesiologist reviewed and agrees with Preprocedure content            Madhu Chahal MD   8/9/2024            
Stable

## 2024-10-06 ENCOUNTER — EMERGENCY (EMERGENCY)
Facility: HOSPITAL | Age: 59
LOS: 1 days | Discharge: HOME | End: 2024-10-06
Attending: STUDENT IN AN ORGANIZED HEALTH CARE EDUCATION/TRAINING PROGRAM | Admitting: EMERGENCY MEDICINE
Payer: MEDICAID

## 2024-10-06 VITALS — HEIGHT: 67 IN

## 2024-10-06 DIAGNOSIS — Z90.710 ACQUIRED ABSENCE OF BOTH CERVIX AND UTERUS: Chronic | ICD-10-CM

## 2024-10-06 DIAGNOSIS — Z90.2 ACQUIRED ABSENCE OF LUNG [PART OF]: Chronic | ICD-10-CM

## 2024-10-06 LAB
ADD ON TEST-SPECIMEN IN LAB: SIGNIFICANT CHANGE UP
ALBUMIN SERPL ELPH-MCNC: 4 G/DL — SIGNIFICANT CHANGE UP (ref 3.3–5)
ALP SERPL-CCNC: 192 U/L — HIGH (ref 40–120)
ALT FLD-CCNC: 22 U/L — SIGNIFICANT CHANGE UP (ref 4–33)
ANION GAP SERPL CALC-SCNC: 13 MMOL/L — SIGNIFICANT CHANGE UP (ref 7–14)
AST SERPL-CCNC: 16 U/L — SIGNIFICANT CHANGE UP (ref 4–32)
BASOPHILS # BLD AUTO: 0.06 K/UL — SIGNIFICANT CHANGE UP (ref 0–0.2)
BASOPHILS NFR BLD AUTO: 0.6 % — SIGNIFICANT CHANGE UP (ref 0–2)
BILIRUB SERPL-MCNC: 0.2 MG/DL — SIGNIFICANT CHANGE UP (ref 0.2–1.2)
BLOOD GAS VENOUS COMPREHENSIVE RESULT: SIGNIFICANT CHANGE UP
BUN SERPL-MCNC: 15 MG/DL — SIGNIFICANT CHANGE UP (ref 7–23)
CALCIUM SERPL-MCNC: 9.2 MG/DL — SIGNIFICANT CHANGE UP (ref 8.4–10.5)
CHLORIDE SERPL-SCNC: 103 MMOL/L — SIGNIFICANT CHANGE UP (ref 98–107)
CO2 SERPL-SCNC: 22 MMOL/L — SIGNIFICANT CHANGE UP (ref 22–31)
CREAT SERPL-MCNC: 0.79 MG/DL — SIGNIFICANT CHANGE UP (ref 0.5–1.3)
EGFR: 86 ML/MIN/1.73M2 — SIGNIFICANT CHANGE UP
EOSINOPHIL # BLD AUTO: 0.24 K/UL — SIGNIFICANT CHANGE UP (ref 0–0.5)
EOSINOPHIL NFR BLD AUTO: 2.4 % — SIGNIFICANT CHANGE UP (ref 0–6)
FLUAV AG NPH QL: SIGNIFICANT CHANGE UP
FLUBV AG NPH QL: SIGNIFICANT CHANGE UP
GLUCOSE SERPL-MCNC: 129 MG/DL — HIGH (ref 70–99)
HCT VFR BLD CALC: 39.5 % — SIGNIFICANT CHANGE UP (ref 34.5–45)
HGB BLD-MCNC: 12.8 G/DL — SIGNIFICANT CHANGE UP (ref 11.5–15.5)
IANC: 6.37 K/UL — SIGNIFICANT CHANGE UP (ref 1.8–7.4)
IMM GRANULOCYTES NFR BLD AUTO: 0.3 % — SIGNIFICANT CHANGE UP (ref 0–0.9)
LYMPHOCYTES # BLD AUTO: 2.6 K/UL — SIGNIFICANT CHANGE UP (ref 1–3.3)
LYMPHOCYTES # BLD AUTO: 26.3 % — SIGNIFICANT CHANGE UP (ref 13–44)
MAGNESIUM SERPL-MCNC: 3.2 MG/DL — HIGH (ref 1.6–2.6)
MCHC RBC-ENTMCNC: 28 PG — SIGNIFICANT CHANGE UP (ref 27–34)
MCHC RBC-ENTMCNC: 32.4 GM/DL — SIGNIFICANT CHANGE UP (ref 32–36)
MCV RBC AUTO: 86.4 FL — SIGNIFICANT CHANGE UP (ref 80–100)
MONOCYTES # BLD AUTO: 0.57 K/UL — SIGNIFICANT CHANGE UP (ref 0–0.9)
MONOCYTES NFR BLD AUTO: 5.8 % — SIGNIFICANT CHANGE UP (ref 2–14)
NEUTROPHILS # BLD AUTO: 6.37 K/UL — SIGNIFICANT CHANGE UP (ref 1.8–7.4)
NEUTROPHILS NFR BLD AUTO: 64.6 % — SIGNIFICANT CHANGE UP (ref 43–77)
NRBC # BLD: 0 /100 WBCS — SIGNIFICANT CHANGE UP (ref 0–0)
NRBC # FLD: 0 K/UL — SIGNIFICANT CHANGE UP (ref 0–0)
PHOSPHATE SERPL-MCNC: 3.1 MG/DL — SIGNIFICANT CHANGE UP (ref 2.5–4.5)
PLATELET # BLD AUTO: 452 K/UL — HIGH (ref 150–400)
POTASSIUM SERPL-MCNC: 3.4 MMOL/L — LOW (ref 3.5–5.3)
POTASSIUM SERPL-SCNC: 3.4 MMOL/L — LOW (ref 3.5–5.3)
PROT SERPL-MCNC: 7.7 G/DL — SIGNIFICANT CHANGE UP (ref 6–8.3)
RBC # BLD: 4.57 M/UL — SIGNIFICANT CHANGE UP (ref 3.8–5.2)
RBC # FLD: 14.5 % — SIGNIFICANT CHANGE UP (ref 10.3–14.5)
RSV RNA NPH QL NAA+NON-PROBE: SIGNIFICANT CHANGE UP
SARS-COV-2 RNA SPEC QL NAA+PROBE: SIGNIFICANT CHANGE UP
SODIUM SERPL-SCNC: 138 MMOL/L — SIGNIFICANT CHANGE UP (ref 135–145)
WBC # BLD: 9.87 K/UL — SIGNIFICANT CHANGE UP (ref 3.8–10.5)
WBC # FLD AUTO: 9.87 K/UL — SIGNIFICANT CHANGE UP (ref 3.8–10.5)

## 2024-10-06 PROCEDURE — 71046 X-RAY EXAM CHEST 2 VIEWS: CPT | Mod: 26

## 2024-10-06 PROCEDURE — 99223 1ST HOSP IP/OBS HIGH 75: CPT

## 2024-10-06 PROCEDURE — 93010 ELECTROCARDIOGRAM REPORT: CPT

## 2024-10-06 RX ORDER — FLUTICASONE PROPION/SALMETEROL 100-50 MCG
1 BLISTER, WITH INHALATION DEVICE INHALATION
Refills: 0 | Status: ACTIVE | OUTPATIENT
Start: 2024-10-06 | End: 2025-09-04

## 2024-10-06 RX ORDER — ACETAMINOPHEN 325 MG
650 TABLET ORAL ONCE
Refills: 0 | Status: COMPLETED | OUTPATIENT
Start: 2024-10-06 | End: 2024-10-06

## 2024-10-06 RX ORDER — PANTOPRAZOLE SODIUM 40 MG/1
40 TABLET, DELAYED RELEASE ORAL
Refills: 0 | Status: DISCONTINUED | OUTPATIENT
Start: 2024-10-06 | End: 2024-10-07

## 2024-10-06 RX ORDER — CYCLOBENZAPRINE HCL 10 MG
10 TABLET ORAL AT BEDTIME
Refills: 0 | Status: ACTIVE | OUTPATIENT
Start: 2024-10-06 | End: 2025-09-04

## 2024-10-06 RX ORDER — METHYLPREDNISOLONE ACETATE 80 MG/ML
40 INJECTION, SUSPENSION INTRA-ARTICULAR; INTRALESIONAL; INTRAMUSCULAR; SOFT TISSUE EVERY 8 HOURS
Refills: 0 | Status: ACTIVE | OUTPATIENT
Start: 2024-10-06 | End: 2025-09-04

## 2024-10-06 RX ORDER — ALBUTEROL 90 MCG
2.5 AEROSOL (GRAM) INHALATION ONCE
Refills: 0 | Status: COMPLETED | OUTPATIENT
Start: 2024-10-06 | End: 2024-10-06

## 2024-10-06 RX ORDER — EFAVIRENZ, EMTRICITABINE AND TENOFOVIR DISOPROXIL FUMARATE 600; 200; 300 MG/1; MG/1; MG/1
1 TABLET, FILM COATED ORAL AT BEDTIME
Refills: 0 | Status: ACTIVE | OUTPATIENT
Start: 2024-10-06 | End: 2025-09-04

## 2024-10-06 RX ORDER — IPRATROPIUM BROMIDE AND ALBUTEROL SULFATE .5; 3 MG/3ML; MG/3ML
3 SOLUTION RESPIRATORY (INHALATION) EVERY 6 HOURS
Refills: 0 | Status: ACTIVE | OUTPATIENT
Start: 2024-10-06 | End: 2025-09-04

## 2024-10-06 RX ORDER — ROSUVASTATIN CALCIUM 20 MG/1
10 TABLET, COATED ORAL AT BEDTIME
Refills: 0 | Status: ACTIVE | OUTPATIENT
Start: 2024-10-06 | End: 2025-09-04

## 2024-10-06 RX ORDER — ONDANSETRON HCL/PF 4 MG/2 ML
4 VIAL (ML) INJECTION ONCE
Refills: 0 | Status: COMPLETED | OUTPATIENT
Start: 2024-10-06 | End: 2024-10-06

## 2024-10-06 RX ORDER — ESCITALOPRAM OXALATE 10 MG
10 TABLET ORAL DAILY
Refills: 0 | Status: ACTIVE | OUTPATIENT
Start: 2024-10-06 | End: 2025-09-04

## 2024-10-06 RX ORDER — METOPROLOL TARTRATE 50 MG
50 TABLET ORAL DAILY
Refills: 0 | Status: ACTIVE | OUTPATIENT
Start: 2024-10-06 | End: 2025-09-04

## 2024-10-06 RX ORDER — ASPIRIN 325 MG
81 TABLET ORAL DAILY
Refills: 0 | Status: ACTIVE | OUTPATIENT
Start: 2024-10-06 | End: 2025-09-04

## 2024-10-06 RX ORDER — SODIUM CHLORIDE 0.9 % (FLUSH) 0.9 %
500 SYRINGE (ML) INJECTION ONCE
Refills: 0 | Status: COMPLETED | OUTPATIENT
Start: 2024-10-06 | End: 2024-10-06

## 2024-10-06 RX ORDER — AMLODIPINE BESYLATE 5 MG
10 TABLET ORAL DAILY
Refills: 0 | Status: ACTIVE | OUTPATIENT
Start: 2024-10-06 | End: 2025-09-04

## 2024-10-06 RX ORDER — METHYLPREDNISOLONE ACETATE 80 MG/ML
125 INJECTION, SUSPENSION INTRA-ARTICULAR; INTRALESIONAL; INTRAMUSCULAR; SOFT TISSUE EVERY 6 HOURS
Refills: 0 | Status: DISCONTINUED | OUTPATIENT
Start: 2024-10-06 | End: 2024-10-06

## 2024-10-06 RX ORDER — METHYLPREDNISOLONE ACETATE 80 MG/ML
125 INJECTION, SUSPENSION INTRA-ARTICULAR; INTRALESIONAL; INTRAMUSCULAR; SOFT TISSUE EVERY 8 HOURS
Refills: 0 | Status: DISCONTINUED | OUTPATIENT
Start: 2024-10-06 | End: 2024-10-06

## 2024-10-06 RX ORDER — TIOTROPIUM BROMIDE INHALATION SPRAY 3.12 UG/1
2 SPRAY, METERED RESPIRATORY (INHALATION) DAILY
Refills: 0 | Status: DISCONTINUED | OUTPATIENT
Start: 2024-10-06 | End: 2024-10-07

## 2024-10-06 RX ADMIN — Medication 2.5 MILLIGRAM(S): at 15:36

## 2024-10-06 RX ADMIN — TIOTROPIUM BROMIDE INHALATION SPRAY 2 PUFF(S): 3.12 SPRAY, METERED RESPIRATORY (INHALATION) at 23:50

## 2024-10-06 RX ADMIN — Medication 10 MILLIGRAM(S): at 19:23

## 2024-10-06 RX ADMIN — METHYLPREDNISOLONE ACETATE 125 MILLIGRAM(S): 80 INJECTION, SUSPENSION INTRA-ARTICULAR; INTRALESIONAL; INTRAMUSCULAR; SOFT TISSUE at 18:29

## 2024-10-06 RX ADMIN — Medication 500 MILLILITER(S): at 18:30

## 2024-10-06 RX ADMIN — Medication 650 MILLIGRAM(S): at 23:49

## 2024-10-06 RX ADMIN — Medication 4 MILLIGRAM(S): at 18:35

## 2024-10-06 RX ADMIN — Medication 1 DOSE(S): at 22:06

## 2024-10-06 RX ADMIN — ROSUVASTATIN CALCIUM 10 MILLIGRAM(S): 20 TABLET, COATED ORAL at 22:06

## 2024-10-06 RX ADMIN — IPRATROPIUM BROMIDE AND ALBUTEROL SULFATE 3 MILLILITER(S): .5; 3 SOLUTION RESPIRATORY (INHALATION) at 22:06

## 2024-10-06 NOTE — ED ADULT NURSE NOTE - DISCHARGE DATE/TIME
Message from [employer]:  Misty ARECHIGAZaki Derek would like a refill of the following medications:     lisinopril-hydroCHLOROthiazide (PRINZIDE,ZESTORETIC) 20-12.5 MG per tablet [Kristina Hassan MD]   Patient Comment: Please send to [facility] mail.      levothyroxine (SYNTHROID, LEVOTHROID) 125 MCG tablet [Kristina Hassan MD]   Patient Comment: Please send to [facility] mail.     Preferred pharmacy: [facility] - [facility] [address]  Delivery method: Pickup       08-Oct-2024 11:16

## 2024-10-06 NOTE — ED CDU PROVIDER INITIAL DAY NOTE - ATTENDING APP SHARED VISIT CONTRIBUTION OF CARE
I, Ricki Israel, DO personally saw the patient with JEANNE.  I have personally performed a face to face diagnostic evaluation on this patient.  I have reviewed the JEANNE note and agree with the history, exam, and plan of care, except as noted.  I personally saw the patient and performed a substantive portion of the visit including all aspects of the medical decision making.  \

## 2024-10-06 NOTE — ED CDU PROVIDER INITIAL DAY NOTE - OBJECTIVE STATEMENT
58 y/o female with pmhx of HIV on HAART, HTN, lung cancer s/p surgery in remission, COVID 19 on 8/22, asthma (never intubated), presents to ED c/o SOB since yesterday. Pt states feels like her asthma but worse. Pt is in school and states her classmates are sick and don't wear masks. Pt denies fever, chills, chest pain, cough, abd pain, n/v/d, dysuria.  CDU KRISTYN MANZANARES - Agree with above. Pt resting comfortably, speaking in full sentences. In ED, pt being treated for asthma, cxr clear; pt with some improvement of sxs. Sent to CDU for continuation of asthma tx.

## 2024-10-06 NOTE — ED PROVIDER NOTE - ATTENDING APP SHARED VISIT CONTRIBUTION OF CARE
I, Ricki Israel, DO personally saw the patient with JEANNE.  I have personally performed a face to face diagnostic evaluation on this patient.  I have reviewed the JEANNE note and agree with the history, exam, and plan of care, except as noted.  I personally saw the patient and performed a substantive portion of the visit including all aspects of the medical decision making.

## 2024-10-06 NOTE — ED ADULT TRIAGE NOTE - AVIAN FLU SYMPTOMS
2019       Attila Hernandez DO  [address] [place] [address]  VIA Facsimile: [phone]      Patient: Santhosh Freedman   YOB: 1968   Date of Visit: 10/7/2019       Dear Dr. Hernandez:    Thank you for referring Santhosh Freedman to me for evaluation. Below are my notes for this visit with him.    If you have questions, please do not hesitate to call me. I look forward to following your patient along with you.      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  He underwent stress test on 2019 where he exercised for total of 9 minutes on a standard Jose G protocol stress test.  No significant EKG changes were obtained.  Mild ischemia involving the inferior wall was observed.  A chronic total occlusion of the right coronary artery was seen and was attempted to traverse but unable to successfully do so.  He was subsequently referred to two-vessel CABG which was performed on 2019 with an in situ LIMA to the LAD and an in situ GALILEO to the PDA.    Recovering well.  Routine postop changes.  Able to walk 1 mile as of yesterday.    Past Medical History:   Diagnosis Date   • Coronary artery disease 2015    Acute inferior wall ST elevation MI: 4.0 x 16 Promus drug-eluting stent to the right coronary artery   • Dyslipidemia    • Hypertension    • MARY (obstructive sleep apnea)        Past Surgical History:   Procedure Laterality Date   • Coronary angioplasty with stent placement  2015    Acute inferolateral ST elevation MI treated with a 4 x 16 Promus drug-eluting stent with mild  nonobstructive coronary disease in the remaining epicardial vessels   • Coronary angioplasty with stent placement  2019    DOT physical, stress test performed inferior wall abnormality noted.  Failed  of RCA.  Referred for two-vessel CABG   • Coronary artery bypass graft  2019    Two-vessel CABG with in situ LIMA to LAD and in situ GALILEO to PDA   • Elbow surgery     • Shoulder surgery     • Ulnar nerve transposition         Family History   Problem Relation Age of Onset   • Stroke Father    • Myocardial Infarction Father        Social History     Tobacco Use   • Smoking status: Former Smoker     Packs/day: 0.00     Types: Cigarettes     Last attempt to quit:      Years since quittin.7   • Smokeless tobacco: Former User   Substance Use Topics   • Alcohol use: No     Frequency: Never   • Drug use: No       ALLERGIES:   Allergen Reactions   • Oxycodone Other (See Comments)       Outpatient Medications Prior to Visit   Medication Sig Dispense Refill   • B-D LUER-SARA SYRINGE 18G X 1-2\" 3 ML Misc U TO DRAW UP TESTOSTERON 1ML WEEKLY  3   • ibuprofen (MOTRIN) 800 MG tablet Take 800 mg by mouth every 6 hours as needed for Pain.     • cholecalciferol (VITAMIN D3) 1000 units tablet Take 1,000 Units by mouth daily.     • Cyanocobalamin (VITAMIN B12 PO) Take 1 tablet by mouth daily.     • clopidogrel (PLAVIX) 75 MG tablet Take 75 mg by mouth daily.     • lisinopril (ZESTRIL) 2.5 MG tablet Take 2.5 mg by mouth daily.     • metoPROLOL tartrate (LOPRESSOR) 100 MG tablet Take 100 mg by mouth 2 times daily.     • aspirin 81 MG EC tablet Take 81 mg by mouth daily.     • levothyroxine (SYNTHROID, LEVOTHROID) 50 MCG tablet Take 50 mcg by mouth daily.     • rosuvastatin (CRESTOR) 20 MG tablet Take 1 tablet by mouth daily. 90 tablet 3   • irbesartan (AVAPRO) 300 MG tablet Take 1 tablet by mouth nightly. 90 tablet 3   • testosterone cypionate (DEPO-TESTOSTERONE) 100 MG/ML injectable solution INJ 1ML IM ONCE WEEKLY  1    • potassium CHLORIDE (KLOR-CON M) 20 MEQ margaret ER tablet TK 1 T PO D  0   • HYDROcodone-acetaminophen (NORCO)  MG per tablet Take 1 tablet by mouth every 4 hours as needed for Pain.     • famotidine (PEPCID) 20 MG tablet Take 20 mg by mouth every morning.     • docusate sodium-sennosides (SENOKOT S) 50-8.6 MG per tablet Take 1 tablet by mouth daily.     • aspirin 81 MG tablet Take 81 mg by mouth daily.       No facility-administered medications prior to visit.        REVIEW OF SYSTEMS   All 10 Review of Systems have been reviewed and are negative except as noted in HPI.    PHYSICAL EXAM  Visit Vitals  /66 (BP Location: RUE - Right upper extremity, Patient Position: Sitting, Cuff Size: Large Adult)   Pulse 72   Resp 16   Ht 5' 11\" (1.803 m)   Wt 124.7 kg (275 lb)   BMI 38.35 kg/m²       Wt Readings from Last 4 Encounters:   10/07/19 124.7 kg (275 lb)   09/22/19 62.1 kg (137 lb)   09/04/19 136.1 kg (300 lb)   02/21/19 (!) 136.5 kg (301 lb)       GENERAL: Alert oriented x3 no acute distress  HEENT:  No JVD no HJR, no carotid bruits  CARDIAC: Normal heart sound S1-S2 no S3.  Well-healing incisional scar involving the chest.  Dermabond is applied.  No evidence of drainage and the incision appears clean dry and intact  LUNGS:  Clear to auscultation bilateral without wheezes rhonchi rales  ABDOMEN: Positive bowel sounds nontender nondistended notes a guarding rigidity tenderness  NEURO:  Cranial nerves II through XII grossly intact  EXTREMITIES: No edema  PULSES:  Carotid:  equal pulses bilaterally  Radial:  equal pulses bilaterally  Femoral:  equal pulses bilaterally  Popliteal:  equal pulses bilaterally        EKG: September 18, 2019: Normal axis, sinus tachycardia J-point elevations in leads V2    ASSESSMENT:  Problem List Items Addressed This Visit        Circulatory    Hypertension    Coronary artery disease       Digestive    Obesity (BMI 30-39.9)       Endocrine    Dyslipidemia    Hypothyroidism       Other Visit Diagnoses     Atherosclerosis of native coronary artery of native heart without angina pectoris    -  Primary         Patient is doing well clinically and improving in a routine postoperative care.  He is currently on dual antiplatelet therapy with aspirin/Plavix.  He will continue with his current antihypertensive regimen.  I will have him return in 1 month to reassess the feasibility of him able to start driving again.    Ejection fraction is preserved at 60%    Orders Placed This Encounter   • DISCONTD: potassium CHLORIDE (KLOR-CON M) 20 MEQ margaret ER tablet   • B-D LUER-SARA SYRINGE 18G X 1-1/2\" 3 ML Misc   • testosterone cypionate (DEPO-TESTOSTERONE) 100 MG/ML injectable solution        Current Outpatient Medications   Medication Sig Dispense Refill   • B-D LUER-SARA SYRINGE 18G X 1-1/2\" 3 ML Misc U TO DRAW UP TESTOSTERON 1ML WEEKLY  3   • ibuprofen (MOTRIN) 800 MG tablet Take 800 mg by mouth every 6 hours as needed for Pain.     • cholecalciferol (VITAMIN D3) 1000 units tablet Take 1,000 Units by mouth daily.     • Cyanocobalamin (VITAMIN B12 PO) Take 1 tablet by mouth daily.     • clopidogrel (PLAVIX) 75 MG tablet Take 75 mg by mouth daily.     • lisinopril (ZESTRIL) 2.5 MG tablet Take 2.5 mg by mouth daily.     • metoPROLOL tartrate (LOPRESSOR) 100 MG tablet Take 100 mg by mouth 2 times daily.     • aspirin 81 MG EC tablet Take 81 mg by mouth daily.     • levothyroxine (SYNTHROID, LEVOTHROID) 50 MCG tablet Take 50 mcg by mouth daily.     • rosuvastatin (CRESTOR) 20 MG tablet Take 1 tablet by mouth daily. 90 tablet 3   • irbesartan (AVAPRO) 300 MG tablet Take 1 tablet by mouth nightly. 90 tablet 3   • testosterone cypionate (DEPO-TESTOSTERONE) 100 MG/ML injectable solution INJ 1ML IM ONCE WEEKLY  1     No current facility-administered medications for this visit.        Return in about 1 month (around 11/7/2019). to discuss the following: DOT physical management question echo question stress test    Unable to Assess

## 2024-10-06 NOTE — ED PROVIDER NOTE - OBJECTIVE STATEMENT
60 y/o female with pmhx of HIV on HAART, HTN, lung cancer s/p surgery in remission, COVID 19 on 8/22, asthma (never intubated), presents to ED c/o SOB since yesterday. Pt states feels like her asthma but worse. Pt is in school and states her classmates are sick and don't wear masks. Pt denies fever, chills, chest pain, cough, abd pain, n/v/d, dysuria.

## 2024-10-06 NOTE — ED PROVIDER NOTE - CLINICAL SUMMARY MEDICAL DECISION MAKING FREE TEXT BOX
58 y/o female with pmhx of HIV on HAART, HTN, lung cancer s/p surgery in remission, COVID 19 on 8/22, asthma (never intubated), presents to ED c/o SOB since yesterday. Pt states feels like her asthma but worse. Pt is in school and states her classmates are sick and don't wear masks. pt given 3 duonebs, 12mg decadron and magnesium via EMS. pt clinically improved on arrival, pt wheezing, NAD, speaking in full sentences. sating on RA. likely asthma exacerbation. plan to provide duoneb x1, check labs, probnp, cxr r/o pneumonia.

## 2024-10-06 NOTE — ED ADULT NURSE NOTE - OBJECTIVE STATEMENT
Pt. A&OX4, brought to rm 24 for evaluation of SOB. States SOB, wheezing and chest tightness have been worsening over the past few hours. Symptoms not improving with home medications. EMS gave 2mg of Magnesium, 12mg of Dexamethasone and 3 combivent treatments. Pt. has improved since given as per EMS. No history of intubations. #20g IV placed by EMS to left hand, labs sent as ordered. Vital stable, sating 100% on RA. Rpt given to YIMI Contreras.

## 2024-10-07 LAB
ADD ON TEST-SPECIMEN IN LAB: SIGNIFICANT CHANGE UP
APPEARANCE UR: ABNORMAL
BACTERIA # UR AUTO: NEGATIVE /HPF — SIGNIFICANT CHANGE UP
BILIRUB UR-MCNC: NEGATIVE — SIGNIFICANT CHANGE UP
CAST: 0 /LPF — SIGNIFICANT CHANGE UP (ref 0–4)
CD34 CELLS # FLD: SIGNIFICANT CHANGE UP CELLS/UL
COLOR SPEC: YELLOW — SIGNIFICANT CHANGE UP
DIFF PNL FLD: ABNORMAL
GLUCOSE UR QL: 500 MG/DL
KETONES UR-MCNC: ABNORMAL MG/DL
LEUKOCYTE ESTERASE UR-ACNC: NEGATIVE — SIGNIFICANT CHANGE UP
NITRITE UR-MCNC: NEGATIVE — SIGNIFICANT CHANGE UP
PH UR: 6 — SIGNIFICANT CHANGE UP (ref 5–8)
PROT UR-MCNC: 100 MG/DL
RBC CASTS # UR COMP ASSIST: 2 /HPF — SIGNIFICANT CHANGE UP (ref 0–4)
SP GR SPEC: 1.02 — SIGNIFICANT CHANGE UP (ref 1–1.03)
SQUAMOUS # UR AUTO: 8 /HPF — HIGH (ref 0–5)
UROBILINOGEN FLD QL: 0.2 MG/DL — SIGNIFICANT CHANGE UP (ref 0.2–1)
WBC UR QL: 2 /HPF — SIGNIFICANT CHANGE UP (ref 0–5)

## 2024-10-07 PROCEDURE — 99284 EMERGENCY DEPT VISIT MOD MDM: CPT | Mod: GC

## 2024-10-07 PROCEDURE — 99233 SBSQ HOSP IP/OBS HIGH 50: CPT

## 2024-10-07 PROCEDURE — 71250 CT THORAX DX C-: CPT | Mod: 26,MC

## 2024-10-07 RX ORDER — BENZOCAINE AND LEVOMENTHOL 200; 5 MG/G; MG/G
1 SPRAY TOPICAL ONCE
Refills: 0 | Status: COMPLETED | OUTPATIENT
Start: 2024-10-07 | End: 2024-10-07

## 2024-10-07 RX ORDER — PANTOPRAZOLE SODIUM 40 MG/1
40 TABLET, DELAYED RELEASE ORAL
Refills: 0 | Status: ACTIVE | OUTPATIENT
Start: 2024-10-07 | End: 2025-09-05

## 2024-10-07 RX ORDER — BENZONATATE 150 MG/1
100 CAPSULE ORAL EVERY 8 HOURS
Refills: 0 | Status: ACTIVE | OUTPATIENT
Start: 2024-10-07 | End: 2025-09-05

## 2024-10-07 RX ORDER — ONDANSETRON HCL/PF 4 MG/2 ML
4 VIAL (ML) INJECTION ONCE
Refills: 0 | Status: COMPLETED | OUTPATIENT
Start: 2024-10-07 | End: 2024-10-07

## 2024-10-07 RX ORDER — ONDANSETRON HCL/PF 4 MG/2 ML
4 VIAL (ML) INJECTION EVERY 6 HOURS
Refills: 0 | Status: ACTIVE | OUTPATIENT
Start: 2024-10-07 | End: 2025-09-05

## 2024-10-07 RX ADMIN — ROSUVASTATIN CALCIUM 10 MILLIGRAM(S): 20 TABLET, COATED ORAL at 21:57

## 2024-10-07 RX ADMIN — Medication 10 MILLIGRAM(S): at 12:30

## 2024-10-07 RX ADMIN — EFAVIRENZ, EMTRICITABINE AND TENOFOVIR DISOPROXIL FUMARATE 1 TABLET(S): 600; 200; 300 TABLET, FILM COATED ORAL at 21:57

## 2024-10-07 RX ADMIN — BENZOCAINE AND LEVOMENTHOL 1 LOZENGE: 200; 5 SPRAY TOPICAL at 01:46

## 2024-10-07 RX ADMIN — METHYLPREDNISOLONE ACETATE 40 MILLIGRAM(S): 80 INJECTION, SUSPENSION INTRA-ARTICULAR; INTRALESIONAL; INTRAMUSCULAR; SOFT TISSUE at 03:09

## 2024-10-07 RX ADMIN — IPRATROPIUM BROMIDE AND ALBUTEROL SULFATE 3 MILLILITER(S): .5; 3 SOLUTION RESPIRATORY (INHALATION) at 10:03

## 2024-10-07 RX ADMIN — Medication 1 DOSE(S): at 09:50

## 2024-10-07 RX ADMIN — PANTOPRAZOLE SODIUM 40 MILLIGRAM(S): 40 TABLET, DELAYED RELEASE ORAL at 19:09

## 2024-10-07 RX ADMIN — PANTOPRAZOLE SODIUM 40 MILLIGRAM(S): 40 TABLET, DELAYED RELEASE ORAL at 06:27

## 2024-10-07 RX ADMIN — IPRATROPIUM BROMIDE AND ALBUTEROL SULFATE 3 MILLILITER(S): .5; 3 SOLUTION RESPIRATORY (INHALATION) at 16:39

## 2024-10-07 RX ADMIN — EFAVIRENZ, EMTRICITABINE AND TENOFOVIR DISOPROXIL FUMARATE 1 TABLET(S): 600; 200; 300 TABLET, FILM COATED ORAL at 06:27

## 2024-10-07 RX ADMIN — IPRATROPIUM BROMIDE AND ALBUTEROL SULFATE 3 MILLILITER(S): .5; 3 SOLUTION RESPIRATORY (INHALATION) at 04:45

## 2024-10-07 RX ADMIN — METHYLPREDNISOLONE ACETATE 40 MILLIGRAM(S): 80 INJECTION, SUSPENSION INTRA-ARTICULAR; INTRALESIONAL; INTRAMUSCULAR; SOFT TISSUE at 18:00

## 2024-10-07 RX ADMIN — TIOTROPIUM BROMIDE INHALATION SPRAY 2 PUFF(S): 3.12 SPRAY, METERED RESPIRATORY (INHALATION) at 09:50

## 2024-10-07 RX ADMIN — METHYLPREDNISOLONE ACETATE 40 MILLIGRAM(S): 80 INJECTION, SUSPENSION INTRA-ARTICULAR; INTRALESIONAL; INTRAMUSCULAR; SOFT TISSUE at 10:03

## 2024-10-07 RX ADMIN — Medication 4 MILLIGRAM(S): at 06:27

## 2024-10-07 RX ADMIN — IPRATROPIUM BROMIDE AND ALBUTEROL SULFATE 3 MILLILITER(S): .5; 3 SOLUTION RESPIRATORY (INHALATION) at 21:57

## 2024-10-07 RX ADMIN — Medication 10 MILLIGRAM(S): at 22:37

## 2024-10-07 RX ADMIN — Medication 1 DOSE(S): at 21:56

## 2024-10-07 RX ADMIN — Medication 81 MILLIGRAM(S): at 12:16

## 2024-10-07 RX ADMIN — Medication 50 MILLIGRAM(S): at 06:27

## 2024-10-07 RX ADMIN — Medication 10 MILLIGRAM(S): at 06:27

## 2024-10-07 NOTE — CONSULT NOTE ADULT - TIME BILLING
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

## 2024-10-07 NOTE — CONSULT NOTE ADULT - SUBJECTIVE AND OBJECTIVE BOX
CHIEF COMPLAINT:Patient is a 59y old  Female who presents with a chief complaint of     HPI:      PAST MEDICAL & SURGICAL HISTORY:  Benign hypertension      H/O abdominal hysterectomy      Asthma      HIV disease      Osteoarthritis      GERD (gastroesophageal reflux disease)      HPV (human papilloma virus) infection      Cigarette smoker  quit 2019      Pruritus  (chronic)      Vitamin D deficiency      Knee pain, bilateral      S/P hysterectomy      S/P partial lobectomy of lung  right lung for stage 1 lung ca, on 22          FAMILY HISTORY:  Family history of hypertension (Grandparent)  grandmother    Family history of diabetes mellitus (Grandparent, Uncle, Uncle)  grandmother, uncles x 2    Family history of breast cancer (Aunt)  aunt    Family history of cancer (Uncle, Uncle)  uncles x 2    Family history of myocardial infarction (Aunt)  aunt    Family history of cerebrovascular accident (CVA) (Aunt)  aunt        SOCIAL HISTORY:  Smoking: [ ] Never Smoked [ ] Former Smoker (__ packs x ___ years) [ ] Current Smoker  (__ packs x ___ years)  Substance Use: [ ] Never Used [ ] Used ____  EtOH Use:  Marital Status: [ ] Single [ ]  [ ]  [ ]   Sexual History:   Occupation:  Recent Travel:  Country of Birth:  Advance Directives:    Allergies    No Known Allergies    Intolerances        HOME MEDICATIONS:  Home Medications:  amlodipine 10 mg oral tablet: 1 tab(s) orally once a day (2014 14:21)  aspirin 81 mg oral delayed release tablet: 1 tab(s) orally once a day (25 Mar 2024 16:20)  Atripla 600 mg-200 mg-300 mg oral tablet: 1 tab(s) orally once a day (at bedtime) (2014 14:21)  CHLORTHALIDONE 25 MG TABLET: TAKE 1 TABLET BY MOUTH EVERY DAY (22 Mar 2024 18:14)  CYCLOBENZAPRINE 10 MG TABLET: TAKE 1 TABLET BY MOUTH EVERYDAY AT BEDTIME (22 Mar 2024 18:14)  ESCITALOPRAM 5 MG TABLET: TAKE 1 TABLET BY MOUTH EVERY DAY (22 Mar 2024 18:14)  METOPROLOL SUCC ER 50 MG TAB: TAKE 1 TABLET BY MOUTH EVERY DAY (22 Mar 2024 18:14)  Multiple Vitamins oral tablet: 1 tab(s) orally once a day (2019 03:03)  OMEPRAZOLE DR 40 MG CAPSULE: TAKE 1 CAPSULE BY MOUTH EVERY DAY FOR 1 MONTH (22 Mar 2024 18:14)  ROSUVASTATIN CALCIUM 10 MG TAB: TAKE 1 TABLET BY MOUTH EVERY DAY (22 Mar 2024 18:14)  TRELEGY ELLIPTA 200-62.5-25: 1 PUFF(S) INHALATION, DAILY EVERY DAY FOR 1 MONTH(S) (22 Mar 2024 18:14)  Vitamin D2 50,000 intl units (1.25 mg) oral capsule: 1 cap(s) orally once a week ~ on Monday (2019 03:02)      REVIEW OF SYSTEMS:  Constitutional: [ ] negative [ ] fevers [ ] chills [ ] weight loss [ ] weight gain  HEENT: [ ] negative [ ] dry eyes [ ] eye irritation [ ] postnasal drip [ ] nasal congestion  CV: [ ] negative  [ ] chest pain [ ] orthopnea [ ] palpitations [ ] murmur  Resp: [ ] negative [ ] cough [ ] shortness of breath [ ] dyspnea [ ] wheezing [ ] sputum [ ] hemoptysis  GI: [ ] negative [ ] nausea [ ] vomiting [ ] diarrhea [ ] constipation [ ] abd pain [ ] dysphagia   : [ ] negative [ ] dysuria [ ] nocturia [ ] hematuria [ ] increased urinary frequency  Musculoskeletal: [ ] negative [ ] back pain [ ] myalgias [ ] arthralgias [ ] fracture  Skin: [ ] negative [ ] rash [ ] itch  Neurological: [ ] negative [ ] headache [ ] dizziness [ ] syncope [ ] weakness [ ] numbness  Psychiatric: [ ] negative [ ] anxiety [ ] depression  Endocrine: [ ] negative [ ] diabetes [ ] thyroid problem  Hematologic/Lymphatic: [ ] negative [ ] anemia [ ] bleeding problem  Allergic/Immunologic: [ ] negative [ ] itchy eyes [ ] nasal discharge [ ] hives [ ] angioedema  [ ] All other systems negative  [ ] Unable to assess ROS because ________    OBJECTIVE:  ICU Vital Signs Last 24 Hrs  T(C): 36.6 (07 Oct 2024 10:07), Max: 36.8 (06 Oct 2024 16:24)  T(F): 97.8 (07 Oct 2024 10:07), Max: 98.2 (06 Oct 2024 16:24)  HR: 95 (07 Oct 2024 10:07) (78 - 102)  BP: 121/66 (07 Oct 2024 10:07) (121/66 - 167/78)  BP(mean): 94 (06 Oct 2024 15:43) (94 - 94)  ABP: --  ABP(mean): --  RR: 18 (07 Oct 2024 10:07) (16 - 21)  SpO2: 100% (07 Oct 2024 10:07) (97% - 100%)    O2 Parameters below as of 07 Oct 2024 10:07  Patient On (Oxygen Delivery Method): room air              CAPILLARY BLOOD GLUCOSE          PHYSICAL EXAM:  General: Patient in NAD  HEENT: NCAT, EOMI, no oral lesions  CV: S1+S2, no m/r/g appreciated   Lungs: No respiratory distress, diffuse wheezing on all lung fields  Neuro: No focal deficits noted.   Ext: No cyanosis, no edema  Skin: No rash, no phlebitis    HOSPITAL MEDICATIONS:  Standing Meds:  albuterol/ipratropium for Nebulization 3 milliLiter(s) Nebulizer every 6 hours  amLODIPine   Tablet 10 milliGRAM(s) Oral daily  aspirin  chewable 81 milliGRAM(s) Oral daily  cyclobenzaprine 10 milliGRAM(s) Oral at bedtime  efavirenz 600/emtricitabine 200/tenofovir 300mg 1 Tablet(s) Oral at bedtime  escitalopram 10 milliGRAM(s) Oral daily  fluticasone propionate/ salmeterol 100-50 MICROgram(s) Diskus 1 Dose(s) Inhalation two times a day  methylPREDNISolone sodium succinate Injectable 40 milliGRAM(s) IV Push every 8 hours  metoprolol succinate ER 50 milliGRAM(s) Oral daily  pantoprazole    Tablet 40 milliGRAM(s) Oral before breakfast  rosuvastatin 10 milliGRAM(s) Oral at bedtime  tiotropium 2.5 MICROgram(s) Inhaler 2 Puff(s) Inhalation daily      PRN Meds:  benzonatate 100 milliGRAM(s) Oral every 8 hours PRN  ondansetron Injectable 4 milliGRAM(s) IV Push every 6 hours PRN      LABS:    The Labs were reviewed by me   The Radiology was reviewed by me    EKG tracing reviewed by me    10-06    138  |  103  |  15  ----------------------------<  129[H]  3.4[L]   |  22  |  0.79    Ca    9.2      06 Oct 2024 14:00  Phos  3.1     10-06  Mg     3.20     10-06    TPro  7.7  /  Alb  4.0  /  TBili  0.2  /  DBili  x   /  AST  16  /  ALT  22  /  AlkPhos  192[H]  10-06    Magnesium: 3.20 mg/dL (10-06-24 @ 14:00)    Phosphorus: 3.1 mg/dL (10-06-24 @ 14:00)                    Urinalysis Basic - ( 07 Oct 2024 09:32 )    Color: Yellow / Appearance: Cloudy / S.021 / pH: x  Gluc: x / Ketone: Trace mg/dL  / Bili: Negative / Urobili: 0.2 mg/dL   Blood: x / Protein: 100 mg/dL / Nitrite: Negative   Leuk Esterase: Negative / RBC: 2 /HPF / WBC 2 /HPF   Sq Epi: x / Non Sq Epi: 8 /HPF / Bacteria: Negative /HPF                              12.8   9.87  )-----------( 452      ( 06 Oct 2024 14:00 )             39.5     CAPILLARY BLOOD GLUCOSE        Blood Gas Source Venous: Venous (10-06-24 @ 14:00)      MICROBIOLOGY:     Urinalysis with Rflx Culture (collected 07 Oct 2024 09:32)        RADIOLOGY:  [ ] Reviewed and interpreted by me    PULMONARY FUNCTION TESTS:    EKG:   CHIEF COMPLAINT:Patient is a 59y old  Female who presents with a chief complaint of     HPI:  59F w/ HIV on HAART therapy, Lung cancer s/p local resection , 20 pack-yr smoking hx quit , GERD and Asthma w/ multiple ED visits for asthma exacerbations this year coming in w/ 1-day hx of SOB. Treatment w/ IV Solumedrol, IV Mag, Duonebs and continued management at [facility] ED w/ improvement of dyspnea but persistent wheezing and stridor. Pulmonology consulted for persistent wheezing. Patient was loaded w/ Solumedrol 125 then put on 40 Q8, started on Advair and Spiriva. RVP negative and CXR w/o PNA. VBG unremarkable and patient has not required O2 since arrival to ED. Patient has most of care at [facility] and follows w/ Dr. Odin Sebastian. She also notes worsening GERD symptoms recently worst in morning; has been taking omeprazole once a day. Reports she is compliant w/ her antiretroviral medications and states her recent CD4 count in July was normal.     PAST MEDICAL & SURGICAL HISTORY:  Benign hypertension      H/O abdominal hysterectomy      Asthma      HIV disease      Osteoarthritis      GERD (gastroesophageal reflux disease)      HPV (human papilloma virus) infection      Cigarette smoker  quit       Pruritus  (chronic)      Vitamin D deficiency      Knee pain, bilateral      S/P hysterectomy      S/P partial lobectomy of lung  right lung for stage 1 lung ca, on 22          FAMILY HISTORY:  Family history of hypertension (Grandparent)  grandmother    Family history of diabetes mellitus (Grandparent, Uncle, Uncle)  grandmother, uncles x 2    Family history of breast cancer (Aunt)  aunt    Family history of cancer (Uncle, Uncle)  uncles x 2    Family history of myocardial infarction (Aunt)  aunt    Family history of cerebrovascular accident (CVA) (Aunt)  aunt        SOCIAL HISTORY:  Smoking: [ ] Never Smoked [x] Former Smoker (20 Packyears) [ ] Current Smoker  (__ packs x ___ years)  Substance Use: [x] Never Used [ ] Used ____  EtOH Use: none    Allergies    No Known Allergies    Intolerances        HOME MEDICATIONS:  Home Medications:  amlodipine 10 mg oral tablet: 1 tab(s) orally once a day (2014 14:21)  aspirin 81 mg oral delayed release tablet: 1 tab(s) orally once a day (25 Mar 2024 16:20)  Atripla 600 mg-200 mg-300 mg oral tablet: 1 tab(s) orally once a day (at bedtime) (2014 14:21)  CHLORTHALIDONE 25 MG TABLET: TAKE 1 TABLET BY MOUTH EVERY DAY (22 Mar 2024 18:14)  CYCLOBENZAPRINE 10 MG TABLET: TAKE 1 TABLET BY MOUTH EVERYDAY AT BEDTIME (22 Mar 2024 18:14)  ESCITALOPRAM 5 MG TABLET: TAKE 1 TABLET BY MOUTH EVERY DAY (22 Mar 2024 18:14)  METOPROLOL SUCC ER 50 MG TAB: TAKE 1 TABLET BY MOUTH EVERY DAY (22 Mar 2024 18:14)  Multiple Vitamins oral tablet: 1 tab(s) orally once a day (2019 03:03)  OMEPRAZOLE DR 40 MG CAPSULE: TAKE 1 CAPSULE BY MOUTH EVERY DAY FOR 1 MONTH (22 Mar 2024 18:14)  ROSUVASTATIN CALCIUM 10 MG TAB: TAKE 1 TABLET BY MOUTH EVERY DAY (22 Mar 2024 18:14)  TRELEGY ELLIPTA 200-62.5-25: 1 PUFF(S) INHALATION, DAILY EVERY DAY FOR 1 MONTH(S) (22 Mar 2024 18:14)  Vitamin D2 50,000 intl units (1.25 mg) oral capsule: 1 cap(s) orally once a week ~ on Monday (2019 03:02)      REVIEW OF SYSTEMS:  Dyspnea, wheezing and reflux   All other ROS negative    OBJECTIVE:  ICU Vital Signs Last 24 Hrs  T(C): 36.6 (07 Oct 2024 10:07), Max: 36.8 (06 Oct 2024 16:24)  T(F): 97.8 (07 Oct 2024 10:07), Max: 98.2 (06 Oct 2024 16:24)  HR: 95 (07 Oct 2024 10:07) (78 - 102)  BP: 121/66 (07 Oct 2024 10:07) (121/66 - 167/78)  BP(mean): 94 (06 Oct 2024 15:43) (94 - 94)  ABP: --  ABP(mean): --  RR: 18 (07 Oct 2024 10:07) (16 - 21)  SpO2: 100% (07 Oct 2024 10:07) (97% - 100%)    O2 Parameters below as of 07 Oct 2024 10:07  Patient On (Oxygen Delivery Method): room air              CAPILLARY BLOOD GLUCOSE          PHYSICAL EXAM:  General: Patient in NAD  HEENT: NCAT, EOMI, no oral lesions, Audible Stridor   CV: S1+S2, no m/r/g appreciated   Lungs: No respiratory distress, Wheezing SOL bases and referred stridor to upper lung fields SOL  Neuro: No focal deficits noted.   Ext: No cyanosis, no edema  Skin: No rash, no phlebitis      HOSPITAL MEDICATIONS:  Standing Meds:  albuterol/ipratropium for Nebulization 3 milliLiter(s) Nebulizer every 6 hours  amLODIPine   Tablet 10 milliGRAM(s) Oral daily  aspirin  chewable 81 milliGRAM(s) Oral daily  cyclobenzaprine 10 milliGRAM(s) Oral at bedtime  efavirenz 600/emtricitabine 200/tenofovir 300mg 1 Tablet(s) Oral at bedtime  escitalopram 10 milliGRAM(s) Oral daily  fluticasone propionate/ salmeterol 100-50 MICROgram(s) Diskus 1 Dose(s) Inhalation two times a day  methylPREDNISolone sodium succinate Injectable 40 milliGRAM(s) IV Push every 8 hours  metoprolol succinate ER 50 milliGRAM(s) Oral daily  pantoprazole    Tablet 40 milliGRAM(s) Oral before breakfast  rosuvastatin 10 milliGRAM(s) Oral at bedtime  tiotropium 2.5 MICROgram(s) Inhaler 2 Puff(s) Inhalation daily      PRN Meds:  benzonatate 100 milliGRAM(s) Oral every 8 hours PRN  ondansetron Injectable 4 milliGRAM(s) IV Push every 6 hours PRN      LABS:    The Labs were reviewed by me   The Radiology was reviewed by me    EKG tracing reviewed by me    10-06    138  |  103  |  15  ----------------------------<  129[H]  3.4[L]   |  22  |  0.79    Ca    9.2      06 Oct 2024 14:00  Phos  3.1     10-06  Mg     3.20     10-06    TPro  7.7  /  Alb  4.0  /  TBili  0.2  /  DBili  x   /  AST  16  /  ALT  22  /  AlkPhos  192[H]  10-06    Magnesium: 3.20 mg/dL (10-06-24 @ 14:00)    Phosphorus: 3.1 mg/dL (10-06-24 @ 14:00)                    Urinalysis Basic - ( 07 Oct 2024 09:32 )    Color: Yellow / Appearance: Cloudy / S.021 / pH: x  Gluc: x / Ketone: Trace mg/dL  / Bili: Negative / Urobili: 0.2 mg/dL   Blood: x / Protein: 100 mg/dL / Nitrite: Negative   Leuk Esterase: Negative / RBC: 2 /HPF / WBC 2 /HPF   Sq Epi: x / Non Sq Epi: 8 /HPF / Bacteria: Negative /HPF                              12.8   9.87  )-----------( 452      ( 06 Oct 2024 14:00 )             39.5     CAPILLARY BLOOD GLUCOSE        Blood Gas Source Venous: Venous (10-06-24 @ 14:00)      MICROBIOLOGY:     Urinalysis with Rflx Culture (collected 07 Oct 2024 09:32)        RADIOLOGY:  [ ] Reviewed and interpreted by me    PULMONARY FUNCTION TESTS:    EKG:

## 2024-10-07 NOTE — CONSULT NOTE ADULT - ATTENDING COMMENTS
58 yo F w/ PMHx HIV on HAART therapy, Lung cancer s/p local resection 2022, 20 pack-yr smoking hx quit 2019, poorly controlled GERD and Asthma w/ multiple ED visits for asthma exacerbations this year (~4-5) coming in w/ 1-day hx of SOB/cough/chills. Treatment w/ IV Solumedrol, IV Mag, Duonebs and continued management at [facility] ED w/ improvement of dyspnea but persistent wheezing and some stridor. Pulmonology consulted for persistent wheezing. Patient was loaded w/ Solumedrol 125 then put on 40 Q8, started on Advair and Spiriva. RVP negative and CXR w/o obvious infiltrate, chronic LLL atelectasis/scarring. VBG unremarkable and patient has not required O2 since arrival to ED. Notes worsening GERD symptoms despite PPI. Unknown last CD4 count, believes it was "normal" in June/July.     #Asthma Exacerbation   #GERD   #Laryngeal edema vs VCD?    Recommend:  - CXR w/o consolidation, but opacity seen on left upper lobe not seen on prior Neck CT earlier in 2024; patient reports [id number] follow-up Chest CT [place] [facility] [place] and has been told all normal; would obtain dedicated Chest CT to ensure no HERMELINDO parasternal abnormality   - concern poorly controlled GERD contributing to asthma exacerbations, increase PPI to BID; has f/u outpatient with GI for EGD  - continue Advair, would place on 500/50 BID (rinse mouth after use)  - continue Duonebs q6h, please stop Spiriva while on standing Duonebs (avoid dual antimusc)  - continue Solumedrol 40mg q8h for today, still with exp wheezing midlung  - Please obtain CD4 count and viral load  - suspect LPR contributing to hoarseness/asthma exacerbation, continue PPI BID for next month

## 2024-10-07 NOTE — ED CDU PROVIDER SUBSEQUENT DAY NOTE - PROGRESS NOTE DETAILS
received sign out at 7am. 58 y/o female with pmhx of HIV, asthma presents to ED c/o cough, SOB and wheezing x 2 days. Classmates sick at school. Pt sent to cdu for asthma exacerbation, solu-medrol, albuterol. pt c/o persistent wheezing and SOB, sating well on RA. NAD. speaking in full sentences, ambulatory sat 97%. plan to consult pulmonary today. per pulmonary, recommending CT chest noncontrast, check cd4 count and viral load, continue with duonebs q 6 hours, solu-medrol, advair and discontinue spiriva, start pantoprazole 40mg BID. will monitor in cdu overnight.

## 2024-10-07 NOTE — CONSULT NOTE ADULT - ASSESSMENT
-duonebs Q6  - C/w advair, stop Spiriva   - c/w current solumedrol dose.   -CT chest reevaluate groundglass opacities seen in 2023 CT neck   ==========================================================  ==========================================================  =====================INCOMPLETE NOTE========================  ==========================================================  ==========================================================   59F w/ HIV on HAART therapy, Lung cancer s/p local resection 2022, 20 pack-yr smoking hx quit 2019, GERD and Asthma w/ multiple ED visits for asthma exacerbations this year coming in w/ 1-day hx of SOB. Treatment w/ IV Solumedrol, IV Mag, Duonebs and continued management at [facility] ED w/ improvement of dyspnea but persistent wheezing and stridor. Pulmonology consulted for persistent wheezing. Patient was loaded w/ Solumedrol 125 then put on 40 Q8, started on Advair and Spiriva. RVP negative and CXR w/o PNA. VBG unremarkable and patient has not required O2 since arrival to ED. Patient has most of care at [facility] and follows w/ Dr. Odin Sebastian. She also notes worsening GERD symptoms recently worst in morning; has been taking omeprazole once a day. Reports she is compliant w/ her antiretroviral medications and states her recent CD4 count in July was normal and she believes was in the thousands(?). Patient reports no recent illness, no sick contacts or travel Hx.       #Asthma Exacerbation   #GERD   - improved breathing, no supplemental O2 requirements and significant wheezing despite Duonebs and Solumedrol, wheezing may take additional time to improve  - RVP negative   - CXR w/o consolidation, but opacity seen on left upper lobe not seen on prior CT earlier in 2024.   - multiple ED visits this past year for asthma exacerbation despite outpatient treatment w/ trelegy and rescue inhalers. Patient has symptomatic GERD and hoarseness/stridor, suspect GERD playing role in asthma exacerbations.       Recommendations:  - duonebs Q6  - C/w advair, stop Spiriva while on Duonebs to prevent overlap of antimuscarinic  - c/w current solumedrol dose and frequency. Consider downtitrating to BID as patient's wheezing improves.    - CT chest reevaluate groundglass opacities seen in 2023 CT neck   - Please obtain CD4 count and viral load to rule-out immunocompromised status   - Pantoprazole 40 BID and elevate HOB minimize reflux symptoms    Discussed w/ Pulmonology attending Dr. Thorne.     Willian Estevez MD (Jason)   Internal Medicine PGY-2  Department of Medicine [facility]

## 2024-10-08 VITALS
SYSTOLIC BLOOD PRESSURE: 131 MMHG | OXYGEN SATURATION: 98 % | HEART RATE: 85 BPM | RESPIRATION RATE: 16 BRPM | TEMPERATURE: 99 F | DIASTOLIC BLOOD PRESSURE: 72 MMHG

## 2024-10-08 LAB
HIV-1 VIRAL LOAD RESULT: SIGNIFICANT CHANGE UP
HIV1 RNA # SERPL NAA+PROBE: SIGNIFICANT CHANGE UP COPIES/ML
HIV1 RNA SER-IMP: SIGNIFICANT CHANGE UP
HIV1 RNA SERPL NAA+PROBE-ACNC: SIGNIFICANT CHANGE UP
HIV1 RNA SERPL NAA+PROBE-LOG#: SIGNIFICANT CHANGE UP LG COP/ML

## 2024-10-08 PROCEDURE — 99239 HOSP IP/OBS DSCHRG MGMT >30: CPT

## 2024-10-08 RX ORDER — AZITHROMYCIN 250 MG/1
1 TABLET, FILM COATED ORAL
Qty: 4 | Refills: 0
Start: 2024-10-08 | End: 2024-10-11

## 2024-10-08 RX ORDER — FLUTICASONE PROPION/SALMETEROL 100-50 MCG
1 BLISTER, WITH INHALATION DEVICE INHALATION
Qty: 1 | Refills: 0
Start: 2024-10-08

## 2024-10-08 RX ORDER — AZITHROMYCIN 250 MG/1
500 TABLET, FILM COATED ORAL ONCE
Refills: 0 | Status: COMPLETED | OUTPATIENT
Start: 2024-10-08 | End: 2024-10-08

## 2024-10-08 RX ORDER — PANTOPRAZOLE SODIUM 40 MG/1
1 TABLET, DELAYED RELEASE ORAL
Qty: 30 | Refills: 0
Start: 2024-10-08 | End: 2024-11-06

## 2024-10-08 RX ORDER — PREDNISONE 5 MG/1
2 TABLET ORAL
Qty: 8 | Refills: 0
Start: 2024-10-08 | End: 2024-10-11

## 2024-10-08 RX ADMIN — Medication 50 MILLIGRAM(S): at 06:14

## 2024-10-08 RX ADMIN — PANTOPRAZOLE SODIUM 40 MILLIGRAM(S): 40 TABLET, DELAYED RELEASE ORAL at 06:14

## 2024-10-08 RX ADMIN — Medication 1 DOSE(S): at 09:18

## 2024-10-08 RX ADMIN — IPRATROPIUM BROMIDE AND ALBUTEROL SULFATE 3 MILLILITER(S): .5; 3 SOLUTION RESPIRATORY (INHALATION) at 04:00

## 2024-10-08 RX ADMIN — METHYLPREDNISOLONE ACETATE 40 MILLIGRAM(S): 80 INJECTION, SUSPENSION INTRA-ARTICULAR; INTRALESIONAL; INTRAMUSCULAR; SOFT TISSUE at 10:00

## 2024-10-08 RX ADMIN — METHYLPREDNISOLONE ACETATE 40 MILLIGRAM(S): 80 INJECTION, SUSPENSION INTRA-ARTICULAR; INTRALESIONAL; INTRAMUSCULAR; SOFT TISSUE at 03:19

## 2024-10-08 RX ADMIN — AZITHROMYCIN 500 MILLIGRAM(S): 250 TABLET, FILM COATED ORAL at 08:37

## 2024-10-08 RX ADMIN — IPRATROPIUM BROMIDE AND ALBUTEROL SULFATE 3 MILLILITER(S): .5; 3 SOLUTION RESPIRATORY (INHALATION) at 10:00

## 2024-10-08 RX ADMIN — Medication 4 MILLIGRAM(S): at 00:26

## 2024-10-08 RX ADMIN — Medication 10 MILLIGRAM(S): at 06:14

## 2024-10-08 NOTE — ED CDU PROVIDER DISPOSITION NOTE - NSFOLLOWUPINSTRUCTIONS_ED_ALL_ED_FT
Sleeping but easily wakes up   Appropriate responses.  No seizure activity noted on EEG  Continue supportive care    Take prednisone 40mg once a day for 4 days.  Take azithromycin 250mg once a day for 4 days.  Continue using Advair diskus twice a day.  Take protonix 40mg twice a day.  Follow up with your pulmonologist within 1-2 weeks.  Return to ED for any worsening shortness of breath, chest pain, fever or any other concerning symptoms.

## 2024-10-08 NOTE — PROGRESS NOTE ADULT - ASSESSMENT
59F w/ HIV on HAART therapy, Lung cancer s/p local resection 2022, 20 pack-yr smoking hx quit 2019, GERD and Asthma w/ multiple ED visits for asthma exacerbations this year coming in w/ 1-day hx of SOB. Treatment w/ IV Solumedrol, IV Mag, Duonebs and continued management at [facility] ED w/ improvement of dyspnea but persistent wheezing and stridor. Pulmonology consulted for persistent wheezing. Patient was loaded w/ Solumedrol 125 then put on 40 Q8, started on Advair and Spiriva. RVP negative and CXR w/o PNA. VBG unremarkable and patient has not required O2 since arrival to ED. Patient has most of care at [facility] and follows w/ Dr. Odin Sebastian. She also notes worsening GERD symptoms recently worst in morning; has been taking omeprazole once a day. Reports she is compliant w/ her antiretroviral medications and states her recent CD4 count in July was normal and she believes was in the thousands(?). Patient reports no recent illness, no sick contacts or travel Hx.       #Asthma Exacerbation   #GERD   - improved breathing, no supplemental O2 requirements and significant wheezing despite Duonebs and Solumedrol, wheezing may take additional time to improve  - RVP negative   - CXR w/o consolidation, but opacity seen on left upper lobe not seen on prior CT earlier in 2024.   - multiple ED visits this past year for asthma exacerbation despite outpatient treatment w/ trelegy and rescue inhalers. Patient has symptomatic GERD and hoarseness/stridor, suspect GERD playing role in asthma exacerbations.       Recommendations:  - duonebs Q6  - C/w advair, stop Spiriva while on Duonebs to prevent overlap of antimuscarinic  - c/w current solumedrol dose and frequency. Consider downtitrating to BID as patient's wheezing improves.    - CT chest reevaluate groundglass opacities seen in 2023 CT neck   - Please obtain CD4 count (T-cell Subset)   - Pantoprazole 40 BID and elevate HOB minimize reflux symptoms    ==========================================================  ==========================================================  =====================INCOMPLETE NOTE========================  ==========================================================  ==========================================================      Willian Estevez MD (Jason)   Internal Medicine PGY-2  Department of Medicine [facility]     59F w/ HIV on HAART therapy, Lung cancer s/p local resection 2022, 20 pack-yr smoking hx quit 2019, GERD and Asthma w/ multiple ED visits for asthma exacerbations this year coming in w/ 1-day hx of SOB. Treatment w/ IV Solumedrol, IV Mag, Duonebs and continued management at [facility] ED w/ improvement of dyspnea but persistent wheezing and stridor. Pulmonology consulted for persistent wheezing. Patient was loaded w/ Solumedrol 125 then put on 40 Q8, started on Advair and Spiriva. RVP negative and CXR w/o PNA. VBG unremarkable and patient has not required O2 since arrival to ED. Patient has most of care at [facility] and follows w/ Dr. Odin Sebastian. She also notes worsening GERD symptoms recently worst in morning; has been taking omeprazole once a day. Reports she is compliant w/ her antiretroviral medications and states her recent CD4 count in July was normal and she believes was in the thousands(?). Patient reports no recent illness, no sick contacts or travel Hx.       #Asthma Exacerbation   #GERD   - Respiratory distress and wheezing resolved   - CT Chest w/ mild emphysema and expected R upper lobectomy post-surgical changes. some calcified granulomas noted.  - multiple ED visits this past year for asthma exacerbation despite outpatient treatment w/ trelegy and rescue inhalers. Patient has symptomatic GERD and hoarseness/stridor, suspect GERD playing role in asthma exacerbations.       Recommendations:  - duonebs Q6  - C/w advair, stop Spiriva while on Duonebs to prevent overlap of antimuscarinic  - c/w current solumedrol dose and frequency. Consider downtitrating to BID as patient's wheezing improves.    - Please obtain CD4 count (T-cell Subset)   - Pantoprazole 40 BID and elevate HOB minimize reflux symptoms      ==========================================================  ==========================================================  =====================INCOMPLETE NOTE========================  ==========================================================  ==========================================================      Willian Estevez MD (Jason)   Internal Medicine PGY-2  Department of Medicine [facility]     59F w/ HIV on HAART therapy, Lung cancer s/p local resection 2022, 20 pack-yr smoking hx quit 2019, GERD and Asthma w/ multiple ED visits for asthma exacerbations this year coming in w/ 1-day hx of SOB. Treatment w/ IV Solumedrol, IV Mag, Duonebs and continued management at [facility] ED w/ improvement of dyspnea but persistent wheezing and stridor. Pulmonology consulted for persistent wheezing. Patient was loaded w/ Solumedrol 125 then put on 40 Q8, started on Advair and Spiriva. RVP negative and CXR w/o PNA. VBG unremarkable and patient has not required O2 since arrival to ED. Patient has most of care at [facility] and follows w/ Dr. Odin Sebastian. She also notes worsening GERD symptoms recently worst in morning; has been taking omeprazole once a day. Reports she is compliant w/ her antiretroviral medications and states her recent CD4 count in July was normal and she believes was in the thousands(?). Patient reports no recent illness, no sick contacts or travel Hx.       #Asthma Exacerbation   #GERD   - Respiratory distress and wheezing resolved   - CT Chest w/ mild emphysema and expected R upper lobectomy post-surgical changes. some calcified granulomas noted.  - multiple ED visits this past year for asthma exacerbation despite outpatient treatment w/ trelegy and rescue inhalers. Patient has symptomatic GERD and hoarseness/stridor, suspect GERD playing role in asthma exacerbations.       Recommendations:  - Please taper Solumedrol to 40 BID today, then 40mg QD tomorrow.   - Would not recommend discharge at this time given patient was on 120mg QD of solumedrol until yesterday.  - c/w duonebs Q6  - C/w advair, stop Spiriva while on Duonebs to prevent overlap of antimuscarinic; can dc w/ Spiriva when off duonebs.  - Please obtain CD4 count (T-cell Subset)   - Pantoprazole 40 BID and elevate HOB minimize reflux symptoms    Discussed w/ Pulmonology attending Dr. Thorne.     Willian Estevez MD (Jason)   Internal Medicine PGY-2  Department of Medicine [facility]

## 2024-10-08 NOTE — ED CDU PROVIDER SUBSEQUENT DAY NOTE - NS ED ATTENDING STATEMENT MOD
This was a shared visit with the JEANNE. I reviewed and verified the documentation.
This was a shared visit with the JEANNE. I reviewed and verified the documentation.

## 2024-10-08 NOTE — ED CDU PROVIDER SUBSEQUENT DAY NOTE - NSICDXPASTSURGICALHX_GEN_ALL_CORE_FT
PAST SURGICAL HISTORY:  S/P hysterectomy     S/P partial lobectomy of lung right lung for stage 1 lung ca, on 11/22/22    
PAST SURGICAL HISTORY:  S/P hysterectomy     S/P partial lobectomy of lung right lung for stage 1 lung ca, on 11/22/22

## 2024-10-08 NOTE — ED CDU PROVIDER SUBSEQUENT DAY NOTE - PHYSICAL EXAMINATION
No LE edema.  No calf tenderness.  No palpable cords.  2+ pulses bilaterally.
No LE edema.  No calf tenderness.  No palpable cords.  2+ pulses bilaterally.

## 2024-10-08 NOTE — ED CDU PROVIDER SUBSEQUENT DAY NOTE - HISTORY
Patient is a 59-year-old female with past medical history of HIV on HAART, hypertension, history of partial lobectomy for stage I lung cancer, history of asthma (no history of intubations in the past) presents with cough, wheezing or shortness of breath.  Patient reports symptoms similar to asthma exacerbation.  Patient denies any fevers, chest pain, hemoptysis, syncope.  In the emergency department, did not chest x-ray showed clear lungs.  Patient was placed in the observation unit for steroids and DuoNebs. Pulm consulted on patient and recommended obtaining CT chest noncontrast, check cd4 count and viral load, continue with duonebs q 6 hours, solu-medrol, advair and discontinue spiriva, start pantoprazole 40mg BID. CT was unremarkable for acute findings.    Interval history: Patient continues to rest comfortably in CDU. Vitals have been stable throughout CDU stay. Patient has no new complaints or concerns upon re-evaluation. Patient still pending continued monitoring and treatment with reassessment in the am.
Patient is a 59-year-old female with past medical history of HIV on HAART, hypertension, history of partial lobectomy for stage I lung cancer, history of asthma (no history of intubations in the past) presents with cough, wheezing or shortness of breath.  Patient reports symptoms similar to asthma exacerbation.  Patient denies any fevers, chest pain, hemoptysis, syncope.  In the emergency department, did not chest x-ray showed clear lungs.  Patient was placed in the observation unit for steroids and DuoNebs.  At this time, patient reports improvement of symptoms.

## 2024-10-08 NOTE — ED CDU PROVIDER DISPOSITION NOTE - PATIENT PORTAL LINK FT
You can access the FollowMyHealth Patient Portal offered by [facility] by registering at the following website: [email or web address]. By joining [employer]’s FollowMyHealth portal, you will also be able to view your health information using other applications (apps) compatible with our system.

## 2024-10-08 NOTE — ED CDU PROVIDER SUBSEQUENT DAY NOTE - NSICDXFAMILYHX_GEN_ALL_CORE_FT
FAMILY HISTORY:  Grandparent  Still living? Unknown  Family history of diabetes mellitus, Age at diagnosis: Age Unknown  Family history of hypertension, Age at diagnosis: Age Unknown    Aunt  Still living? Unknown  Family history of breast cancer, Age at diagnosis: Age Unknown  Family history of cerebrovascular accident (CVA), Age at diagnosis: Age Unknown  Family history of myocardial infarction, Age at diagnosis: Age Unknown    Uncle  Still living? Unknown  Family history of cancer, Age at diagnosis: Age Unknown  Family history of diabetes mellitus, Age at diagnosis: Age Unknown  Family history of cancer, Age at diagnosis: Age Unknown  Family history of diabetes mellitus, Age at diagnosis: Age Unknown    
FAMILY HISTORY:  Grandparent  Still living? Unknown  Family history of diabetes mellitus, Age at diagnosis: Age Unknown  Family history of hypertension, Age at diagnosis: Age Unknown    Aunt  Still living? Unknown  Family history of breast cancer, Age at diagnosis: Age Unknown  Family history of cerebrovascular accident (CVA), Age at diagnosis: Age Unknown  Family history of myocardial infarction, Age at diagnosis: Age Unknown    Uncle  Still living? Unknown  Family history of cancer, Age at diagnosis: Age Unknown  Family history of diabetes mellitus, Age at diagnosis: Age Unknown  Family history of cancer, Age at diagnosis: Age Unknown  Family history of diabetes mellitus, Age at diagnosis: Age Unknown

## 2024-10-08 NOTE — ED CDU PROVIDER SUBSEQUENT DAY NOTE - NSICDXPASTMEDICALHX_GEN_ALL_CORE_FT
PAST MEDICAL HISTORY:  Asthma     Benign hypertension     Cigarette smoker quit 2019    GERD (gastroesophageal reflux disease)     H/O abdominal hysterectomy     HIV disease     HPV (human papilloma virus) infection     Knee pain, bilateral     Osteoarthritis     Pruritus (chronic)    Vitamin D deficiency     
PAST MEDICAL HISTORY:  Asthma     Benign hypertension     Cigarette smoker quit 2019    GERD (gastroesophageal reflux disease)     H/O abdominal hysterectomy     HIV disease     HPV (human papilloma virus) infection     Knee pain, bilateral     Osteoarthritis     Pruritus (chronic)    Vitamin D deficiency

## 2024-10-08 NOTE — ED CDU PROVIDER DISPOSITION NOTE - CLINICAL COURSE
58 y/o female with pmhx of HIV, asthma presents to ED c/o cough, SOB and wheezing x 2 days. Classmates sick at school. Pt sent to cdu for asthma exacerbation, solu-medrol, albuterol. pt c/o persistent wheezing and SOB, sating well on RA. NAD. speaking in full sentences, plan to consult pulmonary today.
59-year-old female with past medical history of HIV on HAART, hypertension, history of partial lobectomy for stage I lung cancer, history of asthma (no history of intubations in the past) presents with cough, wheezing or shortness of breath.  Patient reports symptoms similar to asthma exacerbation.  Patient denies any fevers, chest pain, hemoptysis, syncope.  In the emergency department, did not chest x-ray showed clear lungs.  Patient was placed in the observation unit for steroids and DuoNebs. Pulm consulted on patient and recommended obtaining CT chest noncontrast, check cd4 count and viral load, continue with duonebs q 6 hours, solu-medrol, advair and discontinue spiriva, start pantoprazole 40mg BID. CT was unremarkable for acute findings.  This AM pt reports she is feeling much better, started on azithromycin, will dc home on prednisone, advair.

## 2024-10-08 NOTE — ED CDU PROVIDER SUBSEQUENT DAY NOTE - CLINICAL SUMMARY MEDICAL DECISION MAKING FREE TEXT BOX
Patient is a 59-year-old female with past medical history of HIV on HAART, hypertension, history of partial lobectomy for stage I lung cancer, history of asthma (no history of intubations in the past) presents with cough, wheezing or shortness of breath.  Patient reports symptoms similar to asthma exacerbation.  Patient denies any fevers, chest pain, hemoptysis, syncope.  In the emergency department, did not chest x-ray showed clear lungs.  Patient was placed in the observation unit for steroids and DuoNebs.  At this time, patient reports improvement of symptoms.  Plan to continue nebulizer treatments and steroids. Pulm consulted on patient and recommended obtaining CT chest noncontrast, check cd4 count and viral load, continue with duonebs q 6 hours, solu-medrol, advair and discontinue spiriva, start pantoprazole 40mg BID. CT was unremarkable for acute findings. Patient still pending continued monitoring and treatment with reassessment in the am.
Patient is a 59-year-old female with past medical history of HIV on HAART, hypertension, history of partial lobectomy for stage I lung cancer, history of asthma (no history of intubations in the past) presents with cough, wheezing or shortness of breath.  Patient reports symptoms similar to asthma exacerbation.  Patient denies any fevers, chest pain, hemoptysis, syncope.  In the emergency department, did not chest x-ray showed clear lungs.  Patient was placed in the observation unit for steroids and DuoNebs.  At this time, patient reports improvement of symptoms.  Plan to continue nebulizer treatments and steroids.

## 2024-10-08 NOTE — ED CDU PROVIDER SUBSEQUENT DAY NOTE - ATTENDING APP SHARED VISIT CONTRIBUTION OF CARE
Patient well-appearing no acute distress able to ambulate without shortness of breath HEENT unremarkable heart sounds normal lungs clear abdomen soft nontender patient will be okay for discharge after morning medications.
60 yo F with h/o HIV on HAART, stage 1 lung cancer s/p lobectomy, asthma who presented to the ED with cough, wheezing and shortness of breath. Is being treated for an asthma exacerbation with nebs and steroids. This morning pt reports some improvement but is still not feeling back to baseline. Has also had an episode of post-tussive emesis this morning. Pt still with some expiratory wheezing. Will continue to observe and treat for asthma exacerbation

## 2024-10-08 NOTE — CHART NOTE - NSCHARTNOTEFT_GEN_A_CORE
Patient was discharged from CDU before could be seen for follow-up.  Chest CT reviewed, concern for left hilar OMEGA, team spoke to Radiology resident who reports no OMEGA, enlargement part of pulmonary artery.  Will plan to review imaging further with Chief of Radiology and if any concerning findings will contact patient.  Patient just had Chest CT with [facility] 06/2024 and was told was fine. Has next f/u Chest CT 12/2024 at [facility].

## 2024-10-08 NOTE — PROGRESS NOTE ADULT - SUBJECTIVE AND OBJECTIVE BOX
Interval Events:  Improved wheezing. Patient received a dose of Azithromycin this AM.    REVIEW OF SYSTEMS:  Negative except as documented above.      OBJECTIVE:  ICU Vital Signs Last 24 Hrs  T(C): 36.7 (08 Oct 2024 05:35), Max: 36.7 (07 Oct 2024 14:00)  T(F): 98 (08 Oct 2024 05:35), Max: 98 (07 Oct 2024 14:00)  HR: 81 (08 Oct 2024 05:35) (80 - 95)  BP: 152/74 (08 Oct 2024 05:35) (119/66 - 159/83)  BP(mean): --  ABP: --  ABP(mean): --  RR: 16 (08 Oct 2024 05:35) (16 - 18)  SpO2: 98% (08 Oct 2024 05:35) (96% - 100%)    O2 Parameters below as of 08 Oct 2024 05:35  Patient On (Oxygen Delivery Method): room air              CAPILLARY BLOOD GLUCOSE          PHYSICAL EXAM:  General: Patient in NAD  HEENT: NCAT, EOMI, no oral lesions  CV: S1+S2, no m/r/g appreciated   Lungs: No respiratory distress, CTAB  Abd: Soft, nontender, no guarding, no rebound tenderness, + bowel sounds   : No suprapubic tenderness  Neuro: Alert and oriented to time, place and person. No focal deficits noted.   Ext: No cyanosis, no edema  Skin: No rash, no phlebitis      HOSPITAL MEDICATIONS:  MEDICATIONS  (STANDING):  albuterol/ipratropium for Nebulization 3 milliLiter(s) Nebulizer every 6 hours  amLODIPine   Tablet 10 milliGRAM(s) Oral daily  aspirin  chewable 81 milliGRAM(s) Oral daily  cyclobenzaprine 10 milliGRAM(s) Oral at bedtime  efavirenz 600/emtricitabine 200/tenofovir 300mg 1 Tablet(s) Oral at bedtime  escitalopram 10 milliGRAM(s) Oral daily  fluticasone propionate/ salmeterol 100-50 MICROgram(s) Diskus 1 Dose(s) Inhalation two times a day  methylPREDNISolone sodium succinate Injectable 40 milliGRAM(s) IV Push every 8 hours  metoprolol succinate ER 50 milliGRAM(s) Oral daily  pantoprazole   Suspension 40 milliGRAM(s) Oral two times a day  rosuvastatin 10 milliGRAM(s) Oral at bedtime    MEDICATIONS  (PRN):  benzonatate 100 milliGRAM(s) Oral every 8 hours PRN Cough  ondansetron Injectable 4 milliGRAM(s) IV Push every 6 hours PRN Nausea and/or Vomiting      LABS:                        12.8   9.87  )-----------( 452      ( 06 Oct 2024 14:00 )             39.5     Hgb Trend: 12.8<--  10    138  |  103  |  15  ----------------------------<  129[H]  3.4[L]   |  22  |  0.79    Ca    9.2      06 Oct 2024 14:00  Phos  3.1     10  Mg     3.20     10    TPro  7.7  /  Alb  4.0  /  TBili  0.2  /  DBili  x   /  AST  16  /  ALT  22  /  AlkPhos  192[H]  10    Creatinine Trend: 0.79<--    Urinalysis Basic - ( 07 Oct 2024 09:32 )    Color: Yellow / Appearance: Cloudy / S.021 / pH: x  Gluc: x / Ketone: Trace mg/dL  / Bili: Negative / Urobili: 0.2 mg/dL   Blood: x / Protein: 100 mg/dL / Nitrite: Negative   Leuk Esterase: Negative / RBC: 2 /HPF / WBC 2 /HPF   Sq Epi: x / Non Sq Epi: 8 /HPF / Bacteria: Negative /HPF        Venous Blood Gas:  10-06 @ 14:00  7.45/34/183/24/99.0  VBG Lactate: 1.2      MICROBIOLOGY:     Urinalysis with Rflx Culture (collected 07 Oct 2024 09:32)        RADIOLOGY:  [x] Reviewed and interpreted by me   normal...

## 2024-10-22 ENCOUNTER — EMERGENCY (EMERGENCY)
Facility: HOSPITAL | Age: 59
LOS: 1 days | Discharge: HOME | End: 2024-10-22
Attending: STUDENT IN AN ORGANIZED HEALTH CARE EDUCATION/TRAINING PROGRAM | Admitting: EMERGENCY MEDICINE
Payer: MEDICAID

## 2024-10-22 VITALS
RESPIRATION RATE: 20 BRPM | HEIGHT: 67 IN | WEIGHT: 199.96 LBS | TEMPERATURE: 98 F | HEART RATE: 80 BPM | OXYGEN SATURATION: 96 % | SYSTOLIC BLOOD PRESSURE: 170 MMHG | DIASTOLIC BLOOD PRESSURE: 97 MMHG

## 2024-10-22 DIAGNOSIS — Z90.710 ACQUIRED ABSENCE OF BOTH CERVIX AND UTERUS: Chronic | ICD-10-CM

## 2024-10-22 DIAGNOSIS — Z90.2 ACQUIRED ABSENCE OF LUNG [PART OF]: Chronic | ICD-10-CM

## 2024-10-22 LAB
ADD ON TEST-SPECIMEN IN LAB: SIGNIFICANT CHANGE UP
ALBUMIN SERPL ELPH-MCNC: 3.9 G/DL — SIGNIFICANT CHANGE UP (ref 3.3–5)
ALP SERPL-CCNC: 159 U/L — HIGH (ref 40–120)
ALT FLD-CCNC: 22 U/L — SIGNIFICANT CHANGE UP (ref 4–33)
ANION GAP SERPL CALC-SCNC: 15 MMOL/L — HIGH (ref 7–14)
AST SERPL-CCNC: 26 U/L — SIGNIFICANT CHANGE UP (ref 4–32)
BASOPHILS # BLD AUTO: 0.07 K/UL — SIGNIFICANT CHANGE UP (ref 0–0.2)
BASOPHILS NFR BLD AUTO: 0.7 % — SIGNIFICANT CHANGE UP (ref 0–2)
BILIRUB SERPL-MCNC: 0.2 MG/DL — SIGNIFICANT CHANGE UP (ref 0.2–1.2)
BUN SERPL-MCNC: 15 MG/DL — SIGNIFICANT CHANGE UP (ref 7–23)
CALCIUM SERPL-MCNC: 9.5 MG/DL — SIGNIFICANT CHANGE UP (ref 8.4–10.5)
CHLORIDE SERPL-SCNC: 105 MMOL/L — SIGNIFICANT CHANGE UP (ref 98–107)
CO2 SERPL-SCNC: 21 MMOL/L — LOW (ref 22–31)
CREAT SERPL-MCNC: 1.03 MG/DL — SIGNIFICANT CHANGE UP (ref 0.5–1.3)
EGFR: 63 ML/MIN/1.73M2 — SIGNIFICANT CHANGE UP
EOSINOPHIL # BLD AUTO: 0.19 K/UL — SIGNIFICANT CHANGE UP (ref 0–0.5)
EOSINOPHIL NFR BLD AUTO: 1.9 % — SIGNIFICANT CHANGE UP (ref 0–6)
FLUAV AG NPH QL: SIGNIFICANT CHANGE UP
FLUBV AG NPH QL: SIGNIFICANT CHANGE UP
GLUCOSE SERPL-MCNC: 100 MG/DL — HIGH (ref 70–99)
HCT VFR BLD CALC: 42.1 % — SIGNIFICANT CHANGE UP (ref 34.5–45)
HGB BLD-MCNC: 13.5 G/DL — SIGNIFICANT CHANGE UP (ref 11.5–15.5)
IANC: 6.83 K/UL — SIGNIFICANT CHANGE UP (ref 1.8–7.4)
IMM GRANULOCYTES NFR BLD AUTO: 0.3 % — SIGNIFICANT CHANGE UP (ref 0–0.9)
LYMPHOCYTES # BLD AUTO: 2.44 K/UL — SIGNIFICANT CHANGE UP (ref 1–3.3)
LYMPHOCYTES # BLD AUTO: 24.1 % — SIGNIFICANT CHANGE UP (ref 13–44)
MCHC RBC-ENTMCNC: 28.4 PG — SIGNIFICANT CHANGE UP (ref 27–34)
MCHC RBC-ENTMCNC: 32.1 GM/DL — SIGNIFICANT CHANGE UP (ref 32–36)
MCV RBC AUTO: 88.4 FL — SIGNIFICANT CHANGE UP (ref 80–100)
MONOCYTES # BLD AUTO: 0.58 K/UL — SIGNIFICANT CHANGE UP (ref 0–0.9)
MONOCYTES NFR BLD AUTO: 5.7 % — SIGNIFICANT CHANGE UP (ref 2–14)
NEUTROPHILS # BLD AUTO: 6.83 K/UL — SIGNIFICANT CHANGE UP (ref 1.8–7.4)
NEUTROPHILS NFR BLD AUTO: 67.3 % — SIGNIFICANT CHANGE UP (ref 43–77)
NRBC # BLD: 0 /100 WBCS — SIGNIFICANT CHANGE UP (ref 0–0)
NRBC # FLD: 0 K/UL — SIGNIFICANT CHANGE UP (ref 0–0)
PLATELET # BLD AUTO: 418 K/UL — HIGH (ref 150–400)
POTASSIUM SERPL-MCNC: 4.3 MMOL/L — SIGNIFICANT CHANGE UP (ref 3.5–5.3)
POTASSIUM SERPL-SCNC: 4.3 MMOL/L — SIGNIFICANT CHANGE UP (ref 3.5–5.3)
PROT SERPL-MCNC: 7.7 G/DL — SIGNIFICANT CHANGE UP (ref 6–8.3)
RBC # BLD: 4.76 M/UL — SIGNIFICANT CHANGE UP (ref 3.8–5.2)
RBC # FLD: 15 % — HIGH (ref 10.3–14.5)
RSV RNA NPH QL NAA+NON-PROBE: SIGNIFICANT CHANGE UP
SARS-COV-2 RNA SPEC QL NAA+PROBE: SIGNIFICANT CHANGE UP
SODIUM SERPL-SCNC: 141 MMOL/L — SIGNIFICANT CHANGE UP (ref 135–145)
TROPONIN T, HIGH SENSITIVITY RESULT: 7 NG/L — SIGNIFICANT CHANGE UP
WBC # BLD: 10.14 K/UL — SIGNIFICANT CHANGE UP (ref 3.8–10.5)
WBC # FLD AUTO: 10.14 K/UL — SIGNIFICANT CHANGE UP (ref 3.8–10.5)

## 2024-10-22 PROCEDURE — 93010 ELECTROCARDIOGRAM REPORT: CPT

## 2024-10-22 PROCEDURE — 99223 1ST HOSP IP/OBS HIGH 75: CPT

## 2024-10-22 PROCEDURE — 71046 X-RAY EXAM CHEST 2 VIEWS: CPT | Mod: 26

## 2024-10-22 RX ORDER — IPRATROPIUM BROMIDE AND ALBUTEROL SULFATE .5; 3 MG/3ML; MG/3ML
3 SOLUTION RESPIRATORY (INHALATION)
Refills: 0 | Status: COMPLETED | OUTPATIENT
Start: 2024-10-22 | End: 2024-10-22

## 2024-10-22 RX ORDER — KETOROLAC TROMETHAMINE 10 MG/1
15 TABLET, FILM COATED ORAL ONCE
Refills: 0 | Status: DISCONTINUED | OUTPATIENT
Start: 2024-10-22 | End: 2024-10-22

## 2024-10-22 RX ORDER — TIOTROPIUM BROMIDE INHALATION SPRAY 3.12 UG/1
2 SPRAY, METERED RESPIRATORY (INHALATION) DAILY
Refills: 0 | Status: ACTIVE | OUTPATIENT
Start: 2024-10-22 | End: 2025-09-20

## 2024-10-22 RX ORDER — SODIUM CHLORIDE 0.9 % (FLUSH) 0.9 %
1000 SYRINGE (ML) INJECTION ONCE
Refills: 0 | Status: COMPLETED | OUTPATIENT
Start: 2024-10-22 | End: 2024-10-22

## 2024-10-22 RX ORDER — ESCITALOPRAM OXALATE 10 MG
5 TABLET ORAL DAILY
Refills: 0 | Status: ACTIVE | OUTPATIENT
Start: 2024-10-22 | End: 2025-09-20

## 2024-10-22 RX ORDER — AMLODIPINE BESYLATE 5 MG
10 TABLET ORAL DAILY
Refills: 0 | Status: ACTIVE | OUTPATIENT
Start: 2024-10-22 | End: 2025-09-20

## 2024-10-22 RX ORDER — METOPROLOL TARTRATE 50 MG
50 TABLET ORAL DAILY
Refills: 0 | Status: ACTIVE | OUTPATIENT
Start: 2024-10-22 | End: 2025-09-20

## 2024-10-22 RX ORDER — IPRATROPIUM BROMIDE AND ALBUTEROL SULFATE .5; 3 MG/3ML; MG/3ML
3 SOLUTION RESPIRATORY (INHALATION) EVERY 6 HOURS
Refills: 0 | Status: ACTIVE | OUTPATIENT
Start: 2024-10-22 | End: 2025-09-20

## 2024-10-22 RX ORDER — PREDNISONE 5 MG/1
40 TABLET ORAL DAILY
Refills: 0 | Status: ACTIVE | OUTPATIENT
Start: 2024-10-22 | End: 2024-10-24

## 2024-10-22 RX ORDER — EFAVIRENZ, EMTRICITABINE AND TENOFOVIR DISOPROXIL FUMARATE 600; 200; 300 MG/1; MG/1; MG/1
1 TABLET, FILM COATED ORAL DAILY
Refills: 0 | Status: ACTIVE | OUTPATIENT
Start: 2024-10-22 | End: 2025-09-20

## 2024-10-22 RX ADMIN — IPRATROPIUM BROMIDE AND ALBUTEROL SULFATE 3 MILLILITER(S): .5; 3 SOLUTION RESPIRATORY (INHALATION) at 22:18

## 2024-10-22 RX ADMIN — IPRATROPIUM BROMIDE AND ALBUTEROL SULFATE 3 MILLILITER(S): .5; 3 SOLUTION RESPIRATORY (INHALATION) at 12:22

## 2024-10-22 RX ADMIN — Medication 1000 MILLILITER(S): at 13:22

## 2024-10-22 RX ADMIN — Medication 5 MILLIGRAM(S): at 17:09

## 2024-10-22 RX ADMIN — Medication 6 MILLIGRAM(S): at 12:22

## 2024-10-22 RX ADMIN — IPRATROPIUM BROMIDE AND ALBUTEROL SULFATE 3 MILLILITER(S): .5; 3 SOLUTION RESPIRATORY (INHALATION) at 12:43

## 2024-10-22 RX ADMIN — Medication 1000 MILLILITER(S): at 12:22

## 2024-10-22 RX ADMIN — IPRATROPIUM BROMIDE AND ALBUTEROL SULFATE 3 MILLILITER(S): .5; 3 SOLUTION RESPIRATORY (INHALATION) at 13:05

## 2024-10-22 RX ADMIN — Medication 10 MILLIGRAM(S): at 17:09

## 2024-10-22 RX ADMIN — KETOROLAC TROMETHAMINE 15 MILLIGRAM(S): 10 TABLET, FILM COATED ORAL at 12:23

## 2024-10-22 RX ADMIN — PREDNISONE 40 MILLIGRAM(S): 5 TABLET ORAL at 17:10

## 2024-10-22 RX ADMIN — KETOROLAC TROMETHAMINE 15 MILLIGRAM(S): 10 TABLET, FILM COATED ORAL at 12:53

## 2024-10-22 NOTE — ED CDU PROVIDER INITIAL DAY NOTE - ATTENDING APP SHARED VISIT CONTRIBUTION OF CARE
CDU subsequent day note by Dr. Gamaliel Calixto, CDU attending for this morning:  Signout received from KRISTYN Angeles. I personally saw and evaluated this patient after receiving signout. Patient is well-appearing, in no acute distress, speaking full clear sentences. Reports feeling much improved after steroids, nebs q2-4h.    Upon eval this morning she reports feeling well and is amenable to going home. We will obtain ambulatory SpO2 to see if she becomes hypoxic with exertion; if not, agree with plan to DC home w/ 4 more days steroids and f/u Pulmonology.

## 2024-10-22 NOTE — ED ADULT TRIAGE NOTE - GLASGOW COMA SCALE: BEST MOTOR RESPONSE, MLM
(M6) obeys commands Cephalexin Pregnancy And Lactation Text: This medication is Pregnancy Category B and considered safe during pregnancy.  It is also excreted in breast milk but can be used safely for shorter doses.

## 2024-10-22 NOTE — ED ADULT TRIAGE NOTE - CHIEF COMPLAINT QUOTE
c/o SO and c/p since this morning reports feels like asthma exacerbation, Expiratory wheezing noted in lungs B/l received 1 combi neb by EMS PTA, phx of HTN, lung Ca in remission. Able to speak in full and complete sentences.

## 2024-10-22 NOTE — ED PROVIDER NOTE - NSFOLLOWUPINSTRUCTIONS_ED_ALL_ED_FT
You were seen in the Emergency Department for shortness of breath.  Your evaluation today shows your symptoms are not from any dangerous or life-threatening causes.        1) Continue all previously prescribed medications as directed.    2) Follow up with your primary care physician - take copies of your results.    3) Return to the Emergency Department for worsening or persistent symptoms, and/or ANY NEW OR CONCERNING SYMPTOMS.

## 2024-10-22 NOTE — ED ADULT NURSE NOTE - OBJECTIVE STATEMENT
Received patient in Intake 15 c/o SOB, wheezing, Patient is A&OX4, ambulatory, airway patent, breathing unlabored and even, radial pulses palpable, abdomen soft, nontender. Labs obtained, 22G IV placed on left hand, medications given as ordered, IV fluid bolus infusing, awaiting X-ray. Side rails up and safety maintained. Received patient in Intake 15 c/o SOB, chest pain, congestion, chills, weakness. PMHX HIV, HTN, HLD, Lung Cancer, Asthma. Patient is A&OX4, ambulatory, airway patent, breathing unlabored and even, radial pulses palpable, abdomen soft, nontender. Labs obtained, 22G IV placed on left hand, medications given as ordered, IV fluid bolus infusing, awaiting X-ray. Side rails up and safety maintained. Received patient in Intake 15 c/o SOB, chest pain, congestion, chills, weakness. PMHX HIV, HTN, HLD, Lung Cancer, Asthma. Patient is A&OX4, ambulatory, airway patent, breathing unlabored and even, radial pulses palpable, wheezing, abdomen soft, nontender. Labs obtained, 22G IV placed on left hand, medications given as ordered, IV fluid bolus infusing, awaiting X-ray. Side rails up and safety maintained.

## 2024-10-22 NOTE — ED PROVIDER NOTE - CLINICAL SUMMARY MEDICAL DECISION MAKING FREE TEXT BOX
59-year-old female, history of HIV history of HIV compliant with medication, hypertension, hyperlipidemia, lung CA in remission x 2 years s/p resection, asthma, presents to ED with c/o SOB, generalized weakness, chest pain, congestion, wheezing, chills since yesterday.  Used nebulizer at home without relief.  Last saw pulmonologist 1 month ago.  Was seen at ED 2 weeks ago for the same.  Reports CP only present with deep breathing and positions.  Also reports few episodes of loose stools.  Denies fevers, nausea, vomiting.  No LE edema/pain, prolonged periods of immobilization, smoking, hx of PE/DVT.  VSS.  Patient well appearing, in no acute distress, heart RRR, lungs with diffuse wheezes throughout, speaking in full sentences, abd soft and non-tender, no LE edema BL, reproducible chest wall tenderness to palpation diffusely.  Concern for infectious process-viral vs pna, asthma exacerbation.  Wells score 0 for PE.  Will obtain basic labs, chest xray, duonebs, steroids, IVF. 59-year-old female, history of HIV history of HIV compliant with medication, hypertension, hyperlipidemia, lung CA in remission x 2 years s/p resection, asthma, presents to ED with c/o SOB, generalized weakness, chest pain, congestion, wheezing, chills since yesterday.  Used nebulizer at home without relief.  Last saw pulmonologist 1 month ago.  Was seen at ED 2 weeks ago for the same.  Reports CP only present with deep breathing and positions.  Also reports few episodes of loose stools.  Denies fevers, nausea, vomiting.  No LE edema/pain, prolonged periods of immobilization, smoking, hx of PE/DVT.  VSS.  Patient well appearing, in no acute distress, heart RRR, lungs with diffuse wheezes throughout, speaking in full sentences, abd soft and non-tender, no LE edema BL, reproducible chest wall tenderness to palpation diffusely.  Concern for infectious process-viral vs pna, asthma exacerbation, costochondritis.  Wells score 0 for PE.  Will obtain basic labs, chest xray, duonebs, steroids, IVF.

## 2024-10-22 NOTE — ED CDU PROVIDER INITIAL DAY NOTE - WET READ LAUNCH FT
Oculoplastic Surgeon Procedure Text (D): After obtaining clear surgical margins the patient was sent to oculoplastics for surgical repair.  The patient understands they will receive post-surgical care and follow-up from the referring physician's office. There are no Wet Read(s) to document.

## 2024-10-22 NOTE — ED PROVIDER NOTE - SECONDARY DIAGNOSIS.
Medical records obtained from [address] [place] [address],[address]. Generalized weakness

## 2024-10-22 NOTE — ED CDU PROVIDER INITIAL DAY NOTE - OBJECTIVE STATEMENT
59-year-old female, history of HIV history of HIV compliant with medication, hypertension, hyperlipidemia, lung CA in remission x 2 years s/p resection, asthma, presents to ED with c/o SOB, generalized weakness, chest pain, congestion, wheezing, chills since yesterday.  Used nebulizer at home without relief.  Last saw pulmonologist 1 month ago.  Was seen at ED 2 weeks ago for the same.  Reports CP only present with deep breathing and positions.  Also reports few episodes of loose stools.  Denies fevers, nausea, vomiting.  No LE edema/pain, prolonged periods of immobilization, smoking, hx of PE/DVT.      CDU KRISTYN [place]: Agree with above. On exam patient with mild diffuse wheezing despite 3 DuoNeb's treatments/ IV steroids, peak flow 250.  Patient states she was in CDU 10/6/2024 and was seen by pulmonology was cleared for discharge but after visiting her daughter who smokes she feels that her asthma was triggered again.  Patient states while she was in CDU last Spiriva helped her.  Patient ordered for nebulizer treatments, Spiriva, and prednisone.  Will continue to monitor

## 2024-10-22 NOTE — ED ADULT TRIAGE NOTE - AS TEMP SITE
oral Island Pedicle Flap-Requiring Vessel Identification Text: The defect edges were debeveled with a #15 scalpel blade.  Given the location of the defect, shape of the defect and the proximity to free margins an island pedicle advancement flap was deemed most appropriate.  Using a sterile surgical marker, an appropriate advancement flap was drawn, based on the axial vessel mentioned above, incorporating the defect, outlining the appropriate donor tissue and placing the expected incisions within the relaxed skin tension lines where possible.    The area thus outlined was incised deep to adipose tissue with a #15 scalpel blade.  The skin margins were undermined to an appropriate distance in all directions around the primary defect and laterally outward around the island pedicle utilizing iris scissors.  There was minimal undermining beneath the pedicle flap.

## 2024-10-22 NOTE — ED ADULT NURSE NOTE - NSFALLUNIVINTERV_ED_ALL_ED
Bed/Stretcher in lowest position, wheels locked, appropriate side rails in place/Call bell, personal items and telephone in reach/Instruct patient to call for assistance before getting out of bed/chair/stretcher/Non-slip footwear applied when patient is off stretcher/[place] to call system/Physically safe environment - no spills, clutter or unnecessary equipment/Purposeful proactive rounding/Room/bathroom lighting operational, light cord in reach

## 2024-10-23 VITALS
SYSTOLIC BLOOD PRESSURE: 148 MMHG | TEMPERATURE: 98 F | RESPIRATION RATE: 16 BRPM | HEART RATE: 89 BPM | DIASTOLIC BLOOD PRESSURE: 75 MMHG | OXYGEN SATURATION: 97 %

## 2024-10-23 LAB — 24R-OH-CALCIDIOL SERPL-MCNC: 53.2 NG/ML — SIGNIFICANT CHANGE UP (ref 30–80)

## 2024-10-23 PROCEDURE — 99239 HOSP IP/OBS DSCHRG MGMT >30: CPT

## 2024-10-23 RX ORDER — PREDNISONE 5 MG/1
2 TABLET ORAL
Qty: 6 | Refills: 0
Start: 2024-10-23 | End: 2024-10-25

## 2024-10-23 RX ORDER — ALBUTEROL 90 MCG
3 AEROSOL (GRAM) INHALATION
Qty: 9 | Refills: 0
Start: 2024-10-23 | End: 2024-10-29

## 2024-10-23 RX ORDER — DIPHENHYDRAMINE HCL 12.5MG/5ML
25 LIQUID (ML) ORAL ONCE
Refills: 0 | Status: COMPLETED | OUTPATIENT
Start: 2024-10-23 | End: 2024-10-23

## 2024-10-23 RX ORDER — ALBUTEROL 90 MCG
1 AEROSOL (GRAM) INHALATION ONCE
Refills: 0 | Status: ACTIVE | OUTPATIENT
Start: 2024-10-23

## 2024-10-23 RX ORDER — FLUTICASONE PROPION/SALMETEROL 100-50 MCG
1 BLISTER, WITH INHALATION DEVICE INHALATION
Qty: 1 | Refills: 0
Start: 2024-10-23 | End: 2024-10-29

## 2024-10-23 RX ADMIN — Medication 10 MILLIGRAM(S): at 06:23

## 2024-10-23 RX ADMIN — Medication 25 MILLIGRAM(S): at 00:30

## 2024-10-23 RX ADMIN — Medication 50 MILLIGRAM(S): at 06:23

## 2024-10-23 RX ADMIN — EFAVIRENZ, EMTRICITABINE AND TENOFOVIR DISOPROXIL FUMARATE 1 TABLET(S): 600; 200; 300 TABLET, FILM COATED ORAL at 09:24

## 2024-10-23 RX ADMIN — PREDNISONE 40 MILLIGRAM(S): 5 TABLET ORAL at 06:23

## 2024-10-23 NOTE — ED CDU PROVIDER SUBSEQUENT DAY NOTE - ATTENDING APP SHARED VISIT CONTRIBUTION OF CARE
I performed a face-to-face evaluation of the patient and performed a history and physical examination. I agree with the history and physical examination. I personally saw the patient with the PA and performed a substantive portion of the visit including all aspects of the medical decision making.  No resp distress, lungs clear, pt feels comfortable with dc home

## 2024-10-23 NOTE — ED CDU PROVIDER DISPOSITION NOTE - CLINICAL COURSE
59-year-old female, history of HIV history of HIV compliant with medication, hypertension, hyperlipidemia, lung CA in remission x 2 years s/p resection, asthma being treated in CDU for asthma exacerbation after being exposed to tobacco smoke at daughter's house. Pt has symptomatically improved since being in CDU. Patient ordered for nebulizer treatments, Spiriva, and prednisone. Patient reports she has significant improvement in symptoms, on repeat exam no longer with wheezing, patient is not tachypneic speaking in complete sentences.  Patient is requesting discharge.  Patient requesting refill of albuterol for nebulizer as well as Spiriva.  Prescription for prednisone also sent to patient's pharmacy.  Patient to follow-up with her primary care doctor and she will return to the ER with any worsening or concerning symptoms.  Discharge discussed with CDU attending. 59-year-old female, history of HIV history of HIV compliant with medication, hypertension, hyperlipidemia, lung CA in remission x 2 years s/p resection, asthma being treated in CDU for asthma exacerbation after being exposed to tobacco smoke at daughter's house. Pt has symptomatically improved since being in CDU. Patient ordered for nebulizer treatments, Spiriva, and prednisone. Patient reports she has significant improvement in symptoms, on repeat exam no longer with wheezing, patient is not tachypneic speaking in complete sentences.  Patient is requesting discharge.  Patient requesting refill of albuterol for nebulizer as well as Advair.  Prescription for prednisone also sent to patient's pharmacy.  Patient to follow-up with her primary care doctor and she will return to the ER with any worsening or concerning symptoms.  Discharge discussed with CDU attending.

## 2024-10-23 NOTE — ED CDU PROVIDER DISPOSITION NOTE - NSDCPRINTRESULTS_ED_ALL_ED
Patient requests all Lab, Cardiology, and Radiology Results on their Discharge Instructions
0 = swallows foods/liquids without difficulty

## 2024-10-23 NOTE — ED CDU PROVIDER SUBSEQUENT DAY NOTE - PROGRESS NOTE DETAILS
Patient signed out to me awaiting reassessment this morning.  Patient reports she has significant improvement in symptoms, on repeat exam no longer with wheezing, patient is not tachypneic speaking in complete sentences.  Patient is requesting discharge.  Patient requesting refill of albuterol for nebulizer as well as Spiriva.  Prescription for prednisone also sent to patient's pharmacy.  Patient to follow-up with her primary care doctor and she will return to the ER with any worsening or concerning symptoms.  Discharge discussed with CDU attending. Patient signed out to me awaiting reassessment this morning.  Patient reports she has significant improvement in symptoms, on repeat exam no longer with wheezing, patient is not tachypneic speaking in complete sentences.  Patient is requesting discharge.  Patient requesting refill of albuterol for nebulizer as well as Advair.  Prescription for prednisone also sent to patient's pharmacy.  Patient to follow-up with her primary care doctor and she will return to the ER with any worsening or concerning symptoms.  Discharge discussed with CDU attending. At time of discharge discussed with patient hemoglobin 6.8, she will discuss this with her primary care doctor and see if they recommend she begin metformin versus diet and exercise modification.

## 2024-10-23 NOTE — ED CDU PROVIDER DISPOSITION NOTE - PATIENT PORTAL LINK FT
You can access the FollowMyHealth Patient Portal offered by [facility] by registering at the following website: [email or web address]. By joining [employer]’s FollowMyHealth portal, you will also be able to view your health information using other applications (apps) compatible with our system.

## 2024-10-23 NOTE — ED CDU PROVIDER DISPOSITION NOTE - NSFOLLOWUPINSTRUCTIONS_ED_ALL_ED_FT
Follow-up with your primary care doctor within 1 week  Take prednisone 40 mg (2 tablets) once a day for 3 more days  Use albuterol inhaler 2 puffs or albuterol and nebulizer 1 vial every 4-6 hours as needed for cough or wheeze  Use Spiriva as previously prescribed  Return to the ER with any worsening or concerning symptoms fever/chills, shortness of breath, weakness, chest pain or any other concerns. Follow-up with your primary care doctor within 1 week  Take prednisone 40 mg (2 tablets) once a day for 3 more days  Use albuterol inhaler 2 puffs or albuterol and nebulizer 1 vial every 4-6 hours as needed for cough or wheeze  Use Advair as previously prescribed  Return to the ER with any worsening or concerning symptoms fever/chills, shortness of breath, weakness, chest pain or any other concerns.

## 2024-10-23 NOTE — ED CDU PROVIDER DISPOSITION NOTE - ATTENDING CONTRIBUTION TO CARE
I performed a face-to-face evaluation of the patient and performed a history and physical examination. I agree with the history and physical examination. I personally saw the patient with the PA and performed a substantive portion of the visit including all aspects of the medical decision making.  Non toxic appearing, no resp distress

## 2024-10-23 NOTE — ED CDU PROVIDER SUBSEQUENT DAY NOTE - HISTORY
59-year-old female, history of HIV history of HIV compliant with medication, hypertension, hyperlipidemia, lung CA in remission x 2 years s/p resection, asthma being treated in CDU for asthma exacerbation after being exposed to tobacco smoke at daughter's house. Pt has symptomatically improved since being in CDU. Patient ordered for nebulizer treatments, Spiriva, and prednisone.  Will continue to monitor

## 2024-11-16 NOTE — ED CDU PROVIDER SUBSEQUENT DAY NOTE - CARDIAC, MLM
"  Problem: Suicide Risk  Goal: Absence of Self-Harm  Outcome: Progressing   Goal Outcome Evaluation:    Plan of Care Reviewed With: patient             Patient awake and present in room at check-in. Pt was tearful stating \"I'm upset my sister had  to leave\". Rated anxiety and depression at 9/10. Pt told writer she will like to discharge but her mother does not want her at home until she stabilizes. Endorsed  SI,SIB without any current plan or intent,. Writer listened to patient and provided reassurance. Pt agreed to maintain safe behavior and talk to provider on Monday about discharge plans. Declined any PRNs offered.  Refused to eat dinner, pt stated she was not hungry    Around 1715, writer was called in patient's room. Pt told writer \"I want to die\". Pt was noted scratching old SIB scabs on left hand. Writer was able to verbally de-escalate pt and offered Zyprexa PRN.  When writer came in with medication, pt was lying on the floor with head on her mattress. Pt responded to writer and sat up when prompted. Pt agreed to take PRN Zyprexa 10 mg. Pt told writer \"I don't feel well\". Pt was encouraged to lay down and get some rest. Pt agreed to this. Vitals stable.  On call provider was notified on above events.    Compliant with taking scheduled medications at HS.  Bandaid was applied to scratches on right forearm    Continues on SIO 1:1 for self injury risk    Vitals:at 1600 Temp: 97.9  F (36.6  C)   BP: 128/84 Pulse: 82     SpO2: 98 % O2 Device: None (Room air)      Vitals at 1730 Temp: 97.3  F (36.3  C) Temp src: Temporal BP: 127/82 Pulse: 73   Resp: 18 SpO2: 100 % O2 Device: None (Room air)        " Normal rate, regular rhythm.  Heart sounds S1, S2.  No murmurs, rubs or gallops.

## 2024-11-18 ENCOUNTER — INPATIENT (INPATIENT)
Facility: HOSPITAL | Age: 59
LOS: 4 days | Discharge: HOME | End: 2024-11-23
Attending: INTERNAL MEDICINE | Admitting: INTERNAL MEDICINE
Payer: MEDICAID

## 2024-11-18 VITALS
OXYGEN SATURATION: 99 % | HEIGHT: 67 IN | HEART RATE: 84 BPM | SYSTOLIC BLOOD PRESSURE: 121 MMHG | WEIGHT: 199.96 LBS | RESPIRATION RATE: 24 BRPM | TEMPERATURE: 98 F | DIASTOLIC BLOOD PRESSURE: 89 MMHG

## 2024-11-18 DIAGNOSIS — Z90.2 ACQUIRED ABSENCE OF LUNG [PART OF]: Chronic | ICD-10-CM

## 2024-11-18 DIAGNOSIS — Z90.710 ACQUIRED ABSENCE OF BOTH CERVIX AND UTERUS: Chronic | ICD-10-CM

## 2024-11-18 LAB
ALBUMIN SERPL ELPH-MCNC: 4.4 G/DL — SIGNIFICANT CHANGE UP (ref 3.3–5)
ALP SERPL-CCNC: 190 U/L — HIGH (ref 40–120)
ALT FLD-CCNC: 25 U/L — SIGNIFICANT CHANGE UP (ref 4–33)
ANION GAP SERPL CALC-SCNC: 14 MMOL/L — SIGNIFICANT CHANGE UP (ref 7–14)
AST SERPL-CCNC: 26 U/L — SIGNIFICANT CHANGE UP (ref 4–32)
BASOPHILS # BLD AUTO: 0.06 K/UL — SIGNIFICANT CHANGE UP (ref 0–0.2)
BASOPHILS NFR BLD AUTO: 1.1 % — SIGNIFICANT CHANGE UP (ref 0–2)
BILIRUB SERPL-MCNC: 0.3 MG/DL — SIGNIFICANT CHANGE UP (ref 0.2–1.2)
BLOOD GAS VENOUS COMPREHENSIVE RESULT: SIGNIFICANT CHANGE UP
BUN SERPL-MCNC: 21 MG/DL — SIGNIFICANT CHANGE UP (ref 7–23)
CALCIUM SERPL-MCNC: 9.8 MG/DL — SIGNIFICANT CHANGE UP (ref 8.4–10.5)
CHLORIDE SERPL-SCNC: 100 MMOL/L — SIGNIFICANT CHANGE UP (ref 98–107)
CO2 SERPL-SCNC: 24 MMOL/L — SIGNIFICANT CHANGE UP (ref 22–31)
CREAT SERPL-MCNC: 1.14 MG/DL — SIGNIFICANT CHANGE UP (ref 0.5–1.3)
EGFR: 55 ML/MIN/1.73M2 — LOW
EGFR: 55 ML/MIN/1.73M2 — LOW
EOSINOPHIL # BLD AUTO: 0.2 K/UL — SIGNIFICANT CHANGE UP (ref 0–0.5)
EOSINOPHIL NFR BLD AUTO: 3.5 % — SIGNIFICANT CHANGE UP (ref 0–6)
FLUAV AG NPH QL: SIGNIFICANT CHANGE UP
FLUBV AG NPH QL: SIGNIFICANT CHANGE UP
GLUCOSE SERPL-MCNC: 132 MG/DL — HIGH (ref 70–99)
HCT VFR BLD CALC: 41.6 % — SIGNIFICANT CHANGE UP (ref 34.5–45)
HGB BLD-MCNC: 13.8 G/DL — SIGNIFICANT CHANGE UP (ref 11.5–15.5)
IANC: 2.8 K/UL — SIGNIFICANT CHANGE UP (ref 1.8–7.4)
IMM GRANULOCYTES NFR BLD AUTO: 0.2 % — SIGNIFICANT CHANGE UP (ref 0–0.9)
LYMPHOCYTES # BLD AUTO: 2.2 K/UL — SIGNIFICANT CHANGE UP (ref 1–3.3)
LYMPHOCYTES # BLD AUTO: 38.7 % — SIGNIFICANT CHANGE UP (ref 13–44)
MCHC RBC-ENTMCNC: 28.1 PG — SIGNIFICANT CHANGE UP (ref 27–34)
MCHC RBC-ENTMCNC: 33.2 G/DL — SIGNIFICANT CHANGE UP (ref 32–36)
MCV RBC AUTO: 84.7 FL — SIGNIFICANT CHANGE UP (ref 80–100)
MONOCYTES # BLD AUTO: 0.42 K/UL — SIGNIFICANT CHANGE UP (ref 0–0.9)
MONOCYTES NFR BLD AUTO: 7.4 % — SIGNIFICANT CHANGE UP (ref 2–14)
NEUTROPHILS # BLD AUTO: 2.8 K/UL — SIGNIFICANT CHANGE UP (ref 1.8–7.4)
NEUTROPHILS NFR BLD AUTO: 49.1 % — SIGNIFICANT CHANGE UP (ref 43–77)
NRBC # BLD AUTO: 0 K/UL — SIGNIFICANT CHANGE UP (ref 0–0)
NRBC # BLD: 0 /100 WBCS — SIGNIFICANT CHANGE UP (ref 0–0)
NRBC # FLD: 0 K/UL — SIGNIFICANT CHANGE UP (ref 0–0)
NRBC BLD-RTO: 0 /100 WBCS — SIGNIFICANT CHANGE UP (ref 0–0)
PLATELET # BLD AUTO: 486 K/UL — HIGH (ref 150–400)
POTASSIUM SERPL-MCNC: 3.4 MMOL/L — LOW (ref 3.5–5.3)
POTASSIUM SERPL-SCNC: 3.4 MMOL/L — LOW (ref 3.5–5.3)
PROT SERPL-MCNC: 8.2 G/DL — SIGNIFICANT CHANGE UP (ref 6–8.3)
RBC # BLD: 4.91 M/UL — SIGNIFICANT CHANGE UP (ref 3.8–5.2)
RBC # FLD: 14.5 % — SIGNIFICANT CHANGE UP (ref 10.3–14.5)
RSV RNA NPH QL NAA+NON-PROBE: SIGNIFICANT CHANGE UP
SARS-COV-2 RNA SPEC QL NAA+PROBE: SIGNIFICANT CHANGE UP
SODIUM SERPL-SCNC: 138 MMOL/L — SIGNIFICANT CHANGE UP (ref 135–145)
WBC # BLD: 5.69 K/UL — SIGNIFICANT CHANGE UP (ref 3.8–10.5)
WBC # FLD AUTO: 5.69 K/UL — SIGNIFICANT CHANGE UP (ref 3.8–10.5)

## 2024-11-18 PROCEDURE — 71045 X-RAY EXAM CHEST 1 VIEW: CPT | Mod: 26

## 2024-11-18 PROCEDURE — 99223 1ST HOSP IP/OBS HIGH 75: CPT | Mod: 25

## 2024-11-18 RX ORDER — EFAVIRENZ, EMTRICITABINE AND TENOFOVIR DISOPROXIL FUMARATE 600; 200; 300 MG/1; MG/1; MG/1
1 TABLET, FILM COATED ORAL DAILY
Refills: 0 | Status: DISCONTINUED | OUTPATIENT
Start: 2024-11-18 | End: 2024-11-23

## 2024-11-18 RX ORDER — IPRATROPIUM BROMIDE AND ALBUTEROL SULFATE .5; 2.5 MG/3ML; MG/3ML
3 SOLUTION RESPIRATORY (INHALATION) ONCE
Refills: 0 | Status: COMPLETED | OUTPATIENT
Start: 2024-11-18 | End: 2024-11-18

## 2024-11-18 RX ORDER — METHYLPREDNISOLONE ACETATE 80 MG/ML
90 INJECTION, SUSPENSION INTRA-ARTICULAR; INTRALESIONAL; INTRAMUSCULAR; SOFT TISSUE ONCE
Refills: 0 | Status: COMPLETED | OUTPATIENT
Start: 2024-11-18 | End: 2024-11-18

## 2024-11-18 RX ORDER — IPRATROPIUM BROMIDE AND ALBUTEROL SULFATE .5; 2.5 MG/3ML; MG/3ML
3 SOLUTION RESPIRATORY (INHALATION) EVERY 4 HOURS
Refills: 0 | Status: DISCONTINUED | OUTPATIENT
Start: 2024-11-18 | End: 2024-11-23

## 2024-11-18 RX ORDER — KETOROLAC TROMETHAMINE 30 MG/ML
15 INJECTION, SOLUTION INTRAMUSCULAR; INTRAVENOUS ONCE
Refills: 0 | Status: DISCONTINUED | OUTPATIENT
Start: 2024-11-18 | End: 2024-11-18

## 2024-11-18 RX ORDER — MAGNESIUM SULFATE 500 MG/ML
2 SYRINGE (ML) INJECTION ONCE
Refills: 0 | Status: COMPLETED | OUTPATIENT
Start: 2024-11-18 | End: 2024-11-18

## 2024-11-18 RX ORDER — AMLODIPINE BESYLATE 10 MG/1
10 TABLET ORAL DAILY
Refills: 0 | Status: DISCONTINUED | OUTPATIENT
Start: 2024-11-18 | End: 2024-11-23

## 2024-11-18 RX ORDER — METHYLPREDNISOLONE ACETATE 80 MG/ML
60 INJECTION, SUSPENSION INTRA-ARTICULAR; INTRALESIONAL; INTRAMUSCULAR; SOFT TISSUE EVERY 6 HOURS
Refills: 0 | Status: DISCONTINUED | OUTPATIENT
Start: 2024-11-18 | End: 2024-11-20

## 2024-11-18 RX ORDER — ROSUVASTATIN CALCIUM 20 MG/1
10 TABLET, FILM COATED ORAL AT BEDTIME
Refills: 0 | Status: DISCONTINUED | OUTPATIENT
Start: 2024-11-18 | End: 2024-11-23

## 2024-11-18 RX ORDER — METOPROLOL SUCCINATE 50 MG/1
50 TABLET, EXTENDED RELEASE ORAL DAILY
Refills: 0 | Status: DISCONTINUED | OUTPATIENT
Start: 2024-11-18 | End: 2024-11-19

## 2024-11-18 RX ADMIN — Medication 500 MILLILITER(S): at 10:54

## 2024-11-18 RX ADMIN — Medication 150 GRAM(S): at 12:22

## 2024-11-18 RX ADMIN — METOPROLOL SUCCINATE 50 MILLIGRAM(S): 50 TABLET, EXTENDED RELEASE ORAL at 18:04

## 2024-11-18 RX ADMIN — IPRATROPIUM BROMIDE AND ALBUTEROL SULFATE 3 MILLILITER(S): .5; 2.5 SOLUTION RESPIRATORY (INHALATION) at 15:43

## 2024-11-18 RX ADMIN — IPRATROPIUM BROMIDE AND ALBUTEROL SULFATE 3 MILLILITER(S): .5; 2.5 SOLUTION RESPIRATORY (INHALATION) at 10:16

## 2024-11-18 RX ADMIN — IPRATROPIUM BROMIDE AND ALBUTEROL SULFATE 3 MILLILITER(S): .5; 2.5 SOLUTION RESPIRATORY (INHALATION) at 07:58

## 2024-11-18 RX ADMIN — METHYLPREDNISOLONE ACETATE 90 MILLIGRAM(S): 80 INJECTION, SUSPENSION INTRA-ARTICULAR; INTRALESIONAL; INTRAMUSCULAR; SOFT TISSUE at 07:58

## 2024-11-18 RX ADMIN — Medication 125 MILLILITER(S): at 14:37

## 2024-11-18 RX ADMIN — METHYLPREDNISOLONE ACETATE 60 MILLIGRAM(S): 80 INJECTION, SUSPENSION INTRA-ARTICULAR; INTRALESIONAL; INTRAMUSCULAR; SOFT TISSUE at 18:01

## 2024-11-18 RX ADMIN — KETOROLAC TROMETHAMINE 15 MILLIGRAM(S): 30 INJECTION, SOLUTION INTRAMUSCULAR; INTRAVENOUS at 10:54

## 2024-11-18 RX ADMIN — KETOROLAC TROMETHAMINE 15 MILLIGRAM(S): 30 INJECTION, SOLUTION INTRAMUSCULAR; INTRAVENOUS at 12:51

## 2024-11-18 RX ADMIN — ROSUVASTATIN CALCIUM 10 MILLIGRAM(S): 20 TABLET, FILM COATED ORAL at 22:09

## 2024-11-18 RX ADMIN — IPRATROPIUM BROMIDE AND ALBUTEROL SULFATE 3 MILLILITER(S): .5; 2.5 SOLUTION RESPIRATORY (INHALATION) at 08:38

## 2024-11-19 DIAGNOSIS — J45.901 UNSPECIFIED ASTHMA WITH (ACUTE) EXACERBATION: ICD-10-CM

## 2024-11-19 DIAGNOSIS — K21.9 GASTRO-ESOPHAGEAL REFLUX DISEASE WITHOUT ESOPHAGITIS: ICD-10-CM

## 2024-11-19 DIAGNOSIS — B20 HUMAN IMMUNODEFICIENCY VIRUS [HIV] DISEASE: ICD-10-CM

## 2024-11-19 DIAGNOSIS — I10 ESSENTIAL (PRIMARY) HYPERTENSION: ICD-10-CM

## 2024-11-19 DIAGNOSIS — Z29.9 ENCOUNTER FOR PROPHYLACTIC MEASURES, UNSPECIFIED: ICD-10-CM

## 2024-11-19 DIAGNOSIS — E11.9 TYPE 2 DIABETES MELLITUS WITHOUT COMPLICATIONS: ICD-10-CM

## 2024-11-19 LAB
GLUCOSE BLDC GLUCOMTR-MCNC: 275 MG/DL — HIGH (ref 70–99)
GLUCOSE BLDC GLUCOMTR-MCNC: 280 MG/DL — HIGH (ref 70–99)
GLUCOSE BLDC GLUCOMTR-MCNC: 288 MG/DL — HIGH (ref 70–99)

## 2024-11-19 PROCEDURE — 99223 1ST HOSP IP/OBS HIGH 75: CPT | Mod: GC

## 2024-11-19 PROCEDURE — 99233 SBSQ HOSP IP/OBS HIGH 50: CPT

## 2024-11-19 RX ORDER — ESCITALOPRAM OXALATE 20 MG/1
5 TABLET ORAL DAILY
Refills: 0 | Status: DISCONTINUED | OUTPATIENT
Start: 2024-11-19 | End: 2024-11-23

## 2024-11-19 RX ORDER — IPRATROPIUM BROMIDE AND ALBUTEROL SULFATE .5; 2.5 MG/3ML; MG/3ML
3 SOLUTION RESPIRATORY (INHALATION) EVERY 6 HOURS
Refills: 0 | Status: DISCONTINUED | OUTPATIENT
Start: 2024-11-19 | End: 2024-11-23

## 2024-11-19 RX ORDER — DEXTROSE 50 % IN WATER 50 %
25 SYRINGE (ML) INTRAVENOUS ONCE
Refills: 0 | Status: DISCONTINUED | OUTPATIENT
Start: 2024-11-19 | End: 2024-11-23

## 2024-11-19 RX ORDER — ONDANSETRON HCL/PF 4 MG/2 ML
4 VIAL (ML) INJECTION ONCE
Refills: 0 | Status: COMPLETED | OUTPATIENT
Start: 2024-11-19 | End: 2024-11-19

## 2024-11-19 RX ORDER — DIPHENHYDRAMINE HCL 12.5MG/5ML
25 ELIXIR ORAL ONCE
Refills: 0 | Status: COMPLETED | OUTPATIENT
Start: 2024-11-19 | End: 2024-11-19

## 2024-11-19 RX ORDER — INSULIN LISPRO 100 U/ML
INJECTION, SOLUTION INTRAVENOUS; SUBCUTANEOUS
Refills: 0 | Status: DISCONTINUED | OUTPATIENT
Start: 2024-11-19 | End: 2024-11-19

## 2024-11-19 RX ORDER — INSULIN LISPRO 100 U/ML
INJECTION, SOLUTION INTRAVENOUS; SUBCUTANEOUS
Refills: 0 | Status: DISCONTINUED | OUTPATIENT
Start: 2024-11-19 | End: 2024-11-23

## 2024-11-19 RX ORDER — INSULIN GLARGINE-YFGN 100 [IU]/ML
12 INJECTION, SOLUTION SUBCUTANEOUS AT BEDTIME
Refills: 0 | Status: DISCONTINUED | OUTPATIENT
Start: 2024-11-19 | End: 2024-11-20

## 2024-11-19 RX ORDER — DEXTROSE 50 % IN WATER 50 %
15 SYRINGE (ML) INTRAVENOUS ONCE
Refills: 0 | Status: DISCONTINUED | OUTPATIENT
Start: 2024-11-19 | End: 2024-11-23

## 2024-11-19 RX ORDER — SODIUM CHLORIDE 9 G/1000ML
1000 INJECTION, SOLUTION INTRAVENOUS
Refills: 0 | Status: DISCONTINUED | OUTPATIENT
Start: 2024-11-19 | End: 2024-11-23

## 2024-11-19 RX ORDER — INSULIN LISPRO 100 U/ML
INJECTION, SOLUTION INTRAVENOUS; SUBCUTANEOUS AT BEDTIME
Refills: 0 | Status: DISCONTINUED | OUTPATIENT
Start: 2024-11-19 | End: 2024-11-23

## 2024-11-19 RX ORDER — GLUCAGON 3 MG/1
1 POWDER NASAL ONCE
Refills: 0 | Status: DISCONTINUED | OUTPATIENT
Start: 2024-11-19 | End: 2024-11-23

## 2024-11-19 RX ORDER — INSULIN LISPRO 100 U/ML
INJECTION, SOLUTION INTRAVENOUS; SUBCUTANEOUS AT BEDTIME
Refills: 0 | Status: DISCONTINUED | OUTPATIENT
Start: 2024-11-19 | End: 2024-11-19

## 2024-11-19 RX ORDER — KETOROLAC TROMETHAMINE 30 MG/ML
15 INJECTION, SOLUTION INTRAMUSCULAR; INTRAVENOUS ONCE
Refills: 0 | Status: DISCONTINUED | OUTPATIENT
Start: 2024-11-19 | End: 2024-11-19

## 2024-11-19 RX ORDER — DEXTROSE 50 % IN WATER 50 %
12.5 SYRINGE (ML) INTRAVENOUS ONCE
Refills: 0 | Status: DISCONTINUED | OUTPATIENT
Start: 2024-11-19 | End: 2024-11-23

## 2024-11-19 RX ORDER — ENOXAPARIN SODIUM 100 MG/ML
40 INJECTION SUBCUTANEOUS EVERY 24 HOURS
Refills: 0 | Status: DISCONTINUED | OUTPATIENT
Start: 2024-11-19 | End: 2024-11-23

## 2024-11-19 RX ADMIN — KETOROLAC TROMETHAMINE 15 MILLIGRAM(S): 30 INJECTION, SOLUTION INTRAMUSCULAR; INTRAVENOUS at 13:44

## 2024-11-19 RX ADMIN — AMLODIPINE BESYLATE 10 MILLIGRAM(S): 10 TABLET ORAL at 06:31

## 2024-11-19 RX ADMIN — ROSUVASTATIN CALCIUM 10 MILLIGRAM(S): 20 TABLET, FILM COATED ORAL at 21:59

## 2024-11-19 RX ADMIN — INSULIN LISPRO 3: 100 INJECTION, SOLUTION INTRAVENOUS; SUBCUTANEOUS at 17:23

## 2024-11-19 RX ADMIN — Medication 4 MILLIGRAM(S): at 04:40

## 2024-11-19 RX ADMIN — METHYLPREDNISOLONE ACETATE 60 MILLIGRAM(S): 80 INJECTION, SUSPENSION INTRA-ARTICULAR; INTRALESIONAL; INTRAMUSCULAR; SOFT TISSUE at 19:07

## 2024-11-19 RX ADMIN — METHYLPREDNISOLONE ACETATE 60 MILLIGRAM(S): 80 INJECTION, SUSPENSION INTRA-ARTICULAR; INTRALESIONAL; INTRAMUSCULAR; SOFT TISSUE at 00:08

## 2024-11-19 RX ADMIN — EFAVIRENZ, EMTRICITABINE AND TENOFOVIR DISOPROXIL FUMARATE 1 TABLET(S): 600; 200; 300 TABLET, FILM COATED ORAL at 11:55

## 2024-11-19 RX ADMIN — METOPROLOL SUCCINATE 50 MILLIGRAM(S): 50 TABLET, EXTENDED RELEASE ORAL at 11:55

## 2024-11-19 RX ADMIN — INSULIN LISPRO 2: 100 INJECTION, SOLUTION INTRAVENOUS; SUBCUTANEOUS at 22:00

## 2024-11-19 RX ADMIN — IPRATROPIUM BROMIDE AND ALBUTEROL SULFATE 3 MILLILITER(S): .5; 2.5 SOLUTION RESPIRATORY (INHALATION) at 09:09

## 2024-11-19 RX ADMIN — METHYLPREDNISOLONE ACETATE 60 MILLIGRAM(S): 80 INJECTION, SUSPENSION INTRA-ARTICULAR; INTRALESIONAL; INTRAMUSCULAR; SOFT TISSUE at 11:55

## 2024-11-19 RX ADMIN — Medication 25 MILLIGRAM(S): at 22:00

## 2024-11-19 RX ADMIN — Medication 1 DOSE(S): at 21:58

## 2024-11-19 RX ADMIN — KETOROLAC TROMETHAMINE 15 MILLIGRAM(S): 30 INJECTION, SOLUTION INTRAMUSCULAR; INTRAVENOUS at 14:26

## 2024-11-19 RX ADMIN — Medication 40 MILLIGRAM(S): at 19:07

## 2024-11-19 RX ADMIN — Medication 25 MILLIGRAM(S): at 00:56

## 2024-11-19 RX ADMIN — INSULIN LISPRO 3: 100 INJECTION, SOLUTION INTRAVENOUS; SUBCUTANEOUS at 11:54

## 2024-11-19 RX ADMIN — Medication 40 MILLIEQUIVALENT(S): at 19:07

## 2024-11-19 RX ADMIN — INSULIN GLARGINE-YFGN 12 UNIT(S): 100 INJECTION, SOLUTION SUBCUTANEOUS at 22:02

## 2024-11-19 RX ADMIN — ENOXAPARIN SODIUM 40 MILLIGRAM(S): 100 INJECTION SUBCUTANEOUS at 21:59

## 2024-11-19 RX ADMIN — IPRATROPIUM BROMIDE AND ALBUTEROL SULFATE 3 MILLILITER(S): .5; 2.5 SOLUTION RESPIRATORY (INHALATION) at 00:57

## 2024-11-19 RX ADMIN — Medication 125 MILLILITER(S): at 00:10

## 2024-11-19 RX ADMIN — METHYLPREDNISOLONE ACETATE 60 MILLIGRAM(S): 80 INJECTION, SUSPENSION INTRA-ARTICULAR; INTRALESIONAL; INTRAMUSCULAR; SOFT TISSUE at 06:30

## 2024-11-19 RX ADMIN — IPRATROPIUM BROMIDE AND ALBUTEROL SULFATE 3 MILLILITER(S): .5; 2.5 SOLUTION RESPIRATORY (INHALATION) at 15:58

## 2024-11-20 DIAGNOSIS — I10 ESSENTIAL (PRIMARY) HYPERTENSION: ICD-10-CM

## 2024-11-20 DIAGNOSIS — E78.5 HYPERLIPIDEMIA, UNSPECIFIED: ICD-10-CM

## 2024-11-20 DIAGNOSIS — E11.9 TYPE 2 DIABETES MELLITUS WITHOUT COMPLICATIONS: ICD-10-CM

## 2024-11-20 LAB
4/8 RATIO: 0.96 RATIO — SIGNIFICANT CHANGE UP (ref 0.9–3.6)
ABS CD8: 412 CELLS/UL — SIGNIFICANT CHANGE UP (ref 142–740)
ADD ON TEST-SPECIMEN IN LAB: SIGNIFICANT CHANGE UP
ADD ON TEST-SPECIMEN IN LAB: SIGNIFICANT CHANGE UP
ALBUMIN SERPL ELPH-MCNC: 3.8 G/DL — SIGNIFICANT CHANGE UP (ref 3.3–5)
ALP SERPL-CCNC: 151 U/L — HIGH (ref 40–120)
ALT FLD-CCNC: 22 U/L — SIGNIFICANT CHANGE UP (ref 4–33)
ANION GAP SERPL CALC-SCNC: 12 MMOL/L — SIGNIFICANT CHANGE UP (ref 7–14)
AST SERPL-CCNC: 17 U/L — SIGNIFICANT CHANGE UP (ref 4–32)
B PERT DNA SPEC QL NAA+PROBE: SIGNIFICANT CHANGE UP
B PERT+PARAPERT DNA PNL SPEC NAA+PROBE: SIGNIFICANT CHANGE UP
BASOPHILS # BLD AUTO: 0.02 K/UL — SIGNIFICANT CHANGE UP (ref 0–0.2)
BASOPHILS NFR BLD AUTO: 0.1 % — SIGNIFICANT CHANGE UP (ref 0–2)
BILIRUB SERPL-MCNC: <0.2 MG/DL — SIGNIFICANT CHANGE UP (ref 0.2–1.2)
BUN SERPL-MCNC: 29 MG/DL — HIGH (ref 7–23)
C PNEUM DNA SPEC QL NAA+PROBE: SIGNIFICANT CHANGE UP
CALCIUM SERPL-MCNC: 8.9 MG/DL — SIGNIFICANT CHANGE UP (ref 8.4–10.5)
CD3 BLASTS SPEC-ACNC: 69 % — SIGNIFICANT CHANGE UP (ref 59–83)
CD3 BLASTS SPEC-ACNC: 820 CELLS/UL — SIGNIFICANT CHANGE UP (ref 672–1870)
CD4 %: 33 % — SIGNIFICANT CHANGE UP (ref 30–62)
CD8 %: 34 % — SIGNIFICANT CHANGE UP (ref 12–36)
CHLORIDE SERPL-SCNC: 106 MMOL/L — SIGNIFICANT CHANGE UP (ref 98–107)
CO2 SERPL-SCNC: 19 MMOL/L — LOW (ref 22–31)
CREAT SERPL-MCNC: 1.05 MG/DL — SIGNIFICANT CHANGE UP (ref 0.5–1.3)
EGFR: 61 ML/MIN/1.73M2 — SIGNIFICANT CHANGE UP
EGFR: 61 ML/MIN/1.73M2 — SIGNIFICANT CHANGE UP
EOSINOPHIL # BLD AUTO: 0 K/UL — SIGNIFICANT CHANGE UP (ref 0–0.5)
EOSINOPHIL NFR BLD AUTO: 0 % — SIGNIFICANT CHANGE UP (ref 0–6)
FLUAV AG NPH QL: SIGNIFICANT CHANGE UP
FLUAV SUBTYP SPEC NAA+PROBE: SIGNIFICANT CHANGE UP
FLUBV AG NPH QL: SIGNIFICANT CHANGE UP
FLUBV RNA SPEC QL NAA+PROBE: SIGNIFICANT CHANGE UP
GLUCOSE BLDC GLUCOMTR-MCNC: 199 MG/DL — HIGH (ref 70–99)
GLUCOSE BLDC GLUCOMTR-MCNC: 250 MG/DL — HIGH (ref 70–99)
GLUCOSE BLDC GLUCOMTR-MCNC: 256 MG/DL — HIGH (ref 70–99)
GLUCOSE BLDC GLUCOMTR-MCNC: 338 MG/DL — HIGH (ref 70–99)
GLUCOSE SERPL-MCNC: 243 MG/DL — HIGH (ref 70–99)
HADV DNA SPEC QL NAA+PROBE: SIGNIFICANT CHANGE UP
HCOV 229E RNA SPEC QL NAA+PROBE: SIGNIFICANT CHANGE UP
HCOV HKU1 RNA SPEC QL NAA+PROBE: SIGNIFICANT CHANGE UP
HCOV NL63 RNA SPEC QL NAA+PROBE: SIGNIFICANT CHANGE UP
HCOV OC43 RNA SPEC QL NAA+PROBE: SIGNIFICANT CHANGE UP
HCT VFR BLD CALC: 37.2 % — SIGNIFICANT CHANGE UP (ref 34.5–45)
HGB BLD-MCNC: 11.7 G/DL — SIGNIFICANT CHANGE UP (ref 11.5–15.5)
HMPV RNA SPEC QL NAA+PROBE: SIGNIFICANT CHANGE UP
HPIV1 RNA SPEC QL NAA+PROBE: SIGNIFICANT CHANGE UP
HPIV2 RNA SPEC QL NAA+PROBE: SIGNIFICANT CHANGE UP
HPIV3 RNA SPEC QL NAA+PROBE: SIGNIFICANT CHANGE UP
HPIV4 RNA SPEC QL NAA+PROBE: SIGNIFICANT CHANGE UP
IANC: 18.14 K/UL — HIGH (ref 1.8–7.4)
IMM GRANULOCYTES NFR BLD AUTO: 0.5 % — SIGNIFICANT CHANGE UP (ref 0–0.9)
LYMPHOCYTES # BLD AUTO: 1.41 K/UL — SIGNIFICANT CHANGE UP (ref 1–3.3)
LYMPHOCYTES # BLD AUTO: 7 % — LOW (ref 13–44)
M PNEUMO DNA SPEC QL NAA+PROBE: SIGNIFICANT CHANGE UP
MAGNESIUM SERPL-MCNC: 2.1 MG/DL — SIGNIFICANT CHANGE UP (ref 1.6–2.6)
MCHC RBC-ENTMCNC: 27.8 PG — SIGNIFICANT CHANGE UP (ref 27–34)
MCHC RBC-ENTMCNC: 31.5 G/DL — LOW (ref 32–36)
MCV RBC AUTO: 88.4 FL — SIGNIFICANT CHANGE UP (ref 80–100)
MONOCYTES # BLD AUTO: 0.36 K/UL — SIGNIFICANT CHANGE UP (ref 0–0.9)
MONOCYTES NFR BLD AUTO: 1.8 % — LOW (ref 2–14)
MRSA PCR RESULT.: SIGNIFICANT CHANGE UP
NEUTROPHILS # BLD AUTO: 18.14 K/UL — HIGH (ref 1.8–7.4)
NEUTROPHILS NFR BLD AUTO: 90.6 % — HIGH (ref 43–77)
NRBC # BLD AUTO: 0 K/UL — SIGNIFICANT CHANGE UP (ref 0–0)
NRBC # BLD: 0 /100 WBCS — SIGNIFICANT CHANGE UP (ref 0–0)
NRBC # FLD: 0 K/UL — SIGNIFICANT CHANGE UP (ref 0–0)
NRBC BLD-RTO: 0 /100 WBCS — SIGNIFICANT CHANGE UP (ref 0–0)
PHOSPHATE SERPL-MCNC: 2.3 MG/DL — LOW (ref 2.5–4.5)
PLATELET # BLD AUTO: 450 K/UL — HIGH (ref 150–400)
POTASSIUM SERPL-MCNC: 4.1 MMOL/L — SIGNIFICANT CHANGE UP (ref 3.5–5.3)
POTASSIUM SERPL-SCNC: 4.1 MMOL/L — SIGNIFICANT CHANGE UP (ref 3.5–5.3)
PROT SERPL-MCNC: 6.9 G/DL — SIGNIFICANT CHANGE UP (ref 6–8.3)
RAPID RVP RESULT: SIGNIFICANT CHANGE UP
RBC # BLD: 4.21 M/UL — SIGNIFICANT CHANGE UP (ref 3.8–5.2)
RBC # FLD: 14.7 % — HIGH (ref 10.3–14.5)
RSV RNA NPH QL NAA+NON-PROBE: SIGNIFICANT CHANGE UP
RSV RNA SPEC QL NAA+PROBE: SIGNIFICANT CHANGE UP
RV+EV RNA SPEC QL NAA+PROBE: SIGNIFICANT CHANGE UP
S AUREUS DNA NOSE QL NAA+PROBE: SIGNIFICANT CHANGE UP
SARS-COV-2 RNA SPEC QL NAA+PROBE: SIGNIFICANT CHANGE UP
SARS-COV-2 RNA SPEC QL NAA+PROBE: SIGNIFICANT CHANGE UP
SODIUM SERPL-SCNC: 137 MMOL/L — SIGNIFICANT CHANGE UP (ref 135–145)
T-CELL CD4 SUBSET PNL BLD: 396 CELLS/UL — LOW (ref 489–1457)
WBC # BLD: 20.04 K/UL — HIGH (ref 3.8–10.5)
WBC # FLD AUTO: 20.04 K/UL — HIGH (ref 3.8–10.5)

## 2024-11-20 PROCEDURE — 99222 1ST HOSP IP/OBS MODERATE 55: CPT

## 2024-11-20 PROCEDURE — 99232 SBSQ HOSP IP/OBS MODERATE 35: CPT | Mod: GC

## 2024-11-20 PROCEDURE — 99223 1ST HOSP IP/OBS HIGH 75: CPT | Mod: GC

## 2024-11-20 RX ORDER — METHYLPREDNISOLONE ACETATE 80 MG/ML
60 INJECTION, SUSPENSION INTRA-ARTICULAR; INTRALESIONAL; INTRAMUSCULAR; SOFT TISSUE EVERY 8 HOURS
Refills: 0 | Status: DISCONTINUED | OUTPATIENT
Start: 2024-11-20 | End: 2024-11-20

## 2024-11-20 RX ORDER — INSULIN LISPRO 100 U/ML
8 INJECTION, SOLUTION INTRAVENOUS; SUBCUTANEOUS
Refills: 0 | Status: DISCONTINUED | OUTPATIENT
Start: 2024-11-20 | End: 2024-11-20

## 2024-11-20 RX ORDER — INSULIN LISPRO 100 U/ML
9 INJECTION, SOLUTION INTRAVENOUS; SUBCUTANEOUS
Refills: 0 | Status: DISCONTINUED | OUTPATIENT
Start: 2024-11-20 | End: 2024-11-21

## 2024-11-20 RX ORDER — SOD PHOS DI, MONO/K PHOS MONO 250 MG
1 TABLET ORAL EVERY 6 HOURS
Refills: 0 | Status: COMPLETED | OUTPATIENT
Start: 2024-11-20 | End: 2024-11-20

## 2024-11-20 RX ORDER — METHYLPREDNISOLONE ACETATE 80 MG/ML
60 INJECTION, SUSPENSION INTRA-ARTICULAR; INTRALESIONAL; INTRAMUSCULAR; SOFT TISSUE EVERY 24 HOURS
Refills: 0 | Status: DISCONTINUED | OUTPATIENT
Start: 2024-11-21 | End: 2024-11-21

## 2024-11-20 RX ORDER — INSULIN GLARGINE-YFGN 100 [IU]/ML
18 INJECTION, SOLUTION SUBCUTANEOUS AT BEDTIME
Refills: 0 | Status: DISCONTINUED | OUTPATIENT
Start: 2024-11-20 | End: 2024-11-21

## 2024-11-20 RX ORDER — DIPHENHYDRAMINE HCL 12.5MG/5ML
25 ELIXIR ORAL ONCE
Refills: 0 | Status: COMPLETED | OUTPATIENT
Start: 2024-11-20 | End: 2024-11-20

## 2024-11-20 RX ADMIN — Medication 40 MILLIEQUIVALENT(S): at 00:25

## 2024-11-20 RX ADMIN — Medication 25 MILLIGRAM(S): at 23:11

## 2024-11-20 RX ADMIN — Medication 1 DOSE(S): at 22:12

## 2024-11-20 RX ADMIN — Medication 1 PACKET(S): at 17:05

## 2024-11-20 RX ADMIN — INSULIN LISPRO 4: 100 INJECTION, SOLUTION INTRAVENOUS; SUBCUTANEOUS at 18:06

## 2024-11-20 RX ADMIN — ESCITALOPRAM OXALATE 5 MILLIGRAM(S): 20 TABLET ORAL at 11:30

## 2024-11-20 RX ADMIN — IPRATROPIUM BROMIDE AND ALBUTEROL SULFATE 3 MILLILITER(S): .5; 2.5 SOLUTION RESPIRATORY (INHALATION) at 09:30

## 2024-11-20 RX ADMIN — Medication 1 DOSE(S): at 09:23

## 2024-11-20 RX ADMIN — ROSUVASTATIN CALCIUM 10 MILLIGRAM(S): 20 TABLET, FILM COATED ORAL at 22:13

## 2024-11-20 RX ADMIN — INSULIN LISPRO 6: 100 INJECTION, SOLUTION INTRAVENOUS; SUBCUTANEOUS at 09:22

## 2024-11-20 RX ADMIN — Medication 1 PACKET(S): at 11:30

## 2024-11-20 RX ADMIN — Medication 40 MILLIGRAM(S): at 17:05

## 2024-11-20 RX ADMIN — INSULIN GLARGINE-YFGN 18 UNIT(S): 100 INJECTION, SOLUTION SUBCUTANEOUS at 22:19

## 2024-11-20 RX ADMIN — IPRATROPIUM BROMIDE AND ALBUTEROL SULFATE 3 MILLILITER(S): .5; 2.5 SOLUTION RESPIRATORY (INHALATION) at 21:24

## 2024-11-20 RX ADMIN — Medication 1 APPLICATION(S): at 13:07

## 2024-11-20 RX ADMIN — INSULIN LISPRO 9 UNIT(S): 100 INJECTION, SOLUTION INTRAVENOUS; SUBCUTANEOUS at 18:07

## 2024-11-20 RX ADMIN — EFAVIRENZ, EMTRICITABINE AND TENOFOVIR DISOPROXIL FUMARATE 1 TABLET(S): 600; 200; 300 TABLET, FILM COATED ORAL at 11:30

## 2024-11-20 RX ADMIN — Medication 40 MILLIGRAM(S): at 07:02

## 2024-11-20 RX ADMIN — METHYLPREDNISOLONE ACETATE 60 MILLIGRAM(S): 80 INJECTION, SUSPENSION INTRA-ARTICULAR; INTRALESIONAL; INTRAMUSCULAR; SOFT TISSUE at 07:01

## 2024-11-20 RX ADMIN — METHYLPREDNISOLONE ACETATE 60 MILLIGRAM(S): 80 INJECTION, SUSPENSION INTRA-ARTICULAR; INTRALESIONAL; INTRAMUSCULAR; SOFT TISSUE at 00:25

## 2024-11-20 RX ADMIN — AMLODIPINE BESYLATE 10 MILLIGRAM(S): 10 TABLET ORAL at 07:02

## 2024-11-20 RX ADMIN — METHYLPREDNISOLONE ACETATE 60 MILLIGRAM(S): 80 INJECTION, SUSPENSION INTRA-ARTICULAR; INTRALESIONAL; INTRAMUSCULAR; SOFT TISSUE at 14:17

## 2024-11-20 RX ADMIN — ENOXAPARIN SODIUM 40 MILLIGRAM(S): 100 INJECTION SUBCUTANEOUS at 22:12

## 2024-11-20 RX ADMIN — Medication 20 MILLIGRAM(S): at 13:27

## 2024-11-20 RX ADMIN — INSULIN LISPRO 8: 100 INJECTION, SOLUTION INTRAVENOUS; SUBCUTANEOUS at 13:07

## 2024-11-20 RX ADMIN — IPRATROPIUM BROMIDE AND ALBUTEROL SULFATE 3 MILLILITER(S): .5; 2.5 SOLUTION RESPIRATORY (INHALATION) at 15:48

## 2024-11-21 LAB
ALBUMIN SERPL ELPH-MCNC: 3.9 G/DL — SIGNIFICANT CHANGE UP (ref 3.3–5)
ALP SERPL-CCNC: 146 U/L — HIGH (ref 40–120)
ALT FLD-CCNC: 30 U/L — SIGNIFICANT CHANGE UP (ref 4–33)
ANION GAP SERPL CALC-SCNC: 14 MMOL/L — SIGNIFICANT CHANGE UP (ref 7–14)
AST SERPL-CCNC: 20 U/L — SIGNIFICANT CHANGE UP (ref 4–32)
BILIRUB SERPL-MCNC: <0.2 MG/DL — SIGNIFICANT CHANGE UP (ref 0.2–1.2)
BUN SERPL-MCNC: 28 MG/DL — HIGH (ref 7–23)
CALCIUM SERPL-MCNC: 9 MG/DL — SIGNIFICANT CHANGE UP (ref 8.4–10.5)
CHLORIDE SERPL-SCNC: 103 MMOL/L — SIGNIFICANT CHANGE UP (ref 98–107)
CO2 SERPL-SCNC: 20 MMOL/L — LOW (ref 22–31)
CREAT SERPL-MCNC: 1.06 MG/DL — SIGNIFICANT CHANGE UP (ref 0.5–1.3)
EGFR: 61 ML/MIN/1.73M2 — SIGNIFICANT CHANGE UP
EGFR: 61 ML/MIN/1.73M2 — SIGNIFICANT CHANGE UP
GLUCOSE BLDC GLUCOMTR-MCNC: 147 MG/DL — HIGH (ref 70–99)
GLUCOSE BLDC GLUCOMTR-MCNC: 184 MG/DL — HIGH (ref 70–99)
GLUCOSE BLDC GLUCOMTR-MCNC: 191 MG/DL — HIGH (ref 70–99)
GLUCOSE BLDC GLUCOMTR-MCNC: 200 MG/DL — HIGH (ref 70–99)
GLUCOSE SERPL-MCNC: 205 MG/DL — HIGH (ref 70–99)
HCT VFR BLD CALC: 37.3 % — SIGNIFICANT CHANGE UP (ref 34.5–45)
HGB BLD-MCNC: 12.1 G/DL — SIGNIFICANT CHANGE UP (ref 11.5–15.5)
MAGNESIUM SERPL-MCNC: 2.1 MG/DL — SIGNIFICANT CHANGE UP (ref 1.6–2.6)
MCHC RBC-ENTMCNC: 28.3 PG — SIGNIFICANT CHANGE UP (ref 27–34)
MCHC RBC-ENTMCNC: 32.4 G/DL — SIGNIFICANT CHANGE UP (ref 32–36)
MCV RBC AUTO: 87.4 FL — SIGNIFICANT CHANGE UP (ref 80–100)
NRBC # BLD AUTO: 0 K/UL — SIGNIFICANT CHANGE UP (ref 0–0)
NRBC # BLD: 0 /100 WBCS — SIGNIFICANT CHANGE UP (ref 0–0)
NRBC # FLD: 0 K/UL — SIGNIFICANT CHANGE UP (ref 0–0)
NRBC BLD-RTO: 0 /100 WBCS — SIGNIFICANT CHANGE UP (ref 0–0)
PHOSPHATE SERPL-MCNC: 2.7 MG/DL — SIGNIFICANT CHANGE UP (ref 2.5–4.5)
PLATELET # BLD AUTO: 429 K/UL — HIGH (ref 150–400)
POTASSIUM SERPL-MCNC: 4 MMOL/L — SIGNIFICANT CHANGE UP (ref 3.5–5.3)
POTASSIUM SERPL-SCNC: 4 MMOL/L — SIGNIFICANT CHANGE UP (ref 3.5–5.3)
PROCALCITONIN SERPL-MCNC: 0.06 NG/ML — SIGNIFICANT CHANGE UP (ref 0.02–0.1)
PROT SERPL-MCNC: 6.9 G/DL — SIGNIFICANT CHANGE UP (ref 6–8.3)
RBC # BLD: 4.27 M/UL — SIGNIFICANT CHANGE UP (ref 3.8–5.2)
RBC # FLD: 15 % — HIGH (ref 10.3–14.5)
SODIUM SERPL-SCNC: 137 MMOL/L — SIGNIFICANT CHANGE UP (ref 135–145)
WBC # BLD: 16.84 K/UL — HIGH (ref 3.8–10.5)
WBC # FLD AUTO: 16.84 K/UL — HIGH (ref 3.8–10.5)

## 2024-11-21 PROCEDURE — 99233 SBSQ HOSP IP/OBS HIGH 50: CPT

## 2024-11-21 PROCEDURE — 99232 SBSQ HOSP IP/OBS MODERATE 35: CPT | Mod: GC

## 2024-11-21 PROCEDURE — 99232 SBSQ HOSP IP/OBS MODERATE 35: CPT

## 2024-11-21 RX ORDER — METHYLPREDNISOLONE ACETATE 80 MG/ML
40 INJECTION, SUSPENSION INTRA-ARTICULAR; INTRALESIONAL; INTRAMUSCULAR; SOFT TISSUE DAILY
Refills: 0 | Status: DISCONTINUED | OUTPATIENT
Start: 2024-11-21 | End: 2024-11-23

## 2024-11-21 RX ORDER — INSULIN LISPRO 100 U/ML
7 INJECTION, SOLUTION INTRAVENOUS; SUBCUTANEOUS
Refills: 0 | Status: DISCONTINUED | OUTPATIENT
Start: 2024-11-21 | End: 2024-11-22

## 2024-11-21 RX ORDER — INSULIN GLARGINE-YFGN 100 [IU]/ML
15 INJECTION, SOLUTION SUBCUTANEOUS AT BEDTIME
Refills: 0 | Status: DISCONTINUED | OUTPATIENT
Start: 2024-11-21 | End: 2024-11-23

## 2024-11-21 RX ORDER — MUPIROCIN CALCIUM 20 MG/G
1 CREAM TOPICAL
Refills: 0 | Status: DISCONTINUED | OUTPATIENT
Start: 2024-11-21 | End: 2024-11-23

## 2024-11-21 RX ORDER — CALAMINE 8% AND ZINC OXIDE 8% 160 MG/ML
1 LOTION TOPICAL
Refills: 0 | Status: DISCONTINUED | OUTPATIENT
Start: 2024-11-21 | End: 2024-11-23

## 2024-11-21 RX ADMIN — INSULIN LISPRO 9 UNIT(S): 100 INJECTION, SOLUTION INTRAVENOUS; SUBCUTANEOUS at 12:40

## 2024-11-21 RX ADMIN — MUPIROCIN CALCIUM 1 APPLICATION(S): 20 CREAM TOPICAL at 17:03

## 2024-11-21 RX ADMIN — IPRATROPIUM BROMIDE AND ALBUTEROL SULFATE 3 MILLILITER(S): .5; 2.5 SOLUTION RESPIRATORY (INHALATION) at 10:02

## 2024-11-21 RX ADMIN — AMLODIPINE BESYLATE 10 MILLIGRAM(S): 10 TABLET ORAL at 05:58

## 2024-11-21 RX ADMIN — IPRATROPIUM BROMIDE AND ALBUTEROL SULFATE 3 MILLILITER(S): .5; 2.5 SOLUTION RESPIRATORY (INHALATION) at 20:35

## 2024-11-21 RX ADMIN — INSULIN LISPRO 9 UNIT(S): 100 INJECTION, SOLUTION INTRAVENOUS; SUBCUTANEOUS at 08:59

## 2024-11-21 RX ADMIN — Medication 1 DOSE(S): at 08:59

## 2024-11-21 RX ADMIN — IPRATROPIUM BROMIDE AND ALBUTEROL SULFATE 3 MILLILITER(S): .5; 2.5 SOLUTION RESPIRATORY (INHALATION) at 16:11

## 2024-11-21 RX ADMIN — METHYLPREDNISOLONE ACETATE 60 MILLIGRAM(S): 80 INJECTION, SUSPENSION INTRA-ARTICULAR; INTRALESIONAL; INTRAMUSCULAR; SOFT TISSUE at 00:11

## 2024-11-21 RX ADMIN — ROSUVASTATIN CALCIUM 10 MILLIGRAM(S): 20 TABLET, FILM COATED ORAL at 21:19

## 2024-11-21 RX ADMIN — INSULIN GLARGINE-YFGN 15 UNIT(S): 100 INJECTION, SOLUTION SUBCUTANEOUS at 22:27

## 2024-11-21 RX ADMIN — INSULIN LISPRO 2: 100 INJECTION, SOLUTION INTRAVENOUS; SUBCUTANEOUS at 08:59

## 2024-11-21 RX ADMIN — INSULIN LISPRO 2: 100 INJECTION, SOLUTION INTRAVENOUS; SUBCUTANEOUS at 12:40

## 2024-11-21 RX ADMIN — Medication 1 DOSE(S): at 21:18

## 2024-11-21 RX ADMIN — Medication 1 APPLICATION(S): at 11:06

## 2024-11-21 RX ADMIN — INSULIN LISPRO 7 UNIT(S): 100 INJECTION, SOLUTION INTRAVENOUS; SUBCUTANEOUS at 17:46

## 2024-11-21 RX ADMIN — CALAMINE 8% AND ZINC OXIDE 8% 1 APPLICATION(S): 160 LOTION TOPICAL at 17:03

## 2024-11-21 RX ADMIN — ENOXAPARIN SODIUM 40 MILLIGRAM(S): 100 INJECTION SUBCUTANEOUS at 21:17

## 2024-11-21 RX ADMIN — Medication 40 MILLIGRAM(S): at 17:03

## 2024-11-21 RX ADMIN — Medication 40 MILLIGRAM(S): at 05:58

## 2024-11-21 RX ADMIN — EFAVIRENZ, EMTRICITABINE AND TENOFOVIR DISOPROXIL FUMARATE 1 TABLET(S): 600; 200; 300 TABLET, FILM COATED ORAL at 11:06

## 2024-11-21 RX ADMIN — ESCITALOPRAM OXALATE 5 MILLIGRAM(S): 20 TABLET ORAL at 11:05

## 2024-11-22 ENCOUNTER — TRANSCRIPTION ENCOUNTER (OUTPATIENT)
Age: 59
End: 2024-11-22

## 2024-11-22 LAB
ALBUMIN SERPL ELPH-MCNC: 3.7 G/DL — SIGNIFICANT CHANGE UP (ref 3.3–5)
ALP SERPL-CCNC: 147 U/L — HIGH (ref 40–120)
ALT FLD-CCNC: 33 U/L — SIGNIFICANT CHANGE UP (ref 4–33)
ANION GAP SERPL CALC-SCNC: 12 MMOL/L — SIGNIFICANT CHANGE UP (ref 7–14)
APTT BLD: 27.1 SEC — SIGNIFICANT CHANGE UP (ref 24.5–35.6)
AST SERPL-CCNC: 19 U/L — SIGNIFICANT CHANGE UP (ref 4–32)
BILIRUB SERPL-MCNC: 0.2 MG/DL — SIGNIFICANT CHANGE UP (ref 0.2–1.2)
BLD GP AB SCN SERPL QL: NEGATIVE — SIGNIFICANT CHANGE UP
BUN SERPL-MCNC: 24 MG/DL — HIGH (ref 7–23)
CALCIUM SERPL-MCNC: 8.7 MG/DL — SIGNIFICANT CHANGE UP (ref 8.4–10.5)
CHLORIDE SERPL-SCNC: 100 MMOL/L — SIGNIFICANT CHANGE UP (ref 98–107)
CO2 SERPL-SCNC: 23 MMOL/L — SIGNIFICANT CHANGE UP (ref 22–31)
CREAT SERPL-MCNC: 0.94 MG/DL — SIGNIFICANT CHANGE UP (ref 0.5–1.3)
EGFR: 70 ML/MIN/1.73M2 — SIGNIFICANT CHANGE UP
EGFR: 70 ML/MIN/1.73M2 — SIGNIFICANT CHANGE UP
GLUCOSE BLDC GLUCOMTR-MCNC: 132 MG/DL — HIGH (ref 70–99)
GLUCOSE BLDC GLUCOMTR-MCNC: 177 MG/DL — HIGH (ref 70–99)
GLUCOSE BLDC GLUCOMTR-MCNC: 180 MG/DL — HIGH (ref 70–99)
GLUCOSE BLDC GLUCOMTR-MCNC: 190 MG/DL — HIGH (ref 70–99)
GLUCOSE BLDC GLUCOMTR-MCNC: 191 MG/DL — HIGH (ref 70–99)
GLUCOSE BLDC GLUCOMTR-MCNC: 207 MG/DL — HIGH (ref 70–99)
GLUCOSE SERPL-MCNC: 143 MG/DL — HIGH (ref 70–99)
GRAM STN FLD: SIGNIFICANT CHANGE UP
HCT VFR BLD CALC: 38.9 % — SIGNIFICANT CHANGE UP (ref 34.5–45)
HGB BLD-MCNC: 12.8 G/DL — SIGNIFICANT CHANGE UP (ref 11.5–15.5)
INR BLD: <0.9 RATIO — SIGNIFICANT CHANGE UP (ref 0.85–1.16)
MAGNESIUM SERPL-MCNC: 2.1 MG/DL — SIGNIFICANT CHANGE UP (ref 1.6–2.6)
MCHC RBC-ENTMCNC: 28.4 PG — SIGNIFICANT CHANGE UP (ref 27–34)
MCHC RBC-ENTMCNC: 32.9 G/DL — SIGNIFICANT CHANGE UP (ref 32–36)
MCV RBC AUTO: 86.4 FL — SIGNIFICANT CHANGE UP (ref 80–100)
NIGHT BLUE STAIN TISS: SIGNIFICANT CHANGE UP
NRBC # BLD AUTO: 0 K/UL — SIGNIFICANT CHANGE UP (ref 0–0)
NRBC # BLD: 0 /100 WBCS — SIGNIFICANT CHANGE UP (ref 0–0)
NRBC # FLD: 0 K/UL — SIGNIFICANT CHANGE UP (ref 0–0)
NRBC BLD-RTO: 0 /100 WBCS — SIGNIFICANT CHANGE UP (ref 0–0)
PHOSPHATE SERPL-MCNC: 2.9 MG/DL — SIGNIFICANT CHANGE UP (ref 2.5–4.5)
PLATELET # BLD AUTO: 430 K/UL — HIGH (ref 150–400)
POTASSIUM SERPL-MCNC: 3.2 MMOL/L — LOW (ref 3.5–5.3)
POTASSIUM SERPL-SCNC: 3.2 MMOL/L — LOW (ref 3.5–5.3)
PROT SERPL-MCNC: 6.5 G/DL — SIGNIFICANT CHANGE UP (ref 6–8.3)
PROTHROM AB SERPL-ACNC: 10.6 SEC — SIGNIFICANT CHANGE UP (ref 9.9–13.4)
RBC # BLD: 4.5 M/UL — SIGNIFICANT CHANGE UP (ref 3.8–5.2)
RBC # FLD: 14.9 % — HIGH (ref 10.3–14.5)
RH IG SCN BLD-IMP: POSITIVE — SIGNIFICANT CHANGE UP
SODIUM SERPL-SCNC: 135 MMOL/L — SIGNIFICANT CHANGE UP (ref 135–145)
SPECIMEN SOURCE: SIGNIFICANT CHANGE UP
SPECIMEN SOURCE: SIGNIFICANT CHANGE UP
WBC # BLD: 12.79 K/UL — HIGH (ref 3.8–10.5)
WBC # FLD AUTO: 12.79 K/UL — HIGH (ref 3.8–10.5)

## 2024-11-22 PROCEDURE — 88305 TISSUE EXAM BY PATHOLOGIST: CPT | Mod: 26

## 2024-11-22 PROCEDURE — 88112 CYTOPATH CELL ENHANCE TECH: CPT | Mod: 26

## 2024-11-22 PROCEDURE — 31624 DX BRONCHOSCOPE/LAVAGE: CPT | Mod: GC

## 2024-11-22 PROCEDURE — 99233 SBSQ HOSP IP/OBS HIGH 50: CPT | Mod: GC,25

## 2024-11-22 PROCEDURE — 31645 BRNCHSC W/THER ASPIR 1ST: CPT | Mod: GC

## 2024-11-22 PROCEDURE — 99232 SBSQ HOSP IP/OBS MODERATE 35: CPT | Mod: GC

## 2024-11-22 PROCEDURE — 70490 CT SOFT TISSUE NECK W/O DYE: CPT | Mod: 26

## 2024-11-22 PROCEDURE — 31575 DIAGNOSTIC LARYNGOSCOPY: CPT | Mod: GC,59

## 2024-11-22 RX ORDER — ONDANSETRON HCL/PF 4 MG/2 ML
4 VIAL (ML) INJECTION ONCE
Refills: 0 | Status: DISCONTINUED | OUTPATIENT
Start: 2024-11-22 | End: 2024-11-22

## 2024-11-22 RX ORDER — ISOPROPYL ALCOHOL, BENZOCAINE .7; .06 ML/ML; ML/ML
0 SWAB TOPICAL
Qty: 100 | Refills: 1
Start: 2024-11-22

## 2024-11-22 RX ORDER — HYDROMORPHONE/SOD CHLOR,ISO/PF 2 MG/10 ML
0.5 SYRINGE (ML) INJECTION
Refills: 0 | Status: DISCONTINUED | OUTPATIENT
Start: 2024-11-22 | End: 2024-11-22

## 2024-11-22 RX ORDER — INSULIN LISPRO 100 U/ML
6 INJECTION, SOLUTION INTRAVENOUS; SUBCUTANEOUS
Refills: 0 | Status: DISCONTINUED | OUTPATIENT
Start: 2024-11-22 | End: 2024-11-23

## 2024-11-22 RX ORDER — FENTANYL CITRATE-0.9 % NACL/PF 100MCG/2ML
50 SYRINGE (ML) INTRAVENOUS
Refills: 0 | Status: DISCONTINUED | OUTPATIENT
Start: 2024-11-22 | End: 2024-11-22

## 2024-11-22 RX ADMIN — AMLODIPINE BESYLATE 10 MILLIGRAM(S): 10 TABLET ORAL at 06:07

## 2024-11-22 RX ADMIN — Medication 40 MILLIGRAM(S): at 06:07

## 2024-11-22 RX ADMIN — EFAVIRENZ, EMTRICITABINE AND TENOFOVIR DISOPROXIL FUMARATE 1 TABLET(S): 600; 200; 300 TABLET, FILM COATED ORAL at 13:20

## 2024-11-22 RX ADMIN — Medication 1 APPLICATION(S): at 13:25

## 2024-11-22 RX ADMIN — IPRATROPIUM BROMIDE AND ALBUTEROL SULFATE 3 MILLILITER(S): .5; 2.5 SOLUTION RESPIRATORY (INHALATION) at 21:04

## 2024-11-22 RX ADMIN — MUPIROCIN CALCIUM 1 APPLICATION(S): 20 CREAM TOPICAL at 06:45

## 2024-11-22 RX ADMIN — ENOXAPARIN SODIUM 40 MILLIGRAM(S): 100 INJECTION SUBCUTANEOUS at 22:00

## 2024-11-22 RX ADMIN — CALAMINE 8% AND ZINC OXIDE 8% 1 APPLICATION(S): 160 LOTION TOPICAL at 06:08

## 2024-11-22 RX ADMIN — ROSUVASTATIN CALCIUM 10 MILLIGRAM(S): 20 TABLET, FILM COATED ORAL at 22:00

## 2024-11-22 RX ADMIN — INSULIN LISPRO 4: 100 INJECTION, SOLUTION INTRAVENOUS; SUBCUTANEOUS at 09:09

## 2024-11-22 RX ADMIN — IPRATROPIUM BROMIDE AND ALBUTEROL SULFATE 3 MILLILITER(S): .5; 2.5 SOLUTION RESPIRATORY (INHALATION) at 03:06

## 2024-11-22 RX ADMIN — CALAMINE 8% AND ZINC OXIDE 8% 1 APPLICATION(S): 160 LOTION TOPICAL at 18:57

## 2024-11-22 RX ADMIN — Medication 1 DOSE(S): at 22:00

## 2024-11-22 RX ADMIN — INSULIN GLARGINE-YFGN 15 UNIT(S): 100 INJECTION, SOLUTION SUBCUTANEOUS at 22:53

## 2024-11-22 RX ADMIN — IPRATROPIUM BROMIDE AND ALBUTEROL SULFATE 3 MILLILITER(S): .5; 2.5 SOLUTION RESPIRATORY (INHALATION) at 16:07

## 2024-11-22 RX ADMIN — Medication 40 MILLIEQUIVALENT(S): at 16:33

## 2024-11-22 RX ADMIN — Medication 40 MILLIGRAM(S): at 18:58

## 2024-11-22 RX ADMIN — INSULIN LISPRO 6 UNIT(S): 100 INJECTION, SOLUTION INTRAVENOUS; SUBCUTANEOUS at 18:36

## 2024-11-22 RX ADMIN — INSULIN LISPRO 6 UNIT(S): 100 INJECTION, SOLUTION INTRAVENOUS; SUBCUTANEOUS at 13:23

## 2024-11-22 RX ADMIN — MUPIROCIN CALCIUM 1 APPLICATION(S): 20 CREAM TOPICAL at 18:59

## 2024-11-22 RX ADMIN — Medication 1 DOSE(S): at 09:20

## 2024-11-22 RX ADMIN — Medication 100 MILLIEQUIVALENT(S): at 09:11

## 2024-11-22 RX ADMIN — ESCITALOPRAM OXALATE 5 MILLIGRAM(S): 20 TABLET ORAL at 13:19

## 2024-11-22 RX ADMIN — INSULIN LISPRO 2: 100 INJECTION, SOLUTION INTRAVENOUS; SUBCUTANEOUS at 13:22

## 2024-11-22 RX ADMIN — METHYLPREDNISOLONE ACETATE 40 MILLIGRAM(S): 80 INJECTION, SUSPENSION INTRA-ARTICULAR; INTRALESIONAL; INTRAMUSCULAR; SOFT TISSUE at 06:09

## 2024-11-23 ENCOUNTER — TRANSCRIPTION ENCOUNTER (OUTPATIENT)
Age: 59
End: 2024-11-23

## 2024-11-23 VITALS — OXYGEN SATURATION: 96 %

## 2024-11-23 LAB
ALBUMIN SERPL ELPH-MCNC: 3.6 G/DL — SIGNIFICANT CHANGE UP (ref 3.3–5)
ALP SERPL-CCNC: 135 U/L — HIGH (ref 40–120)
ALT FLD-CCNC: 28 U/L — SIGNIFICANT CHANGE UP (ref 4–33)
ANION GAP SERPL CALC-SCNC: 14 MMOL/L — SIGNIFICANT CHANGE UP (ref 7–14)
AST SERPL-CCNC: 15 U/L — SIGNIFICANT CHANGE UP (ref 4–32)
BILIRUB SERPL-MCNC: 0.2 MG/DL — SIGNIFICANT CHANGE UP (ref 0.2–1.2)
BUN SERPL-MCNC: 28 MG/DL — HIGH (ref 7–23)
CALCIUM SERPL-MCNC: 8.8 MG/DL — SIGNIFICANT CHANGE UP (ref 8.4–10.5)
CHLORIDE SERPL-SCNC: 102 MMOL/L — SIGNIFICANT CHANGE UP (ref 98–107)
CO2 SERPL-SCNC: 21 MMOL/L — LOW (ref 22–31)
CREAT SERPL-MCNC: 0.98 MG/DL — SIGNIFICANT CHANGE UP (ref 0.5–1.3)
EGFR: 66 ML/MIN/1.73M2 — SIGNIFICANT CHANGE UP
EGFR: 66 ML/MIN/1.73M2 — SIGNIFICANT CHANGE UP
GLUCOSE BLDC GLUCOMTR-MCNC: 182 MG/DL — HIGH (ref 70–99)
GLUCOSE BLDC GLUCOMTR-MCNC: 218 MG/DL — HIGH (ref 70–99)
GLUCOSE SERPL-MCNC: 125 MG/DL — HIGH (ref 70–99)
HCT VFR BLD CALC: 36.8 % — SIGNIFICANT CHANGE UP (ref 34.5–45)
HGB BLD-MCNC: 12.3 G/DL — SIGNIFICANT CHANGE UP (ref 11.5–15.5)
MAGNESIUM SERPL-MCNC: 2.2 MG/DL — SIGNIFICANT CHANGE UP (ref 1.6–2.6)
MCHC RBC-ENTMCNC: 28.9 PG — SIGNIFICANT CHANGE UP (ref 27–34)
MCHC RBC-ENTMCNC: 33.4 G/DL — SIGNIFICANT CHANGE UP (ref 32–36)
MCV RBC AUTO: 86.6 FL — SIGNIFICANT CHANGE UP (ref 80–100)
NRBC # BLD AUTO: 0 K/UL — SIGNIFICANT CHANGE UP (ref 0–0)
NRBC # BLD: 0 /100 WBCS — SIGNIFICANT CHANGE UP (ref 0–0)
NRBC # FLD: 0 K/UL — SIGNIFICANT CHANGE UP (ref 0–0)
NRBC BLD-RTO: 0 /100 WBCS — SIGNIFICANT CHANGE UP (ref 0–0)
PHOSPHATE SERPL-MCNC: 3.3 MG/DL — SIGNIFICANT CHANGE UP (ref 2.5–4.5)
PLATELET # BLD AUTO: 435 K/UL — HIGH (ref 150–400)
POTASSIUM SERPL-MCNC: 3.9 MMOL/L — SIGNIFICANT CHANGE UP (ref 3.5–5.3)
POTASSIUM SERPL-SCNC: 3.9 MMOL/L — SIGNIFICANT CHANGE UP (ref 3.5–5.3)
PROT SERPL-MCNC: 6.6 G/DL — SIGNIFICANT CHANGE UP (ref 6–8.3)
RBC # BLD: 4.25 M/UL — SIGNIFICANT CHANGE UP (ref 3.8–5.2)
RBC # FLD: 15 % — HIGH (ref 10.3–14.5)
SODIUM SERPL-SCNC: 137 MMOL/L — SIGNIFICANT CHANGE UP (ref 135–145)
WBC # BLD: 19.77 K/UL — HIGH (ref 3.8–10.5)
WBC # FLD AUTO: 19.77 K/UL — HIGH (ref 3.8–10.5)

## 2024-11-23 PROCEDURE — 99239 HOSP IP/OBS DSCHRG MGMT >30: CPT | Mod: GC

## 2024-11-23 RX ORDER — PREDNISONE 20 MG/1
2 TABLET ORAL
Qty: 2 | Refills: 0
Start: 2024-11-23 | End: 2024-11-23

## 2024-11-23 RX ORDER — ROSUVASTATIN CALCIUM 20 MG/1
1 TABLET, FILM COATED ORAL
Qty: 30 | Refills: 0
Start: 2024-11-23 | End: 2024-12-22

## 2024-11-23 RX ORDER — EFAVIRENZ, EMTRICITABINE AND TENOFOVIR DISOPROXIL FUMARATE 600; 200; 300 MG/1; MG/1; MG/1
1 TABLET, FILM COATED ORAL
Qty: 30 | Refills: 0
Start: 2024-11-23 | End: 2024-12-22

## 2024-11-23 RX ORDER — ALBUTEROL SULFATE 2.5 MG/3ML
2 VIAL, NEBULIZER (ML) INHALATION
Qty: 1 | Refills: 0
Start: 2024-11-23

## 2024-11-23 RX ORDER — IPRATROPIUM BROMIDE AND ALBUTEROL SULFATE .5; 2.5 MG/3ML; MG/3ML
3 SOLUTION RESPIRATORY (INHALATION)
Qty: 90 | Refills: 0
Start: 2024-11-23

## 2024-11-23 RX ORDER — METFORMIN HYDROCHLORIDE 850 MG/1
1 TABLET ORAL
Qty: 60 | Refills: 0
Start: 2024-11-23 | End: 2024-12-22

## 2024-11-23 RX ORDER — AMLODIPINE BESYLATE 10 MG/1
1 TABLET ORAL
Qty: 30 | Refills: 0
Start: 2024-11-23 | End: 2024-12-22

## 2024-11-23 RX ORDER — METFORMIN HYDROCHLORIDE 850 MG/1
1 TABLET ORAL
Qty: 14 | Refills: 0
Start: 2024-11-23 | End: 2024-11-29

## 2024-11-23 RX ORDER — CHLORTHALIDONE 25 MG/1
1 TABLET ORAL
Qty: 30 | Refills: 0
Start: 2024-11-23 | End: 2024-12-22

## 2024-11-23 RX ORDER — ESCITALOPRAM OXALATE 20 MG/1
1 TABLET ORAL
Qty: 30 | Refills: 0
Start: 2024-11-23 | End: 2024-12-22

## 2024-11-23 RX ADMIN — MUPIROCIN CALCIUM 1 APPLICATION(S): 20 CREAM TOPICAL at 09:26

## 2024-11-23 RX ADMIN — INSULIN LISPRO 6 UNIT(S): 100 INJECTION, SOLUTION INTRAVENOUS; SUBCUTANEOUS at 09:23

## 2024-11-23 RX ADMIN — EFAVIRENZ, EMTRICITABINE AND TENOFOVIR DISOPROXIL FUMARATE 1 TABLET(S): 600; 200; 300 TABLET, FILM COATED ORAL at 11:40

## 2024-11-23 RX ADMIN — IPRATROPIUM BROMIDE AND ALBUTEROL SULFATE 3 MILLILITER(S): .5; 2.5 SOLUTION RESPIRATORY (INHALATION) at 10:27

## 2024-11-23 RX ADMIN — Medication 1 DOSE(S): at 09:27

## 2024-11-23 RX ADMIN — CALAMINE 8% AND ZINC OXIDE 8% 1 APPLICATION(S): 160 LOTION TOPICAL at 09:26

## 2024-11-23 RX ADMIN — INSULIN LISPRO 4: 100 INJECTION, SOLUTION INTRAVENOUS; SUBCUTANEOUS at 13:13

## 2024-11-23 RX ADMIN — INSULIN LISPRO 2: 100 INJECTION, SOLUTION INTRAVENOUS; SUBCUTANEOUS at 09:24

## 2024-11-23 RX ADMIN — IPRATROPIUM BROMIDE AND ALBUTEROL SULFATE 3 MILLILITER(S): .5; 2.5 SOLUTION RESPIRATORY (INHALATION) at 08:07

## 2024-11-23 RX ADMIN — IPRATROPIUM BROMIDE AND ALBUTEROL SULFATE 3 MILLILITER(S): .5; 2.5 SOLUTION RESPIRATORY (INHALATION) at 14:45

## 2024-11-23 RX ADMIN — Medication 1 APPLICATION(S): at 11:42

## 2024-11-23 RX ADMIN — AMLODIPINE BESYLATE 10 MILLIGRAM(S): 10 TABLET ORAL at 06:02

## 2024-11-23 RX ADMIN — IPRATROPIUM BROMIDE AND ALBUTEROL SULFATE 3 MILLILITER(S): .5; 2.5 SOLUTION RESPIRATORY (INHALATION) at 16:31

## 2024-11-23 RX ADMIN — Medication 40 MILLIGRAM(S): at 06:02

## 2024-11-23 RX ADMIN — ESCITALOPRAM OXALATE 5 MILLIGRAM(S): 20 TABLET ORAL at 11:40

## 2024-11-23 RX ADMIN — INSULIN LISPRO 6 UNIT(S): 100 INJECTION, SOLUTION INTRAVENOUS; SUBCUTANEOUS at 13:13

## 2024-11-23 RX ADMIN — METHYLPREDNISOLONE ACETATE 40 MILLIGRAM(S): 80 INJECTION, SUSPENSION INTRA-ARTICULAR; INTRALESIONAL; INTRAMUSCULAR; SOFT TISSUE at 06:02

## 2024-11-24 LAB
CULTURE RESULTS: SIGNIFICANT CHANGE UP
SPECIMEN SOURCE: SIGNIFICANT CHANGE UP

## 2024-11-26 LAB — NON-GYNECOLOGICAL CYTOLOGY STUDY: SIGNIFICANT CHANGE UP

## 2024-12-09 ENCOUNTER — INPATIENT (INPATIENT)
Facility: HOSPITAL | Age: 59
LOS: 1 days | Discharge: HOME HEALTH | End: 2024-12-11
Attending: HOSPITALIST | Admitting: HOSPITALIST
Payer: MEDICAID

## 2024-12-09 VITALS
TEMPERATURE: 98 F | DIASTOLIC BLOOD PRESSURE: 87 MMHG | OXYGEN SATURATION: 99 % | WEIGHT: 199.96 LBS | HEIGHT: 67 IN | HEART RATE: 98 BPM | RESPIRATION RATE: 16 BRPM | SYSTOLIC BLOOD PRESSURE: 154 MMHG

## 2024-12-09 DIAGNOSIS — J44.1 CHRONIC OBSTRUCTIVE PULMONARY DISEASE WITH (ACUTE) EXACERBATION: ICD-10-CM

## 2024-12-09 DIAGNOSIS — Z90.2 ACQUIRED ABSENCE OF LUNG [PART OF]: Chronic | ICD-10-CM

## 2024-12-09 DIAGNOSIS — C34.90 MALIGNANT NEOPLASM OF UNSPECIFIED PART OF UNSPECIFIED BRONCHUS OR LUNG: ICD-10-CM

## 2024-12-09 DIAGNOSIS — B20 HUMAN IMMUNODEFICIENCY VIRUS [HIV] DISEASE: ICD-10-CM

## 2024-12-09 DIAGNOSIS — Z29.9 ENCOUNTER FOR PROPHYLACTIC MEASURES, UNSPECIFIED: ICD-10-CM

## 2024-12-09 DIAGNOSIS — N28.89 OTHER SPECIFIED DISORDERS OF KIDNEY AND URETER: ICD-10-CM

## 2024-12-09 DIAGNOSIS — Z90.710 ACQUIRED ABSENCE OF BOTH CERVIX AND UTERUS: Chronic | ICD-10-CM

## 2024-12-09 DIAGNOSIS — Z79.899 OTHER LONG TERM (CURRENT) DRUG THERAPY: ICD-10-CM

## 2024-12-09 LAB
ADD ON TEST-SPECIMEN IN LAB: SIGNIFICANT CHANGE UP
ADD ON TEST-SPECIMEN IN LAB: SIGNIFICANT CHANGE UP
ALBUMIN SERPL ELPH-MCNC: 4.4 G/DL — SIGNIFICANT CHANGE UP (ref 3.3–5)
ALP SERPL-CCNC: 158 U/L — HIGH (ref 40–120)
ALT FLD-CCNC: 21 U/L — SIGNIFICANT CHANGE UP (ref 4–33)
ANION GAP SERPL CALC-SCNC: 14 MMOL/L — SIGNIFICANT CHANGE UP (ref 7–14)
AST SERPL-CCNC: 19 U/L — SIGNIFICANT CHANGE UP (ref 4–32)
B PERT DNA SPEC QL NAA+PROBE: SIGNIFICANT CHANGE UP
B PERT+PARAPERT DNA PNL SPEC NAA+PROBE: SIGNIFICANT CHANGE UP
BASOPHILS # BLD AUTO: 0.03 K/UL — SIGNIFICANT CHANGE UP (ref 0–0.2)
BASOPHILS NFR BLD AUTO: 0.4 % — SIGNIFICANT CHANGE UP (ref 0–2)
BILIRUB SERPL-MCNC: 0.2 MG/DL — SIGNIFICANT CHANGE UP (ref 0.2–1.2)
BLOOD GAS VENOUS COMPREHENSIVE RESULT: SIGNIFICANT CHANGE UP
BUN SERPL-MCNC: 14 MG/DL — SIGNIFICANT CHANGE UP (ref 7–23)
C PNEUM DNA SPEC QL NAA+PROBE: SIGNIFICANT CHANGE UP
CALCIUM SERPL-MCNC: 9.3 MG/DL — SIGNIFICANT CHANGE UP (ref 8.4–10.5)
CHLORIDE SERPL-SCNC: 100 MMOL/L — SIGNIFICANT CHANGE UP (ref 98–107)
CO2 SERPL-SCNC: 23 MMOL/L — SIGNIFICANT CHANGE UP (ref 22–31)
CREAT SERPL-MCNC: 0.83 MG/DL — SIGNIFICANT CHANGE UP (ref 0.5–1.3)
EGFR: 81 ML/MIN/1.73M2 — SIGNIFICANT CHANGE UP
EOSINOPHIL # BLD AUTO: 0.16 K/UL — SIGNIFICANT CHANGE UP (ref 0–0.5)
EOSINOPHIL NFR BLD AUTO: 2 % — SIGNIFICANT CHANGE UP (ref 0–6)
FLUAV AG NPH QL: SIGNIFICANT CHANGE UP
FLUAV SUBTYP SPEC NAA+PROBE: SIGNIFICANT CHANGE UP
FLUBV AG NPH QL: SIGNIFICANT CHANGE UP
FLUBV RNA SPEC QL NAA+PROBE: SIGNIFICANT CHANGE UP
GLUCOSE BLDC GLUCOMTR-MCNC: 298 MG/DL — HIGH (ref 70–99)
GLUCOSE SERPL-MCNC: 106 MG/DL — HIGH (ref 70–99)
HADV DNA SPEC QL NAA+PROBE: SIGNIFICANT CHANGE UP
HCOV 229E RNA SPEC QL NAA+PROBE: SIGNIFICANT CHANGE UP
HCOV HKU1 RNA SPEC QL NAA+PROBE: SIGNIFICANT CHANGE UP
HCOV NL63 RNA SPEC QL NAA+PROBE: SIGNIFICANT CHANGE UP
HCOV OC43 RNA SPEC QL NAA+PROBE: SIGNIFICANT CHANGE UP
HCT VFR BLD CALC: 38.8 % — SIGNIFICANT CHANGE UP (ref 34.5–45)
HGB BLD-MCNC: 13 G/DL — SIGNIFICANT CHANGE UP (ref 11.5–15.5)
HMPV RNA SPEC QL NAA+PROBE: SIGNIFICANT CHANGE UP
HPIV1 RNA SPEC QL NAA+PROBE: SIGNIFICANT CHANGE UP
HPIV2 RNA SPEC QL NAA+PROBE: SIGNIFICANT CHANGE UP
HPIV3 RNA SPEC QL NAA+PROBE: SIGNIFICANT CHANGE UP
HPIV4 RNA SPEC QL NAA+PROBE: SIGNIFICANT CHANGE UP
IANC: 4.66 K/UL — SIGNIFICANT CHANGE UP (ref 1.8–7.4)
IMM GRANULOCYTES NFR BLD AUTO: 0.2 % — SIGNIFICANT CHANGE UP (ref 0–0.9)
LYMPHOCYTES # BLD AUTO: 2.73 K/UL — SIGNIFICANT CHANGE UP (ref 1–3.3)
LYMPHOCYTES # BLD AUTO: 34 % — SIGNIFICANT CHANGE UP (ref 13–44)
M PNEUMO DNA SPEC QL NAA+PROBE: SIGNIFICANT CHANGE UP
MCHC RBC-ENTMCNC: 28.4 PG — SIGNIFICANT CHANGE UP (ref 27–34)
MCHC RBC-ENTMCNC: 33.5 G/DL — SIGNIFICANT CHANGE UP (ref 32–36)
MCV RBC AUTO: 84.9 FL — SIGNIFICANT CHANGE UP (ref 80–100)
MONOCYTES # BLD AUTO: 0.42 K/UL — SIGNIFICANT CHANGE UP (ref 0–0.9)
MONOCYTES NFR BLD AUTO: 5.2 % — SIGNIFICANT CHANGE UP (ref 2–14)
NEUTROPHILS # BLD AUTO: 4.66 K/UL — SIGNIFICANT CHANGE UP (ref 1.8–7.4)
NEUTROPHILS NFR BLD AUTO: 58.2 % — SIGNIFICANT CHANGE UP (ref 43–77)
NRBC # BLD: 0 /100 WBCS — SIGNIFICANT CHANGE UP (ref 0–0)
NRBC # FLD: 0 K/UL — SIGNIFICANT CHANGE UP (ref 0–0)
NT-PROBNP SERPL-SCNC: 128 PG/ML — SIGNIFICANT CHANGE UP
PLATELET # BLD AUTO: 452 K/UL — HIGH (ref 150–400)
POTASSIUM SERPL-MCNC: 3.1 MMOL/L — LOW (ref 3.5–5.3)
POTASSIUM SERPL-SCNC: 3.1 MMOL/L — LOW (ref 3.5–5.3)
PROT SERPL-MCNC: 7.8 G/DL — SIGNIFICANT CHANGE UP (ref 6–8.3)
RAPID RVP RESULT: DETECTED
RBC # BLD: 4.57 M/UL — SIGNIFICANT CHANGE UP (ref 3.8–5.2)
RBC # FLD: 14.6 % — HIGH (ref 10.3–14.5)
RSV RNA NPH QL NAA+NON-PROBE: SIGNIFICANT CHANGE UP
RSV RNA SPEC QL NAA+PROBE: SIGNIFICANT CHANGE UP
RV+EV RNA SPEC QL NAA+PROBE: DETECTED
SARS-COV-2 RNA SPEC QL NAA+PROBE: SIGNIFICANT CHANGE UP
SARS-COV-2 RNA SPEC QL NAA+PROBE: SIGNIFICANT CHANGE UP
SODIUM SERPL-SCNC: 137 MMOL/L — SIGNIFICANT CHANGE UP (ref 135–145)
TROPONIN T, HIGH SENSITIVITY RESULT: 13 NG/L — SIGNIFICANT CHANGE UP
WBC # BLD: 8.02 K/UL — SIGNIFICANT CHANGE UP (ref 3.8–10.5)
WBC # FLD AUTO: 8.02 K/UL — SIGNIFICANT CHANGE UP (ref 3.8–10.5)

## 2024-12-09 PROCEDURE — 71045 X-RAY EXAM CHEST 1 VIEW: CPT | Mod: 26

## 2024-12-09 PROCEDURE — 71275 CT ANGIOGRAPHY CHEST: CPT | Mod: 26,MC

## 2024-12-09 PROCEDURE — 99285 EMERGENCY DEPT VISIT HI MDM: CPT

## 2024-12-09 PROCEDURE — 99223 1ST HOSP IP/OBS HIGH 75: CPT

## 2024-12-09 RX ORDER — ACETAMINOPHEN 500MG 500 MG/1
650 TABLET, COATED ORAL EVERY 6 HOURS
Refills: 0 | Status: DISCONTINUED | OUTPATIENT
Start: 2024-12-09 | End: 2024-12-11

## 2024-12-09 RX ORDER — ALBUTEROL 90 MCG
2 AEROSOL (GRAM) INHALATION
Refills: 0 | Status: DISCONTINUED | OUTPATIENT
Start: 2024-12-09 | End: 2024-12-11

## 2024-12-09 RX ORDER — EFAVIRENZ, EMTRICITABINE, AND TENOFOVIR DISOPROXIL FUMARATE 600; 200; 300 MG/1; MG/1; MG/1
1 TABLET, FILM COATED ORAL AT BEDTIME
Refills: 0 | Status: DISCONTINUED | OUTPATIENT
Start: 2024-12-09 | End: 2024-12-11

## 2024-12-09 RX ORDER — ACETAMINOPHEN, DIPHENHYDRAMINE HCL, PHENYLEPHRINE HCL 325; 25; 5 MG/1; MG/1; MG/1
3 TABLET ORAL AT BEDTIME
Refills: 0 | Status: DISCONTINUED | OUTPATIENT
Start: 2024-12-09 | End: 2024-12-11

## 2024-12-09 RX ORDER — 0.9 % SODIUM CHLORIDE 0.9 %
1000 INTRAVENOUS SOLUTION INTRAVENOUS
Refills: 0 | Status: DISCONTINUED | OUTPATIENT
Start: 2024-12-09 | End: 2024-12-11

## 2024-12-09 RX ORDER — AZITHROMYCIN 250 MG/1
500 TABLET, FILM COATED ORAL ONCE
Refills: 0 | Status: COMPLETED | OUTPATIENT
Start: 2024-12-09 | End: 2024-12-09

## 2024-12-09 RX ORDER — POTASSIUM CHLORIDE 600 MG/1
40 TABLET, EXTENDED RELEASE ORAL ONCE
Refills: 0 | Status: COMPLETED | OUTPATIENT
Start: 2024-12-09 | End: 2024-12-09

## 2024-12-09 RX ORDER — ROSUVASTATIN CALCIUM 5 MG/1
10 TABLET, FILM COATED ORAL AT BEDTIME
Refills: 0 | Status: DISCONTINUED | OUTPATIENT
Start: 2024-12-09 | End: 2024-12-11

## 2024-12-09 RX ORDER — MAGNESIUM, ALUMINUM HYDROXIDE 200-225/5
30 SUSPENSION, ORAL (FINAL DOSE FORM) ORAL EVERY 4 HOURS
Refills: 0 | Status: DISCONTINUED | OUTPATIENT
Start: 2024-12-09 | End: 2024-12-11

## 2024-12-09 RX ORDER — GLUCAGON INJECTION, SOLUTION 0.5 MG/.1ML
1 INJECTION, SOLUTION SUBCUTANEOUS ONCE
Refills: 0 | Status: DISCONTINUED | OUTPATIENT
Start: 2024-12-09 | End: 2024-12-11

## 2024-12-09 RX ORDER — KETOROLAC TROMETHAMINE 30 MG/ML
15 INJECTION INTRAMUSCULAR; INTRAVENOUS ONCE
Refills: 0 | Status: DISCONTINUED | OUTPATIENT
Start: 2024-12-09 | End: 2024-12-09

## 2024-12-09 RX ORDER — PANTOPRAZOLE SODIUM 40 MG/1
40 TABLET, DELAYED RELEASE ORAL EVERY 12 HOURS
Refills: 0 | Status: DISCONTINUED | OUTPATIENT
Start: 2024-12-09 | End: 2024-12-11

## 2024-12-09 RX ORDER — ONDANSETRON HYDROCHLORIDE 4 MG/1
4 TABLET, FILM COATED ORAL EVERY 8 HOURS
Refills: 0 | Status: DISCONTINUED | OUTPATIENT
Start: 2024-12-09 | End: 2024-12-11

## 2024-12-09 RX ORDER — METHYLPREDNISOLONE SOD SUCC 125 MG
125 VIAL (EA) INJECTION ONCE
Refills: 0 | Status: COMPLETED | OUTPATIENT
Start: 2024-12-09 | End: 2024-12-09

## 2024-12-09 RX ORDER — IPRATROPIUM BROMIDE AND ALBUTEROL SULFATE 2.5; .5 MG/3ML; MG/3ML
3 SOLUTION RESPIRATORY (INHALATION) EVERY 6 HOURS
Refills: 0 | Status: DISCONTINUED | OUTPATIENT
Start: 2024-12-09 | End: 2024-12-11

## 2024-12-09 RX ORDER — METHYLPREDNISOLONE SOD SUCC 125 MG
40 VIAL (EA) INJECTION EVERY 8 HOURS
Refills: 0 | Status: DISCONTINUED | OUTPATIENT
Start: 2024-12-09 | End: 2024-12-11

## 2024-12-09 RX ORDER — AZITHROMYCIN 250 MG/1
500 TABLET, FILM COATED ORAL EVERY 24 HOURS
Refills: 0 | Status: DISCONTINUED | OUTPATIENT
Start: 2024-12-10 | End: 2024-12-11

## 2024-12-09 RX ORDER — HEPARIN SODIUM,PORCINE 1000/ML
5000 VIAL (ML) INJECTION EVERY 12 HOURS
Refills: 0 | Status: DISCONTINUED | OUTPATIENT
Start: 2024-12-09 | End: 2024-12-11

## 2024-12-09 RX ORDER — CEFTRIAXONE SODIUM 1 G
1000 VIAL (EA) INJECTION ONCE
Refills: 0 | Status: COMPLETED | OUTPATIENT
Start: 2024-12-09 | End: 2024-12-09

## 2024-12-09 RX ORDER — IPRATROPIUM BROMIDE AND ALBUTEROL SULFATE 2.5; .5 MG/3ML; MG/3ML
3 SOLUTION RESPIRATORY (INHALATION)
Refills: 0 | Status: COMPLETED | OUTPATIENT
Start: 2024-12-09 | End: 2024-12-09

## 2024-12-09 RX ORDER — ESCITALOPRAM OXALATE 10 MG/1
5 TABLET, FILM COATED ORAL DAILY
Refills: 0 | Status: DISCONTINUED | OUTPATIENT
Start: 2024-12-09 | End: 2024-12-11

## 2024-12-09 RX ADMIN — IPRATROPIUM BROMIDE AND ALBUTEROL SULFATE 3 MILLILITER(S): 2.5; .5 SOLUTION RESPIRATORY (INHALATION) at 22:59

## 2024-12-09 RX ADMIN — Medication 150 GRAM(S): at 18:30

## 2024-12-09 RX ADMIN — ROSUVASTATIN CALCIUM 10 MILLIGRAM(S): 5 TABLET, FILM COATED ORAL at 22:58

## 2024-12-09 RX ADMIN — IPRATROPIUM BROMIDE AND ALBUTEROL SULFATE 3 MILLILITER(S): 2.5; .5 SOLUTION RESPIRATORY (INHALATION) at 17:46

## 2024-12-09 RX ADMIN — IPRATROPIUM BROMIDE AND ALBUTEROL SULFATE 3 MILLILITER(S): 2.5; .5 SOLUTION RESPIRATORY (INHALATION) at 17:16

## 2024-12-09 RX ADMIN — Medication 125 MILLIGRAM(S): at 15:23

## 2024-12-09 RX ADMIN — Medication 40 MILLIGRAM(S): at 22:58

## 2024-12-09 RX ADMIN — Medication 1: at 22:55

## 2024-12-09 RX ADMIN — Medication 100 MILLIGRAM(S): at 15:19

## 2024-12-09 RX ADMIN — AZITHROMYCIN 255 MILLIGRAM(S): 250 TABLET, FILM COATED ORAL at 16:50

## 2024-12-09 RX ADMIN — IPRATROPIUM BROMIDE AND ALBUTEROL SULFATE 3 MILLILITER(S): 2.5; .5 SOLUTION RESPIRATORY (INHALATION) at 16:48

## 2024-12-09 RX ADMIN — POTASSIUM CHLORIDE 40 MILLIEQUIVALENT(S): 600 TABLET, EXTENDED RELEASE ORAL at 18:29

## 2024-12-09 RX ADMIN — KETOROLAC TROMETHAMINE 15 MILLIGRAM(S): 30 INJECTION INTRAMUSCULAR; INTRAVENOUS at 15:22

## 2024-12-09 NOTE — ED PROVIDER NOTE - PROGRESS NOTE DETAILS
Taylor Leyva MD (PGY-3 EM): discussed CT results and need for renal MRI f/u. patient still has b/l wheeze, awaiting mag. will admit for copd exacerbation Taylor Leyva MD (PGY-3 EM): discussed CT results and need for renal MRI f/u. patient still has b/l wheeze, awaiting mag. will admit for copd exacerbation. breathing well RA

## 2024-12-09 NOTE — H&P ADULT - NSHPPHYSICALEXAM_GEN_ALL_CORE
PHYSICAL EXAM:  VITALS: Vital Signs Last 24 Hrs  T(C): 36.7 (09 Dec 2024 20:25), Max: 36.8 (09 Dec 2024 13:01)  T(F): 98.1 (09 Dec 2024 20:25), Max: 98.3 (09 Dec 2024 13:01)  HR: 99 (09 Dec 2024 20:25) (98 - 99)  BP: 123/89 (09 Dec 2024 20:25) (123/89 - 154/87)  BP(mean): --  RR: 17 (09 Dec 2024 20:25) (16 - 17)  SpO2: 98% (09 Dec 2024 20:25) (98% - 100%)    Parameters below as of 09 Dec 2024 20:25  Patient On (Oxygen Delivery Method): room air      GENERAL: NAD, comfortable at bedside  HEAD:  Atraumatic, Normocephalic  EYES: EOMI, PERRL, conjunctiva and sclera clear  ENT: Moist Mucus Membranes present, no ulcers appreciated  NECK: Supple, No JVD  CHEST/LUNG: + wheezing noted b/l throughout lung fields, no rales or rhonchi, no accessory muscle use  HEART: Regular rate and rhythm; No murmurs, rubs, or gallops, (+)S1, S2  ABDOMEN: Soft, Nontender, Nondistended; Normal Bowel sounds   EXTREMITIES: No clubbing, cyanosis, or edema  PSYCH: normal mood and affect  NEUROLOGY: AAOx3, non-focal  SKIN: No rashes or lesions

## 2024-12-09 NOTE — ED ADULT TRIAGE NOTE - CHIEF COMPLAINT QUOTE
Pt history of lung ca, presents for shortness of breath progressing over the past several days, was given 1 treatment of albuterol given with EMS, with no relief of shortness of breath, currently on 3L NC spo2 99%, denies any present pain, afebrile.

## 2024-12-09 NOTE — H&P ADULT - PROBLEM SELECTOR PLAN 2
- patient with hx of renal lesion on the L kidney which has increased in size when compared to 2022  - patient made aware of this finding  - she is requesting assistance to schedule a standing MRI test - day team to follow up  - urology follow up and oncology follow up outpatient

## 2024-12-09 NOTE — ED PROVIDER NOTE - CLINICAL SUMMARY MEDICAL DECISION MAKING FREE TEXT BOX
59-year-old female with a history of HIV on HAART, asthma/COPD, lung cancer presenting with wheezing, shortness of breath, nasal congestion, intermittent cough.  Concern for COPD exacerbation versus viral illness versus pneumonia.  Labs, EKG, CXR, CTA chest, DuoNebs, steroids, ABX ordered.

## 2024-12-09 NOTE — ED PROVIDER NOTE - PHYSICAL EXAMINATION
GENERAL: well appearing  HEAD: normocephalic, atraumatic  HEENT: normal conjunctiva, oral mucosa moist  CARDIAC: regular rate and rhythm  PULM: speaking in full sentences, increased work of breathing, wheezing b/l  GI: abdomen nondistended, soft, nontender  : no suprapubic tenderness  NEURO: moving all 4 extremities, answering questions appropriately  MSK: no peripheral edema  SKIN: extremities warm

## 2024-12-09 NOTE — H&P ADULT - NSHPLABSRESULTS_GEN_ALL_CORE
13.0   8.02  )-----------( 452      ( 09 Dec 2024 15:06 )             38.8       12-09    137  |  100  |  14  ----------------------------<  106[H]  3.1[L]   |  23  |  0.83    Ca    9.3      09 Dec 2024 15:06  Mg     2.00     12-09    TPro  7.8  /  Alb  4.4  /  TBili  0.2  /  DBili  x   /  AST  19  /  ALT  21  /  AlkPhos  158[H]  12-09                15:06 - VBG - pH: 7.43  | pCO2: 40    | pO2: 57    | Lactate: 1.6        Urinalysis Basic - ( 09 Dec 2024 15:06 )    Color: x / Appearance: x / SG: x / pH: x  Gluc: 106 mg/dL / Ketone: x  / Bili: x / Urobili: x   Blood: x / Protein: x / Nitrite: x   Leuk Esterase: x / RBC: x / WBC x   Sq Epi: x / Non Sq Epi: x / Bacteria: x            CXR: As per my read - no opacities noted, Will wait for prelim/official read    EKG: As per my read - NSR QTc 457 ms    CT:  IMPRESSION:  No pulmonary embolism.    Indeterminate left renal lesion, 1 cm, stable compared to recent prior   10/7/2024, however increased compared to prior 8/30/2022 when it measured   0.5 cm. Recommend further evaluation with contrast-enhanced MRI renal   mass protocol.

## 2024-12-09 NOTE — H&P ADULT - NSHPREVIEWOFSYSTEMS_GEN_ALL_CORE
REVIEW OF SYSTEMS:    CONSTITUTIONAL: No weakness, fevers or chills  EYES/ENT: No visual changes;  No dysphagia; No sore throat; + rhinorrhea; No sinus pain/pressure  NECK: No pain or stiffness  RESPIRATORY: + cough, + wheezing, no hemoptysis; + shortness of breath  CARDIOVASCULAR: No chest pain or palpitations; No lower extremity edema  GASTROINTESTINAL: No abdominal or epigastric pain. No nausea, vomiting, or hematemesis; No diarrhea or constipation. No melena or hematochezia.  GENITOURINARY: No dysuria, frequency or hematuria  NEUROLOGICAL: No numbness or focal weakness  MSK: ambulates without assistance  SKIN: No itching, burning, rashes, or lesions   All other review of systems is negative unless indicated above.

## 2024-12-09 NOTE — ED PROVIDER NOTE - OBJECTIVE STATEMENT
59-year-old female with a history of HIV on HAART, asthma/COPD, lung cancer presenting with wheezing, shortness of breath, nasal congestion, intermittent cough.  Per patient she was recently seen here approximately 3 weeks ago for similar symptoms.  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  She has been taking her inhalers without significant proven her symptoms.  She otherwise has no significant headache, chest pain, N/V/D/abdominal pain, dysuria, peripheral edema, fever/chills.

## 2024-12-09 NOTE — H&P ADULT - ASSESSMENT
67 y/o F with pmhx of HIV on antiretroviral medications, asthma/COPD, lung Ca presented to the ED for new onset shortness of breath. The patient is admitted for COPD exacerbation.

## 2024-12-09 NOTE — H&P ADULT - HISTORY OF PRESENT ILLNESS
This is a 67 y/o F with pmhx of HIV on antiretroviral medications, asthma/COPD, lung Ca presented to the ED for new onset shortness of breath. The patient noted that she started having congestion and coughing with yellow sputum. Also having myalgias. Denies fevers. She stated that she started wheezing and tried using her inhaler for which it was not improving the symptoms. The patient also noted that she had sick contacts while attending school. Also endorsing congestion. ROS otherwise negative.

## 2024-12-09 NOTE — ED PROVIDER NOTE - ATTENDING CONTRIBUTION TO CARE
59F h/o lung CA p/w SOB x few days.  Denies chest pain. Received albuterol PTA.  Pt on oxygen started by EMS, not hypoxic on RA here.  PMHX HIV on HAART, asthma, lung CA s/p resection 2022.  Pt was admitted here last month for similar, would check CTPA eval for PE given recurrent COPD exacerbation.  Check labs, EKG, trops as well, rx nebs, steroids, mag, reass.   VS:  unremarkable    GEN - NAD;   malaise   A+O x3  Mild discomfort SOB, speaking in complete sentences  HEAD - NC/AT     ENT - PEERL, EOMI, mucous membranes  dry , no discharge      NECK: Neck supple, non-tender without lymphadenopathy, no masses, no JVD  PULM - bilat wheeze,  symmetric breath sounds  COR -  normal heart sounds    ABD - , ND, NT, soft,  BACK - no CVA tenderness, nontender spine     EXTREMS - no edema, no deformity, warm and well perfused    SKIN - no rash    or bruising      NEUROLOGIC - alert, face symmetric, speech fluent, sensation nl, motor no focal deficit.

## 2024-12-09 NOTE — ED ADULT NURSE NOTE - OBJECTIVE STATEMENT
Patient is A&O x 4 presenting to the ED with SOB, audible wheezing, and a nonproductive cough, patient was here 3 weeks ago for a similar exasperation but states this time she is also having slight indigestion and pain in her ribs. History of Asthma, COPD, DM, and lung cancer. At home she takes an inhaler but states she has not gotten much relief this time. Denies any nausea, vomiting, dizziness or fever/chills. Cap refill <2 seconds, lung sounds show wheezing on ausculation and audibly. Patient speaks in full sentences. Labs sent and meds administered. 20 gauge IV in her Left AC. plan of care ongoing

## 2024-12-09 NOTE — H&P ADULT - PROBLEM SELECTOR PLAN 1
- patient with new onset SOB presenting for COPD exacerbation  - pending full RVP, CT chest neg for pneumonia or PE  - will start solumedrol 40mg Q8hr  - duonebs Q6hr standing and albuterol Q3hr prn  - Continuous pulse ox monitoring for now  - will start azithro due to increased sputum production - patient with new onset SOB presenting for COPD exacerbation  - pending full RVP, CT chest neg for pneumonia or PE - notified patient about rhinovirus finding  - will start solumedrol 40mg Q8hr  - duonebs Q6hr standing and albuterol Q3hr prn  - Continuous pulse ox monitoring for now  - will start azithro due to increased sputum production

## 2024-12-09 NOTE — ED ADULT NURSE REASSESSMENT NOTE - NS ED NURSE REASSESS COMMENT FT1
Pt is resting in bed. Pt is A+O4. Pt is normal sinus on tele monitor. Respirations even and unlabored. Denies headache, chest pain, SOB, n/v, diarrhea, dysuria, hematuria, abdominal pain, dizziness, blurry vision, numbness and tingling. Plan of care ongoing.

## 2024-12-10 DIAGNOSIS — E11.9 TYPE 2 DIABETES MELLITUS WITHOUT COMPLICATIONS: ICD-10-CM

## 2024-12-10 LAB
GLUCOSE BLDC GLUCOMTR-MCNC: 157 MG/DL — HIGH (ref 70–99)
GLUCOSE BLDC GLUCOMTR-MCNC: 204 MG/DL — HIGH (ref 70–99)
GLUCOSE BLDC GLUCOMTR-MCNC: 206 MG/DL — HIGH (ref 70–99)
GLUCOSE BLDC GLUCOMTR-MCNC: 231 MG/DL — HIGH (ref 70–99)

## 2024-12-10 PROCEDURE — 99233 SBSQ HOSP IP/OBS HIGH 50: CPT

## 2024-12-10 RX ORDER — INSULIN GLARGINE 100 [IU]/ML
5 INJECTION, SOLUTION SUBCUTANEOUS AT BEDTIME
Refills: 0 | Status: DISCONTINUED | OUTPATIENT
Start: 2024-12-10 | End: 2024-12-11

## 2024-12-10 RX ORDER — DIPHENHYDRAMINE HCL 25 MG
25 CAPSULE ORAL EVERY 12 HOURS
Refills: 0 | Status: DISCONTINUED | OUTPATIENT
Start: 2024-12-10 | End: 2024-12-11

## 2024-12-10 RX ORDER — AMLODIPINE BESYLATE 10 MG/1
10 TABLET ORAL DAILY
Refills: 0 | Status: DISCONTINUED | OUTPATIENT
Start: 2024-12-10 | End: 2024-12-11

## 2024-12-10 RX ORDER — POLYETHYLENE GLYCOL 3350 17 G/17G
17 POWDER, FOR SOLUTION ORAL
Refills: 0 | Status: DISCONTINUED | OUTPATIENT
Start: 2024-12-10 | End: 2024-12-11

## 2024-12-10 RX ORDER — SENNOSIDES 8.6 MG
2 TABLET ORAL AT BEDTIME
Refills: 0 | Status: DISCONTINUED | OUTPATIENT
Start: 2024-12-10 | End: 2024-12-11

## 2024-12-10 RX ORDER — ERGOCALCIFEROL (VITAMIN D2) 200 MCG/ML
1 DROPS ORAL
Refills: 0 | DISCHARGE

## 2024-12-10 RX ORDER — FLUTICASONE PROPIONATE 50 MCG
1 SPRAY, SUSPENSION NASAL
Refills: 0 | DISCHARGE

## 2024-12-10 RX ADMIN — Medication 2: at 17:45

## 2024-12-10 RX ADMIN — Medication 40 MILLIGRAM(S): at 21:33

## 2024-12-10 RX ADMIN — INSULIN GLARGINE 5 UNIT(S): 100 INJECTION, SOLUTION SUBCUTANEOUS at 22:33

## 2024-12-10 RX ADMIN — PANTOPRAZOLE SODIUM 40 MILLIGRAM(S): 40 TABLET, DELAYED RELEASE ORAL at 17:48

## 2024-12-10 RX ADMIN — EFAVIRENZ, EMTRICITABINE, AND TENOFOVIR DISOPROXIL FUMARATE 1 TABLET(S): 600; 200; 300 TABLET, FILM COATED ORAL at 21:33

## 2024-12-10 RX ADMIN — ROSUVASTATIN CALCIUM 10 MILLIGRAM(S): 5 TABLET, FILM COATED ORAL at 21:33

## 2024-12-10 RX ADMIN — AMLODIPINE BESYLATE 10 MILLIGRAM(S): 10 TABLET ORAL at 14:24

## 2024-12-10 RX ADMIN — Medication 40 MILLIGRAM(S): at 14:24

## 2024-12-10 RX ADMIN — IPRATROPIUM BROMIDE AND ALBUTEROL SULFATE 3 MILLILITER(S): 2.5; .5 SOLUTION RESPIRATORY (INHALATION) at 13:45

## 2024-12-10 RX ADMIN — Medication 1: at 08:59

## 2024-12-10 RX ADMIN — IPRATROPIUM BROMIDE AND ALBUTEROL SULFATE 3 MILLILITER(S): 2.5; .5 SOLUTION RESPIRATORY (INHALATION) at 07:20

## 2024-12-10 RX ADMIN — Medication 40 MILLIGRAM(S): at 06:09

## 2024-12-10 RX ADMIN — IPRATROPIUM BROMIDE AND ALBUTEROL SULFATE 3 MILLILITER(S): 2.5; .5 SOLUTION RESPIRATORY (INHALATION) at 21:30

## 2024-12-10 RX ADMIN — Medication 5000 UNIT(S): at 06:09

## 2024-12-10 RX ADMIN — Medication 2: at 12:41

## 2024-12-10 RX ADMIN — Medication 5000 UNIT(S): at 17:48

## 2024-12-10 RX ADMIN — POLYETHYLENE GLYCOL 3350 17 GRAM(S): 17 POWDER, FOR SOLUTION ORAL at 17:47

## 2024-12-10 RX ADMIN — PANTOPRAZOLE SODIUM 40 MILLIGRAM(S): 40 TABLET, DELAYED RELEASE ORAL at 06:10

## 2024-12-10 RX ADMIN — AZITHROMYCIN 500 MILLIGRAM(S): 250 TABLET, FILM COATED ORAL at 18:36

## 2024-12-10 RX ADMIN — EFAVIRENZ, EMTRICITABINE, AND TENOFOVIR DISOPROXIL FUMARATE 1 TABLET(S): 600; 200; 300 TABLET, FILM COATED ORAL at 06:09

## 2024-12-10 RX ADMIN — Medication 3 UNIT(S): at 17:47

## 2024-12-10 RX ADMIN — Medication 2 TABLET(S): at 21:33

## 2024-12-10 NOTE — PROGRESS NOTE ADULT - PROBLEM SELECTOR PLAN 4
Pt undergoing care with Dr Blair of [facility]   s/p RVATS  L lobectomy at [facility] for Stage [id number] lesion ( 2022) measuring 1.4 cm with no jc involvement  Last CT Chest 6/12/24 with no suspicious findings  follow up with Dr Blair after discharge  CTAP with stable Indeterminate left renal lesion, 1 cm, stable compared to recent prior 10/7/2024, however increased compared to prior 8/30/2022 when it measured 0.5 cm. Appreciate onc-Can be followed outpatient s/w Atripla  FU CD4 count and HIV viral load

## 2024-12-10 NOTE — PROGRESS NOTE ADULT - PROBLEM SELECTOR PLAN 3
s/w Atripla  FU CD4 count and HIV viral load Patient with hx of renal lesion on the L kidney which has increased in size when compared to 2022  Patient made aware of this finding. Pt will FU with Oncology as outpt   Urology follow up and oncology follow up outpatient

## 2024-12-10 NOTE — CONSULT NOTE ADULT - ASSESSMENT
59 year old female with history of lung ca s/p resection admitted with asthma exacerbation    Lung cancer  -undergoing care with Dr Blair of [facility]   -s/p RVATS  L lobectomy at [facility] for Stage [id number] lesion ( 2022) measuring 1.4 cm with no jc involvement  -Last CT Chest 6/12/24 with no suspicious findings  -follow up with Dr Blair after discharge    Shortness of breath  -history of asthma with multiple admissions for exacerbations  -02 sat 96% room air, zithromax  -Rhinovirus +, Lungs clear on CXR  CTAP negative pulmonary embolism.  Stable Indeterminate left renal lesion, 1 cm, stable compared to recent prior 10/7/2024, however increased compared to prior 8/30/2022 when it measured 0.5 cm. Can be followed outpatient  -Pulm consult      HIV disease  -on Antivirals  -defer to MOISES Da Silva NP  Hematology/Oncology  [facility]  [phone] (Office)  [phone] (Alt office)  Evenings and weekends please call MD on call or office

## 2024-12-10 NOTE — PROGRESS NOTE ADULT - PROBLEM SELECTOR PLAN 1
Patient with new onset SOB presenting for COPD exacerbation  CTA-Postsurgical changes right upper lobectomy. Mild emphysematous changes. No suspicious   pulmonary nodule. Right lower lobe calcified granuloma. Linear subsegmental atelectasis involving the lingula and both lower lobes.  RVP positive for Entero/Rhino virus  On solumedrol 40mg Q8hr  On  azithro due to increased sputum production  Duonebs Q6hr standing and albuterol Q3hr prn  Continuous pulse ox monitoring for now- Satting 97% on RA Patient with new onset SOB presenting for COPD exacerbation  CTA-Postsurgical changes right upper lobectomy. Mild emphysematous changes. No suspicious   pulmonary nodule. Right lower lobe calcified granuloma. Linear subsegmental atelectasis involving the lingula and both lower lobes.  RVP positive for Entero/Rhino virus  On solumedrol 40mg Q8hr  On  azithro due to increased sputum production  Duonebs Q6hr standing and albuterol Q3hr prn  Continuous pulse ox monitoring for now- Satting 97% on RA  Was admitted for COPD exacerbation a few weeks back. Was seen by Pul and ENT. Underwent flexible laryngoscopy with ENT, findings wnl. Pt underwent bronchoscopy with IP - evaluation revealed no abnormalities, no signs of subglottic stenosis, tracheal stenosis or bronchial stenosis, and larynx with signs of chronic reflux. CT neck showed chronic R vocal cord paralysis, underwent repeat flex laryngoscopy with ENT, findings again wnl. Patient with  SOB presenting for COPD exacerbation likely secondary to rhinoviral infection  Was admitted for COPD exacerbation a few weeks back. Was seen by Pul and ENT. Underwent flexible laryngoscopy with ENT, findings wnl. Pt underwent bronchoscopy with IP - evaluation revealed no abnormalities, no signs of subglottic stenosis, tracheal stenosis or bronchial stenosis, and larynx with signs of chronic reflux. CT neck showed chronic R vocal cord paralysis, underwent repeat flex laryngoscopy with ENT, findings again wnl.  CTA-Postsurgical changes right upper lobectomy. Mild emphysematous changes. No suspicious   pulmonary nodule. Right lower lobe calcified granuloma. Linear subsegmental atelectasis involving the lingula and both lower lobes.  RVP positive for Entero/Rhino virus  On solumedrol 40mg Q8hr  On  azithro due to increased sputum production  Duonebs Q6hr standing and albuterol Q3hr prn  Continuous pulse ox monitoring for now- Satting 97% on RA

## 2024-12-10 NOTE — PROGRESS NOTE ADULT - PROBLEM SELECTOR PLAN 5
- [facility] pharmacy emailed Pt undergoing care with Dr Blair of [facility]   s/p RVATS  L lobectomy at [facility] for Stage [id number] lesion ( 2022) measuring 1.4 cm with no jc involvement  Last CT Chest 6/12/24 with no suspicious findings  follow up with Dr Blair after discharge  CTAP with stable Indeterminate left renal lesion, 1 cm, stable compared to recent prior 10/7/2024, however increased compared to prior 8/30/2022 when it measured 0.5 cm. Appreciate onc-Can be followed outpatient

## 2024-12-10 NOTE — CONSULT NOTE ADULT - NS ATTEND AMEND GEN_ALL_CORE FT
Patient seen and examined with NP during rounds  I agree with her assessment and plan    pt with h/o NSCLC s/p resection, DOMENIC  Admitted for SOB, related to asthma exacerbation, has URI, on appropriate therapy with reponse  Imaging with left renal lesion 1 cm, need outpatient follow up  rest per NP note

## 2024-12-10 NOTE — PROGRESS NOTE ADULT - SUBJECTIVE AND OBJECTIVE BOX
Elly Goetz  Hospitalist  Pager- [phone]    PROGRESS NOTE:     Patient is a 59y old  Female who presents with a chief complaint of shortness of breath (10 Dec 2024 13:30)      SUBJECTIVE / OVERNIGHT EVENTS: Pt seen and examined by me   Ambulating in the room. Audible wheezing +  Reports not feeling good past few days. Feeling a little better today   Denies CP/Palpitations    MEDICATIONS  (STANDING):  albuterol/ipratropium for Nebulization 3 milliLiter(s) Nebulizer every 6 hours  amLODIPine   Tablet 10 milliGRAM(s) Oral daily  azithromycin   Tablet 500 milliGRAM(s) Oral every 24 hours  dextrose 5%. 1000 milliLiter(s) (50 mL/Hr) IV Continuous <Continuous>  dextrose 5%. 1000 milliLiter(s) (100 mL/Hr) IV Continuous <Continuous>  dextrose 50% Injectable 25 Gram(s) IV Push once  dextrose 50% Injectable 12.5 Gram(s) IV Push once  dextrose 50% Injectable 25 Gram(s) IV Push once  efavirenz 600/emtricitabine 200/tenofovir 300mg 1 Tablet(s) Oral at bedtime  escitalopram 5 milliGRAM(s) Oral daily  glucagon  Injectable 1 milliGRAM(s) IntraMuscular once  heparin   Injectable 5000 Unit(s) SubCutaneous every 12 hours  insulin lispro (ADMELOG) corrective regimen sliding scale   SubCutaneous three times a day before meals  insulin lispro (ADMELOG) corrective regimen sliding scale   SubCutaneous at bedtime  methylPREDNISolone sodium succinate Injectable 40 milliGRAM(s) IV Push every 8 hours  pantoprazole    Tablet 40 milliGRAM(s) Oral every 12 hours  polyethylene glycol 3350 17 Gram(s) Oral two times a day  rosuvastatin 10 milliGRAM(s) Oral at bedtime  senna 2 Tablet(s) Oral at bedtime    MEDICATIONS  (PRN):  acetaminophen     Tablet .. 650 milliGRAM(s) Oral every 6 hours PRN Temp greater or equal to 38C (100.4F), Mild Pain (1 - 3)  albuterol    90 MICROgram(s) HFA Inhaler 2 Puff(s) Inhalation every 3 hours PRN Shortness of Breath and/or Wheezing  aluminum hydroxide/magnesium hydroxide/simethicone Suspension 30 milliLiter(s) Oral every 4 hours PRN Dyspepsia  dextrose Oral Gel 15 Gram(s) Oral once PRN Blood Glucose LESS THAN 70 milliGRAM(s)/deciliter  diphenhydrAMINE Elixir 25 milliGRAM(s) Oral every 12 hours PRN Itching  melatonin 3 milliGRAM(s) Oral at bedtime PRN Insomnia  ondansetron Injectable 4 milliGRAM(s) IV Push every 8 hours PRN Nausea and/or Vomiting      CAPILLARY BLOOD GLUCOSE      POCT Blood Glucose.: 206 mg/dL (10 Dec 2024 12:13)  POCT Blood Glucose.: 157 mg/dL (10 Dec 2024 08:45)  POCT Blood Glucose.: 298 mg/dL (09 Dec 2024 22:21)  POCT Blood Glucose.: 118 mg/dL (09 Dec 2024 15:54)    I&O's Summary      PHYSICAL EXAM:  Vital Signs Last 24 Hrs  T(C): 36.7 (10 Dec 2024 10:36), Max: 36.8 (09 Dec 2024 23:30)  T(F): 98 (10 Dec 2024 10:36), Max: 98.3 (09 Dec 2024 23:30)  HR: 100 (10 Dec 2024 10:36) (90 - 105)  BP: 148/90 (10 Dec 2024 10:36) (123/89 - 148/90)  BP(mean): --  RR: 18 (10 Dec 2024 10:36) (17 - 20)  SpO2: 97% (10 Dec 2024 10:36) (96% - 98%)    Parameters below as of 10 Dec 2024 10:36  Patient On (Oxygen Delivery Method): room air        CONSTITUTIONAL: NAD, well-developed  RESPIRATORY: Bilateral wheezing present  CARDIOVASCULAR: Regular rate and rhythm, normal S1 and S2, no murmur/rub/gallop; No lower extremity edema; Peripheral pulses are 2+ bilaterally  ABDOMEN: Nontender to palpation, normoactive bowel sounds, no rebound/guarding; No hepatosplenomegaly  MUSCLOSKELETAL: no clubbing or cyanosis of digits; no joint swelling or tenderness to palpation  PSYCH: A+O to person, place, and time; affect appropriate  NEURO: no gross deficits  SKIN: no rash     LABS:                        13.0   8.02  )-----------( 452      ( 09 Dec 2024 15:06 )             38.8     12-09    137  |  100  |  14  ----------------------------<  106[H]  3.1[L]   |  23  |  0.83    Ca    9.3      09 Dec 2024 15:06  Mg     2.00     12-09    TPro  7.8  /  Alb  4.4  /  TBili  0.2  /  DBili  x   /  AST  19  /  ALT  21  /  AlkPhos  158[H]  12-09          Urinalysis Basic - ( 09 Dec 2024 15:06 )    Color: x / Appearance: x / SG: x / pH: x  Gluc: 106 mg/dL / Ketone: x  / Bili: x / Urobili: x   Blood: x / Protein: x / Nitrite: x   Leuk Esterase: x / RBC: x / WBC x   Sq Epi: x / Non Sq Epi: x / Bacteria: x          RADIOLOGY & ADDITIONAL TESTS:  Results Reviewed:   Imaging Personally Reviewed:  Electrocardiogram Personally Reviewed:    COORDINATION OF CARE:  Care Discussed with Consultants/Other Providers [Y/N]:  Prior or Outpatient Records Reviewed [Y/N]:

## 2024-12-10 NOTE — PROGRESS NOTE ADULT - PROBLEM SELECTOR PLAN 2
Patient with hx of renal lesion on the L kidney which has increased in size when compared to 2022  Patient made aware of this finding  - she is requesting assistance to schedule a standing MRI test - day team to follow up  - urology follow up and oncology follow up outpatient Patient with hx of renal lesion on the L kidney which has increased in size when compared to 2022  Patient made aware of this finding  Urology follow up and oncology follow up outpatient Patient with hx of renal lesion on the L kidney which has increased in size when compared to 2022  Patient made aware of this finding. Pt will FU with Oncology as outpt   Urology follow up and oncology follow up outpatient Now with steroid induced hyperglycemia  Previous A1C in 10/24 -6.8  Will start Lantus 5 units plus Admelog 3 units TID  Continue SSS

## 2024-12-10 NOTE — CONSULT NOTE ADULT - SUBJECTIVE AND OBJECTIVE BOX
Reason for consult: History of lung cancer    HPI:  This is a 67 y/o F with pmhx of HIV on antiretroviral medications, asthma/COPD, lung Ca presented to the ED for new onset shortness of breath. The patient noted that she started having congestion and coughing with yellow sputum. Also having myalgias. Denies fevers. She stated that she started wheezing and tried using her inhaler for which it was not improving the symptoms. The patient also noted that she had sick contacts while attending school. Also endorsing congestion. ROS otherwise negative. (09 Dec 2024 21:20)    Oncology consultation completed for this 59 year old female known to [facility] and follows with Dr Blair for history of Lung cancer  She is s/p RVATS RUL lobectomy at [facility] for Stage [id number] lesion ( 2023)  measuring 1.4 cm with no jc involvement.        PAST MEDICAL & SURGICAL HISTORY:  Benign hypertension      H/O abdominal hysterectomy      Asthma      HIV disease      Osteoarthritis      GERD (gastroesophageal reflux disease)      HPV (human papilloma virus) infection      Cigarette smoker  quit 2019      Pruritus  (chronic)      Vitamin D deficiency      Knee pain, bilateral      S/P hysterectomy      S/P partial lobectomy of lung  right lung for stage 1 lung ca, on 11/22/22          FAMILY HISTORY:  Family history of hypertension (Grandparent)  grandmother    Family history of diabetes mellitus (Grandparent, Uncle, Uncle)  grandmother, uncles x 2    Family history of breast cancer (Aunt)  aunt    Family history of cancer (Uncle, Uncle)  uncles x 2    Family history of myocardial infarction (Aunt)  aunt    Family history of cerebrovascular accident (CVA) (Aunt)  aunt        Alochol: Denied  Smoking: Nonsmoker  Drug Use: Denied  Marital Status:         Allergies    No Known Allergies    Intolerances        MEDICATIONS  (STANDING):  albuterol/ipratropium for Nebulization 3 milliLiter(s) Nebulizer every 6 hours  azithromycin   Tablet 500 milliGRAM(s) Oral every 24 hours  dextrose 5%. 1000 milliLiter(s) (50 mL/Hr) IV Continuous <Continuous>  dextrose 5%. 1000 milliLiter(s) (100 mL/Hr) IV Continuous <Continuous>  dextrose 50% Injectable 25 Gram(s) IV Push once  dextrose 50% Injectable 12.5 Gram(s) IV Push once  dextrose 50% Injectable 25 Gram(s) IV Push once  efavirenz 600/emtricitabine 200/tenofovir 300mg 1 Tablet(s) Oral at bedtime  escitalopram 5 milliGRAM(s) Oral daily  glucagon  Injectable 1 milliGRAM(s) IntraMuscular once  heparin   Injectable 5000 Unit(s) SubCutaneous every 12 hours  insulin lispro (ADMELOG) corrective regimen sliding scale   SubCutaneous three times a day before meals  insulin lispro (ADMELOG) corrective regimen sliding scale   SubCutaneous at bedtime  methylPREDNISolone sodium succinate Injectable 40 milliGRAM(s) IV Push every 8 hours  pantoprazole    Tablet 40 milliGRAM(s) Oral every 12 hours  rosuvastatin 10 milliGRAM(s) Oral at bedtime    MEDICATIONS  (PRN):  acetaminophen     Tablet .. 650 milliGRAM(s) Oral every 6 hours PRN Temp greater or equal to 38C (100.4F), Mild Pain (1 - 3)  albuterol    90 MICROgram(s) HFA Inhaler 2 Puff(s) Inhalation every 3 hours PRN Shortness of Breath and/or Wheezing  aluminum hydroxide/magnesium hydroxide/simethicone Suspension 30 milliLiter(s) Oral every 4 hours PRN Dyspepsia  dextrose Oral Gel 15 Gram(s) Oral once PRN Blood Glucose LESS THAN 70 milliGRAM(s)/deciliter  melatonin 3 milliGRAM(s) Oral at bedtime PRN Insomnia  ondansetron Injectable 4 milliGRAM(s) IV Push every 8 hours PRN Nausea and/or Vomiting      ROS  No fever, sweats, chills  No epistaxis, HA, sore throat  No CP, SOB, cough, sputum  No n/v/d, abd pain, melena, hematochezia  No edema  No rash  No anxiety  No back pain, joint pain  No bleeding, bruising  No dysuria, hematuria    T(C): 36.8 (12-10-24 @ 05:50), Max: 36.8 (12-09-24 @ 13:01)  HR: 90 (12-10-24 @ 05:50) (90 - 105)  BP: 138/90 (12-10-24 @ 05:50) (123/89 - 154/87)  RR: 18 (12-10-24 @ 05:50) (16 - 20)  SpO2: 96% (12-10-24 @ 05:50) (96% - 99%)  Wt(kg): --    PE  NAD  Awake, alert  Anicteric, MMM  RRR  CTAB  Abd soft, NT, ND  No c/c/e  No rash grossly  FROM                          13.0   8.02  )-----------( 452      ( 09 Dec 2024 15:06 )             38.8       12-09    137  |  100  |  14  ----------------------------<  106[H]  3.1[L]   |  23  |  0.83    Ca    9.3      09 Dec 2024 15:06  Mg     2.00     12-09    TPro  7.8  /  Alb  4.4  /  TBili  0.2  /  DBili  x   /  AST  19  /  ALT  21  /  AlkPhos  158[H]  12-09   Reason for consult: History of lung cancer    HPI:  This is a 67 y/o F with pmhx of HIV on antiretroviral medications, asthma/COPD, lung Ca presented to the ED for new onset shortness of breath. The patient noted that she started having congestion and coughing with yellow sputum. Also having myalgias. Denies fevers. She stated that she started wheezing and tried using her inhaler for which it was not improving the symptoms. The patient also noted that she had sick contacts while attending school. Also endorsing congestion. ROS otherwise negative. (09 Dec 2024 21:20)    Oncology consultation completed for this 59 year old female known to [facility] and follows with Dr Blair for history of Lung cancer. She is s/p RVATS  [place] [facility] [place] [id number] lesion ( 2022) measuring 1.4 cm with no jc involvement.  CT chest [facility] [place]12/24 with no suspicious findings.  Patient presents to [facility] with complaints of shortness of breath not improved with using her inhaler        PAST MEDICAL & SURGICAL HISTORY:  Benign hypertension      H/O abdominal hysterectomy      Asthma      HIV disease      Osteoarthritis      GERD (gastroesophageal reflux disease)      HPV (human papilloma virus) infection      Cigarette smoker  quit 2019      Pruritus  (chronic)      Vitamin D deficiency      Knee pain, bilateral      S/P hysterectomy      S/P partial lobectomy of lung  right lung for stage 1 lung ca, on 11/22/22          FAMILY HISTORY:  Family history of hypertension (Grandparent)  grandmother    Family history of diabetes mellitus (Grandparent, Uncle, Uncle)  grandmother, uncles x 2    Family history of breast cancer (Aunt)  aunt    Family history of cancer (Uncle, Uncle)  uncles x 2    Family history of myocardial infarction (Aunt)  aunt    Family history of cerebrovascular accident (CVA) (Aunt)  aunt        Alochol: Denied  Smoking: Nonsmoker  Drug Use: Denied  Marital Status:         Allergies    No Known Allergies    Intolerances        MEDICATIONS  (STANDING):  albuterol/ipratropium for Nebulization 3 milliLiter(s) Nebulizer every 6 hours  azithromycin   Tablet 500 milliGRAM(s) Oral every 24 hours  dextrose 5%. 1000 milliLiter(s) (50 mL/Hr) IV Continuous <Continuous>  dextrose 5%. 1000 milliLiter(s) (100 mL/Hr) IV Continuous <Continuous>  dextrose 50% Injectable 25 Gram(s) IV Push once  dextrose 50% Injectable 12.5 Gram(s) IV Push once  dextrose 50% Injectable 25 Gram(s) IV Push once  efavirenz 600/emtricitabine 200/tenofovir 300mg 1 Tablet(s) Oral at bedtime  escitalopram 5 milliGRAM(s) Oral daily  glucagon  Injectable 1 milliGRAM(s) IntraMuscular once  heparin   Injectable 5000 Unit(s) SubCutaneous every 12 hours  insulin lispro (ADMELOG) corrective regimen sliding scale   SubCutaneous three times a day before meals  insulin lispro (ADMELOG) corrective regimen sliding scale   SubCutaneous at bedtime  methylPREDNISolone sodium succinate Injectable 40 milliGRAM(s) IV Push every 8 hours  pantoprazole    Tablet 40 milliGRAM(s) Oral every 12 hours  rosuvastatin 10 milliGRAM(s) Oral at bedtime    MEDICATIONS  (PRN):  acetaminophen     Tablet .. 650 milliGRAM(s) Oral every 6 hours PRN Temp greater or equal to 38C (100.4F), Mild Pain (1 - 3)  albuterol    90 MICROgram(s) HFA Inhaler 2 Puff(s) Inhalation every 3 hours PRN Shortness of Breath and/or Wheezing  aluminum hydroxide/magnesium hydroxide/simethicone Suspension 30 milliLiter(s) Oral every 4 hours PRN Dyspepsia  dextrose Oral Gel 15 Gram(s) Oral once PRN Blood Glucose LESS THAN 70 milliGRAM(s)/deciliter  melatonin 3 milliGRAM(s) Oral at bedtime PRN Insomnia  ondansetron Injectable 4 milliGRAM(s) IV Push every 8 hours PRN Nausea and/or Vomiting      ROS  No fever, sweats, chills  No epistaxis, HA, sore throat  c/o shortness of breath  No n/v/d, abd pain, melena, hematochezia  No edema  No rash  No anxiety  No back pain, joint pain  No bleeding, bruising  No dysuria, hematuria    T(C): 36.8 (12-10-24 @ 05:50), Max: 36.8 (12-09-24 @ 13:01)  HR: 90 (12-10-24 @ 05:50) (90 - 105)  BP: 138/90 (12-10-24 @ 05:50) (123/89 - 154/87)  RR: 18 (12-10-24 @ 05:50) (16 - 20)  SpO2: 96% (12-10-24 @ 05:50) (96% - 99%)  Wt(kg): --    PE  NAD  Awake, alert  Anicteric, MMM  RRR  CTAB  Abd soft, NT, ND  No c/c/e  No rash grossly                            13.0   8.02  )-----------( 452      ( 09 Dec 2024 15:06 )             38.8       12-09    137  |  100  |  14  ----------------------------<  106[H]  3.1[L]   |  23  |  0.83    Ca    9.3      09 Dec 2024 15:06  Mg     2.00     12-09    TPro  7.8  /  Alb  4.4  /  TBili  0.2  /  DBili  x   /  AST  19  /  ALT  21  /  AlkPhos  158[H]  12-09

## 2024-12-10 NOTE — PHARMACOTHERAPY INTERVENTION NOTE - COMMENTS
Medication history is incomplete. Unable to verify patient's medication list with two sources.    Medication history obtained from patient's outpatient pharmacy ([facility]) as patient unable to provide entirety of list on interview.     Clarifications are needed regarding inhaler use as patient has recently filled both trelegy and advair within days of each other for the past couple of months concerning for duplication of therapy. Unclear if patient still receives dyazide as patient last filled in september x 30 days (clarification is needed as patient is unclear). Patient recently had a course of azithromycin 500mg daily x 3 days that began on 12/5.

## 2024-12-10 NOTE — PATIENT PROFILE ADULT - FALL HARM RISK - HARM RISK INTERVENTIONS

## 2024-12-10 NOTE — PROGRESS NOTE ADULT - ASSESSMENT
69 y/o F with pmhx of HIV on antiretroviral medications, asthma/COPD, lung Ca presented to the ED for new onset shortness of breath. The patient is admitted for COPD exacerbation.

## 2024-12-11 ENCOUNTER — TRANSCRIPTION ENCOUNTER (OUTPATIENT)
Age: 59
End: 2024-12-11

## 2024-12-11 VITALS — OXYGEN SATURATION: 99 %

## 2024-12-11 LAB
ANION GAP SERPL CALC-SCNC: 13 MMOL/L — SIGNIFICANT CHANGE UP (ref 7–14)
BUN SERPL-MCNC: 21 MG/DL — SIGNIFICANT CHANGE UP (ref 7–23)
CALCIUM SERPL-MCNC: 9.5 MG/DL — SIGNIFICANT CHANGE UP (ref 8.4–10.5)
CHLORIDE SERPL-SCNC: 103 MMOL/L — SIGNIFICANT CHANGE UP (ref 98–107)
CO2 SERPL-SCNC: 20 MMOL/L — LOW (ref 22–31)
CREAT SERPL-MCNC: 0.88 MG/DL — SIGNIFICANT CHANGE UP (ref 0.5–1.3)
EGFR: 76 ML/MIN/1.73M2 — SIGNIFICANT CHANGE UP
GLUCOSE BLDC GLUCOMTR-MCNC: 129 MG/DL — HIGH (ref 70–99)
GLUCOSE BLDC GLUCOMTR-MCNC: 165 MG/DL — HIGH (ref 70–99)
GLUCOSE BLDC GLUCOMTR-MCNC: 170 MG/DL — HIGH (ref 70–99)
GLUCOSE SERPL-MCNC: 155 MG/DL — HIGH (ref 70–99)
HCT VFR BLD CALC: 37.5 % — SIGNIFICANT CHANGE UP (ref 34.5–45)
HGB BLD-MCNC: 12.3 G/DL — SIGNIFICANT CHANGE UP (ref 11.5–15.5)
MAGNESIUM SERPL-MCNC: 2.2 MG/DL — SIGNIFICANT CHANGE UP (ref 1.6–2.6)
MCHC RBC-ENTMCNC: 28.5 PG — SIGNIFICANT CHANGE UP (ref 27–34)
MCHC RBC-ENTMCNC: 32.8 G/DL — SIGNIFICANT CHANGE UP (ref 32–36)
MCV RBC AUTO: 86.8 FL — SIGNIFICANT CHANGE UP (ref 80–100)
NRBC # BLD: 0 /100 WBCS — SIGNIFICANT CHANGE UP (ref 0–0)
NRBC # FLD: 0 K/UL — SIGNIFICANT CHANGE UP (ref 0–0)
PHOSPHATE SERPL-MCNC: 2.4 MG/DL — LOW (ref 2.5–4.5)
PLATELET # BLD AUTO: 482 K/UL — HIGH (ref 150–400)
POTASSIUM SERPL-MCNC: 4.1 MMOL/L — SIGNIFICANT CHANGE UP (ref 3.5–5.3)
POTASSIUM SERPL-SCNC: 4.1 MMOL/L — SIGNIFICANT CHANGE UP (ref 3.5–5.3)
RBC # BLD: 4.32 M/UL — SIGNIFICANT CHANGE UP (ref 3.8–5.2)
RBC # FLD: 14.9 % — HIGH (ref 10.3–14.5)
SODIUM SERPL-SCNC: 136 MMOL/L — SIGNIFICANT CHANGE UP (ref 135–145)
WBC # BLD: 16.25 K/UL — HIGH (ref 3.8–10.5)
WBC # FLD AUTO: 16.25 K/UL — HIGH (ref 3.8–10.5)

## 2024-12-11 PROCEDURE — 99239 HOSP IP/OBS DSCHRG MGMT >30: CPT

## 2024-12-11 RX ORDER — PANTOPRAZOLE SODIUM 40 MG/1
1 TABLET, DELAYED RELEASE ORAL
Refills: 0 | DISCHARGE

## 2024-12-11 RX ORDER — FLUTICASONE FUROATE, UMECLIDINIUM BROMIDE AND VILANTEROL TRIFENATATE 100; 62.5; 25 UG/1; UG/1; UG/1
1 POWDER RESPIRATORY (INHALATION)
Qty: 1 | Refills: 0
Start: 2024-12-11 | End: 2025-01-09

## 2024-12-11 RX ORDER — FLUTICASONE FUROATE, UMECLIDINIUM BROMIDE AND VILANTEROL TRIFENATATE 100; 62.5; 25 UG/1; UG/1; UG/1
1 POWDER RESPIRATORY (INHALATION)
Qty: 1 | Refills: 0
Start: 2024-12-11 | End: 2025-10-04

## 2024-12-11 RX ORDER — AZITHROMYCIN 250 MG/1
1 TABLET, FILM COATED ORAL
Qty: 3 | Refills: 0
Start: 2024-12-11 | End: 2024-12-13

## 2024-12-11 RX ORDER — SITAGLIPTIN 50 MG/1
1 TABLET ORAL
Qty: 30 | Refills: 0
Start: 2024-12-11 | End: 2025-01-09

## 2024-12-11 RX ORDER — PANTOPRAZOLE SODIUM 40 MG/1
1 TABLET, DELAYED RELEASE ORAL
Qty: 14 | Refills: 0
Start: 2024-12-11 | End: 2024-12-17

## 2024-12-11 RX ORDER — CALCIUM CARBONATE 500(1250)
2 TABLET ORAL ONCE
Refills: 0 | Status: COMPLETED | OUTPATIENT
Start: 2024-12-11 | End: 2024-12-11

## 2024-12-11 RX ORDER — IPRATROPIUM BROMIDE AND ALBUTEROL SULFATE 2.5; .5 MG/3ML; MG/3ML
3 SOLUTION RESPIRATORY (INHALATION)
Qty: 56 | Refills: 0
Start: 2024-12-11 | End: 2024-12-24

## 2024-12-11 RX ORDER — FLUTICASONE FUROATE, UMECLIDINIUM BROMIDE AND VILANTEROL TRIFENATATE 100; 62.5; 25 UG/1; UG/1; UG/1
1 POWDER RESPIRATORY (INHALATION)
Refills: 0 | DISCHARGE

## 2024-12-11 RX ORDER — PREDNISONE 20 MG/1
2 TABLET ORAL
Qty: 6 | Refills: 0
Start: 2024-12-11 | End: 2024-12-13

## 2024-12-11 RX ORDER — POLYETHYLENE GLYCOL 3350 17 G/17G
17 POWDER, FOR SOLUTION ORAL
Qty: 0 | Refills: 0 | DISCHARGE
Start: 2024-12-11

## 2024-12-11 RX ORDER — METOPROLOL TARTRATE 100 MG/1
1 TABLET, FILM COATED ORAL
Refills: 0 | DISCHARGE

## 2024-12-11 RX ORDER — SENNOSIDES 8.6 MG
2 TABLET ORAL
Qty: 0 | Refills: 0 | DISCHARGE
Start: 2024-12-11

## 2024-12-11 RX ADMIN — IPRATROPIUM BROMIDE AND ALBUTEROL SULFATE 3 MILLILITER(S): 2.5; .5 SOLUTION RESPIRATORY (INHALATION) at 04:04

## 2024-12-11 RX ADMIN — Medication 3 UNIT(S): at 17:50

## 2024-12-11 RX ADMIN — AZITHROMYCIN 500 MILLIGRAM(S): 250 TABLET, FILM COATED ORAL at 17:48

## 2024-12-11 RX ADMIN — IPRATROPIUM BROMIDE AND ALBUTEROL SULFATE 3 MILLILITER(S): 2.5; .5 SOLUTION RESPIRATORY (INHALATION) at 07:33

## 2024-12-11 RX ADMIN — Medication 1: at 08:38

## 2024-12-11 RX ADMIN — POLYETHYLENE GLYCOL 3350 17 GRAM(S): 17 POWDER, FOR SOLUTION ORAL at 05:57

## 2024-12-11 RX ADMIN — Medication 30 MILLILITER(S): at 07:26

## 2024-12-11 RX ADMIN — Medication 5000 UNIT(S): at 05:58

## 2024-12-11 RX ADMIN — PANTOPRAZOLE SODIUM 40 MILLIGRAM(S): 40 TABLET, DELAYED RELEASE ORAL at 17:52

## 2024-12-11 RX ADMIN — Medication 40 MILLIGRAM(S): at 05:57

## 2024-12-11 RX ADMIN — Medication 2 TABLET(S): at 12:58

## 2024-12-11 RX ADMIN — AMLODIPINE BESYLATE 10 MILLIGRAM(S): 10 TABLET ORAL at 05:57

## 2024-12-11 RX ADMIN — POLYETHYLENE GLYCOL 3350 17 GRAM(S): 17 POWDER, FOR SOLUTION ORAL at 17:48

## 2024-12-11 RX ADMIN — PANTOPRAZOLE SODIUM 40 MILLIGRAM(S): 40 TABLET, DELAYED RELEASE ORAL at 05:58

## 2024-12-11 RX ADMIN — Medication 3 UNIT(S): at 12:35

## 2024-12-11 RX ADMIN — Medication 1: at 12:35

## 2024-12-11 RX ADMIN — IPRATROPIUM BROMIDE AND ALBUTEROL SULFATE 3 MILLILITER(S): 2.5; .5 SOLUTION RESPIRATORY (INHALATION) at 13:02

## 2024-12-11 RX ADMIN — Medication 3 UNIT(S): at 08:36

## 2024-12-11 NOTE — DISCHARGE NOTE PROVIDER - CARE PROVIDER_API CALL
LONG PATEL  [address] [place] [address]  Phone: [phone]  Fax: [phone]  Follow Up Time:     Micah Motta  Pulmonary Disease  [address] [place] [address]  Phone: [phone]  Fax: [phone]  Follow Up Time:

## 2024-12-11 NOTE — DISCHARGE NOTE PROVIDER - NSDCCPCAREPLAN_GEN_ALL_CORE_FT
PRINCIPAL DISCHARGE DIAGNOSIS  Diagnosis: COPD exacerbation  Assessment and Plan of Treatment: You were admitted to the hospital for asthma/COPD exacerbation. You were treated with nebulized medications to help with your breathing, which improved overall.  You should continue to take your medication as prescribed  You can follow up with your PCP and/or endocrinology for further care of your diabetes   Please follow up with your pulmonologist for further management of asthma/COPD, and your primary care doctor and/or endocrinologist for management of blood sugar.Call 911 and seek medical treatment for any of the following:

## 2024-12-11 NOTE — DISCHARGE NOTE PROVIDER - NSFOLLOWUPCLINICS_GEN_ALL_ED_FT
[facility]  Endocrinology  [address] [place] [address]  Phone: [phone]  Fax:

## 2024-12-11 NOTE — DISCHARGE NOTE PROVIDER - NSDCFUSCHEDAPPT_GEN_ALL_CORE_FT
Kelsey Fonseca  [facility]  [address] [address] [facility]  Scheduled Appointment: 12/20/2024

## 2024-12-11 NOTE — DISCHARGE NOTE PROVIDER - ATTENDING DISCHARGE PHYSICAL EXAMINATION:
Pt seen and examined by me. Overall clinically improving, No audible wheezing. Feels well, Ambulated in the hallway. Asking to go home  Vital Signs Last 24 Hrs  T(C): 36.7 (11 Dec 2024 10:30), Max: 36.8 (10 Dec 2024 17:45)  T(F): 98.1 (11 Dec 2024 10:30), Max: 98.3 (10 Dec 2024 17:45)  HR: 87 (11 Dec 2024 13:03) (87 - 103)  BP: 141/82 (11 Dec 2024 10:30) (141/82 - 149/86)  BP(mean): --  RR: 18 (11 Dec 2024 10:30) (18 - 18)  SpO2: 99% (11 Dec 2024 13:03) (96% - 100%)    Parameters below as of 11 Dec 2024 13:03  Patient On (Oxygen Delivery Method): room air    MEDICATIONS  (STANDING):  albuterol/ipratropium for Nebulization 3 milliLiter(s) Nebulizer every 6 hours  amLODIPine   Tablet 10 milliGRAM(s) Oral daily  azithromycin   Tablet 500 milliGRAM(s) Oral every 24 hours  dextrose 5%. 1000 milliLiter(s) (50 mL/Hr) IV Continuous <Continuous>  dextrose 5%. 1000 milliLiter(s) (100 mL/Hr) IV Continuous <Continuous>  dextrose 50% Injectable 25 Gram(s) IV Push once  dextrose 50% Injectable 12.5 Gram(s) IV Push once  dextrose 50% Injectable 25 Gram(s) IV Push once  efavirenz 600/emtricitabine 200/tenofovir 300mg 1 Tablet(s) Oral at bedtime  escitalopram 5 milliGRAM(s) Oral daily  glucagon  Injectable 1 milliGRAM(s) IntraMuscular once  heparin   Injectable 5000 Unit(s) SubCutaneous every 12 hours  insulin glargine Injectable (LANTUS) 5 Unit(s) SubCutaneous at bedtime  insulin lispro (ADMELOG) corrective regimen sliding scale   SubCutaneous three times a day before meals  insulin lispro (ADMELOG) corrective regimen sliding scale   SubCutaneous at bedtime  insulin lispro Injectable (ADMELOG) 3 Unit(s) SubCutaneous three times a day before meals  pantoprazole    Tablet 40 milliGRAM(s) Oral every 12 hours  polyethylene glycol 3350 17 Gram(s) Oral two times a day  rosuvastatin 10 milliGRAM(s) Oral at bedtime  senna 2 Tablet(s) Oral at bedtime    MEDICATIONS  (PRN):  acetaminophen     Tablet .. 650 milliGRAM(s) Oral every 6 hours PRN Temp greater or equal to 38C (100.4F), Mild Pain (1 - 3)  albuterol    90 MICROgram(s) HFA Inhaler 2 Puff(s) Inhalation every 3 hours PRN Shortness of Breath and/or Wheezing  aluminum hydroxide/magnesium hydroxide/simethicone Suspension 30 milliLiter(s) Oral every 4 hours PRN Dyspepsia  dextrose Oral Gel 15 Gram(s) Oral once PRN Blood Glucose LESS THAN 70 milliGRAM(s)/deciliter  diphenhydrAMINE Elixir 25 milliGRAM(s) Oral every 12 hours PRN Itching  melatonin 3 milliGRAM(s) Oral at bedtime PRN Insomnia  ondansetron Injectable 4 milliGRAM(s) IV Push every 8 hours PRN Nausea and/or Vomiting    EXAM  General - NAD  RS- Lungs clear, no wheezing appreciated on exam  CVS- S1S2 clear. no murmur  Abd-soft, non tender. BS present  LE- no edema

## 2024-12-11 NOTE — DISCHARGE NOTE PROVIDER - HOSPITAL COURSE
HPI:  This is a 69 y/o F with pmhx of HIV on antiretroviral medications, asthma/COPD, lung Ca presented to the ED for new onset shortness of breath. The patient noted that she started having congestion and coughing with yellow sputum. Also having myalgias. Denies fevers. She stated that she started wheezing and tried using her inhaler for which it was not improving the symptoms. The patient also noted that she had sick contacts while attending school. Also endorsing congestion. ROS otherwise negative. (09 Dec 2024 21:20)    #  COPD exacerbation- ·  Plan: Patient with  SOB presenting for COPD exacerbation likely secondary to rhinoviral infection  Was admitted for COPD exacerbation a few weeks back. Was seen by Pul and ENT. Underwent flexible laryngoscopy with ENT, findings wnl. Pt underwent bronchoscopy with IP - evaluation revealed no abnormalities, no signs of subglottic stenosis, tracheal stenosis or bronchial stenosis, and larynx with signs of chronic reflux. CT neck showed chronic R vocal cord paralysis, underwent repeat flex laryngoscopy with ENT, findings again wnl.  CTA-Postsurgical changes right upper lobectomy. Mild emphysematous changes. No suspicious   pulmonary nodule. Right lower lobe calcified granuloma. Linear subsegmental atelectasis involving the lingula and both lower lobes.  RVP positive for Entero/Rhino virus. On solumedrol 40mg Q8hr, plan to transition to PO Prednisone x 5 days  On  azithro due to increased sputum production. Duonebs Q6hr standing and albuterol Q3hr prn  Continuous pulse ox monitoring for now- Satting 97% on RA.    # Diabetes mellitus, type 2- Now with steroid induced hyperglycemia  Previous A1C in 10/24 -6.8. On Lantus 5 units plus Admelog 3 units TID. Continue SSS. Pt was discharged on Metformin during her last admission which she doesnt take given nausea and abdominal cramps. Reports she will not take it ever, Asking for an alternative. Will plan to dc on Januvia      # Renal mass- patient with hx of renal lesion on the L kidney which has increased in size when compared to 2022. Patient made aware of this finding. Pt will FU with Oncology as outpt. Urology follow up and oncology follow up outpatient.    # HIV disease.   ·  Plan: s/w Atripla  FU CD4 count and HIV viral load.     Problem/Plan - 5:  ·  Problem: Lung cancer.   ·  Plan: Pt undergoing care with Dr Blair of [facility]   s/p RVATS  [place] [facility] [place] [id number] [place] ( 2022) measuring 1.4 cm with no jc involvement  Last CT Chest 6/12/24 with no suspicious findings  follow up with Dr Blair after discharge  CTAP with stable Indeterminate left renal lesion, 1 cm, stable compared to recent prior 10/7/2024, however increased compared to prior 8/30/2022 when it measured 0.5 cm. Appreciate onc-Can be followed outpatient.             HPI:  This is a 67 y/o F with pmhx of HIV on antiretroviral medications, asthma/COPD, lung Ca presented to the ED for new onset shortness of breath. The patient noted that she started having congestion and coughing with yellow sputum. Also having myalgias. Denies fevers. She stated that she started wheezing and tried using her inhaler for which it was not improving the symptoms. The patient also noted that she had sick contacts while attending school. Also endorsing congestion. ROS otherwise negative. (09 Dec 2024 21:20)    #  COPD exacerbation- ·  Plan: Patient with  SOB presenting for COPD exacerbation likely secondary to rhinoviral infection  Was admitted for COPD exacerbation a few weeks back. Was seen by Pul and ENT. Underwent flexible laryngoscopy with ENT, findings wnl. Pt underwent bronchoscopy with IP - evaluation revealed no abnormalities, no signs of subglottic stenosis, tracheal stenosis or bronchial stenosis, and larynx with signs of chronic reflux. CT neck showed chronic R vocal cord paralysis, underwent repeat flex laryngoscopy with ENT, findings again wnl.  CTA-Postsurgical changes right upper lobectomy. Mild emphysematous changes. No suspicious   pulmonary nodule. Right lower lobe calcified granuloma. Linear subsegmental atelectasis involving the lingula and both lower lobes.  RVP positive for Entero/Rhino virus. On solumedrol 40mg Q8hr, plan to transition to PO Prednisone x 5 days  On  azithro due to increased sputum production. Duonebs Q6hr standing and albuterol Q3hr prn  Continuous pulse ox monitoring for now- Satting 97% on RA.    # Diabetes mellitus, type 2- Now with steroid induced hyperglycemia  Previous A1C in 10/24 -6.8. On Lantus 5 units plus Admelog 3 units TID. Continue SSS. Pt was discharged on Metformin during her last admission which she doesnt take given nausea and abdominal cramps. Reports she will not take it ever, Asking for an alternative. Will plan to dc on Januvia      # Renal mass- patient with hx of renal lesion on the L kidney which has increased in size when compared to 2022. Patient made aware of this finding. Pt will FU with Oncology as outpt. Urology follow up and oncology follow up outpatient.    # HIV disease.-c/w Atripla. FU outpt      # History of  Lung cancer-  Pt undergoing care with Dr Blair of [facility]   s/p RVATS  [place] [facility] [place] [id number] [place] ( 2022) measuring 1.4 cm with no jc involvement  Last CT Chest 6/12/24 with no suspicious findings  follow up with Dr Blair after discharge  CTAP with stable Indeterminate left renal lesion, 1 cm, stable compared to recent prior 10/7/2024, however increased compared to prior 8/30/2022 when it measured 0.5 cm. Appreciate onc-Can be followed outpatient.

## 2024-12-11 NOTE — DISCHARGE NOTE NURSING/CASE MANAGEMENT/SOCIAL WORK - FINANCIAL ASSISTANCE
[facility] provides services at a reduced cost to those who are determined to be eligible through [facility]’s financial assistance program. Information regarding [facility]’s financial assistance program can be found by going to [email or web address] or by calling [phone].

## 2024-12-11 NOTE — DISCHARGE NOTE PROVIDER - CARE PROVIDERS DIRECT ADDRESSES
,DirectAddress_Unknown,[email or web address]

## 2024-12-11 NOTE — DISCHARGE NOTE PROVIDER - NSDCFUADDAPPT_GEN_ALL_CORE_FT
Please follow up with your primary care provider and pulmonologist in 1-2 weeks following discharge from the hospital for continued monitoring and management.  Please follow up with your primary care provider and pulmonologist in 1-2 weeks following discharge from the hospital for continued monitoring and management.     Call [phone] to establish yourself as a patient with the endocrinologist.

## 2024-12-11 NOTE — DISCHARGE NOTE NURSING/CASE MANAGEMENT/SOCIAL WORK - PATIENT PORTAL LINK FT
You can access the FollowMyHealth Patient Portal offered by [facility] by registering at the following website: [email or web address]. By joining [facility]’s FollowMyHealth portal, you will also be able to view your health information using other applications (apps) compatible with our system.

## 2024-12-11 NOTE — DISCHARGE NOTE PROVIDER - NSDCMRMEDTOKEN_GEN_ALL_CORE_FT
amlodipine 10 mg oral tablet: 1 tab(s) orally once a day  azithromycin 500 mg oral tablet: 1 tab(s) orally every 24 hours  efavirenz/emtricitabine/tenofovir disoproxil fumarate 600 mg-200 mg-300 mg oral tablet: 1 tab(s) orally once a day (at bedtime)  ergocalciferol 1.25 mg (50,000 intl units) oral capsule: 1 cap(s) orally once a week  fluticasone 50 mcg/inh nasal spray: 1 spray(s) in each nostril 2 times a day  Freestyle Temi 2 [place]: Dispense 1 [place]  Freestyle Temi 2 Sensors: Apply 1 sensor every 14 days  Freestyle Temi 3 [place]: Dispense 1 [place]  Freestyle Temi 3 Sensors: Please change every 14 days  ipratropium-albuterol 0.5 mg-2.5 mg/3 mL inhalation solution: 3 milliliter(s) inhaled every 6 hours as needed for  shortness of breath and/or wheezing  Januvia 50 mg oral tablet: 1 tab(s) orally once a day  Lexapro 5 mg oral tablet: 1 tab(s) orally once a day  pantoprazole 40 mg oral delayed release tablet: 1 tab(s) orally 2 times a day  polyethylene glycol 3350 oral powder for reconstitution: 17 gram(s) orally 2 times a day  predniSONE 20 mg oral tablet: 2 tab(s) orally once a day starting 12/12  rosuvastatin 10 mg oral tablet: 1 tab(s) orally once a day (at bedtime)  senna leaf extract oral tablet: 2 tab(s) orally once a day (at bedtime)  Trelegy Ellipta 200 mcg-62.5 mcg-25 mcg/inh inhalation powder: 1 blister(s) inhaled once a day

## 2024-12-16 ENCOUNTER — NON-APPOINTMENT (OUTPATIENT)
Age: 59
End: 2024-12-16

## 2024-12-16 ENCOUNTER — APPOINTMENT (OUTPATIENT)
Dept: ENDOCRINOLOGY | Facility: CLINIC | Age: 59
End: 2024-12-16
Payer: MEDICAID

## 2024-12-16 VITALS
HEART RATE: 97 BPM | WEIGHT: 201 LBS | BODY MASS INDEX: 31.55 KG/M2 | DIASTOLIC BLOOD PRESSURE: 80 MMHG | SYSTOLIC BLOOD PRESSURE: 108 MMHG | OXYGEN SATURATION: 97 % | HEIGHT: 67 IN

## 2024-12-16 DIAGNOSIS — E78.5 HYPERLIPIDEMIA, UNSPECIFIED: ICD-10-CM

## 2024-12-16 DIAGNOSIS — I10 ESSENTIAL (PRIMARY) HYPERTENSION: ICD-10-CM

## 2024-12-16 DIAGNOSIS — E11.9 TYPE 2 DIABETES MELLITUS W/OUT COMPLICATIONS: ICD-10-CM

## 2024-12-16 LAB — GLUCOSE BLDC GLUCOMTR-MCNC: 117

## 2024-12-16 PROCEDURE — 99204 OFFICE O/P NEW MOD 45 MIN: CPT | Mod: 25

## 2024-12-16 PROCEDURE — 82962 GLUCOSE BLOOD TEST: CPT

## 2024-12-16 RX ORDER — LANCETS 30 GAUGE
EACH MISCELLANEOUS
Qty: 1 | Refills: 1 | Status: ACTIVE | COMMUNITY
Start: 2024-12-16 | End: 1900-01-01

## 2024-12-16 RX ORDER — BLOOD-GLUCOSE METER
W/DEVICE EACH MISCELLANEOUS
Qty: 1 | Refills: 0 | Status: ACTIVE | COMMUNITY
Start: 2024-12-16 | End: 1900-01-01

## 2024-12-16 RX ORDER — SITAGLIPTIN 50 MG/1
50 TABLET, FILM COATED ORAL
Qty: 90 | Refills: 0 | Status: ACTIVE | COMMUNITY
Start: 2024-12-16 | End: 1900-01-01

## 2024-12-16 RX ORDER — BLOOD-GLUCOSE SENSOR
EACH MISCELLANEOUS
Qty: 2 | Refills: 4 | Status: ACTIVE | COMMUNITY
Start: 2024-12-16 | End: 1900-01-01

## 2024-12-16 RX ORDER — BLOOD SUGAR DIAGNOSTIC
STRIP MISCELLANEOUS DAILY
Qty: 1 | Refills: 1 | Status: ACTIVE | COMMUNITY
Start: 2024-12-16 | End: 1900-01-01

## 2024-12-18 LAB
CHOLEST SERPL-MCNC: 255 MG/DL
CREAT SPEC-SCNC: 145 MG/DL
HDLC SERPL-MCNC: 111 MG/DL
LDLC SERPL CALC-MCNC: 122 MG/DL
MICROALBUMIN 24H UR DL<=1MG/L-MCNC: 23.3 MG/DL
MICROALBUMIN/CREAT 24H UR-RTO: 160 MG/G
NONHDLC SERPL-MCNC: 143 MG/DL
TRIGL SERPL-MCNC: 129 MG/DL

## 2024-12-18 RX ORDER — ROSUVASTATIN CALCIUM 10 MG/1
10 TABLET, FILM COATED ORAL
Qty: 90 | Refills: 0 | Status: ACTIVE | COMMUNITY
Start: 2024-12-18 | End: 1900-01-01

## 2024-12-20 ENCOUNTER — APPOINTMENT (OUTPATIENT)
Dept: OTOLARYNGOLOGY | Facility: CLINIC | Age: 59
End: 2024-12-20
Payer: MEDICAID

## 2024-12-20 VITALS
SYSTOLIC BLOOD PRESSURE: 138 MMHG | HEIGHT: 67 IN | OXYGEN SATURATION: 99 % | BODY MASS INDEX: 31.83 KG/M2 | HEART RATE: 83 BPM | DIASTOLIC BLOOD PRESSURE: 84 MMHG | WEIGHT: 202.82 LBS

## 2024-12-20 DIAGNOSIS — M26.609 UNSPECIFIED TEMPOROMANDIBULAR JOINT DISORDER: ICD-10-CM

## 2024-12-20 DIAGNOSIS — H61.23 IMPACTED CERUMEN, BILATERAL: ICD-10-CM

## 2024-12-20 DIAGNOSIS — H90.3 SENSORINEURAL HEARING LOSS, BILATERAL: ICD-10-CM

## 2024-12-20 DIAGNOSIS — H93.293 OTHER ABNORMAL AUDITORY PERCEPTIONS, BILATERAL: ICD-10-CM

## 2024-12-20 PROCEDURE — 99214 OFFICE O/P EST MOD 30 MIN: CPT | Mod: 25

## 2024-12-20 PROCEDURE — 92557 COMPREHENSIVE HEARING TEST: CPT

## 2024-12-20 PROCEDURE — 92567 TYMPANOMETRY: CPT

## 2024-12-21 LAB
CULTURE RESULTS: SIGNIFICANT CHANGE UP
SPECIMEN SOURCE: SIGNIFICANT CHANGE UP

## 2024-12-27 NOTE — ED CDU PROVIDER INITIAL DAY NOTE - NSICDXFAMILYHX_GEN_ALL_CORE_FT
FAMILY HISTORY:  Grandparent  Still living? Unknown  Family history of diabetes mellitus, Age at diagnosis: Age Unknown  Family history of hypertension, Age at diagnosis: Age Unknown    Aunt  Still living? Unknown  Family history of breast cancer, Age at diagnosis: Age Unknown  Family history of cerebrovascular accident (CVA), Age at diagnosis: Age Unknown  Family history of myocardial infarction, Age at diagnosis: Age Unknown    Uncle  Still living? Unknown  Family history of cancer, Age at diagnosis: Age Unknown  Family history of diabetes mellitus, Age at diagnosis: Age Unknown  Family history of cancer, Age at diagnosis: Age Unknown  Family history of diabetes mellitus, Age at diagnosis: Age Unknown    
89

## 2025-01-07 PROCEDURE — 95251 CONT GLUC MNTR ANALYSIS I&R: CPT

## 2025-01-07 PROCEDURE — 95249 CONT GLUC MNTR PT PROV EQP: CPT

## 2025-01-08 LAB
CULTURE RESULTS: SIGNIFICANT CHANGE UP
SPECIMEN SOURCE: SIGNIFICANT CHANGE UP

## 2025-01-24 ENCOUNTER — APPOINTMENT (OUTPATIENT)
Dept: ENDOCRINOLOGY | Facility: CLINIC | Age: 60
End: 2025-01-24

## 2025-01-24 DIAGNOSIS — E11.9 TYPE 2 DIABETES MELLITUS W/OUT COMPLICATIONS: ICD-10-CM

## 2025-01-24 PROCEDURE — G0108 DIAB MANAGE TRN  PER INDIV: CPT

## 2025-01-24 RX ORDER — BLOOD SUGAR DIAGNOSTIC
STRIP MISCELLANEOUS
Qty: 1 | Refills: 3 | Status: ACTIVE | COMMUNITY
Start: 2025-01-24 | End: 1900-01-01

## 2025-01-24 RX ORDER — LANCETS
EACH MISCELLANEOUS
Qty: 1 | Refills: 3 | Status: ACTIVE | COMMUNITY
Start: 2025-01-24 | End: 1900-01-01

## 2025-02-09 ENCOUNTER — EMERGENCY (EMERGENCY)
Facility: HOSPITAL | Age: 60
LOS: 1 days | Discharge: HOME | End: 2025-02-09
Attending: STUDENT IN AN ORGANIZED HEALTH CARE EDUCATION/TRAINING PROGRAM | Admitting: STUDENT IN AN ORGANIZED HEALTH CARE EDUCATION/TRAINING PROGRAM
Payer: MEDICAID

## 2025-02-09 VITALS
RESPIRATION RATE: 17 BRPM | WEIGHT: 207.9 LBS | DIASTOLIC BLOOD PRESSURE: 83 MMHG | HEART RATE: 95 BPM | SYSTOLIC BLOOD PRESSURE: 144 MMHG | TEMPERATURE: 98 F | OXYGEN SATURATION: 98 %

## 2025-02-09 VITALS
HEART RATE: 97 BPM | DIASTOLIC BLOOD PRESSURE: 83 MMHG | SYSTOLIC BLOOD PRESSURE: 133 MMHG | TEMPERATURE: 99 F | OXYGEN SATURATION: 98 % | RESPIRATION RATE: 18 BRPM

## 2025-02-09 DIAGNOSIS — Z90.710 ACQUIRED ABSENCE OF BOTH CERVIX AND UTERUS: Chronic | ICD-10-CM

## 2025-02-09 DIAGNOSIS — Z90.2 ACQUIRED ABSENCE OF LUNG [PART OF]: Chronic | ICD-10-CM

## 2025-02-09 LAB
ALBUMIN SERPL ELPH-MCNC: 4.2 G/DL — SIGNIFICANT CHANGE UP (ref 3.3–5)
ALP SERPL-CCNC: 212 U/L — HIGH (ref 40–120)
ALT FLD-CCNC: 25 U/L — SIGNIFICANT CHANGE UP (ref 4–33)
ANION GAP SERPL CALC-SCNC: 15 MMOL/L — HIGH (ref 7–14)
APTT BLD: 30.3 SEC — SIGNIFICANT CHANGE UP (ref 24.5–35.6)
AST SERPL-CCNC: 22 U/L — SIGNIFICANT CHANGE UP (ref 4–32)
BASOPHILS # BLD AUTO: 0.07 K/UL — SIGNIFICANT CHANGE UP (ref 0–0.2)
BASOPHILS NFR BLD AUTO: 0.7 % — SIGNIFICANT CHANGE UP (ref 0–2)
BILIRUB SERPL-MCNC: 0.3 MG/DL — SIGNIFICANT CHANGE UP (ref 0.2–1.2)
BUN SERPL-MCNC: 16 MG/DL — SIGNIFICANT CHANGE UP (ref 7–23)
CALCIUM SERPL-MCNC: 9.5 MG/DL — SIGNIFICANT CHANGE UP (ref 8.4–10.5)
CHLORIDE SERPL-SCNC: 102 MMOL/L — SIGNIFICANT CHANGE UP (ref 98–107)
CO2 SERPL-SCNC: 21 MMOL/L — LOW (ref 22–31)
CREAT SERPL-MCNC: 0.97 MG/DL — SIGNIFICANT CHANGE UP (ref 0.5–1.3)
EGFR: 67 ML/MIN/1.73M2 — SIGNIFICANT CHANGE UP
EOSINOPHIL # BLD AUTO: 0.19 K/UL — SIGNIFICANT CHANGE UP (ref 0–0.5)
EOSINOPHIL NFR BLD AUTO: 1.8 % — SIGNIFICANT CHANGE UP (ref 0–6)
FLUAV AG NPH QL: SIGNIFICANT CHANGE UP
FLUBV AG NPH QL: SIGNIFICANT CHANGE UP
GLUCOSE SERPL-MCNC: 127 MG/DL — HIGH (ref 70–99)
HCT VFR BLD CALC: 40.4 % — SIGNIFICANT CHANGE UP (ref 34.5–45)
HGB BLD-MCNC: 13.2 G/DL — SIGNIFICANT CHANGE UP (ref 11.5–15.5)
IANC: 6.61 K/UL — SIGNIFICANT CHANGE UP (ref 1.8–7.4)
IMM GRANULOCYTES NFR BLD AUTO: 0.3 % — SIGNIFICANT CHANGE UP (ref 0–0.9)
INR BLD: <0.9 RATIO — SIGNIFICANT CHANGE UP (ref 0.85–1.16)
LYMPHOCYTES # BLD AUTO: 2.91 K/UL — SIGNIFICANT CHANGE UP (ref 1–3.3)
LYMPHOCYTES # BLD AUTO: 28.1 % — SIGNIFICANT CHANGE UP (ref 13–44)
MCHC RBC-ENTMCNC: 28.1 PG — SIGNIFICANT CHANGE UP (ref 27–34)
MCHC RBC-ENTMCNC: 32.7 G/DL — SIGNIFICANT CHANGE UP (ref 32–36)
MCV RBC AUTO: 86 FL — SIGNIFICANT CHANGE UP (ref 80–100)
MONOCYTES # BLD AUTO: 0.54 K/UL — SIGNIFICANT CHANGE UP (ref 0–0.9)
MONOCYTES NFR BLD AUTO: 5.2 % — SIGNIFICANT CHANGE UP (ref 2–14)
NEUTROPHILS # BLD AUTO: 6.61 K/UL — SIGNIFICANT CHANGE UP (ref 1.8–7.4)
NEUTROPHILS NFR BLD AUTO: 63.9 % — SIGNIFICANT CHANGE UP (ref 43–77)
NRBC # BLD AUTO: 0 K/UL — SIGNIFICANT CHANGE UP (ref 0–0)
NRBC # BLD: 0 /100 WBCS — SIGNIFICANT CHANGE UP (ref 0–0)
NRBC # FLD: 0 K/UL — SIGNIFICANT CHANGE UP (ref 0–0)
NRBC BLD-RTO: 0 /100 WBCS — SIGNIFICANT CHANGE UP (ref 0–0)
PLATELET # BLD AUTO: 501 K/UL — HIGH (ref 150–400)
POTASSIUM SERPL-MCNC: 3.4 MMOL/L — LOW (ref 3.5–5.3)
POTASSIUM SERPL-SCNC: 3.4 MMOL/L — LOW (ref 3.5–5.3)
PROT SERPL-MCNC: 7.8 G/DL — SIGNIFICANT CHANGE UP (ref 6–8.3)
PROTHROM AB SERPL-ACNC: 10.4 SEC — SIGNIFICANT CHANGE UP (ref 9.9–13.4)
RBC # BLD: 4.7 M/UL — SIGNIFICANT CHANGE UP (ref 3.8–5.2)
RBC # FLD: 14.1 % — SIGNIFICANT CHANGE UP (ref 10.3–14.5)
RSV RNA NPH QL NAA+NON-PROBE: SIGNIFICANT CHANGE UP
SARS-COV-2 RNA SPEC QL NAA+PROBE: SIGNIFICANT CHANGE UP
SODIUM SERPL-SCNC: 138 MMOL/L — SIGNIFICANT CHANGE UP (ref 135–145)
TROPONIN T, HIGH SENSITIVITY RESULT: 11 NG/L — SIGNIFICANT CHANGE UP
TROPONIN T, HIGH SENSITIVITY RESULT: 12 NG/L — SIGNIFICANT CHANGE UP
WBC # BLD: 10.35 K/UL — SIGNIFICANT CHANGE UP (ref 3.8–10.5)
WBC # FLD AUTO: 10.35 K/UL — SIGNIFICANT CHANGE UP (ref 3.8–10.5)

## 2025-02-09 PROCEDURE — 99285 EMERGENCY DEPT VISIT HI MDM: CPT

## 2025-02-09 PROCEDURE — 70496 CT ANGIOGRAPHY HEAD: CPT | Mod: 26

## 2025-02-09 PROCEDURE — 71045 X-RAY EXAM CHEST 1 VIEW: CPT | Mod: 26

## 2025-02-09 PROCEDURE — 70450 CT HEAD/BRAIN W/O DYE: CPT | Mod: 26,59

## 2025-02-09 PROCEDURE — 70498 CT ANGIOGRAPHY NECK: CPT | Mod: 26

## 2025-02-09 RX ORDER — ACETAMINOPHEN 160 MG/5ML
975 SUSPENSION ORAL ONCE
Refills: 0 | Status: DISCONTINUED | OUTPATIENT
Start: 2025-02-09 | End: 2025-02-09

## 2025-02-09 RX ORDER — ACETAMINOPHEN 160 MG/5ML
1000 SUSPENSION ORAL ONCE
Refills: 0 | Status: COMPLETED | OUTPATIENT
Start: 2025-02-09 | End: 2025-02-09

## 2025-02-09 RX ORDER — BACTERIOSTATIC SODIUM CHLORIDE 0.9 %
1000 VIAL (ML) INJECTION ONCE
Refills: 0 | Status: COMPLETED | OUTPATIENT
Start: 2025-02-09 | End: 2025-02-09

## 2025-02-09 RX ORDER — POTASSIUM CHLORIDE 750 MG/1
20 TABLET, EXTENDED RELEASE ORAL ONCE
Refills: 0 | Status: COMPLETED | OUTPATIENT
Start: 2025-02-09 | End: 2025-02-09

## 2025-02-09 RX ORDER — SODIUM CHLORIDE 9 G/ML
1000 INJECTION, SOLUTION INTRAVENOUS ONCE
Refills: 0 | Status: COMPLETED | OUTPATIENT
Start: 2025-02-09 | End: 2025-02-09

## 2025-02-09 RX ADMIN — Medication 1000 MILLILITER(S): at 14:33

## 2025-02-09 RX ADMIN — POTASSIUM CHLORIDE 20 MILLIEQUIVALENT(S): 750 TABLET, EXTENDED RELEASE ORAL at 15:45

## 2025-02-09 RX ADMIN — ACETAMINOPHEN 400 MILLIGRAM(S): 160 SUSPENSION ORAL at 16:28

## 2025-02-09 RX ADMIN — SODIUM CHLORIDE 1000 MILLILITER(S): 9 INJECTION, SOLUTION INTRAVENOUS at 17:24

## 2025-02-09 RX ADMIN — Medication 25 MILLIGRAM(S): at 14:31

## 2025-02-09 RX ADMIN — SODIUM CHLORIDE 1000 MILLILITER(S): 9 INJECTION, SOLUTION INTRAVENOUS at 15:42

## 2025-02-09 NOTE — STROKE CODE NOTE - DISPOSITION
Patient shown picture of her face, says it looks normal to her. No cerebellar features on examination, able to walk narrow based but after a few steps feels lightheaded and gets a headache. Denies any vertiginous symptoms when asked although mentions dizziness in lieu of light headedness.

## 2025-02-09 NOTE — ED ADULT TRIAGE NOTE - CODE STROKE ACTIVATED DT
INFORMED OF THE NEED FOR A URINE SPECIMEN. WILL ATTEMPT TO VOID.
PATIENT OFFERS NO NEW COMPLAINTS. SPEECH CLEAR. CONVERSING WITH PARENTS. GAIT STEADY WHEN AMBULATING TO THE BATHROOM.
09-Feb-2025 13:40

## 2025-02-09 NOTE — ED ADULT NURSE REASSESSMENT NOTE - NS ED NURSE REASSESS COMMENT FT1
Pt AAOX4, resp even and unlabored, denies dizziness at this time, no acute distress noted, vitals updated, Dc instructions and f/u explained by PA, IV removed, pt ambulates out of ED with family member.

## 2025-02-09 NOTE — ED ADULT NURSE NOTE - OBJECTIVE STATEMENT
Float RN: Pt. A&OX4, c/o dizziness, headache and nausea after waking up from a nap around 12:30pm. Also c/o pain to left shoulder. Pt. extremely dizzy when lying flat. Denies vision changes, vomiting, diarrhea, abdominal pain or recent illness. No drift noted to extremities. Slight left sided facial droop noted on exam. Pt. appears overwhelmed when asked if face looks normal. Pt. brought straight to CT for code stroke. Code stroke canceled by attending.  #18g IV placed to right FA, labs sent as ordered. Vitals stable in triage. Respirations even and unlabored. Report given to primary RN Daylin.

## 2025-02-09 NOTE — ED ADULT TRIAGE NOTE - CHIEF COMPLAINT QUOTE
pt took a nap around 11am, c/o dizziness upon waking up from her nap at 12:30 with nausea and blurred vision. slight L sided facial droop noted.

## 2025-02-09 NOTE — ED ADULT NURSE NOTE - NSFALLRISKINTERV_ED_ALL_ED

## 2025-02-09 NOTE — ED PROVIDER NOTE - NSFOLLOWUPINSTRUCTIONS_ED_ALL_ED_FT
You have been evaluated in the Emergency Department today for abdominal pain and lightheadedness. Your evaluation did not show evidence of medical conditions requiring emergent intervention at this time. The results of your labs and imaging are included in your discharge instructions.    Your labs show you are dehydrated with slightly low potassium, which we gave you fluids and oral potassium for.    Please schedule an appointment with your primary care physician this week.    UNLESS OTHERWISE DIRECTED BY YOUR PRIMARY DOCTOR, for discomfort at home, you can alternate between 600mg ibuprofen (Motrin, Alleve, Advil, etc.) and 650-975mg acetaminophen (Tylenol) every 6-8 hours. If your pain is severe, you can take them both together at the same time. Please do not exceed 3200mg ibuprofen or 4000mg Tylenol in one day. Please consult with your primary doctor before taking any medications on a regular basis.  - If you have a history of KIDNEY DISEASE, BLEEDING PROBLEMS, ACID REFLUX or STOMACH ULCERS, medications such as ibuprofen, Alleve, Motrin, etc. may not be a good choice for you.  - If you have a history of LIVER DISEASE or REGULAR ALCOHOL USE, acetaminophen (Tylenol) may not be a good choice for you.    Return to the Emergency Department if you experience worsening or uncontrolled pain, fevers 100.4°F or greater, recurrent vomiting, inability to tolerate food or fluids by mouth, bloody stools or vomit, black or tarry stools, or any other concerning symptoms.    Thank you for choosing us for your care today.

## 2025-02-09 NOTE — ED PROVIDER NOTE - PROGRESS NOTE DETAILS
EM PGY-2/Manav DO: Pt seen and examined at bedside. Seen ambulating to bathroom w/o difficulty. Still endorsing lightheadedness and mild abd cramping iso diarrhea and nausea today. No fevers. Requesting to be tested for COVID. Abd soft, nontender, no rebound/guarding, no peritoneal signs, no palpable masses. Likely gastroenteritis. Discussed results w/ patient showing slightly low K along with dehydration, for which will give more fluids for. Also requesting pain medication for shoulder pain, which she states has been going on for a long time and is not new. Rpt trop, viral swab, IVF, reeval, anticipate d/c. KRISTYN Riley: Received signout from Dr. Shankar.  Patient seen resting comfortably in no acute distress.  Labs acutely unremarkable.  Flu COVID-negative.  CTA of head and neck with no acute findings.  Chest x-ray with no acute findings.  Patient tolerating p.o., ambulating well to the bathroom.  Patient reports moderate improvement of symptoms.  Patient advised to follow-up with neurologist and ENT for further evaluation.  Strict return precautions given upon discharge.

## 2025-02-09 NOTE — ED PROVIDER NOTE - PATIENT PORTAL LINK FT
You can access the FollowMyHealth Patient Portal offered by [facility] by registering at the following website: [email or web address]. By joining [facility]’s FollowMyHealth portal, you will also be able to view your health information using other applications (apps) compatible with our system.

## 2025-02-09 NOTE — ED PROVIDER NOTE - CLINICAL SUMMARY MEDICAL DECISION MAKING FREE TEXT BOX
EM PGY-2/Manav, DO: 60 y/o F PMHx COPD/asthma, previous smoker, HIV (on ART, undetectable), HTN, DM2, h/o stage I lung CA s/p lobectomy (in remission, w/ new renal lesion noted incidentally during last admission 12/2024, following w/ onc), presents to ED cc lightheadedness, nausea and diarrhea after waking up for nap. Code stroke called upon initial encounter given L facial asymmetry, later cancelled EM PGY-2/Manav, DO: 58 y/o F PMHx COPD/asthma, previous smoker, HIV (on ART, undetectable), HTN, DM2, h/o stage I lung CA s/p lobectomy (in remission, w/ new renal lesion noted incidentally during last admission 12/2024, following w/ onc), presents to ED cc lightheadedness, nausea and diarrhea after waking up for nap PTA, called EMS immediately. Pt sts dizziness but denies room-spinning sensation and sts feels like going to pass out. Pt sts was in her normal state of health this AM when waking up 0530, took nap at 11A and woke up ~1230P. Denies any chest pain, SOB, pleuritic pain, leg swelling, numbness, tingling, fevers, chills, URI symptoms, dysuria.   Code stroke called upon initial encounter given L facial asymmetry, subsequently cancelled upon agreement of neuro and EM attending. Pt endorses her face looks normal when shown pic of herself. Able to walk without gait disturbances.     MDM: 60 yo F PMHx above-mentioned hx p/w lightheadedness, nausea and diarrhea upon waking. Will c/w CTH/CTA iso facial asymmetry and poor historian on initial encounter iso tearful/anxious, will assess for infectious, cardiac, metabolic, endocrine etiology. Reeval to dispo.

## 2025-02-09 NOTE — ED PROVIDER NOTE - ATTENDING CONTRIBUTION TO CARE
I, Ricki Israel DO personally saw the patient with JEANNE.  I have personally performed a face to face diagnostic evaluation on this patient.  I have reviewed the JEANNE note and agree with the history, exam, and plan of care, except as noted.  I personally saw the patient and performed a substantive portion of the visit including all aspects of the medical decision making.    I, Ricki Israel DO,  performed the initial face to face bedside interview with this patient regarding history of present illness, review of symptoms and relevant past medical, social and family history.  I completed an independent physical examination.  I was the initial provider who evaluated this patient. I have signed out the follow up of any pending tests (i.e. labs, radiological studies) to the resident.  I have communicated the patient’s plan of care and disposition with the resident.  The history, relevant review of systems, past medical and surgical history, medical decision making, and physical examination was documented by the scribe in my presence and I attest to the accuracy of the documentation.

## 2025-02-09 NOTE — ED PROVIDER NOTE - NS ED ATTENDING STATEMENT MOD
I have seen and examined this patient and fully participated in the care of this patient as the teaching attending.  The service was shared with the JEANNE.  I reviewed and verified the documentation.

## 2025-02-09 NOTE — ED PROVIDER NOTE - PHYSICAL EXAMINATION
General: Tearful, anxious appearing but nontoxic appearing. Speaking in full sentences with appropriate phonation.  Head: NC/AT.   Eyes: EOMI. Conjunctiva/sclera clear, PERRLA, gaze conjugate   Neck: Supple, FROM, no nuchal rigidity  Cardiac: RRR. No MGR appreciated.  Pulmonary:   Abdominal:   Neurologic: No gross focal sensory or motor deficits. Moves all 4 extremities during encounter. Ambulatory independently. NIHSS 0.   Musculoskeletal: Strength appropriate in all 4 extremities for age with no limited ROM. No visible deformities or extremity swelling.  Skin: Color appropriate for race. Intact, warm, and well-perfused. No visible lesions or bruising. General: Tearful, anxious appearing but nontoxic appearing. Speaking in full sentences with appropriate phonation.  Head: NC/AT.   Eyes: EOMI. Conjunctiva/sclera clear, PERRLA, gaze conjugate   Neck: Supple, FROM, no nuchal rigidity  Cardiac: RRR. No MGR appreciated.  Pulmonary: CTAB, no increased WOB, no accessory m. use  Abdominal: Obese, soft, nontender, no peritoneal signs, no cva ttp  Neurologic: No gross focal sensory or motor deficits. Moves all 4 extremities during encounter. Ambulatory independently. NIHSS 0.   Musculoskeletal: Strength appropriate in all 4 extremities for age with no limited ROM. No visible deformities or extremity swelling.  Skin: Color appropriate for race. Intact, warm, and well-perfused. No visible lesions or bruising.

## 2025-02-11 NOTE — ED ADULT NURSE NOTE - TEMPLATE
Quality 226: Preventive Care And Screening: Tobacco Use: Screening And Cessation Intervention: Patient screened for tobacco use and is an ex/non-smoker Quality 47: Advance Care Plan: Advance Care Planning discussed and documented; advance care plan or surrogate decision maker documented in the medical record. Detail Level: Detailed Quality 130: Documentation Of Current Medications In The Medical Record: Current Medications Documented General

## 2025-03-28 ENCOUNTER — RX RENEWAL (OUTPATIENT)
Age: 60
End: 2025-03-28

## 2025-03-28 ENCOUNTER — APPOINTMENT (OUTPATIENT)
Dept: ENDOCRINOLOGY | Facility: CLINIC | Age: 60
End: 2025-03-28

## 2025-03-31 ENCOUNTER — EMERGENCY (EMERGENCY)
Facility: HOSPITAL | Age: 60
LOS: 1 days | Discharge: HOME | End: 2025-03-31
Attending: EMERGENCY MEDICINE | Admitting: STUDENT IN AN ORGANIZED HEALTH CARE EDUCATION/TRAINING PROGRAM
Payer: MEDICAID

## 2025-03-31 VITALS
WEIGHT: 207.9 LBS | HEIGHT: 67 IN | DIASTOLIC BLOOD PRESSURE: 89 MMHG | TEMPERATURE: 98 F | HEART RATE: 83 BPM | OXYGEN SATURATION: 100 % | SYSTOLIC BLOOD PRESSURE: 127 MMHG | RESPIRATION RATE: 21 BRPM

## 2025-03-31 DIAGNOSIS — Z90.710 ACQUIRED ABSENCE OF BOTH CERVIX AND UTERUS: Chronic | ICD-10-CM

## 2025-03-31 DIAGNOSIS — Z90.2 ACQUIRED ABSENCE OF LUNG [PART OF]: Chronic | ICD-10-CM

## 2025-03-31 LAB
ADD ON TEST-SPECIMEN IN LAB: SIGNIFICANT CHANGE UP
ALBUMIN SERPL ELPH-MCNC: 4.1 G/DL — SIGNIFICANT CHANGE UP (ref 3.3–5)
ALP SERPL-CCNC: 214 U/L — HIGH (ref 40–120)
ALT FLD-CCNC: 29 U/L — SIGNIFICANT CHANGE UP (ref 4–33)
ANION GAP SERPL CALC-SCNC: 15 MMOL/L — HIGH (ref 7–14)
AST SERPL-CCNC: 30 U/L — SIGNIFICANT CHANGE UP (ref 4–32)
BASOPHILS # BLD AUTO: 0.06 K/UL — SIGNIFICANT CHANGE UP (ref 0–0.2)
BASOPHILS NFR BLD AUTO: 0.5 % — SIGNIFICANT CHANGE UP (ref 0–2)
BILIRUB SERPL-MCNC: 0.2 MG/DL — SIGNIFICANT CHANGE UP (ref 0.2–1.2)
BLOOD GAS VENOUS COMPREHENSIVE RESULT: SIGNIFICANT CHANGE UP
BUN SERPL-MCNC: 16 MG/DL — SIGNIFICANT CHANGE UP (ref 7–23)
CALCIUM SERPL-MCNC: 9.3 MG/DL — SIGNIFICANT CHANGE UP (ref 8.4–10.5)
CHLORIDE SERPL-SCNC: 103 MMOL/L — SIGNIFICANT CHANGE UP (ref 98–107)
CO2 SERPL-SCNC: 17 MMOL/L — LOW (ref 22–31)
CREAT SERPL-MCNC: 0.89 MG/DL — SIGNIFICANT CHANGE UP (ref 0.5–1.3)
EGFR: 75 ML/MIN/1.73M2 — SIGNIFICANT CHANGE UP
EGFR: 75 ML/MIN/1.73M2 — SIGNIFICANT CHANGE UP
EOSINOPHIL # BLD AUTO: 0.01 K/UL — SIGNIFICANT CHANGE UP (ref 0–0.5)
EOSINOPHIL NFR BLD AUTO: 0.1 % — SIGNIFICANT CHANGE UP (ref 0–6)
FLUAV AG NPH QL: SIGNIFICANT CHANGE UP
FLUBV AG NPH QL: SIGNIFICANT CHANGE UP
GLUCOSE SERPL-MCNC: 137 MG/DL — HIGH (ref 70–99)
HCT VFR BLD CALC: 37.4 % — SIGNIFICANT CHANGE UP (ref 34.5–45)
HGB BLD-MCNC: 12.3 G/DL — SIGNIFICANT CHANGE UP (ref 11.5–15.5)
IANC: 11.69 K/UL — HIGH (ref 1.8–7.4)
IMM GRANULOCYTES NFR BLD AUTO: 0.5 % — SIGNIFICANT CHANGE UP (ref 0–0.9)
LYMPHOCYTES # BLD AUTO: 1.08 K/UL — SIGNIFICANT CHANGE UP (ref 1–3.3)
LYMPHOCYTES # BLD AUTO: 8.3 % — LOW (ref 13–44)
MCHC RBC-ENTMCNC: 27.9 PG — SIGNIFICANT CHANGE UP (ref 27–34)
MCHC RBC-ENTMCNC: 32.9 G/DL — SIGNIFICANT CHANGE UP (ref 32–36)
MCV RBC AUTO: 84.8 FL — SIGNIFICANT CHANGE UP (ref 80–100)
MONOCYTES # BLD AUTO: 0.13 K/UL — SIGNIFICANT CHANGE UP (ref 0–0.9)
MONOCYTES NFR BLD AUTO: 1 % — LOW (ref 2–14)
NEUTROPHILS # BLD AUTO: 11.69 K/UL — HIGH (ref 1.8–7.4)
NEUTROPHILS NFR BLD AUTO: 89.6 % — HIGH (ref 43–77)
NRBC # BLD AUTO: 0 K/UL — SIGNIFICANT CHANGE UP (ref 0–0)
NRBC # FLD: 0 K/UL — SIGNIFICANT CHANGE UP (ref 0–0)
NRBC BLD AUTO-RTO: 0 /100 WBCS — SIGNIFICANT CHANGE UP (ref 0–0)
PLATELET # BLD AUTO: 456 K/UL — HIGH (ref 150–400)
POTASSIUM SERPL-MCNC: 4.7 MMOL/L — SIGNIFICANT CHANGE UP (ref 3.5–5.3)
POTASSIUM SERPL-SCNC: 4.7 MMOL/L — SIGNIFICANT CHANGE UP (ref 3.5–5.3)
PROT SERPL-MCNC: 8 G/DL — SIGNIFICANT CHANGE UP (ref 6–8.3)
RBC # BLD: 4.41 M/UL — SIGNIFICANT CHANGE UP (ref 3.8–5.2)
RBC # FLD: 14.3 % — SIGNIFICANT CHANGE UP (ref 10.3–14.5)
RSV RNA NPH QL NAA+NON-PROBE: SIGNIFICANT CHANGE UP
SODIUM SERPL-SCNC: 135 MMOL/L — SIGNIFICANT CHANGE UP (ref 135–145)
SOURCE RESPIRATORY: SIGNIFICANT CHANGE UP
WBC # BLD: 13.03 K/UL — HIGH (ref 3.8–10.5)
WBC # FLD AUTO: 13.03 K/UL — HIGH (ref 3.8–10.5)

## 2025-03-31 PROCEDURE — 71045 X-RAY EXAM CHEST 1 VIEW: CPT | Mod: 26

## 2025-03-31 PROCEDURE — 93010 ELECTROCARDIOGRAM REPORT: CPT

## 2025-03-31 PROCEDURE — 99223 1ST HOSP IP/OBS HIGH 75: CPT

## 2025-03-31 RX ORDER — GLUCAGON 3 MG/1
1 POWDER NASAL ONCE
Refills: 0 | Status: ACTIVE | OUTPATIENT
Start: 2025-03-31 | End: 2026-02-27

## 2025-03-31 RX ORDER — TIOTROPIUM BROMIDE INHALATION SPRAY 3.12 UG/1
2 SPRAY, METERED RESPIRATORY (INHALATION) DAILY
Refills: 0 | Status: ACTIVE | OUTPATIENT
Start: 2025-03-31 | End: 2026-02-27

## 2025-03-31 RX ORDER — DEXTROSE 50 % IN WATER 50 %
15 SYRINGE (ML) INTRAVENOUS ONCE
Refills: 0 | Status: ACTIVE | OUTPATIENT
Start: 2025-03-31 | End: 2026-02-27

## 2025-03-31 RX ORDER — DIPHENHYDRAMINE HCL 12.5MG/5ML
25 ELIXIR ORAL ONCE
Refills: 0 | Status: COMPLETED | OUTPATIENT
Start: 2025-03-31 | End: 2025-03-31

## 2025-03-31 RX ORDER — AMLODIPINE BESYLATE 10 MG/1
10 TABLET ORAL DAILY
Refills: 0 | Status: ACTIVE | OUTPATIENT
Start: 2025-03-31 | End: 2026-02-27

## 2025-03-31 RX ORDER — KETOROLAC TROMETHAMINE 30 MG/ML
15 INJECTION, SOLUTION INTRAMUSCULAR; INTRAVENOUS ONCE
Refills: 0 | Status: DISCONTINUED | OUTPATIENT
Start: 2025-03-31 | End: 2025-03-31

## 2025-03-31 RX ORDER — DEXTROSE 50 % IN WATER 50 %
25 SYRINGE (ML) INTRAVENOUS ONCE
Refills: 0 | Status: ACTIVE | OUTPATIENT
Start: 2025-03-31

## 2025-03-31 RX ORDER — INSULIN LISPRO 100 U/ML
INJECTION, SOLUTION INTRAVENOUS; SUBCUTANEOUS
Refills: 0 | Status: ACTIVE | OUTPATIENT
Start: 2025-03-31 | End: 2026-02-27

## 2025-03-31 RX ORDER — METHYLPREDNISOLONE ACETATE 80 MG/ML
40 INJECTION, SUSPENSION INTRA-ARTICULAR; INTRALESIONAL; INTRAMUSCULAR; SOFT TISSUE EVERY 8 HOURS
Refills: 0 | Status: ACTIVE | OUTPATIENT
Start: 2025-04-01 | End: 2026-02-28

## 2025-03-31 RX ORDER — INSULIN LISPRO 100 U/ML
2 INJECTION, SOLUTION INTRAVENOUS; SUBCUTANEOUS ONCE
Refills: 0 | Status: COMPLETED | OUTPATIENT
Start: 2025-03-31 | End: 2025-03-31

## 2025-03-31 RX ORDER — METHYLPREDNISOLONE ACETATE 80 MG/ML
125 INJECTION, SUSPENSION INTRA-ARTICULAR; INTRALESIONAL; INTRAMUSCULAR; SOFT TISSUE ONCE
Refills: 0 | Status: COMPLETED | OUTPATIENT
Start: 2025-03-31 | End: 2025-03-31

## 2025-03-31 RX ORDER — SODIUM CHLORIDE 9 G/1000ML
1000 INJECTION, SOLUTION INTRAVENOUS
Refills: 0 | Status: ACTIVE | OUTPATIENT
Start: 2025-03-31 | End: 2026-02-27

## 2025-03-31 RX ORDER — EFAVIRENZ, EMTRICITABINE AND TENOFOVIR DISOPROXIL FUMARATE 600; 200; 300 MG/1; MG/1; MG/1
1 TABLET, FILM COATED ORAL DAILY
Refills: 0 | Status: ACTIVE | OUTPATIENT
Start: 2025-04-01 | End: 2026-02-28

## 2025-03-31 RX ORDER — ALBUTEROL SULFATE 2.5 MG/3ML
2 VIAL, NEBULIZER (ML) INHALATION ONCE
Refills: 0 | Status: COMPLETED | OUTPATIENT
Start: 2025-03-31 | End: 2025-03-31

## 2025-03-31 RX ORDER — INSULIN LISPRO 100 U/ML
INJECTION, SOLUTION INTRAVENOUS; SUBCUTANEOUS AT BEDTIME
Refills: 0 | Status: ACTIVE | OUTPATIENT
Start: 2025-03-31 | End: 2026-02-27

## 2025-03-31 RX ORDER — DEXTROSE 50 % IN WATER 50 %
12.5 SYRINGE (ML) INTRAVENOUS ONCE
Refills: 0 | Status: ACTIVE | OUTPATIENT
Start: 2025-03-31

## 2025-03-31 RX ORDER — EFAVIRENZ, EMTRICITABINE AND TENOFOVIR DISOPROXIL FUMARATE 600; 200; 300 MG/1; MG/1; MG/1
1 TABLET, FILM COATED ORAL AT BEDTIME
Refills: 0 | Status: DISCONTINUED | OUTPATIENT
Start: 2025-03-31 | End: 2025-03-31

## 2025-03-31 RX ORDER — IPRATROPIUM BROMIDE AND ALBUTEROL SULFATE .5; 2.5 MG/3ML; MG/3ML
3 SOLUTION RESPIRATORY (INHALATION)
Refills: 0 | Status: COMPLETED | OUTPATIENT
Start: 2025-03-31 | End: 2025-03-31

## 2025-03-31 RX ORDER — ESCITALOPRAM OXALATE 20 MG/1
5 TABLET ORAL DAILY
Refills: 0 | Status: ACTIVE | OUTPATIENT
Start: 2025-04-01 | End: 2026-02-28

## 2025-03-31 RX ORDER — IPRATROPIUM BROMIDE AND ALBUTEROL SULFATE .5; 2.5 MG/3ML; MG/3ML
3 SOLUTION RESPIRATORY (INHALATION) EVERY 6 HOURS
Refills: 0 | Status: ACTIVE | OUTPATIENT
Start: 2025-03-31 | End: 2026-02-27

## 2025-03-31 RX ORDER — ROSUVASTATIN CALCIUM 20 MG/1
10 TABLET, FILM COATED ORAL AT BEDTIME
Refills: 0 | Status: ACTIVE | OUTPATIENT
Start: 2025-03-31 | End: 2026-02-27

## 2025-03-31 RX ADMIN — IPRATROPIUM BROMIDE AND ALBUTEROL SULFATE 3 MILLILITER(S): .5; 2.5 SOLUTION RESPIRATORY (INHALATION) at 16:48

## 2025-03-31 RX ADMIN — IPRATROPIUM BROMIDE AND ALBUTEROL SULFATE 3 MILLILITER(S): .5; 2.5 SOLUTION RESPIRATORY (INHALATION) at 16:02

## 2025-03-31 RX ADMIN — METHYLPREDNISOLONE ACETATE 125 MILLIGRAM(S): 80 INJECTION, SUSPENSION INTRA-ARTICULAR; INTRALESIONAL; INTRAMUSCULAR; SOFT TISSUE at 16:02

## 2025-03-31 RX ADMIN — IPRATROPIUM BROMIDE AND ALBUTEROL SULFATE 3 MILLILITER(S): .5; 2.5 SOLUTION RESPIRATORY (INHALATION) at 17:29

## 2025-03-31 RX ADMIN — KETOROLAC TROMETHAMINE 15 MILLIGRAM(S): 30 INJECTION, SOLUTION INTRAMUSCULAR; INTRAVENOUS at 16:02

## 2025-03-31 RX ADMIN — AMLODIPINE BESYLATE 10 MILLIGRAM(S): 10 TABLET ORAL at 22:07

## 2025-03-31 RX ADMIN — ROSUVASTATIN CALCIUM 10 MILLIGRAM(S): 20 TABLET, FILM COATED ORAL at 22:07

## 2025-03-31 RX ADMIN — INSULIN LISPRO 2 UNIT(S): 100 INJECTION, SOLUTION INTRAVENOUS; SUBCUTANEOUS at 19:39

## 2025-03-31 RX ADMIN — Medication 25 MILLIGRAM(S): at 22:40

## 2025-03-31 RX ADMIN — Medication 2 PUFF(S): at 20:52

## 2025-03-31 RX ADMIN — IPRATROPIUM BROMIDE AND ALBUTEROL SULFATE 3 MILLILITER(S): .5; 2.5 SOLUTION RESPIRATORY (INHALATION) at 22:11

## 2025-03-31 RX ADMIN — KETOROLAC TROMETHAMINE 15 MILLIGRAM(S): 30 INJECTION, SOLUTION INTRAMUSCULAR; INTRAVENOUS at 21:33

## 2025-03-31 RX ADMIN — INSULIN LISPRO 1: 100 INJECTION, SOLUTION INTRAVENOUS; SUBCUTANEOUS at 22:08

## 2025-03-31 NOTE — ED ADULT NURSE REASSESSMENT NOTE - NS ED NURSE REASSESS COMMENT FT1
Pt received from primary RN stable. Respirations even and unlabored. Pt expresses slight discomfort at this time. Medications even and unlabored. To be determined if Pt will be observation or admit.  Safety precautions in place.

## 2025-03-31 NOTE — ED ADULT NURSE NOTE - NSFALLUNIVINTERV_ED_ALL_ED
Bed/Stretcher in lowest position, wheels locked, appropriate side rails in place/Call bell, personal items and telephone in reach/Instruct patient to call for assistance before getting out of bed/chair/stretcher/Non-slip footwear applied when patient is off stretcher/[place] to call system/Physically safe environment - no spills, clutter or unnecessary equipment/Purposeful proactive rounding/Room/bathroom lighting operational, light cord in reach

## 2025-03-31 NOTE — ED PROVIDER NOTE - CLINICAL SUMMARY MEDICAL DECISION MAKING FREE TEXT BOX
The patient has dyspnea and/or increased work of breathing. Initial vital signs do not demonstrate hypoxia. A chest x-ray is ordered to evaluate for a pleural effusion, pneumothorax, or focal consolidation. The patient's history and exam were factored into a PERC and/or Wells Score. They do not require a CT angiogram of the chest at this time to exclude a pulmonary embolism. We will continue to monitor their vitals on pulse oximetry as clinically indicated.

## 2025-03-31 NOTE — ED PROVIDER NOTE - PHYSICAL EXAMINATION
General: alert, oriented to person, time, place  Psych: mood appropriate  Head: normocephalic; atraumatic  Eyes: conjunctivae clear bilaterally, sclerae anicteric  ENT: no nasal flaring, patent nares  Cardio: RRR, no m/r/g, pulses 2+ b/l  Resp: end-expiratory wheezing bilaterally  GI: soft/nondistended/nontender  Neuro: normal sensation, moving all four extremities equally  Skin: No evidence of rash or bruising  MSK: normal movement of all extremities  Lymph/Vasc: no LE edema

## 2025-03-31 NOTE — ED CDU PROVIDER INITIAL DAY NOTE - CLINICAL SUMMARY MEDICAL DECISION MAKING FREE TEXT BOX
Pt is a 60 YO F with PMH HTN, DM2, HIV (on medications), HPV, GERD, Asthma who presented to ED with shortness of breath/wheezing, chest tightness x 2 days. In ED, pt with leukocytosis 13.03, hyperglycemic 309, Flu/covid/rsv negative, CXR clear. Pt with wheezing on exam. Pt placed in CDU for nebs, steroids, pulse ox monitoring, frequent reassessments.

## 2025-03-31 NOTE — ED CDU PROVIDER INITIAL DAY NOTE - OBJECTIVE STATEMENT
59-year-old female with past medical history of asthma and COPD, former smoker, multiple previous hospital admissions for COPD/asthma, presents to the emergency department with multiple days of shortness of breath. No recent air travel, no fevers or chills, no productive cough.    ED HPI as above, noted  Pt is a 58 YO F with PMH HTN, DM2, HIV (on medications), HPV, GERD, Asthma who presented to ED with shortness of breath/wheezing, chest tightness x 2 days. In ED, pt with leukocytosis 13.03, hyperglycemic 309, Flu/covid/rsv negative, CXR clear. Pt with wheezing on exam. Pt placed in CDU for nebs, steroids, pulse ox monitoring, frequent reassessments.

## 2025-03-31 NOTE — ED PROVIDER NOTE - AVIAN FLU SYMPTOMS
Nutrition Assessment  Reason for Consult/Assessment: Initial, Vent support, Enteral nutrition    Diagnosis and Hx: Reviewed    Pertinent Nutrition History: PMH reviewed and includes anxiety, depression, lung cancer, and substance abuse. MST No.    Pertinent Nutrition Information: Patient remains NPO, intubated, and sedated on vent at 30% FiO2. OGT to LIS. TFs to start today. Labs reviewed, noting elevated glucose. Meds reviewed, noting steroid and thiamine.                              Diet Order: NPO, Enteral nutrition/tube feeding     Enteral nutrition/tube feeding: TFs to start today.           Diet tolerance: NPO, TBD   Food Allergies: None known    Demographic/Anthropometrics Information  Gender: male  Patient Age: 52 year old  Height:   Ht Readings from Last 1 Encounters:   07/31/24 5' 7\" (1.702 m)      Weight:   Wt Readings from Last 1 Encounters:   08/01/24 85.9 kg      BMI:   BMI Readings from Last 1 Encounters:   08/01/24 29.66 kg/m²        % Weight Change: Per Epic, patient's admit weight was 200 # and his current weight is 179 #. Inaccurate weights? His weight in 2020 was 166 #.  Weight change significant: No       Estimated Needs:  Calculated Energy Needs: 8527-2012  kcal   [place]: 1878  [place] Calculation: 1668  Calculated protein needs:   g   Calculated Fluid Needs: Per Provider     NFPE  Nutrition Focused Physical Exam  Physical Exam Completed: No (08/01/24 1551 : Hilda Harper RD)  Reason Not Completed: Patient unavailable, Other (NFPE not appropriate at this time.) (08/01/24 1551 : Hilda Harper RD)           Skin Assessment/Wounds  Edema: Mild to moderate pitting, slight swelling of the extremity, indentation subsides quickly (0-30 sec) (Non-pitting = BUE; BLE)             Treatment Plan/Interventions   Meals & snacks: NPO    Enteral Nutrition:  Enteral Nutrition Formula: Osmolite 1.5 Calorie  Rate: Recommend starting TF at 10 mL/hr, advancing by 10 mL Q8 hours until a goal rate  of 30 mL/hr is reached.  Access Site: OG  Calories Provided by Tube Feedin kcal at goal  Protein Provided by Tube Feedin g protein at goal  Free Water Provided by Tube Feedin mL at goal  Fiber Provided by Tube Feedin g fiber at goal  Modular: Hormel Healthy Shot  Modular Frequency: Daily  Modular Provides: 100 kcal, 24 g protein  Goal rate: 30 mL/hr    Flushes: 30 mL water Q4 hours, pre/post Hormel Healthy Shot. Adjust per MD.  IV Fluids: Versed, Precedex, NS at 75 mL/hr, and Propofol at 29.3 mL/hr - provides 774 kcal/day    Tube feedings at goal (TF formula, Hormel Healthy Shot, and Propofol) provide a total of 1954 kcal and 69 g protein/day, which meets 104% of estimated daily [place] Equation calorie needs and 80% of estimated daily lower-end protein needs.    Coordination of nutrition care: Discussed nutrition care plan with RN. TFs to start today.       Goals & Monitoring  Intervention: Coordination of nutrition care by a nutrition professional, Enteral nutrition, Meals and snacks, Parenteral nutrition/IV fluids    Goal: Maintain or improve weight, Transition to enteral nutrition, Tolerate enteral feeding goal, Meet >/equal 75% estimated needs   Intervention goal status: Some progress toward goal  Time frame to achieve goal: 1-3 days    Dietitian will monitor: Anthropometric Measurements, Biochemical data, medical tests, procedures, Nutrition-focused physical findings, Enteral nutrition intake          Nutrition Diagnosis/PES   Nutrition Diagnosis: Inadequate oral intake     Related to: Intubation/respiratory status   As evidenced by: Need for NPO status, Other (comments)Need for alternative source of nutrition via TFs     Primary Nutrition Diagnosis status: Active nutrition diagnosis                    No

## 2025-03-31 NOTE — ED ADULT NURSE NOTE - SUICIDE SCREENING QUESTION 3
ENDOSCOPY OPERATIVE REPORT    Patient Name: Vincenzo Hemphill, [address] [place] [address] Record #: [id number]  YOB: 1947  Date of Procedure: 8/27/2020    Preoperative Diagnosis: epigastric pain; diarrhea.    Postoperative Diagnosis:    1.) Mild [place] KENNEY were stable. COMPLICATIONS: None. PLAN: Await biopsy results. The patient and  were informed of the endoscopic findings and was also given a copy of the findings, postoperative instructions, and postoperative precautions.   Dima Patel MD  [facility]

## 2025-03-31 NOTE — ED ADULT NURSE REASSESSMENT NOTE - NS ED NURSE REASSESS COMMENT FT1
report given to [facility] CDU RN, all questions answered. pt medicated as per orders, tolerated well. pt transported to CDU via wheelchair in NAD, RR even and unlabored. safety measures maintained, all belongings with pt

## 2025-03-31 NOTE — ED ADULT NURSE NOTE - NS ED NURSE RECORD ANOTHER HT AND WT
Gui called about his Amlodipine.    He was started on Amlodipine 7.5mg daily during his 1/15/25 admission. He just saw his nephrologist Dr. Yusuf Rivera on 3/24/25. And Dr. Rivera suggested he increase his Torsemide from 60mg daily to 100mg daily.    Dr. Rivera's current note under Care Everywhere.    What the patient wants to know is his swelling caused by adding the Amlodipine 7.5mg daily? And if so is there a better medication that he can take that wouldn't also have him need to increase his Torsemide?    Please advise.    Thanks,  Jena   No

## 2025-03-31 NOTE — ED ADULT NURSE NOTE - OBJECTIVE STATEMENT
hx asthma. sent from urgent care d/t sob since saturday. pt used inhaler at home with no relief. pt received 2 neb tx pta. pt reporting chest pain and dizziness x 2 days hx asthma. sent from urgent care d/t sob since saturday. pt used inhaler at home with no relief. pt received 2 neb tx pta. pt reporting chest pain and dizziness x 2 days. connected to cardiac monitor, NSR at this time. pt tolerating RA well, o2 sat 95% on RA. 20G iv placed to left hand, blood drawn and sent to lab. iv flushing well without complications, iv site WNL. safety measures maintained, side rails up x2. call bell within reach at bedside

## 2025-03-31 NOTE — ED ADULT NURSE REASSESSMENT NOTE - NS ED NURSE REASSESS COMMENT FT1
pt states concerns d/t glucose levels rising after eating. fingerstick as documented, pt medicated as per orders, tolerated well. pt A&OX4, RR even and unlabored. safety measures maintained, side rails up x2. awaiting CDU evaluation

## 2025-03-31 NOTE — ED ADULT NURSE REASSESSMENT NOTE - NS ED NURSE REASSESS COMMENT FT1
pt reporting continued sob. pt medicated as per orders. tolerated inhaler well, RR even and unlabored. ice chips provided.

## 2025-03-31 NOTE — ED CDU PROVIDER INITIAL DAY NOTE - BREATH SOUNDS
speaking in full clear sentences, no acessory muscle usage, no intercostal retractions, + expiratory wheezing throughout bilateral lung fields

## 2025-03-31 NOTE — ED PROVIDER NOTE - OBJECTIVE STATEMENT
59-year-old female with past medical history of asthma and COPD, former smoker, multiple previous hospital admissions for COPD/asthma, presents to the emergency department with multiple days of shortness of breath. No recent air travel, no fevers or chills, no productive cough.

## 2025-03-31 NOTE — ED PROVIDER NOTE - PROGRESS NOTE DETAILS
Yfn Mehta MD: Patient with normal pCO2, CXR clear, patient still feeling short of breath. Will have CDU evaluate for potential placement. Yfn Mehta MD: Daytime CDU PA unable to see patient prior to shift change, will await nighttime PA. Yfn Mehta MD: Nighttime PA accepts, patient still stable on room air.

## 2025-03-31 NOTE — ED ADULT TRIAGE NOTE - CHIEF COMPLAINT QUOTE
coming from urgent care, c/o shortness of breath that started Saturday. took inhaler at 0900 AM with out relief. pt received X 2 nebulizer treatments PTA. O2 > 95% room air. also endorsing chest discomfort and dizziness X 2 days. Phx asthma, HTN, DM, HIV, HPV, GERD coming from urgent care, c/o shortness of breath that started Saturday. took inhaler at 0900 AM with out relief. pt received X 2 nebulizer treatments PTA. O2 > 95% room air. placed on 2L NC for comfort. also endorsing chest discomfort and dizziness X 2 days. Phx asthma, HTN, DM, HIV, HPV, GERD. .

## 2025-03-31 NOTE — ED ADULT NURSE NOTE - CHIEF COMPLAINT QUOTE
coming from urgent care, c/o shortness of breath that started Saturday. took inhaler at 0900 AM with out relief. pt received X 2 nebulizer treatments PTA. O2 > 95% room air. placed on 2L NC for comfort. also endorsing chest discomfort and dizziness X 2 days. Phx asthma, HTN, DM, HIV, HPV, GERD. .

## 2025-03-31 NOTE — ED PROVIDER NOTE - ATTENDING CONTRIBUTION TO CARE
Agree with resident note  59-year-old female with past medical history of asthma, COPD, former smoker, states multiple admissions in the past for shortness of breath due to COPD/asthma presents for same.  Denies fevers coughing.  States not pushing as much air.  States typically when received steroids improved.  Denies any chest pain.  Physical exam  Well-appearing female in no respiratory distress  Vital signs stable  Fair air entry with expiratory wheezing, no use of intercostals  S1-S2 no murmurs rubs or gallops  Abdomen soft nontender nondistended  Impression  COPD/asthma exacerbation will give nebs and steroids get chest x-ray basic labs and reassess given prior history of multiple admissions with fair/good air entry and expiratory wheezing likely will require an observation.  CDU versus admission

## 2025-04-01 VITALS
HEART RATE: 108 BPM | OXYGEN SATURATION: 100 % | TEMPERATURE: 98 F | DIASTOLIC BLOOD PRESSURE: 94 MMHG | SYSTOLIC BLOOD PRESSURE: 155 MMHG | RESPIRATION RATE: 18 BRPM

## 2025-04-01 PROCEDURE — 99239 HOSP IP/OBS DSCHRG MGMT >30: CPT

## 2025-04-01 PROCEDURE — 93010 ELECTROCARDIOGRAM REPORT: CPT

## 2025-04-01 RX ORDER — IPRATROPIUM BROMIDE AND ALBUTEROL SULFATE .5; 2.5 MG/3ML; MG/3ML
3 SOLUTION RESPIRATORY (INHALATION)
Qty: 28 | Refills: 0
Start: 2025-04-01 | End: 2025-04-07

## 2025-04-01 RX ORDER — FLUTICASONE FUROATE, UMECLIDINIUM BROMIDE AND VILANTEROL TRIFENATATE 100; 62.5; 25 UG/1; UG/1; UG/1
1 POWDER RESPIRATORY (INHALATION)
Qty: 1 | Refills: 0
Start: 2025-04-01 | End: 2025-04-30

## 2025-04-01 RX ORDER — ACETAMINOPHEN 500 MG/5ML
975 LIQUID (ML) ORAL ONCE
Refills: 0 | Status: COMPLETED | OUTPATIENT
Start: 2025-04-01 | End: 2025-04-01

## 2025-04-01 RX ORDER — METHYLPREDNISOLONE ACETATE 80 MG/ML
1 INJECTION, SUSPENSION INTRA-ARTICULAR; INTRALESIONAL; INTRAMUSCULAR; SOFT TISSUE
Qty: 1 | Refills: 0
Start: 2025-04-01 | End: 2025-04-06

## 2025-04-01 RX ADMIN — METHYLPREDNISOLONE ACETATE 40 MILLIGRAM(S): 80 INJECTION, SUSPENSION INTRA-ARTICULAR; INTRALESIONAL; INTRAMUSCULAR; SOFT TISSUE at 07:46

## 2025-04-01 RX ADMIN — INSULIN LISPRO 1: 100 INJECTION, SOLUTION INTRAVENOUS; SUBCUTANEOUS at 12:08

## 2025-04-01 RX ADMIN — Medication 975 MILLIGRAM(S): at 05:14

## 2025-04-01 RX ADMIN — EFAVIRENZ, EMTRICITABINE AND TENOFOVIR DISOPROXIL FUMARATE 1 TABLET(S): 600; 200; 300 TABLET, FILM COATED ORAL at 12:07

## 2025-04-01 RX ADMIN — Medication 20 MILLIGRAM(S): at 12:09

## 2025-04-01 RX ADMIN — Medication 975 MILLIGRAM(S): at 04:18

## 2025-04-01 RX ADMIN — METHYLPREDNISOLONE ACETATE 40 MILLIGRAM(S): 80 INJECTION, SUSPENSION INTRA-ARTICULAR; INTRALESIONAL; INTRAMUSCULAR; SOFT TISSUE at 00:09

## 2025-04-01 RX ADMIN — INSULIN LISPRO 1: 100 INJECTION, SOLUTION INTRAVENOUS; SUBCUTANEOUS at 07:46

## 2025-04-01 RX ADMIN — Medication 40 MILLIGRAM(S): at 07:46

## 2025-04-01 RX ADMIN — IPRATROPIUM BROMIDE AND ALBUTEROL SULFATE 3 MILLILITER(S): .5; 2.5 SOLUTION RESPIRATORY (INHALATION) at 04:02

## 2025-04-01 RX ADMIN — IPRATROPIUM BROMIDE AND ALBUTEROL SULFATE 3 MILLILITER(S): .5; 2.5 SOLUTION RESPIRATORY (INHALATION) at 09:53

## 2025-04-01 RX ADMIN — ESCITALOPRAM OXALATE 5 MILLIGRAM(S): 20 TABLET ORAL at 12:08

## 2025-04-01 NOTE — ED CDU PROVIDER DISPOSITION NOTE - ATTENDING CONTRIBUTION TO CARE
Pt with asthma exacerbation, placed in cdu for supportive care, improved today and no longer wheezing,w ill dc with steroid taper

## 2025-04-01 NOTE — ED CDU PROVIDER DISPOSITION NOTE - PATIENT PORTAL LINK FT
You can access the FollowMyHealth Patient Portal offered by [facility] by registering at the following website: [email or web address]. By joining [employer]’s FollowMyHealth portal, you will also be able to view your health information using other applications (apps) compatible with our system.

## 2025-04-01 NOTE — ED CDU PROVIDER DISPOSITION NOTE - CLINICAL COURSE
60 YO F with PMH HTN, DM2, HIV (on medications), HPV, GERD, Asthma who presented to ED with shortness of breath/wheezing, chest tightness x 2 days. In ED, pt with leukocytosis 13.03, hyperglycemic 309, Flu/covid/rsv negative, CXR clear. Pt with wheezing on exam. Pt placed in CDU for nebs, steroids, pulse ox monitoring, frequent reassessments. upon reassessment this AM, pt with brief transient episode of lightheadedness after using nebulzier treatment. ECG sinus tachy but otherwise no ischemic changes. lung fields diffusely clear. satting 100% RA, speaking in complete and full sentences. will dc w/ medrol dose tino, c/w nebs/inhalers, and outpatient f/u.

## 2025-04-01 NOTE — ED CDU PROVIDER DISPOSITION NOTE - NSFOLLOWUPINSTRUCTIONS_ED_ALL_ED_FT
Follow up with your PMD within 48-72 hours.  Rest, increase fluids. Take medrol dose tino as prescribed - taper, 6 tablets x 1 day, 5 tablets x 1 day, 4 tablets x 1 day, etc until completed.   Albuterol inhaler 2 inhalations every 4-6 hours as needed. Take all of your other medications as previously prescribed. Any worsening wheezing, shortness of breath, chest pain, fever, chills return to the ED    [facility] Internal Medicine  General Internal Medicine  [address] [place] [id number]  Phone: [phone]  Fax:     [facility]  Internal Medicine  [address] [place] [address]  Phone: [phone]  Fax: [phone]    Asthma WHAT YOU NEED TO KNOW:    Asthma is a lung disease that makes breathing difficult. Chronic inflammation and reactions to triggers narrow the airways in the lungs. Asthma can become life-threatening if it is not managed.  Normal vs Asthmatic Bronchioles Adult    DISCHARGE INSTRUCTIONS:    Call your local emergency number (911 in the ) if:    You have severe shortness of breath.    The skin around your neck and ribs pulls in with each breath.    Your peak flow numbers are in the red zone of your AAP.  Seek care immediately if:    You have shortness of breath, even after you take your short-term medicine as directed.    Your lips or nails turn blue or gray.  Call your doctor or asthma specialist if:    You run out of medicine before your next refill is due.    Your symptoms get worse.    You need to take more medicine than usual to control your symptoms.    You have questions or concerns about your condition or care.  Medicines:    Medicines may be used to decrease inflammation, open airways, and make it easier to breathe. Medicines may be inhaled, taken as a pill, or injected. Short-term medicines relieve your symptoms quickly. Long-term medicines are used to prevent future asthma attacks. Other medicines may be needed if your regular medicines are not able to prevent attacks. You may also need medicine to help control your allergies. Ask your healthcare provider for more information about any medicine you are given and how to take it safely.    Take your medicine as directed. Contact your healthcare provider if you think your medicine is not helping or if you have side effects. Tell him or her if you are allergic to any medicine. Keep a list of the medicines, vitamins, and herbs you take. Include the amounts, and when and why you take them. Bring the list or the pill bottles to follow-up visits. Carry your medicine list with you in case of an emergency.  Manage your symptoms and prevent future attacks:    Follow your Asthma Action Plan (AAP). This is a written plan that you and your healthcare provider create. It explains which medicine you need and when to change doses if necessary. It also explains how you can monitor symptoms and use a peak flow meter. The meter measures how well your lungs are working.    Manage other health conditions, such as allergies, acid reflux, and sleep apnea.    Identify and avoid triggers. These may include pets, dust mites, mold, and cockroaches.    Do not smoke or be around others who smoke. Nicotine and other chemicals in cigarettes and cigars can cause lung damage. Ask your healthcare provider for information if you currently smoke and need help to quit. E-cigarettes or smokeless tobacco still contain nicotine. Talk to your healthcare provider before you use these products.    Ask about the flu vaccine. The flu can make your asthma worse. You may need a yearly flu shot.  Follow up with your healthcare provider as directed: You will need to return to make sure your medicine is working and your symptoms are controlled. You may be referred to an asthma or allergy specialist. You may be asked to keep a record of your peak flow values and bring it with you to your appointments. Write down your questions so you remember to ask them during your visits.      [facility]  [facility]  [address] [place] [address]  Teléfono: [phone]  Fax:    [facility]  [facility]  [address] [place] [address]  Teléfono: [phone]  Fax: [phone]

## 2025-04-01 NOTE — PROVIDER CONTACT NOTE (OTHER) - ACTION/TREATMENT ORDERED:
Transportation coordinated via [facility] with [employer] [phone] for 2:15pm . Inv# [id number].

## 2025-04-01 NOTE — ED CDU PROVIDER SUBSEQUENT DAY NOTE - HISTORY
Pt is a 58 YO F with PMH HTN, DM2, HIV (on medications), HPV, GERD, Asthma who presented to ED with shortness of breath/wheezing, chest tightness x 2 days. In ED, pt with leukocytosis 13.03, hyperglycemic 309, Flu/covid/rsv negative, CXR clear. Pt with wheezing on exam. Pt placed in CDU for nebs, steroids, pulse ox monitoring, frequent reassessments.  In the interim- pt reports mild improvement, satting 100% on RA

## 2025-04-01 NOTE — ED CDU PROVIDER SUBSEQUENT DAY NOTE - ATTENDING APP SHARED VISIT CONTRIBUTION OF CARE
58 YO F with PMH HTN, DM2, HIV (on medications), HPV, GERD, Asthma who presented to ED with shortness of breath/wheezing, chest tightness x 2 days. In ED, pt with leukocytosis 13.03, hyperglycemic 309, Flu/covid/rsv negative, CXR clear. Pt with wheezing on exam. Pt placed in CDU for nebs, steroids, pulse ox monitoring, frequent reassessments.  agree with above: pt with asthma exacerbation, placed in cdu for nebs and steroids, improving, lungs clear, pt speaking in full sentences without issues, will dc with course of steroids

## 2025-04-22 NOTE — PATIENT PROFILE ADULT - NSPROHMCARDIOSYMPCOND_GEN_A_NUR
PROVIDER:[TOKEN:[[id number]],FOLLOWUP:[7-10 Days]],PROVIDER:[TOKEN:[[id number]],FOLLOWUP:[7-10 Days]]
hypertension

## 2025-05-08 ENCOUNTER — EMERGENCY (EMERGENCY)
Facility: HOSPITAL | Age: 60
LOS: 1 days | End: 2025-05-08
Attending: STUDENT IN AN ORGANIZED HEALTH CARE EDUCATION/TRAINING PROGRAM | Admitting: EMERGENCY MEDICINE
Payer: MEDICAID

## 2025-05-08 VITALS
HEART RATE: 75 BPM | DIASTOLIC BLOOD PRESSURE: 80 MMHG | RESPIRATION RATE: 16 BRPM | SYSTOLIC BLOOD PRESSURE: 150 MMHG | TEMPERATURE: 98 F | HEIGHT: 67 IN | OXYGEN SATURATION: 98 % | WEIGHT: 207.9 LBS

## 2025-05-08 DIAGNOSIS — Z90.710 ACQUIRED ABSENCE OF BOTH CERVIX AND UTERUS: Chronic | ICD-10-CM

## 2025-05-08 DIAGNOSIS — Z90.2 ACQUIRED ABSENCE OF LUNG [PART OF]: Chronic | ICD-10-CM

## 2025-05-08 LAB
ADD ON TEST-SPECIMEN IN LAB: SIGNIFICANT CHANGE UP
ADD ON TEST-SPECIMEN IN LAB: SIGNIFICANT CHANGE UP
ALBUMIN SERPL ELPH-MCNC: 3.9 G/DL — SIGNIFICANT CHANGE UP (ref 3.3–5)
ALP SERPL-CCNC: 196 U/L — HIGH (ref 40–120)
ALT FLD-CCNC: 25 U/L — SIGNIFICANT CHANGE UP (ref 4–33)
ANION GAP SERPL CALC-SCNC: 13 MMOL/L — SIGNIFICANT CHANGE UP (ref 7–14)
AST SERPL-CCNC: 26 U/L — SIGNIFICANT CHANGE UP (ref 4–32)
BASOPHILS # BLD AUTO: 0.07 K/UL — SIGNIFICANT CHANGE UP (ref 0–0.2)
BASOPHILS NFR BLD AUTO: 0.6 % — SIGNIFICANT CHANGE UP (ref 0–2)
BILIRUB SERPL-MCNC: 0.4 MG/DL — SIGNIFICANT CHANGE UP (ref 0.2–1.2)
BLOOD GAS VENOUS COMPREHENSIVE RESULT: SIGNIFICANT CHANGE UP
BUN SERPL-MCNC: 16 MG/DL — SIGNIFICANT CHANGE UP (ref 7–23)
CALCIUM SERPL-MCNC: 9.1 MG/DL — SIGNIFICANT CHANGE UP (ref 8.4–10.5)
CHLORIDE SERPL-SCNC: 105 MMOL/L — SIGNIFICANT CHANGE UP (ref 98–107)
CO2 SERPL-SCNC: 20 MMOL/L — LOW (ref 22–31)
CREAT SERPL-MCNC: 0.93 MG/DL — SIGNIFICANT CHANGE UP (ref 0.5–1.3)
D DIMER BLD IA.RAPID-MCNC: <150 NG/ML DDU — SIGNIFICANT CHANGE UP
EGFR: 71 ML/MIN/1.73M2 — SIGNIFICANT CHANGE UP
EGFR: 71 ML/MIN/1.73M2 — SIGNIFICANT CHANGE UP
EOSINOPHIL # BLD AUTO: 0.17 K/UL — SIGNIFICANT CHANGE UP (ref 0–0.5)
EOSINOPHIL NFR BLD AUTO: 1.6 % — SIGNIFICANT CHANGE UP (ref 0–6)
FLUAV AG NPH QL: SIGNIFICANT CHANGE UP
FLUBV AG NPH QL: SIGNIFICANT CHANGE UP
GLUCOSE SERPL-MCNC: 112 MG/DL — HIGH (ref 70–99)
HCT VFR BLD CALC: 38 % — SIGNIFICANT CHANGE UP (ref 34.5–45)
HGB BLD-MCNC: 12.1 G/DL — SIGNIFICANT CHANGE UP (ref 11.5–15.5)
IANC: 7.58 K/UL — HIGH (ref 1.8–7.4)
IMM GRANULOCYTES NFR BLD AUTO: 0.4 % — SIGNIFICANT CHANGE UP (ref 0–0.9)
LYMPHOCYTES # BLD AUTO: 2.24 K/UL — SIGNIFICANT CHANGE UP (ref 1–3.3)
LYMPHOCYTES # BLD AUTO: 20.7 % — SIGNIFICANT CHANGE UP (ref 13–44)
MCHC RBC-ENTMCNC: 27.4 PG — SIGNIFICANT CHANGE UP (ref 27–34)
MCHC RBC-ENTMCNC: 31.8 G/DL — LOW (ref 32–36)
MCV RBC AUTO: 86 FL — SIGNIFICANT CHANGE UP (ref 80–100)
MONOCYTES # BLD AUTO: 0.7 K/UL — SIGNIFICANT CHANGE UP (ref 0–0.9)
MONOCYTES NFR BLD AUTO: 6.5 % — SIGNIFICANT CHANGE UP (ref 2–14)
NEUTROPHILS # BLD AUTO: 7.58 K/UL — HIGH (ref 1.8–7.4)
NEUTROPHILS NFR BLD AUTO: 70.2 % — SIGNIFICANT CHANGE UP (ref 43–77)
NRBC # BLD AUTO: 0 K/UL — SIGNIFICANT CHANGE UP (ref 0–0)
NRBC # FLD: 0 K/UL — SIGNIFICANT CHANGE UP (ref 0–0)
NRBC BLD AUTO-RTO: 0 /100 WBCS — SIGNIFICANT CHANGE UP (ref 0–0)
PLATELET # BLD AUTO: 448 K/UL — HIGH (ref 150–400)
POTASSIUM SERPL-MCNC: 3.6 MMOL/L — SIGNIFICANT CHANGE UP (ref 3.5–5.3)
POTASSIUM SERPL-SCNC: 3.6 MMOL/L — SIGNIFICANT CHANGE UP (ref 3.5–5.3)
PROT SERPL-MCNC: 7.3 G/DL — SIGNIFICANT CHANGE UP (ref 6–8.3)
RBC # BLD: 4.42 M/UL — SIGNIFICANT CHANGE UP (ref 3.8–5.2)
RBC # FLD: 15 % — HIGH (ref 10.3–14.5)
RSV RNA NPH QL NAA+NON-PROBE: SIGNIFICANT CHANGE UP
SARS-COV-2 RNA SPEC QL NAA+PROBE: SIGNIFICANT CHANGE UP
SODIUM SERPL-SCNC: 138 MMOL/L — SIGNIFICANT CHANGE UP (ref 135–145)
SOURCE RESPIRATORY: SIGNIFICANT CHANGE UP
TROPONIN T, HIGH SENSITIVITY RESULT: 8 NG/L — SIGNIFICANT CHANGE UP
WBC # BLD: 10.8 K/UL — HIGH (ref 3.8–10.5)
WBC # FLD AUTO: 10.8 K/UL — HIGH (ref 3.8–10.5)

## 2025-05-08 PROCEDURE — 93010 ELECTROCARDIOGRAM REPORT: CPT

## 2025-05-08 PROCEDURE — 99223 1ST HOSP IP/OBS HIGH 75: CPT | Mod: 25

## 2025-05-08 PROCEDURE — 71046 X-RAY EXAM CHEST 2 VIEWS: CPT | Mod: 26

## 2025-05-08 RX ORDER — DIPHENHYDRAMINE HCL 12.5MG/5ML
25 ELIXIR ORAL ONCE
Refills: 0 | Status: COMPLETED | OUTPATIENT
Start: 2025-05-08 | End: 2025-05-08

## 2025-05-08 RX ORDER — PREDNISONE 20 MG/1
50 TABLET ORAL DAILY
Refills: 0 | Status: DISCONTINUED | OUTPATIENT
Start: 2025-05-08 | End: 2025-05-08

## 2025-05-08 RX ORDER — METHYLPREDNISOLONE ACETATE 80 MG/ML
60 INJECTION, SUSPENSION INTRA-ARTICULAR; INTRALESIONAL; INTRAMUSCULAR; SOFT TISSUE ONCE
Refills: 0 | Status: COMPLETED | OUTPATIENT
Start: 2025-05-08 | End: 2025-05-08

## 2025-05-08 RX ORDER — TIOTROPIUM BROMIDE INHALATION SPRAY 3.12 UG/1
2 SPRAY, METERED RESPIRATORY (INHALATION) DAILY
Refills: 0 | Status: DISCONTINUED | OUTPATIENT
Start: 2025-05-08 | End: 2025-05-11

## 2025-05-08 RX ORDER — INSULIN LISPRO 100 U/ML
INJECTION, SOLUTION INTRAVENOUS; SUBCUTANEOUS AT BEDTIME
Refills: 0 | Status: DISCONTINUED | OUTPATIENT
Start: 2025-05-08 | End: 2025-05-11

## 2025-05-08 RX ORDER — IPRATROPIUM BROMIDE AND ALBUTEROL SULFATE .5; 2.5 MG/3ML; MG/3ML
3 SOLUTION RESPIRATORY (INHALATION) ONCE
Refills: 0 | Status: COMPLETED | OUTPATIENT
Start: 2025-05-08 | End: 2025-05-08

## 2025-05-08 RX ORDER — GLUCAGON 3 MG/1
1 POWDER NASAL ONCE
Refills: 0 | Status: DISCONTINUED | OUTPATIENT
Start: 2025-05-08 | End: 2025-05-11

## 2025-05-08 RX ORDER — DIPHENHYDRAMINE HCL 12.5MG/5ML
50 ELIXIR ORAL AT BEDTIME
Refills: 0 | Status: DISCONTINUED | OUTPATIENT
Start: 2025-05-09 | End: 2025-05-11

## 2025-05-08 RX ORDER — DIPHENHYDRAMINE HCL 12.5MG/5ML
25 ELIXIR ORAL AT BEDTIME
Refills: 0 | Status: DISCONTINUED | OUTPATIENT
Start: 2025-05-08 | End: 2025-05-08

## 2025-05-08 RX ORDER — FLUTICASONE PROPIONATE 50 UG/1
1 SPRAY, METERED NASAL
Refills: 0 | Status: DISCONTINUED | OUTPATIENT
Start: 2025-05-08 | End: 2025-05-11

## 2025-05-08 RX ORDER — CEFTRIAXONE 500 MG/1
1000 INJECTION, POWDER, FOR SOLUTION INTRAMUSCULAR; INTRAVENOUS ONCE
Refills: 0 | Status: COMPLETED | OUTPATIENT
Start: 2025-05-08 | End: 2025-05-08

## 2025-05-08 RX ORDER — SODIUM CHLORIDE 9 G/1000ML
1000 INJECTION, SOLUTION INTRAVENOUS
Refills: 0 | Status: DISCONTINUED | OUTPATIENT
Start: 2025-05-08 | End: 2025-05-11

## 2025-05-08 RX ORDER — EFAVIRENZ, EMTRICITABINE AND TENOFOVIR DISOPROXIL FUMARATE 600; 200; 300 MG/1; MG/1; MG/1
1 TABLET, FILM COATED ORAL DAILY
Refills: 0 | Status: DISCONTINUED | OUTPATIENT
Start: 2025-05-08 | End: 2025-05-11

## 2025-05-08 RX ORDER — MAGNESIUM SULFATE 500 MG/ML
2 SYRINGE (ML) INJECTION ONCE
Refills: 0 | Status: COMPLETED | OUTPATIENT
Start: 2025-05-08 | End: 2025-05-08

## 2025-05-08 RX ORDER — IPRATROPIUM BROMIDE AND ALBUTEROL SULFATE .5; 2.5 MG/3ML; MG/3ML
3 SOLUTION RESPIRATORY (INHALATION)
Refills: 0 | Status: COMPLETED | OUTPATIENT
Start: 2025-05-08 | End: 2025-05-08

## 2025-05-08 RX ORDER — IPRATROPIUM BROMIDE AND ALBUTEROL SULFATE .5; 2.5 MG/3ML; MG/3ML
3 SOLUTION RESPIRATORY (INHALATION) EVERY 4 HOURS
Refills: 0 | Status: DISCONTINUED | OUTPATIENT
Start: 2025-05-08 | End: 2025-05-11

## 2025-05-08 RX ORDER — DEXTROSE 50 % IN WATER 50 %
25 SYRINGE (ML) INTRAVENOUS ONCE
Refills: 0 | Status: DISCONTINUED | OUTPATIENT
Start: 2025-05-08 | End: 2025-05-11

## 2025-05-08 RX ORDER — AMLODIPINE BESYLATE 10 MG/1
10 TABLET ORAL DAILY
Refills: 0 | Status: DISCONTINUED | OUTPATIENT
Start: 2025-05-08 | End: 2025-05-11

## 2025-05-08 RX ORDER — DEXTROSE 50 % IN WATER 50 %
15 SYRINGE (ML) INTRAVENOUS ONCE
Refills: 0 | Status: DISCONTINUED | OUTPATIENT
Start: 2025-05-08 | End: 2025-05-11

## 2025-05-08 RX ORDER — METHYLPREDNISOLONE ACETATE 80 MG/ML
60 INJECTION, SUSPENSION INTRA-ARTICULAR; INTRALESIONAL; INTRAMUSCULAR; SOFT TISSUE EVERY 6 HOURS
Refills: 0 | Status: DISCONTINUED | OUTPATIENT
Start: 2025-05-09 | End: 2025-05-11

## 2025-05-08 RX ORDER — DEXTROSE 50 % IN WATER 50 %
12.5 SYRINGE (ML) INTRAVENOUS ONCE
Refills: 0 | Status: DISCONTINUED | OUTPATIENT
Start: 2025-05-08 | End: 2025-05-11

## 2025-05-08 RX ORDER — CEFTRIAXONE 500 MG/1
1000 INJECTION, POWDER, FOR SOLUTION INTRAMUSCULAR; INTRAVENOUS EVERY 24 HOURS
Refills: 0 | Status: DISCONTINUED | OUTPATIENT
Start: 2025-05-09 | End: 2025-05-11

## 2025-05-08 RX ORDER — ALBUTEROL SULFATE 2.5 MG/3ML
2.5 VIAL, NEBULIZER (ML) INHALATION
Refills: 0 | Status: COMPLETED | OUTPATIENT
Start: 2025-05-08 | End: 2025-05-08

## 2025-05-08 RX ORDER — IPRATROPIUM BROMIDE AND ALBUTEROL SULFATE .5; 2.5 MG/3ML; MG/3ML
3 SOLUTION RESPIRATORY (INHALATION) EVERY 6 HOURS
Refills: 0 | Status: DISCONTINUED | OUTPATIENT
Start: 2025-05-08 | End: 2025-05-08

## 2025-05-08 RX ORDER — INSULIN LISPRO 100 U/ML
INJECTION, SOLUTION INTRAVENOUS; SUBCUTANEOUS
Refills: 0 | Status: DISCONTINUED | OUTPATIENT
Start: 2025-05-08 | End: 2025-05-11

## 2025-05-08 RX ADMIN — IPRATROPIUM BROMIDE AND ALBUTEROL SULFATE 3 MILLILITER(S): .5; 2.5 SOLUTION RESPIRATORY (INHALATION) at 07:58

## 2025-05-08 RX ADMIN — IPRATROPIUM BROMIDE AND ALBUTEROL SULFATE 3 MILLILITER(S): .5; 2.5 SOLUTION RESPIRATORY (INHALATION) at 21:43

## 2025-05-08 RX ADMIN — Medication 25 MILLIGRAM(S): at 21:57

## 2025-05-08 RX ADMIN — Medication 20 MILLIGRAM(S): at 18:57

## 2025-05-08 RX ADMIN — Medication 150 GRAM(S): at 10:07

## 2025-05-08 RX ADMIN — CEFTRIAXONE 100 MILLIGRAM(S): 500 INJECTION, POWDER, FOR SOLUTION INTRAMUSCULAR; INTRAVENOUS at 15:32

## 2025-05-08 RX ADMIN — Medication 2.5 MILLIGRAM(S): at 14:44

## 2025-05-08 RX ADMIN — PREDNISONE 50 MILLIGRAM(S): 20 TABLET ORAL at 18:58

## 2025-05-08 RX ADMIN — Medication 1000 MILLILITER(S): at 11:15

## 2025-05-08 RX ADMIN — Medication 2.5 MILLIGRAM(S): at 15:31

## 2025-05-08 RX ADMIN — METHYLPREDNISOLONE ACETATE 60 MILLIGRAM(S): 80 INJECTION, SUSPENSION INTRA-ARTICULAR; INTRALESIONAL; INTRAMUSCULAR; SOFT TISSUE at 07:58

## 2025-05-08 RX ADMIN — FLUTICASONE PROPIONATE 1 SPRAY(S): 50 SPRAY, METERED NASAL at 20:02

## 2025-05-08 RX ADMIN — INSULIN LISPRO 2: 100 INJECTION, SOLUTION INTRAVENOUS; SUBCUTANEOUS at 19:29

## 2025-05-08 RX ADMIN — Medication 25 MILLIGRAM(S): at 21:41

## 2025-05-08 RX ADMIN — Medication 2.5 MILLIGRAM(S): at 13:55

## 2025-05-08 RX ADMIN — IPRATROPIUM BROMIDE AND ALBUTEROL SULFATE 3 MILLILITER(S): .5; 2.5 SOLUTION RESPIRATORY (INHALATION) at 21:57

## 2025-05-09 VITALS
DIASTOLIC BLOOD PRESSURE: 74 MMHG | SYSTOLIC BLOOD PRESSURE: 153 MMHG | TEMPERATURE: 98 F | OXYGEN SATURATION: 98 % | RESPIRATION RATE: 18 BRPM | HEART RATE: 96 BPM

## 2025-05-09 PROCEDURE — 99239 HOSP IP/OBS DSCHRG MGMT >30: CPT

## 2025-05-09 RX ORDER — ALBUTEROL SULFATE 2.5 MG/3ML
2 VIAL, NEBULIZER (ML) INHALATION
Qty: 1 | Refills: 0
Start: 2025-05-09 | End: 2025-05-15

## 2025-05-09 RX ORDER — ALBUTEROL SULFATE 2.5 MG/3ML
2 VIAL, NEBULIZER (ML) INHALATION
Refills: 0
Start: 2025-05-09

## 2025-05-09 RX ORDER — AZITHROMYCIN 250 MG
1 CAPSULE ORAL
Qty: 1 | Refills: 0
Start: 2025-05-09 | End: 2025-05-13

## 2025-05-09 RX ORDER — ONDANSETRON HCL/PF 4 MG/2 ML
4 VIAL (ML) INJECTION ONCE
Refills: 0 | Status: COMPLETED | OUTPATIENT
Start: 2025-05-09 | End: 2025-05-09

## 2025-05-09 RX ORDER — PREDNISONE 20 MG/1
2 TABLET ORAL
Qty: 10 | Refills: 0
Start: 2025-05-09 | End: 2025-05-13

## 2025-05-09 RX ORDER — AZITHROMYCIN 250 MG
1 CAPSULE ORAL
Qty: 5 | Refills: 0
Start: 2025-05-09 | End: 2025-05-13

## 2025-05-09 RX ORDER — AZITHROMYCIN 250 MG
500 CAPSULE ORAL ONCE
Refills: 0 | Status: COMPLETED | OUTPATIENT
Start: 2025-05-09 | End: 2025-05-09

## 2025-05-09 RX ADMIN — AMLODIPINE BESYLATE 10 MILLIGRAM(S): 10 TABLET ORAL at 05:03

## 2025-05-09 RX ADMIN — EFAVIRENZ, EMTRICITABINE AND TENOFOVIR DISOPROXIL FUMARATE 1 TABLET(S): 600; 200; 300 TABLET, FILM COATED ORAL at 11:39

## 2025-05-09 RX ADMIN — IPRATROPIUM BROMIDE AND ALBUTEROL SULFATE 3 MILLILITER(S): .5; 2.5 SOLUTION RESPIRATORY (INHALATION) at 13:05

## 2025-05-09 RX ADMIN — INSULIN LISPRO 2: 100 INJECTION, SOLUTION INTRAVENOUS; SUBCUTANEOUS at 11:39

## 2025-05-09 RX ADMIN — FLUTICASONE PROPIONATE 1 SPRAY(S): 50 SPRAY, METERED NASAL at 05:02

## 2025-05-09 RX ADMIN — IPRATROPIUM BROMIDE AND ALBUTEROL SULFATE 3 MILLILITER(S): .5; 2.5 SOLUTION RESPIRATORY (INHALATION) at 00:30

## 2025-05-09 RX ADMIN — METHYLPREDNISOLONE ACETATE 60 MILLIGRAM(S): 80 INJECTION, SUSPENSION INTRA-ARTICULAR; INTRALESIONAL; INTRAMUSCULAR; SOFT TISSUE at 05:03

## 2025-05-09 RX ADMIN — IPRATROPIUM BROMIDE AND ALBUTEROL SULFATE 3 MILLILITER(S): .5; 2.5 SOLUTION RESPIRATORY (INHALATION) at 04:52

## 2025-05-09 RX ADMIN — METHYLPREDNISOLONE ACETATE 60 MILLIGRAM(S): 80 INJECTION, SUSPENSION INTRA-ARTICULAR; INTRALESIONAL; INTRAMUSCULAR; SOFT TISSUE at 00:03

## 2025-05-09 RX ADMIN — Medication 20 MILLIGRAM(S): at 05:00

## 2025-05-09 RX ADMIN — INSULIN LISPRO 2: 100 INJECTION, SOLUTION INTRAVENOUS; SUBCUTANEOUS at 07:55

## 2025-05-09 RX ADMIN — Medication 250 MILLIGRAM(S): at 08:31

## 2025-05-09 RX ADMIN — METHYLPREDNISOLONE ACETATE 60 MILLIGRAM(S): 80 INJECTION, SUSPENSION INTRA-ARTICULAR; INTRALESIONAL; INTRAMUSCULAR; SOFT TISSUE at 11:41

## 2025-05-09 RX ADMIN — IPRATROPIUM BROMIDE AND ALBUTEROL SULFATE 3 MILLILITER(S): .5; 2.5 SOLUTION RESPIRATORY (INHALATION) at 08:38

## 2025-05-09 RX ADMIN — Medication 4 MILLIGRAM(S): at 04:45

## 2025-06-03 ENCOUNTER — NON-APPOINTMENT (OUTPATIENT)
Age: 60
End: 2025-06-03

## 2025-06-03 ENCOUNTER — APPOINTMENT (OUTPATIENT)
Dept: ENDOCRINOLOGY | Facility: CLINIC | Age: 60
End: 2025-06-03
Payer: MEDICAID

## 2025-06-03 VITALS
OXYGEN SATURATION: 98 % | SYSTOLIC BLOOD PRESSURE: 148 MMHG | HEIGHT: 67 IN | HEART RATE: 74 BPM | WEIGHT: 208 LBS | DIASTOLIC BLOOD PRESSURE: 90 MMHG | BODY MASS INDEX: 32.65 KG/M2

## 2025-06-03 DIAGNOSIS — E78.5 HYPERLIPIDEMIA, UNSPECIFIED: ICD-10-CM

## 2025-06-03 DIAGNOSIS — I10 ESSENTIAL (PRIMARY) HYPERTENSION: ICD-10-CM

## 2025-06-03 DIAGNOSIS — R23.2 FLUSHING: ICD-10-CM

## 2025-06-03 DIAGNOSIS — E11.9 TYPE 2 DIABETES MELLITUS W/OUT COMPLICATIONS: ICD-10-CM

## 2025-06-03 LAB — GLUCOSE BLDC GLUCOMTR-MCNC: 116

## 2025-06-03 PROCEDURE — 82962 GLUCOSE BLOOD TEST: CPT

## 2025-06-03 PROCEDURE — 99214 OFFICE O/P EST MOD 30 MIN: CPT | Mod: 25

## 2025-06-03 RX ORDER — BLOOD-GLUCOSE SENSOR
EACH MISCELLANEOUS
Qty: 2 | Refills: 11 | Status: ACTIVE | COMMUNITY
Start: 2025-06-03 | End: 1900-01-01

## 2025-06-04 LAB
ALBUMIN SERPL ELPH-MCNC: 4.4 G/DL
ALP BLD-CCNC: 205 U/L
ALT SERPL-CCNC: 35 U/L
ANION GAP SERPL CALC-SCNC: 14 MMOL/L
AST SERPL-CCNC: 23 U/L
BILIRUB SERPL-MCNC: 0.2 MG/DL
BUN SERPL-MCNC: 16 MG/DL
CALCIUM SERPL-MCNC: 9.9 MG/DL
CHLORIDE SERPL-SCNC: 104 MMOL/L
CHOLEST SERPL-MCNC: 183 MG/DL
CO2 SERPL-SCNC: 21 MMOL/L
CREAT SERPL-MCNC: 0.95 MG/DL
EGFRCR SERPLBLD CKD-EPI 2021: 69 ML/MIN/1.73M2
ESTRADIOL SERPL-MCNC: 41 PG/ML
FSH SERPL-MCNC: 79.8 IU/L
GLUCOSE SERPL-MCNC: 103 MG/DL
HDLC SERPL-MCNC: 89 MG/DL
LDLC SERPL-MCNC: 79 MG/DL
NONHDLC SERPL-MCNC: 94 MG/DL
POTASSIUM SERPL-SCNC: 3.4 MMOL/L
PROT SERPL-MCNC: 7.5 G/DL
SODIUM SERPL-SCNC: 139 MMOL/L
TRIGL SERPL-MCNC: 84 MG/DL
TSH SERPL-ACNC: 1.15 UIU/ML

## 2025-06-05 ENCOUNTER — INPATIENT (INPATIENT)
Facility: HOSPITAL | Age: 60
LOS: 4 days | Discharge: HOME | End: 2025-06-10
Attending: STUDENT IN AN ORGANIZED HEALTH CARE EDUCATION/TRAINING PROGRAM | Admitting: STUDENT IN AN ORGANIZED HEALTH CARE EDUCATION/TRAINING PROGRAM
Payer: MEDICAID

## 2025-06-05 VITALS
DIASTOLIC BLOOD PRESSURE: 78 MMHG | HEIGHT: 67 IN | SYSTOLIC BLOOD PRESSURE: 145 MMHG | RESPIRATION RATE: 17 BRPM | TEMPERATURE: 98 F | WEIGHT: 210.1 LBS | HEART RATE: 90 BPM | OXYGEN SATURATION: 100 %

## 2025-06-05 DIAGNOSIS — Z90.2 ACQUIRED ABSENCE OF LUNG [PART OF]: Chronic | ICD-10-CM

## 2025-06-05 DIAGNOSIS — Z90.710 ACQUIRED ABSENCE OF BOTH CERVIX AND UTERUS: Chronic | ICD-10-CM

## 2025-06-05 LAB
ADD ON TEST-SPECIMEN IN LAB: SIGNIFICANT CHANGE UP
ALBUMIN SERPL ELPH-MCNC: 3.8 G/DL — SIGNIFICANT CHANGE UP (ref 3.3–5)
ALP SERPL-CCNC: 186 U/L — HIGH (ref 40–120)
ALT FLD-CCNC: 29 U/L — SIGNIFICANT CHANGE UP (ref 4–33)
ANION GAP SERPL CALC-SCNC: 13 MMOL/L — SIGNIFICANT CHANGE UP (ref 7–14)
AST SERPL-CCNC: 21 U/L — SIGNIFICANT CHANGE UP (ref 4–32)
BASOPHILS # BLD AUTO: 0.05 K/UL — SIGNIFICANT CHANGE UP (ref 0–0.2)
BASOPHILS NFR BLD AUTO: 0.4 % — SIGNIFICANT CHANGE UP (ref 0–2)
BILIRUB SERPL-MCNC: <0.2 MG/DL — SIGNIFICANT CHANGE UP (ref 0.2–1.2)
BLOOD GAS VENOUS COMPREHENSIVE RESULT: SIGNIFICANT CHANGE UP
BUN SERPL-MCNC: 14 MG/DL — SIGNIFICANT CHANGE UP (ref 7–23)
CALCIUM SERPL-MCNC: 8.5 MG/DL — SIGNIFICANT CHANGE UP (ref 8.4–10.5)
CHLORIDE SERPL-SCNC: 108 MMOL/L — HIGH (ref 98–107)
CO2 SERPL-SCNC: 20 MMOL/L — LOW (ref 22–31)
CREAT SERPL-MCNC: 0.96 MG/DL — SIGNIFICANT CHANGE UP (ref 0.5–1.3)
EGFR: 68 ML/MIN/1.73M2 — SIGNIFICANT CHANGE UP
EGFR: 68 ML/MIN/1.73M2 — SIGNIFICANT CHANGE UP
EOSINOPHIL # BLD AUTO: 0.08 K/UL — SIGNIFICANT CHANGE UP (ref 0–0.5)
EOSINOPHIL NFR BLD AUTO: 0.6 % — SIGNIFICANT CHANGE UP (ref 0–6)
FLUAV AG NPH QL: SIGNIFICANT CHANGE UP
FLUBV AG NPH QL: SIGNIFICANT CHANGE UP
GLUCOSE SERPL-MCNC: 157 MG/DL — HIGH (ref 70–99)
HCT VFR BLD CALC: 34 % — LOW (ref 34.5–45)
HGB BLD-MCNC: 11.1 G/DL — LOW (ref 11.5–15.5)
IANC: 10.05 K/UL — HIGH (ref 1.8–7.4)
IMM GRANULOCYTES NFR BLD AUTO: 0.3 % — SIGNIFICANT CHANGE UP (ref 0–0.9)
LYMPHOCYTES # BLD AUTO: 1.36 K/UL — SIGNIFICANT CHANGE UP (ref 1–3.3)
LYMPHOCYTES # BLD AUTO: 11 % — LOW (ref 13–44)
MCHC RBC-ENTMCNC: 27.3 PG — SIGNIFICANT CHANGE UP (ref 27–34)
MCHC RBC-ENTMCNC: 32.6 G/DL — SIGNIFICANT CHANGE UP (ref 32–36)
MCV RBC AUTO: 83.7 FL — SIGNIFICANT CHANGE UP (ref 80–100)
MONOCYTES # BLD AUTO: 0.75 K/UL — SIGNIFICANT CHANGE UP (ref 0–0.9)
MONOCYTES NFR BLD AUTO: 6.1 % — SIGNIFICANT CHANGE UP (ref 2–14)
NEUTROPHILS # BLD AUTO: 10.05 K/UL — HIGH (ref 1.8–7.4)
NEUTROPHILS NFR BLD AUTO: 81.6 % — HIGH (ref 43–77)
NRBC # BLD AUTO: 0 K/UL — SIGNIFICANT CHANGE UP (ref 0–0)
NRBC # FLD: 0 K/UL — SIGNIFICANT CHANGE UP (ref 0–0)
NRBC BLD AUTO-RTO: 0 /100 WBCS — SIGNIFICANT CHANGE UP (ref 0–0)
PLATELET # BLD AUTO: 446 K/UL — HIGH (ref 150–400)
POTASSIUM SERPL-MCNC: 3.5 MMOL/L — SIGNIFICANT CHANGE UP (ref 3.5–5.3)
POTASSIUM SERPL-SCNC: 3.5 MMOL/L — SIGNIFICANT CHANGE UP (ref 3.5–5.3)
PROT SERPL-MCNC: 7.2 G/DL — SIGNIFICANT CHANGE UP (ref 6–8.3)
RBC # BLD: 4.06 M/UL — SIGNIFICANT CHANGE UP (ref 3.8–5.2)
RBC # FLD: 15.9 % — HIGH (ref 10.3–14.5)
RSV RNA NPH QL NAA+NON-PROBE: SIGNIFICANT CHANGE UP
SARS-COV-2 RNA SPEC QL NAA+PROBE: SIGNIFICANT CHANGE UP
SODIUM SERPL-SCNC: 141 MMOL/L — SIGNIFICANT CHANGE UP (ref 135–145)
SOURCE RESPIRATORY: SIGNIFICANT CHANGE UP
WBC # BLD: 12.33 K/UL — HIGH (ref 3.8–10.5)
WBC # FLD AUTO: 12.33 K/UL — HIGH (ref 3.8–10.5)

## 2025-06-05 PROCEDURE — 71250 CT THORAX DX C-: CPT | Mod: 26

## 2025-06-05 PROCEDURE — 99285 EMERGENCY DEPT VISIT HI MDM: CPT

## 2025-06-05 PROCEDURE — 71046 X-RAY EXAM CHEST 2 VIEWS: CPT | Mod: 26

## 2025-06-05 RX ORDER — IPRATROPIUM BROMIDE AND ALBUTEROL SULFATE .5; 2.5 MG/3ML; MG/3ML
3 SOLUTION RESPIRATORY (INHALATION)
Refills: 0 | Status: COMPLETED | OUTPATIENT
Start: 2025-06-05 | End: 2025-06-05

## 2025-06-05 RX ORDER — DEXAMETHASONE 0.5 MG/1
10 TABLET ORAL ONCE
Refills: 0 | Status: COMPLETED | OUTPATIENT
Start: 2025-06-05 | End: 2025-06-05

## 2025-06-05 RX ORDER — DIPHENHYDRAMINE HCL 12.5MG/5ML
25 ELIXIR ORAL ONCE
Refills: 0 | Status: COMPLETED | OUTPATIENT
Start: 2025-06-05 | End: 2025-06-05

## 2025-06-05 RX ORDER — MAGNESIUM SULFATE 500 MG/ML
2 SYRINGE (ML) INJECTION ONCE
Refills: 0 | Status: COMPLETED | OUTPATIENT
Start: 2025-06-05 | End: 2025-06-05

## 2025-06-05 RX ORDER — IPRATROPIUM BROMIDE AND ALBUTEROL SULFATE .5; 2.5 MG/3ML; MG/3ML
3 SOLUTION RESPIRATORY (INHALATION) EVERY 6 HOURS
Refills: 0 | Status: DISCONTINUED | OUTPATIENT
Start: 2025-06-05 | End: 2025-06-06

## 2025-06-05 RX ORDER — AZITHROMYCIN 250 MG
500 CAPSULE ORAL ONCE
Refills: 0 | Status: COMPLETED | OUTPATIENT
Start: 2025-06-05 | End: 2025-06-05

## 2025-06-05 RX ADMIN — Medication 150 GRAM(S): at 19:05

## 2025-06-05 RX ADMIN — IPRATROPIUM BROMIDE AND ALBUTEROL SULFATE 3 MILLILITER(S): .5; 2.5 SOLUTION RESPIRATORY (INHALATION) at 22:19

## 2025-06-05 RX ADMIN — Medication 25 MILLIGRAM(S): at 23:17

## 2025-06-05 RX ADMIN — DEXAMETHASONE 102 MILLIGRAM(S): 0.5 TABLET ORAL at 18:04

## 2025-06-05 RX ADMIN — IPRATROPIUM BROMIDE AND ALBUTEROL SULFATE 3 MILLILITER(S): .5; 2.5 SOLUTION RESPIRATORY (INHALATION) at 18:05

## 2025-06-05 RX ADMIN — Medication 250 MILLIGRAM(S): at 18:31

## 2025-06-05 RX ADMIN — IPRATROPIUM BROMIDE AND ALBUTEROL SULFATE 3 MILLILITER(S): .5; 2.5 SOLUTION RESPIRATORY (INHALATION) at 18:13

## 2025-06-05 RX ADMIN — IPRATROPIUM BROMIDE AND ALBUTEROL SULFATE 3 MILLILITER(S): .5; 2.5 SOLUTION RESPIRATORY (INHALATION) at 18:32

## 2025-06-05 NOTE — ED ADULT NURSE NOTE - AVIAN FLU SYMPTOMS
Please let patient know that she has a UTI. Cipro for 7 days pended to be sent to her pharmacy.   No

## 2025-06-05 NOTE — ED PROVIDER NOTE - PROGRESS NOTE DETAILS
DO Cooper, EM Attending: CT unremarkable, ambulatory saturation normal. will place in cdu for continued treatments.

## 2025-06-05 NOTE — ED ADULT NURSE NOTE - CHIEF COMPLAINT QUOTE
pt with asthma, c/o exacerbation. took rescue med at home with steroids  with no relief. given 1 albuterol by EMS. pt tachypneic in triage. pt provided 2L O2 via NC for comfort

## 2025-06-05 NOTE — ED ADULT NURSE NOTE - OBJECTIVE STATEMENT
Patient presenting to the ED with c/o of shortness of breath. Patient verbalized taking her prescribed medication at home but no relief noted.  Patient verbalized being seen in the hospital for the same symptoms but after discharge she has to come back. hx of HIV, HPV, GERD , COPD and Asthma

## 2025-06-05 NOTE — ED PROVIDER NOTE - ATTENDING CONTRIBUTION TO CARE
I personally made the management plan, and take responsibility for the patient's management. I have discussed with and reviewed the Resident's note and agree with the History, ROS, Physical Exam and MDM unless otherwise indicated. A brief summary of my personal evaluation and impression can be found below.    60-year-old female with a past medical history of COPD, asthma, former smoker 9 pack years, GERD, HIV, hypertension, presenting to due to concern of asthma exacerbation.  She was given 1 albuterol treatment with EMS.  She took 5 mg of prednisone at home without significant relief.  Placed on nasal cannula in triage however was not hypoxic.  She denies any fevers or known sick contacts.    General: Well-appearing, NAD  Head: Atraumatic, normocephalic  Eyes: EOM grossly in tact, no scleral icterus, no discharge  ENT: moist mucous membranes  Neurology: A&Ox 3, nonfocal, GU x 4  Respiratory: Diffuse expiratory wheezing, on nebulizer tx during my assessment but appears slightly labored  CV: Extremities warm and well perfused  Abdominal: Soft, non-distended  Extremities: No edema, no deformities  Skin: warm and dry. No rashes    Based on exam would suspect a likely asthma exacerbation.  Will treat with DuoNebs, azithromycin as patient has COPD, Decadron.  Will send viral swab, blood gas, CBC, CMP, perform a chest x-ray to evaluate for underlying pneumonia.  Will reassess after 3 DuoNebs to determine if patient needs additional treatment.  Would also perform an ambulatory saturation to determine if patient can go home. vs cdu vs. admit

## 2025-06-05 NOTE — ED ADULT TRIAGE NOTE - CHIEF COMPLAINT QUOTE
pt with asthma, c/o exacerbation. took rescue med at home with steroids  with no relief. given 1 albuterol by EMS. pt tachypneic in triage. pt with asthma, c/o exacerbation. took rescue med at home with steroids  with no relief. given 1 albuterol by EMS. pt tachypneic in triage. pt provided 2L O2 via NC for comfort

## 2025-06-05 NOTE — ED PROVIDER NOTE - OBJECTIVE STATEMENT
60-year-old female with past medical history of COPD not on any home oxygen, asthma, former smoker 9 pack years with history of lung malignancy in remission after removal many years ago, GERD, HIV, hypertension, presents today for dyspnea.  She notes that it feels like her previous asthma exacerbations,, called EMS who gave her 1 albuterol treatment, she took 2 puffs of her albuterol inhaler today, and 5 mg of her home prednisone dose.  Was placed on nasal cannula in triage but not hypoxic for comfort.  She notes that yesterday preceding the difficulty breathing today she had a dry cough.  She otherwise denies any infectious contacts, recent travel, history of clots, fever, chills, night sweats, abdominal pain, nausea, vomiting, dysuria. None

## 2025-06-05 NOTE — ED PROVIDER NOTE - CLINICAL SUMMARY MEDICAL DECISION MAKING FREE TEXT BOX
60-year-old female with past medical history of COPD not on any home oxygen, asthma, former smoker 9 pack years with history of lung malignancy in remission after removal many years ago, GERD, HIV, hypertension, presents today for dyspnea.  Physical examination remarkable for diffuse wheezes in all fields expiratory.  Differential diagnosis includes likely asthma exacerbation, however underlying COPD history as well could be a COPD exacerbation, will evaluate for any underlying pneumonia or infectious causes with the initial cough that may have triggered this exacerbation.  Plan includes symptomatic treatment including DuoNebs, will reevaluate after the symptomatic treatment for further medications, steroid as the 5 mg prednisone is not enough for this exacerbation, labs, imaging, reassess with ambulatory saturation for disposition status

## 2025-06-05 NOTE — ED PROVIDER NOTE - PHYSICAL EXAMINATION
Bryan Fuller DO (PGY1)   Physical Exam:    Gen: NAD, AOx3  Head: NCAT  HEENT: EOMI, PEERLA, pink and moist mucous membranes  Lung: Expiratory wheezes in all fields  CV: RRR, no murmurs, rubs or gallops  Abd: soft, NT, ND, no guarding, no rigidity, no rebound tenderness, no CVA tenderness   MSK: no visible deformities, ROM normal in UE/LE, no back pain  Neuro: No focal sensory or motor deficits. Sensation intact to light touch all extremities.  Skin: Warm, well perfused, no rash, no leg swelling  Psych: normal affect, calm

## 2025-06-06 LAB — ADD ON TEST-SPECIMEN IN LAB: SIGNIFICANT CHANGE UP

## 2025-06-06 PROCEDURE — 99223 1ST HOSP IP/OBS HIGH 75: CPT

## 2025-06-06 RX ORDER — DEXAMETHASONE 0.5 MG/1
10 TABLET ORAL ONCE
Refills: 0 | Status: COMPLETED | OUTPATIENT
Start: 2025-06-06 | End: 2025-06-06

## 2025-06-06 RX ORDER — DIPHENHYDRAMINE HCL 12.5MG/5ML
25 ELIXIR ORAL AT BEDTIME
Refills: 0 | Status: DISCONTINUED | OUTPATIENT
Start: 2025-06-06 | End: 2025-06-06

## 2025-06-06 RX ORDER — TIOTROPIUM BROMIDE INHALATION SPRAY 3.12 UG/1
2 SPRAY, METERED RESPIRATORY (INHALATION) DAILY
Refills: 0 | Status: DISCONTINUED | OUTPATIENT
Start: 2025-06-06 | End: 2025-06-07

## 2025-06-06 RX ORDER — DIPHENHYDRAMINE HCL 12.5MG/5ML
25 ELIXIR ORAL AT BEDTIME
Refills: 0 | Status: DISCONTINUED | OUTPATIENT
Start: 2025-06-06 | End: 2025-06-10

## 2025-06-06 RX ORDER — PREDNISONE 20 MG/1
50 TABLET ORAL EVERY 24 HOURS
Refills: 0 | Status: DISCONTINUED | OUTPATIENT
Start: 2025-06-06 | End: 2025-06-07

## 2025-06-06 RX ORDER — ROSUVASTATIN CALCIUM 20 MG/1
10 TABLET, FILM COATED ORAL AT BEDTIME
Refills: 0 | Status: DISCONTINUED | OUTPATIENT
Start: 2025-06-06 | End: 2025-06-10

## 2025-06-06 RX ORDER — EFAVIRENZ, EMTRICITABINE AND TENOFOVIR DISOPROXIL FUMARATE 600; 200; 300 MG/1; MG/1; MG/1
1 TABLET, FILM COATED ORAL AT BEDTIME
Refills: 0 | Status: DISCONTINUED | OUTPATIENT
Start: 2025-06-06 | End: 2025-06-10

## 2025-06-06 RX ORDER — AZITHROMYCIN 250 MG
500 CAPSULE ORAL EVERY 24 HOURS
Refills: 0 | Status: DISCONTINUED | OUTPATIENT
Start: 2025-06-06 | End: 2025-06-07

## 2025-06-06 RX ORDER — KETOROLAC TROMETHAMINE 30 MG/ML
30 INJECTION, SOLUTION INTRAMUSCULAR; INTRAVENOUS ONCE
Refills: 0 | Status: DISCONTINUED | OUTPATIENT
Start: 2025-06-06 | End: 2025-06-06

## 2025-06-06 RX ORDER — KETOROLAC TROMETHAMINE 30 MG/ML
15 INJECTION, SOLUTION INTRAMUSCULAR; INTRAVENOUS ONCE
Refills: 0 | Status: DISCONTINUED | OUTPATIENT
Start: 2025-06-06 | End: 2025-06-06

## 2025-06-06 RX ORDER — AMLODIPINE BESYLATE 10 MG/1
10 TABLET ORAL DAILY
Refills: 0 | Status: DISCONTINUED | OUTPATIENT
Start: 2025-06-06 | End: 2025-06-07

## 2025-06-06 RX ORDER — DEXAMETHASONE 0.5 MG/1
10 TABLET ORAL ONCE
Refills: 0 | Status: DISCONTINUED | OUTPATIENT
Start: 2025-06-06 | End: 2025-06-06

## 2025-06-06 RX ORDER — IPRATROPIUM BROMIDE AND ALBUTEROL SULFATE .5; 2.5 MG/3ML; MG/3ML
3 SOLUTION RESPIRATORY (INHALATION) EVERY 4 HOURS
Refills: 0 | Status: DISCONTINUED | OUTPATIENT
Start: 2025-06-06 | End: 2025-06-07

## 2025-06-06 RX ADMIN — Medication 1 DOSE(S): at 21:45

## 2025-06-06 RX ADMIN — EFAVIRENZ, EMTRICITABINE AND TENOFOVIR DISOPROXIL FUMARATE 1 TABLET(S): 600; 200; 300 TABLET, FILM COATED ORAL at 21:45

## 2025-06-06 RX ADMIN — Medication 125 MILLILITER(S): at 21:48

## 2025-06-06 RX ADMIN — Medication 40 MILLIGRAM(S): at 19:19

## 2025-06-06 RX ADMIN — IPRATROPIUM BROMIDE AND ALBUTEROL SULFATE 3 MILLILITER(S): .5; 2.5 SOLUTION RESPIRATORY (INHALATION) at 21:47

## 2025-06-06 RX ADMIN — PREDNISONE 50 MILLIGRAM(S): 20 TABLET ORAL at 21:45

## 2025-06-06 RX ADMIN — IPRATROPIUM BROMIDE AND ALBUTEROL SULFATE 3 MILLILITER(S): .5; 2.5 SOLUTION RESPIRATORY (INHALATION) at 17:07

## 2025-06-06 RX ADMIN — ROSUVASTATIN CALCIUM 10 MILLIGRAM(S): 20 TABLET, FILM COATED ORAL at 21:45

## 2025-06-06 RX ADMIN — KETOROLAC TROMETHAMINE 15 MILLIGRAM(S): 30 INJECTION, SOLUTION INTRAMUSCULAR; INTRAVENOUS at 13:25

## 2025-06-06 RX ADMIN — Medication 25 MILLIGRAM(S): at 22:15

## 2025-06-06 RX ADMIN — Medication 125 MILLILITER(S): at 03:54

## 2025-06-06 RX ADMIN — KETOROLAC TROMETHAMINE 15 MILLIGRAM(S): 30 INJECTION, SOLUTION INTRAMUSCULAR; INTRAVENOUS at 15:00

## 2025-06-06 RX ADMIN — IPRATROPIUM BROMIDE AND ALBUTEROL SULFATE 3 MILLILITER(S): .5; 2.5 SOLUTION RESPIRATORY (INHALATION) at 12:44

## 2025-06-06 RX ADMIN — DEXAMETHASONE 102 MILLIGRAM(S): 0.5 TABLET ORAL at 03:54

## 2025-06-06 RX ADMIN — Medication 125 MILLILITER(S): at 13:25

## 2025-06-06 RX ADMIN — Medication 250 MILLIGRAM(S): at 18:13

## 2025-06-06 RX ADMIN — IPRATROPIUM BROMIDE AND ALBUTEROL SULFATE 3 MILLILITER(S): .5; 2.5 SOLUTION RESPIRATORY (INHALATION) at 04:04

## 2025-06-06 RX ADMIN — IPRATROPIUM BROMIDE AND ALBUTEROL SULFATE 3 MILLILITER(S): .5; 2.5 SOLUTION RESPIRATORY (INHALATION) at 07:57

## 2025-06-06 RX ADMIN — KETOROLAC TROMETHAMINE 30 MILLIGRAM(S): 30 INJECTION, SOLUTION INTRAMUSCULAR; INTRAVENOUS at 04:31

## 2025-06-06 NOTE — ED CDU PROVIDER INITIAL DAY NOTE - CLINICAL SUMMARY MEDICAL DECISION MAKING FREE TEXT BOX
60-year-old female with past medical history of COPD not on any home oxygen, asthma, former smoker 9 pack years with history of lung malignancy in remission after removal many years ago, GERD, HIV, hypertension, presents today for dyspnea.  She notes that it feels like her previous asthma exacerbations,, called EMS who gave her 1 albuterol treatment, she took 2 puffs of her albuterol inhaler today, and 5 mg of her home prednisone dose.  Was placed on nasal cannula in triage but not hypoxic for comfort.  She notes that yesterday preceding the difficulty breathing today she had a dry cough.  She otherwise denies any infectious contacts, recent travel, history of clots, fever, chills, night sweats, abdominal pain, nausea, vomiting, dysuria.    In the ED, VSS, WBC 12.33, full RVP negative, CT chest showed: "...IMPRESSION: Stable exam. No evidence of pneumonia.".  Pt with diffuse expiratory wheezing in ED, treated with nebs, Decadron, mag, azithromycin, with stated/observed improvement.  Pt was sent to CDU for continued supportive care, nebs/Decadron/azithromycin, with goal for continued clinical improvement.

## 2025-06-06 NOTE — ED CDU PROVIDER INITIAL DAY NOTE - PHYSICAL EXAMINATION
CONSTITUTIONAL:  Well appearing, awake, alert, oriented to person, place, time/situation and in no apparent distress.  Pt. is objectively comfortable appearing and verbalizing in full, clear, effortless sentences.  ENMT: NC/AT.  Airway patent.  Nasal mucosa clear.  Moist mucous membranes.  Neck supple.  EYES:  Clear OU.  CARDIAC:  Normal rate, regular rhythm.  Heart sounds S1 S2.  No murmurs, gallops, or rubs.  RESPIRATORY:  Breath sounds equal bilaterally.  Mild expiratory wheezes bilaterally, no rales, no rhonchi, no retractions.  GASTROINTESTINAL:  Abdomen soft, non-distended, non-tender.  No rebound, no guarding.  NEUROLOGICAL:  Alert and oriented to person/place/time/situation.  No focal deficits; no tremors noted.   MUSCULOSKELETAL:  Range of motion is not limited.    SKIN:  Skin color unremarkable.  Skin warm, dry, and intact.    PSYCHIATRIC:  Alert and oriented to person/place/time/situation.  Mood and affect WNL.  No apparent risk to self or others.

## 2025-06-06 NOTE — ED CDU PROVIDER INITIAL DAY NOTE - ATTENDING APP SHARED VISIT CONTRIBUTION OF CARE
I personally made the management plan, and take responsibility for the patient's management. I have discussed with and reviewed the PA's note and agree with the History, ROS, Physical Exam and MDM unless otherwise indicated. A brief summary of my personal evaluation and impression can be found below.      60-year-old female with a past medical history of COPD, asthma, former smoker 9 pack years, GERD, HIV, hypertension, presenting to due to concern of asthma exacerbation.  She was given 1 albuterol treatment with EMS.  She took 5 mg of prednisone at home without significant relief.  Placed on nasal cannula in triage however was not hypoxic.  She denies any fevers or known sick contacts.    General: Well-appearing, NAD  Head: Atraumatic, normocephalic  Eyes: EOM grossly in tact, no scleral icterus, no discharge  ENT: moist mucous membranes  Neurology: A&Ox 3, nonfocal, GU x 4  Respiratory: Diffuse expiratory wheezing, on nebulizer tx during my assessment but appears slightly labored  CV: Extremities warm and well perfused  Abdominal: Soft, non-distended  Extremities: No edema, no deformities  Skin: warm and dry. No rashes    Based on exam would suspect a likely asthma exacerbation.  pt received DuoNebs, azithromycin as patient has COPD, Decadron. viral swab and xray negative. CT chest performed to eval for occult pna w/o any findings. pt lung exam improved, ambulating w/ normal O2 saturation.  will place in CDU for continued treatments, monitoring and reassessment.

## 2025-06-06 NOTE — ED CDU PROVIDER INITIAL DAY NOTE - OBJECTIVE STATEMENT
60-year-old female with past medical history of COPD not on any home oxygen, asthma, former smoker 9 pack years with history of lung malignancy in remission after removal many years ago, GERD, HIV, hypertension, presents today for dyspnea.  She notes that it feels like her previous asthma exacerbations,, called EMS who gave her 1 albuterol treatment, she took 2 puffs of her albuterol inhaler today, and 5 mg of her home prednisone dose.  Was placed on nasal cannula in triage but not hypoxic for comfort.  She notes that yesterday preceding the difficulty breathing today she had a dry cough.  She otherwise denies any infectious contacts, recent travel, history of clots, fever, chills, night sweats, abdominal pain, nausea, vomiting, dysuria.    CDU KRISTYN Manzo Note----  ED Provider HPI as above.  In the ED, VSS, WBC 12.33, full RVP negative, CT chest showed: "...IMPRESSION: Stable exam. No evidence of pneumonia.".  Pt with diffuse expiratory wheezing in ED, treated with nebs, Decadron, mag, azithromycin, with stated/observed improvement.  Pt was sent to CDU for continued supportive care, nebs/Decadron/azithromycin, with goal for continued clinical improvement.

## 2025-06-06 NOTE — ED CDU PROVIDER INITIAL DAY NOTE - PROGRESS NOTE DETAILS
Patient placed in CDU overnight.  Receiving a neb treatment at this time still wheezing bilaterally.  But able to answer questions in full sentences.  No retractions.  Denies any chest pain.  Will continue with steroids, DuoNebs and reassess. Pt reports symptoms mildly improved.  Pt reports she has an appointment to see her pulmonologist on 6/9/25.  Plan to continue treatment for asthma.

## 2025-06-06 NOTE — ED CDU PROVIDER INITIAL DAY NOTE - NSICDXPASTMEDICALHX_GEN_ALL_CORE_FT
Please call patient to have labs drawn today.  
PAST MEDICAL HISTORY:  Asthma     Benign hypertension     Cigarette smoker quit 2019    GERD (gastroesophageal reflux disease)     H/O abdominal hysterectomy     HIV disease     HPV (human papilloma virus) infection     Knee pain, bilateral     Osteoarthritis     Pruritus (chronic)    Vitamin D deficiency

## 2025-06-06 NOTE — ED CDU PROVIDER INITIAL DAY NOTE - DETAILS
Nebs/steroids/supportive care, general observation care / monitoring, goal for clinical improvement.

## 2025-06-07 DIAGNOSIS — Z29.9 ENCOUNTER FOR PROPHYLACTIC MEASURES, UNSPECIFIED: ICD-10-CM

## 2025-06-07 DIAGNOSIS — Z79.899 OTHER LONG TERM (CURRENT) DRUG THERAPY: ICD-10-CM

## 2025-06-07 DIAGNOSIS — J44.1 CHRONIC OBSTRUCTIVE PULMONARY DISEASE WITH (ACUTE) EXACERBATION: ICD-10-CM

## 2025-06-07 DIAGNOSIS — B20 HUMAN IMMUNODEFICIENCY VIRUS [HIV] DISEASE: ICD-10-CM

## 2025-06-07 DIAGNOSIS — E11.9 TYPE 2 DIABETES MELLITUS WITHOUT COMPLICATIONS: ICD-10-CM

## 2025-06-07 DIAGNOSIS — C34.90 MALIGNANT NEOPLASM OF UNSPECIFIED PART OF UNSPECIFIED BRONCHUS OR LUNG: ICD-10-CM

## 2025-06-07 LAB
GLUCOSE BLDC GLUCOMTR-MCNC: 106 MG/DL — HIGH (ref 70–99)
GLUCOSE BLDC GLUCOMTR-MCNC: 109 MG/DL — HIGH (ref 70–99)
GLUCOSE BLDC GLUCOMTR-MCNC: 183 MG/DL — HIGH (ref 70–99)

## 2025-06-07 PROCEDURE — 99223 1ST HOSP IP/OBS HIGH 75: CPT

## 2025-06-07 PROCEDURE — 99233 SBSQ HOSP IP/OBS HIGH 50: CPT

## 2025-06-07 RX ORDER — MAGNESIUM, ALUMINUM HYDROXIDE 200-200 MG
30 TABLET,CHEWABLE ORAL EVERY 4 HOURS
Refills: 0 | Status: DISCONTINUED | OUTPATIENT
Start: 2025-06-07 | End: 2025-06-10

## 2025-06-07 RX ORDER — INSULIN LISPRO 100 U/ML
INJECTION, SOLUTION INTRAVENOUS; SUBCUTANEOUS
Refills: 0 | Status: DISCONTINUED | OUTPATIENT
Start: 2025-06-07 | End: 2025-06-10

## 2025-06-07 RX ORDER — HEPARIN SODIUM 1000 [USP'U]/ML
5000 INJECTION INTRAVENOUS; SUBCUTANEOUS EVERY 12 HOURS
Refills: 0 | Status: DISCONTINUED | OUTPATIENT
Start: 2025-06-07 | End: 2025-06-10

## 2025-06-07 RX ORDER — METHYLPREDNISOLONE ACETATE 80 MG/ML
40 INJECTION, SUSPENSION INTRA-ARTICULAR; INTRALESIONAL; INTRAMUSCULAR; SOFT TISSUE EVERY 8 HOURS
Refills: 0 | Status: DISCONTINUED | OUTPATIENT
Start: 2025-06-08 | End: 2025-06-09

## 2025-06-07 RX ORDER — AZITHROMYCIN 250 MG
250 CAPSULE ORAL DAILY
Refills: 0 | Status: COMPLETED | OUTPATIENT
Start: 2025-06-07 | End: 2025-06-10

## 2025-06-07 RX ORDER — GLUCAGON 3 MG/1
1 POWDER NASAL ONCE
Refills: 0 | Status: DISCONTINUED | OUTPATIENT
Start: 2025-06-07 | End: 2025-06-10

## 2025-06-07 RX ORDER — METHYLPREDNISOLONE ACETATE 80 MG/ML
40 INJECTION, SUSPENSION INTRA-ARTICULAR; INTRALESIONAL; INTRAMUSCULAR; SOFT TISSUE EVERY 8 HOURS
Refills: 0 | Status: DISCONTINUED | OUTPATIENT
Start: 2025-06-07 | End: 2025-06-07

## 2025-06-07 RX ORDER — SODIUM CHLORIDE 9 G/1000ML
1000 INJECTION, SOLUTION INTRAVENOUS
Refills: 0 | Status: DISCONTINUED | OUTPATIENT
Start: 2025-06-07 | End: 2025-06-10

## 2025-06-07 RX ORDER — METHYLPREDNISOLONE ACETATE 80 MG/ML
125 INJECTION, SUSPENSION INTRA-ARTICULAR; INTRALESIONAL; INTRAMUSCULAR; SOFT TISSUE ONCE
Refills: 0 | Status: COMPLETED | OUTPATIENT
Start: 2025-06-07 | End: 2025-06-07

## 2025-06-07 RX ORDER — DEXTROSE 50 % IN WATER 50 %
15 SYRINGE (ML) INTRAVENOUS ONCE
Refills: 0 | Status: DISCONTINUED | OUTPATIENT
Start: 2025-06-07 | End: 2025-06-10

## 2025-06-07 RX ORDER — MELATONIN 5 MG
3 TABLET ORAL AT BEDTIME
Refills: 0 | Status: DISCONTINUED | OUTPATIENT
Start: 2025-06-07 | End: 2025-06-10

## 2025-06-07 RX ORDER — ESCITALOPRAM OXALATE 20 MG/1
5 TABLET ORAL DAILY
Refills: 0 | Status: DISCONTINUED | OUTPATIENT
Start: 2025-06-07 | End: 2025-06-10

## 2025-06-07 RX ORDER — KETOROLAC TROMETHAMINE 30 MG/ML
15 INJECTION, SOLUTION INTRAMUSCULAR; INTRAVENOUS ONCE
Refills: 0 | Status: DISCONTINUED | OUTPATIENT
Start: 2025-06-07 | End: 2025-06-07

## 2025-06-07 RX ORDER — TRAMADOL HYDROCHLORIDE 50 MG/1
25 TABLET, FILM COATED ORAL ONCE
Refills: 0 | Status: DISCONTINUED | OUTPATIENT
Start: 2025-06-07 | End: 2025-06-07

## 2025-06-07 RX ORDER — IPRATROPIUM BROMIDE AND ALBUTEROL SULFATE .5; 2.5 MG/3ML; MG/3ML
3 SOLUTION RESPIRATORY (INHALATION) EVERY 6 HOURS
Refills: 0 | Status: DISCONTINUED | OUTPATIENT
Start: 2025-06-07 | End: 2025-06-10

## 2025-06-07 RX ORDER — DEXTROSE 50 % IN WATER 50 %
12.5 SYRINGE (ML) INTRAVENOUS ONCE
Refills: 0 | Status: DISCONTINUED | OUTPATIENT
Start: 2025-06-07 | End: 2025-06-10

## 2025-06-07 RX ORDER — DEXTROSE 50 % IN WATER 50 %
25 SYRINGE (ML) INTRAVENOUS ONCE
Refills: 0 | Status: DISCONTINUED | OUTPATIENT
Start: 2025-06-07 | End: 2025-06-10

## 2025-06-07 RX ORDER — ACETAMINOPHEN 500 MG/5ML
650 LIQUID (ML) ORAL EVERY 6 HOURS
Refills: 0 | Status: DISCONTINUED | OUTPATIENT
Start: 2025-06-07 | End: 2025-06-10

## 2025-06-07 RX ORDER — ACETAMINOPHEN 500 MG/5ML
650 LIQUID (ML) ORAL ONCE
Refills: 0 | Status: COMPLETED | OUTPATIENT
Start: 2025-06-07 | End: 2025-06-07

## 2025-06-07 RX ORDER — LIDOCAINE HYDROCHLORIDE 20 MG/ML
1 JELLY TOPICAL ONCE
Refills: 0 | Status: COMPLETED | OUTPATIENT
Start: 2025-06-07 | End: 2025-06-07

## 2025-06-07 RX ORDER — INSULIN LISPRO 100 U/ML
INJECTION, SOLUTION INTRAVENOUS; SUBCUTANEOUS AT BEDTIME
Refills: 0 | Status: DISCONTINUED | OUTPATIENT
Start: 2025-06-07 | End: 2025-06-10

## 2025-06-07 RX ORDER — ALBUTEROL SULFATE 2.5 MG/3ML
2 VIAL, NEBULIZER (ML) INHALATION
Refills: 0 | Status: DISCONTINUED | OUTPATIENT
Start: 2025-06-07 | End: 2025-06-10

## 2025-06-07 RX ORDER — ONDANSETRON HCL/PF 4 MG/2 ML
4 VIAL (ML) INJECTION EVERY 8 HOURS
Refills: 0 | Status: DISCONTINUED | OUTPATIENT
Start: 2025-06-07 | End: 2025-06-10

## 2025-06-07 RX ADMIN — KETOROLAC TROMETHAMINE 15 MILLIGRAM(S): 30 INJECTION, SOLUTION INTRAMUSCULAR; INTRAVENOUS at 11:00

## 2025-06-07 RX ADMIN — IPRATROPIUM BROMIDE AND ALBUTEROL SULFATE 3 MILLILITER(S): .5; 2.5 SOLUTION RESPIRATORY (INHALATION) at 12:12

## 2025-06-07 RX ADMIN — IPRATROPIUM BROMIDE AND ALBUTEROL SULFATE 3 MILLILITER(S): .5; 2.5 SOLUTION RESPIRATORY (INHALATION) at 05:49

## 2025-06-07 RX ADMIN — KETOROLAC TROMETHAMINE 15 MILLIGRAM(S): 30 INJECTION, SOLUTION INTRAMUSCULAR; INTRAVENOUS at 21:03

## 2025-06-07 RX ADMIN — IPRATROPIUM BROMIDE AND ALBUTEROL SULFATE 3 MILLILITER(S): .5; 2.5 SOLUTION RESPIRATORY (INHALATION) at 16:38

## 2025-06-07 RX ADMIN — KETOROLAC TROMETHAMINE 15 MILLIGRAM(S): 30 INJECTION, SOLUTION INTRAMUSCULAR; INTRAVENOUS at 10:08

## 2025-06-07 RX ADMIN — KETOROLAC TROMETHAMINE 15 MILLIGRAM(S): 30 INJECTION, SOLUTION INTRAMUSCULAR; INTRAVENOUS at 21:33

## 2025-06-07 RX ADMIN — TIOTROPIUM BROMIDE INHALATION SPRAY 2 PUFF(S): 3.12 SPRAY, METERED RESPIRATORY (INHALATION) at 09:12

## 2025-06-07 RX ADMIN — LIDOCAINE HYDROCHLORIDE 1 PATCH: 20 JELLY TOPICAL at 18:04

## 2025-06-07 RX ADMIN — Medication 250 MILLIGRAM(S): at 12:12

## 2025-06-07 RX ADMIN — IPRATROPIUM BROMIDE AND ALBUTEROL SULFATE 3 MILLILITER(S): .5; 2.5 SOLUTION RESPIRATORY (INHALATION) at 09:11

## 2025-06-07 RX ADMIN — METHYLPREDNISOLONE ACETATE 125 MILLIGRAM(S): 80 INJECTION, SUSPENSION INTRA-ARTICULAR; INTRALESIONAL; INTRAMUSCULAR; SOFT TISSUE at 21:05

## 2025-06-07 RX ADMIN — Medication 1 DOSE(S): at 09:12

## 2025-06-07 RX ADMIN — Medication 25 MILLIGRAM(S): at 21:07

## 2025-06-07 RX ADMIN — Medication 125 MILLILITER(S): at 05:50

## 2025-06-07 RX ADMIN — AMLODIPINE BESYLATE 10 MILLIGRAM(S): 10 TABLET ORAL at 05:49

## 2025-06-07 RX ADMIN — ROSUVASTATIN CALCIUM 10 MILLIGRAM(S): 20 TABLET, FILM COATED ORAL at 21:07

## 2025-06-07 RX ADMIN — LIDOCAINE HYDROCHLORIDE 1 PATCH: 20 JELLY TOPICAL at 22:10

## 2025-06-07 RX ADMIN — LIDOCAINE HYDROCHLORIDE 1 PATCH: 20 JELLY TOPICAL at 10:08

## 2025-06-07 RX ADMIN — EFAVIRENZ, EMTRICITABINE AND TENOFOVIR DISOPROXIL FUMARATE 1 TABLET(S): 600; 200; 300 TABLET, FILM COATED ORAL at 22:18

## 2025-06-07 NOTE — H&P ADULT - NSHPPHYSICALEXAM_GEN_ALL_CORE
PHYSICAL EXAM:  VITALS: Vital Signs Last 24 Hrs  T(C): 36.6 (07 Jun 2025 23:01), Max: 36.8 (07 Jun 2025 17:27)  T(F): 97.8 (07 Jun 2025 23:01), Max: 98.2 (07 Jun 2025 17:27)  HR: 88 (07 Jun 2025 23:01) (57 - 99)  BP: 125/62 (07 Jun 2025 23:01) (119/76 - 162/84)  BP(mean): --  RR: 17 (07 Jun 2025 23:01) (16 - 18)  SpO2: 96% (07 Jun 2025 23:01) (96% - 99%)    Parameters below as of 07 Jun 2025 23:01  Patient On (Oxygen Delivery Method): room air      GENERAL: NAD, comfortable at bedside  HEAD:  Atraumatic, Normocephalic  EYES: EOMI, PERRL, conjunctiva and sclera clear  ENT: Moist Mucus Membranes present, no ulcers appreciated  NECK: Supple, No JVD  CHEST/LUNG: + wheezes throughout lung field, no rales or rhonchi, no accessory muscle use  HEART: Regular rate and rhythm; No murmurs, rubs, or gallops, (+)S1, S2  ABDOMEN: Soft, Nontender, Nondistended; Normal Bowel sounds   EXTREMITIES: No clubbing, cyanosis, or edema  PSYCH: normal mood and affect  NEUROLOGY: AAOx3, non-focal  SKIN: No rashes or lesions

## 2025-06-07 NOTE — PATIENT PROFILE ADULT - FALL HARM RISK - HARM RISK INTERVENTIONS

## 2025-06-07 NOTE — ED ADULT NURSE REASSESSMENT NOTE - NS ED NURSE REASSESS COMMENT FT1
Break RN: pt remains a&ox4, denying chest pain, sob, n+v, headache, dizziness. RR even, unlabored. Pt medicated as per MAR. Safety maintained.
Patient transferred to CDU, Report given. Left unit in stable condition. Breathing non-labored.
Pt  observed alert and oriented x4, able to make needs known. Pt noted with color good with respirations henrique bray non-labored  on 2 liters nasal cannula. Pt normal sinus rhythm on the cardiac monitor. Pt denies chest pain or discomfort. No s/s of distress noted.  Call bell in place at pt side. Pt safety maintained.
Report received from YIMI Bolaños. Pt A&Ox3 sitting up in stretcher resting comfortably. Respirations even and slightly labored on 2L nasal cannula for comfort. Denies headache, dizziness, and chest pain at this time. VS as noted in flow sheet. Pt remains on , 100% oxygen saturation noted. Plan of care ongoing, comfort measures provided and safety measures maintained. Awaiting CT.
Pt observed alert and awake able to make needs known.  Pt noted with color good with respirations henrique [place] non-labored on room air. Pt denies chest pain or discomfort .  Pt normal sinus rhythm on the bedside monitor.   IVF in progress  . No s/s  of redness or swelling at site. Dressing observed clean dry and intact.  Call bell in place at side.. No s/s of distress noted.
Report received from rafael Puente. Pt is A&Ox4. Pt received on 2L nasal cannula, +tachypneic, +wheezing noted. 3rd Duoneb administered as ordered. Pt placed on continuous pulse oximetry. right 20G IV WNL. pending x-ray. Stretcher set in lowest position, call bell within reach, safety maintained.

## 2025-06-07 NOTE — H&P ADULT - PROBLEM SELECTOR PLAN 1
- patient presented to the ED for worsening shortness of breath found to have wheezing - likely copd exacerbation  - patient has pulm follow up outpatient, planned for a spirometry test outpatient  - will c/w solu-medrol 40mg Q8hr for now  - duonebs Q6hr, albuterol Q3hr prn  - will c/w azithro for now  - CT chest neg for pneumonia  - pulm consult in AM  - Toradol for pain control prn - patient presented to the ED for worsening shortness of breath found to have wheezing - likely copd exacerbation  - patient has pulm follow up outpatient, planned for a spirometry test outpatient  - will c/w solu-medrol 40mg Q8hr for now  - duonebs Q6hr, albuterol Q3hr prn  - will c/w azithro for now  - CT chest neg for pneumonia  - pulm consult in AM  - Toradol for pain control prn  - Continuous pulse ox monitoring - patient presented to the ED for worsening shortness of breath found to have wheezing - likely copd exacerbation  - patient has pulm follow up outpatient, planned for a spirometry test outpatient  - pulm consult in AM  - will c/w solu-medrol 40mg Q8hr for now  - duonebs Q6hr, albuterol Q3hr prn  - will c/w azithro for now  - CT chest neg for pneumonia  - pulm consult in AM  - Toradol for pain control prn  - Continuous pulse ox monitoring

## 2025-06-07 NOTE — ED CDU PROVIDER SUBSEQUENT DAY NOTE - CLINICAL SUMMARY MEDICAL DECISION MAKING FREE TEXT BOX
60-year-old female with past medical history of COPD not on any home oxygen, asthma, former smoker 9 pack years with history of lung malignancy in remission after removal many years ago, GERD, HIV, hypertension, presents to the ED c/o wheezing and dyspnea, stated feels c/w hx/o COPD/asthma.    In the ED, VSS, WBC 12.33, full RVP negative, CT chest showed: "...IMPRESSION: Stable exam. No evidence of pneumonia.".  Pt with diffuse expiratory wheezing in ED, treated with nebs, Decadron, mag, azithromycin, with stated/observed improvement.  Pt was sent to CDU for continued supportive care, nebs/Decadron/azithromycin, with goal for continued clinical improvement.  In the interim, pt objectively noted to be resting comfortably; pt has been clinically stable; no issues thus far.

## 2025-06-07 NOTE — H&P ADULT - NSHPLABSRESULTS_GEN_ALL_CORE
WBC 12.33, Hb/HCT 11.1/34.0,     Na 141, K 3.5, Cl 108, CO2 20, BUN/Cr 14/0.96      IMPRESSION:  Stable exam. No evidence of pneumonia. WBC 12.33, Hb/HCT 11.1/34.0,     Na 141, K 3.5, Cl 108, CO2 20, BUN/Cr 14/0.96      IMPRESSION:  Stable exam. No evidence of pneumonia.    EKG NSR QTc 440 ms

## 2025-06-07 NOTE — ED CDU PROVIDER DISPOSITION NOTE - WET READ LAUNCH FT
There are no Wet Read(s) to document. Patient was seen and examined ____[id number] @ 18:58____  I reviewed maternal labs and notes which were available in infant's chart.  I reviewed past medical history and pregnancy course with Mom personally; I inquired about significant labs and findings on prenatal ultrasound that required follow up.  I discussed the importance of skin-to-skin and reviewed infant feeding guidance - specifically breastfeeding q2-3 hours on EACH breast.  We discussed that baby will lose weight over the next few days, and that we will continue to monitor weight loss, feedings, voids/stooling.    Attending Physical Exam:  General: alert, awake, good tone, pink   HEENT: AFOF, Eyes:nl set, Ears: normal set bilaterally, No anomaly, Nose: patent, Throat: clear, no cleft lip or palate, Tongue: normal Neck: clavicles intact bilaterally  Lungs: Clear to auscultation bilaterally, no wheezes, no crackles  CVS: S1,S2 normal, no murmur, femoral pulses palpable bilaterally  Abdomen: soft, no masses, no organomegaly, not distended  Umbilical stump: intact, dry  : Bryant 1, anus patent  Extremities: FROM x 4, no hip clicks bilaterally  Skin: intact, no abnormal rashes, capillary refill < 2 seconds  Neuro: symmetric rene reflex bilaterally, good tone, + suck reflex, + grasp reflex     Plan:  - ROUTINE  CARE - screening tests (hearing, CCHD, universal  screen); HepB vaccination per parental consent; jaundice check with transcutaneous and/or serum bilirubin; monitor weights/voids/stools per protocol      I was physically present for the E/M service provided.  I agree with the above history, physical, and plan which I have reviewed and edited where appropriate.  I was physically present for the key portions of the service provided.    Armida Houston MD

## 2025-06-07 NOTE — H&P ADULT - PROBLEM SELECTOR PLAN 2
- will start  with low dose sliding scale insulin for now  - Patient currently not on metformin at home

## 2025-06-07 NOTE — ED CDU PROVIDER SUBSEQUENT DAY NOTE - ATTENDING APP SHARED VISIT CONTRIBUTION OF CARE
Dr. Johnson:  I performed a face to face bedside interview with patient regarding history of present illness, review of symptoms and past medical history. I completed an independent physical exam.  I have discussed patient's plan of care with PA.   I agree with note as stated above, having amended the EMR as needed to reflect my findings.   This includes HISTORY OF PRESENT ILLNESS, HIV, PAST MEDICAL/SURGICAL/FAMILY/SOCIAL HISTORY, ALLERGIES AND HOME MEDICATIONS, REVIEW OF SYSTEMS, PHYSICAL EXAM, and any PROGRESS NOTES during the time I functioned as the attending physician for this patient.    60F h/o HIV, COPD, lung cancer in remission, presented to ED with wheezing and dyspnea, consistent with COPD  exacerbation.   Complains that she still feels quite tight/dyspneic this morning.    Exam:  - nad  - rrr  - diffuse wheezing  - abd soft ntnd    A/P  - COPD exacerbation  - continue meds

## 2025-06-07 NOTE — H&P ADULT - HISTORY OF PRESENT ILLNESS
This is a 67 y/o F with pmhx of HIV on antiretroviral medications, COPD, lung Ca s/p lobectomy, presented to the ED for worsening shortness of breath. The patient chronically has shortness of breath for the last few months due to COPD. Recently 3 days ago the patient felt worsening shortness of breath and wheezing because of poor air quality. The patient uses all her inhalers, symptoms not improving. Reports rib pain b/l due to the frequent coughing. Cough nonproductive. Denies fevers at home. Still has wheezing.

## 2025-06-07 NOTE — H&P ADULT - NSHPREVIEWOFSYSTEMS_GEN_ALL_CORE
REVIEW OF SYSTEMS:    CONSTITUTIONAL: No weakness, fevers or chills  EYES/ENT: No visual changes;  No dysphagia; No sore throat; No rhinorrhea; No sinus pain/pressure  NECK: No pain or stiffness  RESPIRATORY: + cough, + wheezing, no hemoptysis; + shortness of breath  CARDIOVASCULAR: No chest pain or palpitations; No lower extremity edema  GASTROINTESTINAL: No abdominal or epigastric pain. No nausea, vomiting, or hematemesis; No diarrhea or constipation. No melena or hematochezia.  GENITOURINARY: No dysuria, frequency or hematuria  NEUROLOGICAL: No numbness or focal weakness  MSK: ambulates without assistance  SKIN: No itching, burning, rashes, or lesions   All other review of systems is negative unless indicated above.

## 2025-06-07 NOTE — H&P ADULT - IN ACCORDANCE WITH [place] STATE LAW, WE OFFER EVERY PATIENT A HEPATITIS C TEST. WOULD YOU LIKE TO BE TESTED TODAY?
The following changes were made to your medications today:   CHANGE to metoprolol tartrate 12.5 mg twice daily   STOP rosuvastatin   START atorvastatin 10 mg at bedtime  Continue all present medications     The following lifestyle modifications are encouraged:  Continue regular exercise at least 30 minutes daily.  Lowfat/Low Cholesterol diet advised.      The following tests have been ordered:  none     Follow up with [place] in 6 months  Return to Clinic in 1 year or sooner if needed.   
Opt out
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
## 2025-06-07 NOTE — H&P ADULT - ASSESSMENT
67 y/o F with pmhx of HIV on antiretroviral medications, COPD, lung Ca s/p lobectomy, presented to the ED for worsening shortness of breath. Admitted for COPD exacerbation, needing IV steroids.

## 2025-06-07 NOTE — ED CDU PROVIDER DISPOSITION NOTE - CLINICAL COURSE
60-year-old female with past medical history of COPD not on any home oxygen, asthma, former smoker 9 pack years with history of lung malignancy in remission after removal many years ago, GERD, HIV, hypertension, presents to the ED c/o wheezing and dyspnea, stated feels c/w hx/o COPD/asthma.    In the ED, VSS, WBC 12.33, full RVP negative, CT chest showed: "...IMPRESSION: Stable exam. No evidence of pneumonia.".  Pt with diffuse expiratory wheezing in ED, treated with nebs, Decadron, mag, azithromycin, with stated/observed improvement.  Pt was sent to CDU for continued supportive care, nebs/Decadron/azithromycin, with goal for continued clinical improvement. upon reassessment pt diffusely wheezing, BAHENA w/ ambulation to bathroom, does not feel ready to be discharged. will admit for further nebs, steroids and serial lung exams for her COPD exacerbation.

## 2025-06-07 NOTE — ED CDU PROVIDER DISPOSITION NOTE - ATTENDING CONTRIBUTION TO CARE
Dr. Johnson:  I performed a face to face bedside interview with patient regarding history of present illness, review of symptoms and past medical history. I completed an independent physical exam.  I have discussed patient's plan of care with PA.   I agree with note as stated above, having amended the EMR as needed to reflect my findings.   This includes HISTORY OF PRESENT ILLNESS, HIV, PAST MEDICAL/SURGICAL/FAMILY/SOCIAL HISTORY, ALLERGIES AND HOME MEDICATIONS, REVIEW OF SYSTEMS, PHYSICAL EXAM, and any PROGRESS NOTES during the time I functioned as the attending physician for this patient.

## 2025-06-08 LAB
ANION GAP SERPL CALC-SCNC: 15 MMOL/L — HIGH (ref 7–14)
BUN SERPL-MCNC: 19 MG/DL — SIGNIFICANT CHANGE UP (ref 7–23)
CALCIUM SERPL-MCNC: 8.8 MG/DL — SIGNIFICANT CHANGE UP (ref 8.4–10.5)
CHLORIDE SERPL-SCNC: 99 MMOL/L — SIGNIFICANT CHANGE UP (ref 98–107)
CO2 SERPL-SCNC: 20 MMOL/L — LOW (ref 22–31)
CREAT SERPL-MCNC: 0.94 MG/DL — SIGNIFICANT CHANGE UP (ref 0.5–1.3)
EGFR: 69 ML/MIN/1.73M2 — SIGNIFICANT CHANGE UP
EGFR: 69 ML/MIN/1.73M2 — SIGNIFICANT CHANGE UP
GLUCOSE BLDC GLUCOMTR-MCNC: 136 MG/DL — HIGH (ref 70–99)
GLUCOSE BLDC GLUCOMTR-MCNC: 154 MG/DL — HIGH (ref 70–99)
GLUCOSE BLDC GLUCOMTR-MCNC: 154 MG/DL — HIGH (ref 70–99)
GLUCOSE BLDC GLUCOMTR-MCNC: 167 MG/DL — HIGH (ref 70–99)
GLUCOSE SERPL-MCNC: 224 MG/DL — HIGH (ref 70–99)
HCT VFR BLD CALC: 37.4 % — SIGNIFICANT CHANGE UP (ref 34.5–45)
HGB BLD-MCNC: 12.1 G/DL — SIGNIFICANT CHANGE UP (ref 11.5–15.5)
MAGNESIUM SERPL-MCNC: 2 MG/DL — SIGNIFICANT CHANGE UP (ref 1.6–2.6)
MCHC RBC-ENTMCNC: 27.7 PG — SIGNIFICANT CHANGE UP (ref 27–34)
MCHC RBC-ENTMCNC: 32.4 G/DL — SIGNIFICANT CHANGE UP (ref 32–36)
MCV RBC AUTO: 85.6 FL — SIGNIFICANT CHANGE UP (ref 80–100)
MRSA PCR RESULT.: SIGNIFICANT CHANGE UP
NRBC # BLD AUTO: 0 K/UL — SIGNIFICANT CHANGE UP (ref 0–0)
NRBC # FLD: 0 K/UL — SIGNIFICANT CHANGE UP (ref 0–0)
NRBC BLD AUTO-RTO: 0 /100 WBCS — SIGNIFICANT CHANGE UP (ref 0–0)
PHOSPHATE SERPL-MCNC: 1.7 MG/DL — LOW (ref 2.5–4.5)
PLATELET # BLD AUTO: 479 K/UL — HIGH (ref 150–400)
POTASSIUM SERPL-MCNC: 3.9 MMOL/L — SIGNIFICANT CHANGE UP (ref 3.5–5.3)
POTASSIUM SERPL-SCNC: 3.9 MMOL/L — SIGNIFICANT CHANGE UP (ref 3.5–5.3)
RBC # BLD: 4.37 M/UL — SIGNIFICANT CHANGE UP (ref 3.8–5.2)
RBC # FLD: 16.2 % — HIGH (ref 10.3–14.5)
S AUREUS DNA NOSE QL NAA+PROBE: SIGNIFICANT CHANGE UP
SODIUM SERPL-SCNC: 134 MMOL/L — LOW (ref 135–145)
WBC # BLD: 13.32 K/UL — HIGH (ref 3.8–10.5)
WBC # FLD AUTO: 13.32 K/UL — HIGH (ref 3.8–10.5)

## 2025-06-08 PROCEDURE — 99233 SBSQ HOSP IP/OBS HIGH 50: CPT

## 2025-06-08 RX ORDER — SODIUM CHLORIDE 0.65 %
1 AEROSOL, SPRAY (ML) NASAL
Refills: 0 | Status: DISCONTINUED | OUTPATIENT
Start: 2025-06-08 | End: 2025-06-10

## 2025-06-08 RX ORDER — ERGOCALCIFEROL 1.25 MG/1
50000 CAPSULE ORAL
Refills: 0 | Status: DISCONTINUED | OUTPATIENT
Start: 2025-06-08 | End: 2025-06-10

## 2025-06-08 RX ORDER — BENZONATATE 100 MG
100 CAPSULE ORAL EVERY 8 HOURS
Refills: 0 | Status: DISCONTINUED | OUTPATIENT
Start: 2025-06-08 | End: 2025-06-10

## 2025-06-08 RX ORDER — SOD PHOS DI, MONO/K PHOS MONO 250 MG
1 TABLET ORAL EVERY 4 HOURS
Refills: 0 | Status: COMPLETED | OUTPATIENT
Start: 2025-06-08 | End: 2025-06-08

## 2025-06-08 RX ORDER — KETOROLAC TROMETHAMINE 30 MG/ML
15 INJECTION, SOLUTION INTRAMUSCULAR; INTRAVENOUS ONCE
Refills: 0 | Status: DISCONTINUED | OUTPATIENT
Start: 2025-06-08 | End: 2025-06-08

## 2025-06-08 RX ADMIN — Medication 1 TABLET(S): at 13:34

## 2025-06-08 RX ADMIN — Medication 25 MILLIGRAM(S): at 21:37

## 2025-06-08 RX ADMIN — Medication 250 MILLIGRAM(S): at 12:37

## 2025-06-08 RX ADMIN — ERGOCALCIFEROL 50000 UNIT(S): 1.25 CAPSULE ORAL at 17:54

## 2025-06-08 RX ADMIN — Medication 100 MILLIGRAM(S): at 09:54

## 2025-06-08 RX ADMIN — Medication 1 APPLICATION(S): at 07:46

## 2025-06-08 RX ADMIN — METHYLPREDNISOLONE ACETATE 40 MILLIGRAM(S): 80 INJECTION, SUSPENSION INTRA-ARTICULAR; INTRALESIONAL; INTRAMUSCULAR; SOFT TISSUE at 21:35

## 2025-06-08 RX ADMIN — EFAVIRENZ, EMTRICITABINE AND TENOFOVIR DISOPROXIL FUMARATE 1 TABLET(S): 600; 200; 300 TABLET, FILM COATED ORAL at 21:35

## 2025-06-08 RX ADMIN — KETOROLAC TROMETHAMINE 15 MILLIGRAM(S): 30 INJECTION, SOLUTION INTRAMUSCULAR; INTRAVENOUS at 09:51

## 2025-06-08 RX ADMIN — Medication 1 DOSE(S): at 21:40

## 2025-06-08 RX ADMIN — INSULIN LISPRO 1: 100 INJECTION, SOLUTION INTRAVENOUS; SUBCUTANEOUS at 09:21

## 2025-06-08 RX ADMIN — IPRATROPIUM BROMIDE AND ALBUTEROL SULFATE 3 MILLILITER(S): .5; 2.5 SOLUTION RESPIRATORY (INHALATION) at 09:53

## 2025-06-08 RX ADMIN — ROSUVASTATIN CALCIUM 10 MILLIGRAM(S): 20 TABLET, FILM COATED ORAL at 21:35

## 2025-06-08 RX ADMIN — HEPARIN SODIUM 5000 UNIT(S): 1000 INJECTION INTRAVENOUS; SUBCUTANEOUS at 17:59

## 2025-06-08 RX ADMIN — INSULIN LISPRO 1: 100 INJECTION, SOLUTION INTRAVENOUS; SUBCUTANEOUS at 17:57

## 2025-06-08 RX ADMIN — HEPARIN SODIUM 5000 UNIT(S): 1000 INJECTION INTRAVENOUS; SUBCUTANEOUS at 05:45

## 2025-06-08 RX ADMIN — IPRATROPIUM BROMIDE AND ALBUTEROL SULFATE 3 MILLILITER(S): .5; 2.5 SOLUTION RESPIRATORY (INHALATION) at 15:33

## 2025-06-08 RX ADMIN — Medication 1 DOSE(S): at 12:39

## 2025-06-08 RX ADMIN — IPRATROPIUM BROMIDE AND ALBUTEROL SULFATE 3 MILLILITER(S): .5; 2.5 SOLUTION RESPIRATORY (INHALATION) at 21:05

## 2025-06-08 RX ADMIN — Medication 1 TABLET(S): at 10:30

## 2025-06-08 RX ADMIN — METHYLPREDNISOLONE ACETATE 40 MILLIGRAM(S): 80 INJECTION, SUSPENSION INTRA-ARTICULAR; INTRALESIONAL; INTRAMUSCULAR; SOFT TISSUE at 13:35

## 2025-06-08 RX ADMIN — ESCITALOPRAM OXALATE 5 MILLIGRAM(S): 20 TABLET ORAL at 12:37

## 2025-06-08 RX ADMIN — Medication 1 TABLET(S): at 17:54

## 2025-06-08 RX ADMIN — METHYLPREDNISOLONE ACETATE 40 MILLIGRAM(S): 80 INJECTION, SUSPENSION INTRA-ARTICULAR; INTRALESIONAL; INTRAMUSCULAR; SOFT TISSUE at 05:45

## 2025-06-08 NOTE — PROGRESS NOTE ADULT - ASSESSMENT
69 y/o F with pmhx of HIV on antiretroviral medications, COPD, lung Ca s/p lobectomy, presented to the ED for worsening shortness of breath. Admitted for COPD exacerbation, needing IV steroids.

## 2025-06-08 NOTE — PHARMACOTHERAPY INTERVENTION NOTE - COMMENTS
Medication history is complete. Medication list updated in Outpatient Medication Record (OMR). Please call spectra [phone] if you have any questions. Sources: Pharmacy , Patient    Removed from OMR:  -Lexapro 5 mg : as per patient no longer taking      Home Medications:  albuterol 90 mcg/inh inhalation aerosol: 2 inhaler(s) inhaled every 6 hours as needed for  shortness of breath and/or wheezing (08 Jun 2025 16:35)  amlodipine 10 mg oral tablet: 1 tab(s) orally once a day (08 Jun 2025 16:35)  Centrum Adults oral tablet: 1 tab(s) orally once a day (08 Jun 2025 16:35)  ergocalciferol 1.25 mg (50,000 intl units) oral capsule: 1 cap(s) orally once a week ..(Sundays) (08 Jun 2025 16:35)  efavirenz/emtricitabine/tenofovir disoproxil fumarate 600 mg-200 mg-300 mg oral tablet: 1 tab(s) orally once a day (at bedtime) (08 Jun 2025 16:35)  fluticasone 50 mcg/inh nasal spray: 1 spray(s) in each nostril 2 times a day (08 Jun 2025 16:35)  metoprolol succinate 50 mg oral tablet, extended release: 1 tab(s) orally once a day (08 Jun 2025 16:33)  ????pantoprazole 40 mg oral delayed release tablet: 1 tab(s) orally once a day (as per patient she takes omeprazole 40mg, but pharmacy last filled 8/2024, most recent fills are pantoprazole)  Pepcid 20 mg oral tablet: 1 tab(s) orally 2 times a day (with meals) as needed for indigestion (08 Jun 2025 16:35)  rosuvastatin 10 mg oral tablet: 1 tab(s) orally once a day (at bedtime) (08 Jun 2025 16:35)  Trelegy Ellipta 200 mcg-62.5 mcg-25 mcg/inh inhalation powder: 1 blister(s) inhaled once a day (08 Jun 2025 16:35)

## 2025-06-08 NOTE — PROGRESS NOTE ADULT - SUBJECTIVE AND OBJECTIVE BOX
[facility] Division of Hospital Medicine  Gilbert Pritchard DO  Available via MS Teams  Pager: [phone]    Patient is a 60y old  Female who presents with a chief complaint of shortness of breath (07 Jun 2025 22:59)      SUBJECTIVE / OVERNIGHT EVENTS:    Pt seen and examined this morning. Pt states she feels improved today but is still coughing. She also admits to continued wheezing.     ADDITIONAL REVIEW OF SYSTEMS:  No Fever, chills, chest pain, shortness of breath.     MEDICATIONS  (STANDING):  albuterol/ipratropium for Nebulization 3 milliLiter(s) Nebulizer every 6 hours  azithromycin   Tablet 250 milliGRAM(s) Oral daily  chlorhexidine 2% Cloths 1 Application(s) Topical <User Schedule>  dextrose 5%. 1000 milliLiter(s) (100 mL/Hr) IV Continuous <Continuous>  dextrose 5%. 1000 milliLiter(s) (50 mL/Hr) IV Continuous <Continuous>  dextrose 50% Injectable 25 Gram(s) IV Push once  dextrose 50% Injectable 12.5 Gram(s) IV Push once  dextrose 50% Injectable 25 Gram(s) IV Push once  diphenhydrAMINE Injectable 25 milliGRAM(s) IV Push at bedtime  efavirenz 600/emtricitabine 200/tenofovir 300mg 1 Tablet(s) Oral at bedtime  ergocalciferol 68549 Unit(s) Oral <User Schedule>  escitalopram 5 milliGRAM(s) Oral daily  fluticasone propionate/ salmeterol 100-50 MICROgram(s) Diskus 1 Dose(s) Inhalation two times a day  glucagon  Injectable 1 milliGRAM(s) IntraMuscular once  heparin   Injectable 5000 Unit(s) SubCutaneous every 12 hours  insulin lispro (ADMELOG) corrective regimen sliding scale   SubCutaneous three times a day before meals  insulin lispro (ADMELOG) corrective regimen sliding scale   SubCutaneous at bedtime  methylPREDNISolone sodium succinate Injectable 40 milliGRAM(s) IV Push every 8 hours  potassium phosphate / sodium phosphate Tablet (K-PHOS No. 2) 1 Tablet(s) Oral every 4 hours  rosuvastatin 10 milliGRAM(s) Oral at bedtime    MEDICATIONS  (PRN):  acetaminophen     Tablet .. 650 milliGRAM(s) Oral every 6 hours PRN Temp greater or equal to 38C (100.4F), Mild Pain (1 - 3)  albuterol    90 MICROgram(s) HFA Inhaler 2 Puff(s) Inhalation every 3 hours PRN Shortness of Breath and/or Wheezing  aluminum hydroxide/magnesium hydroxide/simethicone Suspension 30 milliLiter(s) Oral every 4 hours PRN Dyspepsia  benzocaine/menthol Lozenge 1 Lozenge Oral every 3 hours PRN Sore Throat  benzonatate 100 milliGRAM(s) Oral every 8 hours PRN Cough  dextrose Oral Gel 15 Gram(s) Oral once PRN Blood Glucose LESS THAN 70 milliGRAM(s)/deciliter  melatonin 3 milliGRAM(s) Oral at bedtime PRN Insomnia  ondansetron Injectable 4 milliGRAM(s) IV Push every 8 hours PRN Nausea and/or Vomiting      I&O's Summary    07 Jun 2025 07:01  -  08 Jun 2025 07:00  --------------------------------------------------------  IN: 350 mL / OUT: 0 mL / NET: 350 mL        PHYSICAL EXAM:  Vital Signs Last 24 Hrs  T(C): 37.1 (08 Jun 2025 10:30), Max: 37.1 (08 Jun 2025 10:30)  T(F): 98.8 (08 Jun 2025 10:30), Max: 98.8 (08 Jun 2025 10:30)  HR: 92 (08 Jun 2025 10:30) (82 - 99)  BP: 157/90 (08 Jun 2025 10:30) (125/62 - 159/89)  BP(mean): --  RR: 17 (08 Jun 2025 10:30) (16 - 17)  SpO2: 92% (08 Jun 2025 10:30) (92% - 100%)    Parameters below as of 08 Jun 2025 10:30  Patient On (Oxygen Delivery Method): room air      CONSTITUTIONAL: NAD  EYES: PERRLA  ENMT: Moist oral mucosa  RESPIRATORY: Normal respiratory effort; b/l wheezing noted  CARDIOVASCULAR: Regular rate and rhythm, normal S1 and S2, no murmur/rub/gallop  ABDOMEN: Nontender to palpation, normoactive bowel sounds  PSYCH: A+O to person, place, and time  NEUROLOGY: CN 2-12 are intact and symmetric; no gross sensory deficits     LABS:                        12.1   13.32 )-----------( 479      ( 08 Jun 2025 06:45 )             37.4     06-08    134[L]  |  99  |  19  ----------------------------<  224[H]  3.9   |  20[L]  |  0.94    Ca    8.8      08 Jun 2025 06:45  Phos  1.7     06-08  Mg     2.00     06-08            Urinalysis Basic - ( 08 Jun 2025 06:45 )    Color: x / Appearance: x / SG: x / pH: x  Gluc: 224 mg/dL / Ketone: x  / Bili: x / Urobili: x   Blood: x / Protein: x / Nitrite: x   Leuk Esterase: x / RBC: x / WBC x   Sq Epi: x / Non Sq Epi: x / Bacteria: x        SARS-CoV-2: NotDetec (05 Jun 2025 18:04)

## 2025-06-09 LAB
ANION GAP SERPL CALC-SCNC: 14 MMOL/L — SIGNIFICANT CHANGE UP (ref 7–14)
BUN SERPL-MCNC: 25 MG/DL — HIGH (ref 7–23)
CALCIUM SERPL-MCNC: 9 MG/DL — SIGNIFICANT CHANGE UP (ref 8.4–10.5)
CHLORIDE SERPL-SCNC: 100 MMOL/L — SIGNIFICANT CHANGE UP (ref 98–107)
CO2 SERPL-SCNC: 20 MMOL/L — LOW (ref 22–31)
CREAT SERPL-MCNC: 1.01 MG/DL — SIGNIFICANT CHANGE UP (ref 0.5–1.3)
EGFR: 64 ML/MIN/1.73M2 — SIGNIFICANT CHANGE UP
EGFR: 64 ML/MIN/1.73M2 — SIGNIFICANT CHANGE UP
GLUCOSE BLDC GLUCOMTR-MCNC: 150 MG/DL — HIGH (ref 70–99)
GLUCOSE BLDC GLUCOMTR-MCNC: 160 MG/DL — HIGH (ref 70–99)
GLUCOSE BLDC GLUCOMTR-MCNC: 182 MG/DL — HIGH (ref 70–99)
GLUCOSE BLDC GLUCOMTR-MCNC: 206 MG/DL — HIGH (ref 70–99)
GLUCOSE SERPL-MCNC: 164 MG/DL — HIGH (ref 70–99)
HCT VFR BLD CALC: 36.5 % — SIGNIFICANT CHANGE UP (ref 34.5–45)
HGB BLD-MCNC: 12 G/DL — SIGNIFICANT CHANGE UP (ref 11.5–15.5)
MAGNESIUM SERPL-MCNC: 2.1 MG/DL — SIGNIFICANT CHANGE UP (ref 1.6–2.6)
MCHC RBC-ENTMCNC: 27.6 PG — SIGNIFICANT CHANGE UP (ref 27–34)
MCHC RBC-ENTMCNC: 32.9 G/DL — SIGNIFICANT CHANGE UP (ref 32–36)
MCV RBC AUTO: 83.9 FL — SIGNIFICANT CHANGE UP (ref 80–100)
NRBC # BLD AUTO: 0 K/UL — SIGNIFICANT CHANGE UP (ref 0–0)
NRBC # FLD: 0 K/UL — SIGNIFICANT CHANGE UP (ref 0–0)
NRBC BLD AUTO-RTO: 0 /100 WBCS — SIGNIFICANT CHANGE UP (ref 0–0)
PHOSPHATE SERPL-MCNC: 3 MG/DL — SIGNIFICANT CHANGE UP (ref 2.5–4.5)
PLATELET # BLD AUTO: 501 K/UL — HIGH (ref 150–400)
POTASSIUM SERPL-MCNC: 3.8 MMOL/L — SIGNIFICANT CHANGE UP (ref 3.5–5.3)
POTASSIUM SERPL-SCNC: 3.8 MMOL/L — SIGNIFICANT CHANGE UP (ref 3.5–5.3)
RBC # BLD: 4.35 M/UL — SIGNIFICANT CHANGE UP (ref 3.8–5.2)
RBC # FLD: 15.9 % — HIGH (ref 10.3–14.5)
SODIUM SERPL-SCNC: 134 MMOL/L — LOW (ref 135–145)
WBC # BLD: 17.26 K/UL — HIGH (ref 3.8–10.5)
WBC # FLD AUTO: 17.26 K/UL — HIGH (ref 3.8–10.5)

## 2025-06-09 PROCEDURE — 99223 1ST HOSP IP/OBS HIGH 75: CPT | Mod: GC

## 2025-06-09 PROCEDURE — 99233 SBSQ HOSP IP/OBS HIGH 50: CPT

## 2025-06-09 RX ORDER — METHYLPREDNISOLONE ACETATE 80 MG/ML
40 INJECTION, SUSPENSION INTRA-ARTICULAR; INTRALESIONAL; INTRAMUSCULAR; SOFT TISSUE DAILY
Refills: 0 | Status: DISCONTINUED | OUTPATIENT
Start: 2025-06-10 | End: 2025-06-10

## 2025-06-09 RX ORDER — METHYLPREDNISOLONE ACETATE 80 MG/ML
40 INJECTION, SUSPENSION INTRA-ARTICULAR; INTRALESIONAL; INTRAMUSCULAR; SOFT TISSUE
Refills: 0 | Status: DISCONTINUED | OUTPATIENT
Start: 2025-06-09 | End: 2025-06-09

## 2025-06-09 RX ORDER — KETOROLAC TROMETHAMINE 30 MG/ML
15 INJECTION, SOLUTION INTRAMUSCULAR; INTRAVENOUS EVERY 6 HOURS
Refills: 0 | Status: DISCONTINUED | OUTPATIENT
Start: 2025-06-09 | End: 2025-06-10

## 2025-06-09 RX ORDER — METOPROLOL SUCCINATE 50 MG/1
50 TABLET, EXTENDED RELEASE ORAL DAILY
Refills: 0 | Status: DISCONTINUED | OUTPATIENT
Start: 2025-06-09 | End: 2025-06-10

## 2025-06-09 RX ORDER — METHYLPREDNISOLONE ACETATE 80 MG/ML
40 INJECTION, SUSPENSION INTRA-ARTICULAR; INTRALESIONAL; INTRAMUSCULAR; SOFT TISSUE EVERY 8 HOURS
Refills: 0 | Status: DISCONTINUED | OUTPATIENT
Start: 2025-06-09 | End: 2025-06-09

## 2025-06-09 RX ADMIN — METHYLPREDNISOLONE ACETATE 40 MILLIGRAM(S): 80 INJECTION, SUSPENSION INTRA-ARTICULAR; INTRALESIONAL; INTRAMUSCULAR; SOFT TISSUE at 06:12

## 2025-06-09 RX ADMIN — METOPROLOL SUCCINATE 50 MILLIGRAM(S): 50 TABLET, EXTENDED RELEASE ORAL at 12:12

## 2025-06-09 RX ADMIN — Medication 25 MILLIGRAM(S): at 21:32

## 2025-06-09 RX ADMIN — EFAVIRENZ, EMTRICITABINE AND TENOFOVIR DISOPROXIL FUMARATE 1 TABLET(S): 600; 200; 300 TABLET, FILM COATED ORAL at 21:31

## 2025-06-09 RX ADMIN — KETOROLAC TROMETHAMINE 15 MILLIGRAM(S): 30 INJECTION, SOLUTION INTRAMUSCULAR; INTRAVENOUS at 10:44

## 2025-06-09 RX ADMIN — Medication 1 SPRAY(S): at 21:31

## 2025-06-09 RX ADMIN — METHYLPREDNISOLONE ACETATE 40 MILLIGRAM(S): 80 INJECTION, SUSPENSION INTRA-ARTICULAR; INTRALESIONAL; INTRAMUSCULAR; SOFT TISSUE at 13:35

## 2025-06-09 RX ADMIN — IPRATROPIUM BROMIDE AND ALBUTEROL SULFATE 3 MILLILITER(S): .5; 2.5 SOLUTION RESPIRATORY (INHALATION) at 11:28

## 2025-06-09 RX ADMIN — HEPARIN SODIUM 5000 UNIT(S): 1000 INJECTION INTRAVENOUS; SUBCUTANEOUS at 17:45

## 2025-06-09 RX ADMIN — IPRATROPIUM BROMIDE AND ALBUTEROL SULFATE 3 MILLILITER(S): .5; 2.5 SOLUTION RESPIRATORY (INHALATION) at 03:53

## 2025-06-09 RX ADMIN — KETOROLAC TROMETHAMINE 15 MILLIGRAM(S): 30 INJECTION, SOLUTION INTRAMUSCULAR; INTRAVENOUS at 11:00

## 2025-06-09 RX ADMIN — Medication 40 MILLIGRAM(S): at 12:13

## 2025-06-09 RX ADMIN — Medication 1 DOSE(S): at 09:52

## 2025-06-09 RX ADMIN — HEPARIN SODIUM 5000 UNIT(S): 1000 INJECTION INTRAVENOUS; SUBCUTANEOUS at 06:12

## 2025-06-09 RX ADMIN — Medication 1 DOSE(S): at 21:31

## 2025-06-09 RX ADMIN — Medication 1 APPLICATION(S): at 06:12

## 2025-06-09 RX ADMIN — KETOROLAC TROMETHAMINE 15 MILLIGRAM(S): 30 INJECTION, SOLUTION INTRAMUSCULAR; INTRAVENOUS at 22:58

## 2025-06-09 RX ADMIN — Medication 250 MILLIGRAM(S): at 12:12

## 2025-06-09 RX ADMIN — IPRATROPIUM BROMIDE AND ALBUTEROL SULFATE 3 MILLILITER(S): .5; 2.5 SOLUTION RESPIRATORY (INHALATION) at 16:48

## 2025-06-09 RX ADMIN — INSULIN LISPRO 1: 100 INJECTION, SOLUTION INTRAVENOUS; SUBCUTANEOUS at 13:09

## 2025-06-09 RX ADMIN — KETOROLAC TROMETHAMINE 15 MILLIGRAM(S): 30 INJECTION, SOLUTION INTRAMUSCULAR; INTRAVENOUS at 22:17

## 2025-06-09 RX ADMIN — INSULIN LISPRO 2: 100 INJECTION, SOLUTION INTRAVENOUS; SUBCUTANEOUS at 17:45

## 2025-06-09 RX ADMIN — ESCITALOPRAM OXALATE 5 MILLIGRAM(S): 20 TABLET ORAL at 12:12

## 2025-06-09 RX ADMIN — Medication 1 SPRAY(S): at 09:53

## 2025-06-09 RX ADMIN — ROSUVASTATIN CALCIUM 10 MILLIGRAM(S): 20 TABLET, FILM COATED ORAL at 21:30

## 2025-06-09 RX ADMIN — IPRATROPIUM BROMIDE AND ALBUTEROL SULFATE 3 MILLILITER(S): .5; 2.5 SOLUTION RESPIRATORY (INHALATION) at 21:19

## 2025-06-09 NOTE — PROGRESS NOTE ADULT - PROBLEM/PLAN-2
The patient's L leg is hurting more where her EVH site is.  Should she come into see Dr. Mayer?  
DISPLAY PLAN FREE TEXT
DISPLAY PLAN FREE TEXT

## 2025-06-09 NOTE — PROGRESS NOTE ADULT - ASSESSMENT
68F with HIV (october/november 2024 VL undetectable, CD4 396) on ART, COPD, lung cancer s/p RUL lobectomy who presents with wheezing and SOB 2/2 COPD exacerbation. Pt continues to have symptoms despite steroid management.

## 2025-06-09 NOTE — PROGRESS NOTE ADULT - PROBLEM SELECTOR PLAN 2
A1c 6.6  - continue ISS  - dc home with metformin
- will start  with low dose sliding scale insulin for now  - Patient currently not on metformin at home

## 2025-06-09 NOTE — PROGRESS NOTE ADULT - PROBLEM SELECTOR PLAN 1
Patient presented to the ED for worsening shortness of breath found to have wheezing - likely copd exacerbation  - patient has pulm follow up outpatient, planned for a spirometry test outpatient  - continue solumedrol 40mg q8  - duonebs Q6hr, albuterol Q3hr prn  - will c/w azithro for now  - CT chest neg for pneumonia  - Toradol for pain control prn  - Continuous pulse ox monitoring  - pulm consult today given persistent symptoms
- patient presented to the ED for worsening shortness of breath found to have wheezing - likely copd exacerbation  - patient has pulm follow up outpatient, planned for a spirometry test outpatient  - will c/w solu-medrol 40mg switch from q8 to q12  - duonebs Q6hr, albuterol Q3hr prn  - will c/w azithro for now  - CT chest neg for pneumonia  - Toradol for pain control prn  - Continuous pulse ox monitoring  - consider pulm if worsening, pt follows with [facility] and plans to f/u outpatient

## 2025-06-09 NOTE — PROGRESS NOTE ADULT - PROBLEM SELECTOR PLAN 3
Recent VL undetectable, CD4 396  - check VL and T cell subset in AM  - continue ART
- c/w Atripla for HIV management  - f/u ID outpatient

## 2025-06-09 NOTE — CONSULT NOTE ADULT - ATTENDING COMMENTS
60 year old female with HIV on HAART, lung cancer s/p local resection (2022), TBM (diagnosed bronchoscopy 11/2024), GERD c/b LPR, asthma, COPD presenting with dyspnea.     On exa with prominent upper airway sounds, no appreciable wheeze and otherwise clear lungs. Lower suspicion that this is ongoing exacerbation of underlying obstructive airways disease.     - ENT evaluation  - PPI, elevate HOB  - Can continue bronchodilator therapy as above  - Would start to decrease steroids as noted above  - Should eventually have outpatient sleep study and PFT's, suspect will benefit from positive pressure  - OOB as able  - IS to bedside  - Aerobika and airways clearance  - Rest as above

## 2025-06-10 ENCOUNTER — TRANSCRIPTION ENCOUNTER (OUTPATIENT)
Age: 60
End: 2025-06-10

## 2025-06-10 VITALS
RESPIRATION RATE: 18 BRPM | HEART RATE: 80 BPM | OXYGEN SATURATION: 100 % | DIASTOLIC BLOOD PRESSURE: 82 MMHG | TEMPERATURE: 98 F | SYSTOLIC BLOOD PRESSURE: 149 MMHG

## 2025-06-10 DIAGNOSIS — R06.02 SHORTNESS OF BREATH: ICD-10-CM

## 2025-06-10 LAB
4/8 RATIO: 1.75 RATIO — SIGNIFICANT CHANGE UP (ref 0.9–3.6)
ABS CD8: 913 CELLS/UL — HIGH (ref 142–740)
ANION GAP SERPL CALC-SCNC: 16 MMOL/L — HIGH (ref 7–14)
BUN SERPL-MCNC: 21 MG/DL — SIGNIFICANT CHANGE UP (ref 7–23)
CALCIUM SERPL-MCNC: 8.7 MG/DL — SIGNIFICANT CHANGE UP (ref 8.4–10.5)
CD3 BLASTS SPEC-ACNC: 2584 CELLS/UL — HIGH (ref 672–1870)
CD3 BLASTS SPEC-ACNC: 80 % — SIGNIFICANT CHANGE UP (ref 59–83)
CD4 %: 49 % — SIGNIFICANT CHANGE UP (ref 30–62)
CD8 %: 28 % — SIGNIFICANT CHANGE UP (ref 12–36)
CHLORIDE SERPL-SCNC: 102 MMOL/L — SIGNIFICANT CHANGE UP (ref 98–107)
CO2 SERPL-SCNC: 20 MMOL/L — LOW (ref 22–31)
CREAT SERPL-MCNC: 1.08 MG/DL — SIGNIFICANT CHANGE UP (ref 0.5–1.3)
EGFR: 59 ML/MIN/1.73M2 — LOW
EGFR: 59 ML/MIN/1.73M2 — LOW
GLUCOSE BLDC GLUCOMTR-MCNC: 173 MG/DL — HIGH (ref 70–99)
GLUCOSE BLDC GLUCOMTR-MCNC: 192 MG/DL — HIGH (ref 70–99)
GLUCOSE SERPL-MCNC: 156 MG/DL — HIGH (ref 70–99)
HCT VFR BLD CALC: 38 % — SIGNIFICANT CHANGE UP (ref 34.5–45)
HGB BLD-MCNC: 12.2 G/DL — SIGNIFICANT CHANGE UP (ref 11.5–15.5)
MCHC RBC-ENTMCNC: 27.6 PG — SIGNIFICANT CHANGE UP (ref 27–34)
MCHC RBC-ENTMCNC: 32.1 G/DL — SIGNIFICANT CHANGE UP (ref 32–36)
MCV RBC AUTO: 86 FL — SIGNIFICANT CHANGE UP (ref 80–100)
NRBC # BLD AUTO: 0.02 K/UL — HIGH (ref 0–0)
NRBC # FLD: 0.02 K/UL — HIGH (ref 0–0)
NRBC BLD AUTO-RTO: 0 /100 WBCS — SIGNIFICANT CHANGE UP (ref 0–0)
PLATELET # BLD AUTO: 500 K/UL — HIGH (ref 150–400)
POTASSIUM SERPL-MCNC: 3.8 MMOL/L — SIGNIFICANT CHANGE UP (ref 3.5–5.3)
POTASSIUM SERPL-SCNC: 3.8 MMOL/L — SIGNIFICANT CHANGE UP (ref 3.5–5.3)
RBC # BLD: 4.42 M/UL — SIGNIFICANT CHANGE UP (ref 3.8–5.2)
RBC # FLD: 16.7 % — HIGH (ref 10.3–14.5)
SODIUM SERPL-SCNC: 138 MMOL/L — SIGNIFICANT CHANGE UP (ref 135–145)
T-CELL CD4 SUBSET PNL BLD: 1593 CELLS/UL — HIGH (ref 489–1457)
VIABLE CELLS NFR SPEC: SIGNIFICANT CHANGE UP
WBC # BLD: 15.2 K/UL — HIGH (ref 3.8–10.5)
WBC # FLD AUTO: 15.2 K/UL — HIGH (ref 3.8–10.5)

## 2025-06-10 PROCEDURE — 99233 SBSQ HOSP IP/OBS HIGH 50: CPT | Mod: GC

## 2025-06-10 PROCEDURE — 99239 HOSP IP/OBS DSCHRG MGMT >30: CPT

## 2025-06-10 RX ORDER — IPRATROPIUM BROMIDE AND ALBUTEROL SULFATE .5; 2.5 MG/3ML; MG/3ML
3 SOLUTION RESPIRATORY (INHALATION) EVERY 12 HOURS
Refills: 0 | Status: DISCONTINUED | OUTPATIENT
Start: 2025-06-10 | End: 2025-06-10

## 2025-06-10 RX ORDER — PREDNISONE 20 MG/1
10 TABLET ORAL DAILY
Refills: 0 | Status: CANCELLED | OUTPATIENT
Start: 2025-06-20 | End: 2025-06-10

## 2025-06-10 RX ORDER — ACETAMINOPHEN 500 MG/5ML
2 LIQUID (ML) ORAL
Qty: 0 | Refills: 0 | DISCHARGE
Start: 2025-06-10

## 2025-06-10 RX ORDER — PREDNISONE 20 MG/1
20 TABLET ORAL DAILY
Refills: 0 | Status: CANCELLED | OUTPATIENT
Start: 2025-06-17 | End: 2025-06-10

## 2025-06-10 RX ORDER — PREDNISONE 20 MG/1
40 TABLET ORAL DAILY
Refills: 0 | Status: DISCONTINUED | OUTPATIENT
Start: 2025-06-11 | End: 2025-06-10

## 2025-06-10 RX ORDER — BENZONATATE 100 MG
1 CAPSULE ORAL
Qty: 9 | Refills: 0
Start: 2025-06-10 | End: 2025-06-12

## 2025-06-10 RX ORDER — PREDNISONE 20 MG/1
30 TABLET ORAL DAILY
Refills: 0 | Status: CANCELLED | OUTPATIENT
Start: 2025-06-14 | End: 2025-06-10

## 2025-06-10 RX ORDER — PREDNISONE 20 MG/1
1 TABLET ORAL
Qty: 30 | Refills: 0
Start: 2025-06-10 | End: 2025-06-21

## 2025-06-10 RX ADMIN — Medication 1 APPLICATION(S): at 05:36

## 2025-06-10 RX ADMIN — Medication 40 MILLIGRAM(S): at 06:58

## 2025-06-10 RX ADMIN — Medication 250 MILLIGRAM(S): at 11:13

## 2025-06-10 RX ADMIN — METHYLPREDNISOLONE ACETATE 40 MILLIGRAM(S): 80 INJECTION, SUSPENSION INTRA-ARTICULAR; INTRALESIONAL; INTRAMUSCULAR; SOFT TISSUE at 05:35

## 2025-06-10 RX ADMIN — INSULIN LISPRO 1: 100 INJECTION, SOLUTION INTRAVENOUS; SUBCUTANEOUS at 12:22

## 2025-06-10 RX ADMIN — INSULIN LISPRO 1: 100 INJECTION, SOLUTION INTRAVENOUS; SUBCUTANEOUS at 08:57

## 2025-06-10 RX ADMIN — ESCITALOPRAM OXALATE 5 MILLIGRAM(S): 20 TABLET ORAL at 11:12

## 2025-06-10 RX ADMIN — Medication 1 DOSE(S): at 08:58

## 2025-06-10 RX ADMIN — HEPARIN SODIUM 5000 UNIT(S): 1000 INJECTION INTRAVENOUS; SUBCUTANEOUS at 05:35

## 2025-06-10 RX ADMIN — IPRATROPIUM BROMIDE AND ALBUTEROL SULFATE 3 MILLILITER(S): .5; 2.5 SOLUTION RESPIRATORY (INHALATION) at 09:00

## 2025-06-10 RX ADMIN — IPRATROPIUM BROMIDE AND ALBUTEROL SULFATE 3 MILLILITER(S): .5; 2.5 SOLUTION RESPIRATORY (INHALATION) at 03:19

## 2025-06-10 RX ADMIN — METOPROLOL SUCCINATE 50 MILLIGRAM(S): 50 TABLET, EXTENDED RELEASE ORAL at 05:35

## 2025-06-10 NOTE — CONSULT NOTE ADULT - SUBJECTIVE AND OBJECTIVE BOX
CHIEF COMPLAINT:    HPI:  HPI:  This is a 69 y/o F with pmhx of HIV on antiretroviral medications, COPD, lung Ca s/p lobectomy, presented to the ED for worsening shortness of breath. The patient chronically has shortness of breath for the last few months due to COPD. Recently 3 days ago the patient felt worsening shortness of breath and wheezing because of poor air quality. The patient uses all her inhalers, symptoms not improving. Reports rib pain b/l due to the frequent coughing. Cough nonproductive. Denies fevers at home. Still has wheezing.  (07 Jun 2025 22:59)    Patient formerly evaluated and found to have LPRD and TBM.  has cough increased dyspnea after poor air quality  has been using inhalers although unable to bring up mucus      PAST MEDICAL & SURGICAL HISTORY:  Benign hypertension      H/O abdominal hysterectomy      Asthma      HIV disease      Osteoarthritis      GERD (gastroesophageal reflux disease)      HPV (human papilloma virus) infection      Cigarette smoker  quit 2019      Pruritus  (chronic)      Vitamin D deficiency      Knee pain, bilateral      S/P hysterectomy      S/P partial lobectomy of lung  right lung for stage 1 lung ca, on 11/22/22          FAMILY HISTORY:  Family history of hypertension (Grandparent)  grandmother    Family history of diabetes mellitus (Grandparent, Uncle, Uncle)  grandmother, uncles x 2    Family history of breast cancer (Aunt)  aunt    Family history of cancer (Uncle, Uncle)  uncles x 2    Family history of myocardial infarction (Aunt)  aunt    Family history of cerebrovascular accident (CVA) (Aunt)  aunt        SOCIAL HISTORY:  former smoker    Allergies    No Known Allergies    Intolerances        HOME MEDICATIONS:  Home Medications:  Centrum Adults oral tablet: 1 tab(s) orally once a day (08 Jun 2025 16:35)  ergocalciferol 1.25 mg (50,000 intl units) oral capsule: 1 cap(s) orally once a week ..(Sundays) (08 Jun 2025 16:35)  fluticasone 50 mcg/inh nasal spray: 1 spray(s) in each nostril 2 times a day (08 Jun 2025 16:35)  metoprolol succinate 50 mg oral tablet, extended release: 1 tab(s) orally once a day (08 Jun 2025 16:33)  pantoprazole 40 mg oral delayed release tablet: 1 tab(s) orally once a day (08 Jun 2025 16:34)      REVIEW OF SYSTEMS:  Patient denies fevers, chills, chest pain, nausea, abdominal pain, diarrhea, constipation, dysuria, leg swelling, headache, light headedness    OBJECTIVE:  ICU Vital Signs Last 24 Hrs  T(C): 37.1 (09 Jun 2025 14:38), Max: 37.1 (09 Jun 2025 10:09)  T(F): 98.7 (09 Jun 2025 14:38), Max: 98.8 (09 Jun 2025 10:09)  HR: 95 (09 Jun 2025 14:38) (84 - 99)  BP: 148/75 (09 Jun 2025 14:38) (133/72 - 169/96)  BP(mean): --  ABP: --  ABP(mean): --  RR: 18 (09 Jun 2025 14:38) (17 - 18)  SpO2: 96% (09 Jun 2025 14:38) (94% - 100%)    O2 Parameters below as of 09 Jun 2025 14:38  Patient On (Oxygen Delivery Method): room air              06-08 @ 07:01  -  06-09 @ 07:00  --------------------------------------------------------  IN: 300 mL / OUT: 0 mL / NET: 300 mL      CAPILLARY BLOOD GLUCOSE      POCT Blood Glucose.: 182 mg/dL (09 Jun 2025 12:35)      PHYSICAL EXAM:  General: awake and alert, nontoxic appearing *** lying in bed  HEENT: NC/AT, EOMI b/l, conjunctiva normal, MMM  Lymph Nodes: no cervical LAD  Neck: supple. full range of motion, has upper airway wheezing  Respiratory: CTA b/l, no w/r/c, appears comfortable on RA, no conversational dyspnea or accessory muscle use  Cardiovascular: S1 S2 present, RRR, no m/r/g  Abdomen: soft, NT/ND, +BS  Extremities: no c/c/e  Skin: no rashes or lesions noted  Neurological: AAOx3, no focal deficits  Psychiatry: calm, cooperative    LINES:     HOSPITAL MEDICATIONS:  Standing Meds:  albuterol/ipratropium for Nebulization 3 milliLiter(s) Nebulizer every 6 hours  azithromycin   Tablet 250 milliGRAM(s) Oral daily  chlorhexidine 2% Cloths 1 Application(s) Topical <User Schedule>  dextrose 5%. 1000 milliLiter(s) IV Continuous <Continuous>  dextrose 5%. 1000 milliLiter(s) IV Continuous <Continuous>  dextrose 50% Injectable 25 Gram(s) IV Push once  dextrose 50% Injectable 12.5 Gram(s) IV Push once  dextrose 50% Injectable 25 Gram(s) IV Push once  diphenhydrAMINE Injectable 25 milliGRAM(s) IV Push at bedtime  efavirenz 600/emtricitabine 200/tenofovir 300mg 1 Tablet(s) Oral at bedtime  ergocalciferol 52129 Unit(s) Oral <User Schedule>  escitalopram 5 milliGRAM(s) Oral daily  fluticasone propionate/ salmeterol 100-50 MICROgram(s) Diskus 1 Dose(s) Inhalation two times a day  glucagon  Injectable 1 milliGRAM(s) IntraMuscular once  heparin   Injectable 5000 Unit(s) SubCutaneous every 12 hours  insulin lispro (ADMELOG) corrective regimen sliding scale   SubCutaneous three times a day before meals  insulin lispro (ADMELOG) corrective regimen sliding scale   SubCutaneous at bedtime  methylPREDNISolone sodium succinate Injectable 40 milliGRAM(s) IV Push every 8 hours  metoprolol succinate ER 50 milliGRAM(s) Oral daily  pantoprazole    Tablet 40 milliGRAM(s) Oral before breakfast  rosuvastatin 10 milliGRAM(s) Oral at bedtime      PRN Meds:  acetaminophen     Tablet .. 650 milliGRAM(s) Oral every 6 hours PRN  albuterol    90 MICROgram(s) HFA Inhaler 2 Puff(s) Inhalation every 3 hours PRN  aluminum hydroxide/magnesium hydroxide/simethicone Suspension 30 milliLiter(s) Oral every 4 hours PRN  benzocaine/menthol Lozenge 1 Lozenge Oral every 3 hours PRN  benzonatate 100 milliGRAM(s) Oral every 8 hours PRN  dextrose Oral Gel 15 Gram(s) Oral once PRN  hydrocodone/homatropine Syrup 5 milliLiter(s) Oral every 8 hours PRN  ketorolac   Injectable 15 milliGRAM(s) IV Push every 6 hours PRN  melatonin 3 milliGRAM(s) Oral at bedtime PRN  ondansetron Injectable 4 milliGRAM(s) IV Push every 8 hours PRN  sodium chloride 0.65% Nasal 1 Spray(s) Both Nostrils every 2 hours PRN      LABS:                        12.0   17.26 )-----------( 501      ( 09 Jun 2025 03:20 )             36.5     Hgb Trend: 12.0<--, 12.1<--, 11.1<--  06-09    134[L]  |  100  |  25[H]  ----------------------------<  164[H]  3.8   |  20[L]  |  1.01    Ca    9.0      09 Jun 2025 03:20  Phos  3.0     06-09  Mg     2.10     06-09      Creatinine Trend: 1.01<--, 0.94<--, 0.96<--    Urinalysis Basic - ( 09 Jun 2025 03:20 )    Color: x / Appearance: x / SG: x / pH: x  Gluc: 164 mg/dL / Ketone: x  / Bili: x / Urobili: x   Blood: x / Protein: x / Nitrite: x   Leuk Esterase: x / RBC: x / WBC x   Sq Epi: x / Non Sq Epi: x / Bacteria: x            MICROBIOLOGY:       RADIOLOGY:  [ ] Reviewed and interpreted by me    PULMONARY FUNCTION TESTS:    EKG:
CC: SOB    HPI: 61 y/o female with a history of COPD, Lung Ca, Asthma, GERD admitted to [facility] with COPD exacerbation. Pulmonary requesting ENT eval for upper airway disease. C/o SOB, BAHENA with ambulating up stairs that has worsened over the past couple of months. Denies cough, fevers, chills, N/V, rigors, hoarseness. Tolerating oral diet.       PAST MEDICAL & SURGICAL HISTORY:  Benign hypertension      H/O abdominal hysterectomy      Asthma      HIV disease      Osteoarthritis      GERD (gastroesophageal reflux disease)      HPV (human papilloma virus) infection      Cigarette smoker  quit 2019      Pruritus  (chronic)      Vitamin D deficiency      Knee pain, bilateral      S/P hysterectomy      S/P partial lobectomy of lung  right lung for stage 1 lung ca, on 11/22/22        Allergies    No Known Allergies    Intolerances      MEDICATIONS  (STANDING):  albuterol/ipratropium for Nebulization 3 milliLiter(s) Nebulizer every 12 hours  chlorhexidine 2% Cloths 1 Application(s) Topical <User Schedule>  dextrose 5%. 1000 milliLiter(s) (100 mL/Hr) IV Continuous <Continuous>  dextrose 5%. 1000 milliLiter(s) (50 mL/Hr) IV Continuous <Continuous>  dextrose 50% Injectable 25 Gram(s) IV Push once  dextrose 50% Injectable 12.5 Gram(s) IV Push once  dextrose 50% Injectable 25 Gram(s) IV Push once  diphenhydrAMINE Injectable 25 milliGRAM(s) IV Push at bedtime  efavirenz 600/emtricitabine 200/tenofovir 300mg 1 Tablet(s) Oral at bedtime  ergocalciferol 36733 Unit(s) Oral <User Schedule>  escitalopram 5 milliGRAM(s) Oral daily  fluticasone propionate/ salmeterol 100-50 MICROgram(s) Diskus 1 Dose(s) Inhalation two times a day  glucagon  Injectable 1 milliGRAM(s) IntraMuscular once  heparin   Injectable 5000 Unit(s) SubCutaneous every 12 hours  insulin lispro (ADMELOG) corrective regimen sliding scale   SubCutaneous three times a day before meals  insulin lispro (ADMELOG) corrective regimen sliding scale   SubCutaneous at bedtime  metoprolol succinate ER 50 milliGRAM(s) Oral daily  pantoprazole    Tablet 40 milliGRAM(s) Oral before breakfast  rosuvastatin 10 milliGRAM(s) Oral at bedtime    MEDICATIONS  (PRN):  acetaminophen     Tablet .. 650 milliGRAM(s) Oral every 6 hours PRN Temp greater or equal to 38C (100.4F), Mild Pain (1 - 3)  albuterol    90 MICROgram(s) HFA Inhaler 2 Puff(s) Inhalation every 3 hours PRN Shortness of Breath and/or Wheezing  aluminum hydroxide/magnesium hydroxide/simethicone Suspension 30 milliLiter(s) Oral every 4 hours PRN Dyspepsia  benzocaine/menthol Lozenge 1 Lozenge Oral every 3 hours PRN Sore Throat  benzonatate 100 milliGRAM(s) Oral every 8 hours PRN Cough  dextrose Oral Gel 15 Gram(s) Oral once PRN Blood Glucose LESS THAN 70 milliGRAM(s)/deciliter  hydrocodone/homatropine Syrup 5 milliLiter(s) Oral every 8 hours PRN Cough  ketorolac   Injectable 15 milliGRAM(s) IV Push every 6 hours PRN Severe Pain (7 - 10)  melatonin 3 milliGRAM(s) Oral at bedtime PRN Insomnia  ondansetron Injectable 4 milliGRAM(s) IV Push every 8 hours PRN Nausea and/or Vomiting  sodium chloride 0.65% Nasal 1 Spray(s) Both Nostrils every 2 hours PRN Nasal Congestion        ROS:   ENT: all negative except as noted in HPI   CV: denies palpitations  Pulm: denies SOB, cough, hemoptysis  GI: denies change in apetite, indigestion, n/v  : denies pertinent urinary symptoms, urgency  Neuro: denies numbness/tingling, loss of sensation  Psych: denies anxiety  MS: denies muscle weakness, instability  Heme: denies easy bruising or bleeding  Endo: denies heat/cold intolerance, excessive sweating  Vascular: denies LE edema    Vital Signs Last 24 Hrs  T(C): 36.5 (10 Marlon 2025 13:19), Max: 37.1 (09 Jun 2025 14:38)  T(F): 97.7 (10 Marlon 2025 13:19), Max: 98.8 (09 Jun 2025 17:13)  HR: 80 (10 Marlon 2025 13:19) (73 - 95)  BP: 149/82 (10 Marlon 2025 13:19) (146/87 - 158/87)  BP(mean): --  RR: 18 (10 Marlon 2025 13:19) (18 - 19)  SpO2: 100% (10 Marlon 2025 13:19) (95% - 100%)    Parameters below as of 10 Marlon 2025 13:19  Patient On (Oxygen Delivery Method): room air                              12.2   15.20 )-----------( 500      ( 10 Marlon 2025 07:18 )             38.0    06-10    138  |  102  |  21  ----------------------------<  156[H]  3.8   |  20[L]  |  1.08    Ca    8.7      10 Marlon 2025 07:18  Phos  3.0     06-09  Mg     2.10     06-09         PHYSICAL EXAM:  Gen: NAD  Skin: No rashes, bruises, or lesions  Head: Normocephalic, Atraumatic  Face: no edema, erythema, or fluctuance. Parotid glands soft without mass  Eyes: no scleral injection  Ears: Right - ear canal clear, TM intact without effusion or erythema. No evidence of any fluid drainage. No mastoid tenderness, erythema, or ear bulging            Left - ear canal clear, TM intact without effusion or erythema. No evidence of any fluid drainage. No mastoid tenderness, erythema, or ear bulging  Nose: Nares bilaterally patent, no discharge  Mouth: No Stridor / Drooling / Trismus.  Mucosa moist, tongue/uvula midline, oropharynx clear  Neck: Flat, supple, no lymphadenopathy, trachea midline, no masses  Lymphatic: No lymphadenopathy  Resp: breathing easily, no stridor  CV: no peripheral edema/cyanosis  GI: nondistended   Peripheral vascular: no JVD or edema  Neuro: facial nerve intact, no facial droop      Diagnostic Nasal Endoscopy:   pink, moist and sensate, no polyps appreciated      Fiberoptic Indirect laryngoscopy:   No base of tongue edema,   no masses  epiglottis sharp  vocal cords mobile without any edema    IMAGING/ADDITIONAL STUDIES:

## 2025-06-10 NOTE — DISCHARGE NOTE PROVIDER - NSDCFUADDAPPT_GEN_ALL_CORE_FT
Please follow up with your Outpatient Pulmonologist Doctor on 6/11 at 11AM  Please call to follow up with your PCP within 1-2 weeks upon discharge

## 2025-06-10 NOTE — DISCHARGE NOTE NURSING/CASE MANAGEMENT/SOCIAL WORK - PATIENT PORTAL LINK FT
You can access the FollowMyHealth Patient Portal offered by [facility] by registering at the following website: [email or web address]. By joining [facility]’s FollowMyHealth portal, you will also be able to view your health information using other applications (apps) compatible with our system.

## 2025-06-10 NOTE — DISCHARGE NOTE PROVIDER - CARE PROVIDER_API CALL
Odin Sebastian MD,   [facility] Pulmonology Office  [address], [address]  [place], [address]  Phone: [phone]  Fax: (   )    -  Scheduled Appointment: 06/11/2025 11:00 AM

## 2025-06-10 NOTE — DISCHARGE NOTE PROVIDER - HOSPITAL COURSE
HPI:  This is a 67 y/o F with pmhx of HIV on antiretroviral medications, COPD, lung Ca s/p lobectomy, presented to the ED for worsening shortness of breath. The patient chronically has shortness of breath for the last few months due to COPD. Recently 3 days ago the patient felt worsening shortness of breath and wheezing because of poor air quality. The patient uses all her inhalers, symptoms not improving. Reports rib pain b/l due to the frequent coughing. Cough nonproductive. Denies fevers at home. Still has wheezing.  (07 Jun 2025 22:59)    Hospital Course: Pt was started on azithromycin and solumedrol 40mg TID without initial improvement in wheezing and cough. Pt was seen by pulmonology who was concerned for upper airway etiology of wheezing. ENT evaluated pt and scope was without concerning findings. On day of discharge wheezing and cough improved. Pt stable for dc with close pulmonary follow up. Pt to be discharged home with steroid taper.      Important Medication Changes and Reason:  - Steroid taper as prescribed  - follow up with your PMD for management of hyperglycemia    Active or Pending Issues Requiring Follow-up:    Advanced Directives:   [x] Full code  [ ] DNR  [ ] Hospice    Discharge Diagnoses:  - URI  - COPD exacerbation

## 2025-06-10 NOTE — DISCHARGE NOTE PROVIDER - PROVIDER TOKENS
FREE:[LAST:[Odin Sebastian MD],PHONE:[phone]],FAX:[(   [phone]    -],ADDRESS:[[facility]  [address], [address] [place] [address], [facility]],SCHEDULEDAPPT:[06/11/2025],SCHEDULEDAPPTTIME:[11:00 AM]]

## 2025-06-10 NOTE — DISCHARGE NOTE PROVIDER - NSDCCPCAREPLAN_GEN_ALL_CORE_FT
PRINCIPAL DISCHARGE DIAGNOSIS  Diagnosis: COPD exacerbation  Assessment and Plan of Treatment: You were found to have wheezing concerning for COPD exacerbation. Continue steroid taper as prescribed. Return to the ED with shortness of breath, difficulty swallowing, fevers, chills or any other concerning symptoms.

## 2025-06-10 NOTE — PROGRESS NOTE ADULT - TIME BILLING
review of laboratory data, radiology results, consultants' recommendations, documentation in [place], discussion with patient/ACP and interdisciplinary staff (such as , social workers, etc). Interventions were performed as documented above.
Review of patient records, including history, laboratory data, imaging. Patient evaluation and assessment. Coordination of care. Time excludes teaching
I have spent 50 minutes of time on the encounter which excludes teaching and separately reported services.

## 2025-06-10 NOTE — CHART NOTE - NSCHARTNOTEFT_GEN_A_CORE
Pt stable for dc home  See discharge note for plan and physical exam.    Plan d/w pulm, pt and ENT    Laurita Vasquez MD

## 2025-06-10 NOTE — CONSULT NOTE ADULT - ASSESSMENT
60F w/ HIV on HAART therapy, Lung cancer s/p local resection 2022, TBM, 20 pack-yr smoking hx quit 2019, GERD and Asthma w/ multiple ED visits for asthma exacerbations this year coming in with SOB    #recommendations  patient's wheezing likely largely related to upper trachea, has been diagnosed with LPRD and TBM in the past, only possible COPD/asthma exacerbation   non emergent consult with ENT  please ensure PPI and elevated HOB  should continue asthma therapy: duoneb q6 hours, advair standing  please ensure patient is using aerobika with breathing treatments to facilitate airway clearance  would decrease solumedrol to 40 IV daily starting tomorrow, then prednisone 40 mg x3 days then decrease by 10 mg until off every three days  will need outpatient pulm eval with PFTs and sleep study to eval for nocturnal CPAP  will need HOME token used for post discharge followup      
 61 y/o female with a history of COPD, Lung Ca, Asthma, GERD admitted to [facility] with COPD exacerbation. Pulmonary requesting ENT eval for upper airway disease. C/o SOB, BAEHNA with ambulating up stairs that has worsened over the past couple of months

## 2025-06-10 NOTE — PROGRESS NOTE ADULT - ASSESSMENT
60F w/ HIV on HAART therapy, Lung cancer s/p local resection 2022, TBM, 20 pack-yr smoking hx quit 2019, GERD and Asthma w/ multiple ED visits for asthma exacerbations this year coming in with SOB    #recommendations  patient's wheezing likely largely related to upper trachea, has been diagnosed with LPRD and TBM in the past, only possible COPD/asthma exacerbation   non emergent consult with ENT  please ensure PPI and elevated HOB  should continue asthma therapy: duoneb q6 hours, advair standing  please ensure patient is using aerobika with breathing treatments to facilitate airway clearance  would decrease solumedrol to 40 IV daily starting tomorrow, then prednisone 40 mg x3 days then decrease by 10 mg until off every three days  will need outpatient pulm eval with PFTs and sleep study to eval for nocturnal CPAP  will need HOME token used for post discharge followup       60F w/ HIV on HAART therapy, Lung cancer s/p local resection 2022, TBM, 20 pack-yr smoking hx quit 2019, GERD and Asthma w/ multiple ED visits for asthma exacerbations this year coming in with SOB    #recommendations  patient's wheezing likely largely related to upper trachea, has been diagnosed with LPRD and TBM in the past, only possible COPD/asthma exacerbation   non emergent consult with ENT  please ensure PPI and elevated HOB  should continue asthma therapy: duoneb q6 hours, advair standing  please ensure patient is using aerobika with breathing treatments to facilitate airway clearance  would decrease solumedrol to 40 IV daily starting today, then prednisone 40 mg x3 days then decrease by 10 mg until off every three days  will need outpatient pulm eval with PFTs and sleep study to eval for nocturnal CPAP  will need HOME token used for post discharge followup

## 2025-06-10 NOTE — DISCHARGE NOTE NURSING/CASE MANAGEMENT/SOCIAL WORK - FINANCIAL ASSISTANCE
[facility] provides services at a reduced cost to those who are determined to be eligible through [facility]’s financial assistance program. Information regarding [facility]’s financial assistance program can be found by going to [email or web address] or by calling [phone].

## 2025-06-10 NOTE — DISCHARGE NOTE PROVIDER - ATTENDING DISCHARGE PHYSICAL EXAMINATION:
T(C): 36.5 (06-10-25 @ 13:19), Max: 37.1 (06-09-25 @ 14:38)  HR: 80 (06-10-25 @ 13:19) (73 - 95)  BP: 149/82 (06-10-25 @ 13:19) (146/87 - 158/87)  RR: 18 (06-10-25 @ 13:19) (18 - 19)  SpO2: 100% (06-10-25 @ 13:19) (95% - 100%)    CONSTITUTIONAL: Well groomed, no apparent distress  EYES: PERRLA and symmetric, EOMI, No conjunctival or scleral injection, non-icteric  ENMT: Oral mucosa with moist membranes. Normal dentition; no pharyngeal injection or exudates  NECK: Supple, symmetric and without tracheal deviation   RESP: No respiratory distress, no use of accessory muscles; mild inspiratory wheezing  CV: RRR, +S1S2, no MRG; no JVD; no peripheral edema  GI: Soft, NT, ND, no rebound, no guarding; no palpable masses; no hepatosplenomegaly; no hernia palpated  MSK: Normal gait; No digital clubbing or cyanosis; examination of the (head/neck/spine/ribs/pelvis, RUE, LUE, RLE, LLE) without misalignment,            Normal ROM without pain, no spinal tenderness, normal muscle strength/tone  SKIN: No rashes or ulcers noted; no subcutaneous nodules or induration palpable  NEURO: CN II-XII intact; no focal motor deficits, sensation intact in upper and lower extremities b/l to light touch   PSYCH: Appropriate insight/judgment; A+O x 3, mood and affect appropriate, recent/remote memory intact

## 2025-06-10 NOTE — PROGRESS NOTE ADULT - SUBJECTIVE AND OBJECTIVE BOX
CHIEF COMPLAINT:Patient is a 60y old  Female who presents with a chief complaint of shortness of breath (09 Jun 2025 16:51)      Interval Events:    REVIEW OF SYSTEMS:  [x] All other systems negative except per HPI   [ ] Unable to assess ROS because ________    OBJECTIVE:  ICU Vital Signs Last 24 Hrs  T(C): 36.9 (10 Marlon 2025 05:34), Max: 37.1 (09 Jun 2025 10:09)  T(F): 98.5 (10 Marlon 2025 05:34), Max: 98.8 (09 Jun 2025 10:09)  HR: 88 (10 Marlon 2025 05:34) (75 - 99)  BP: 146/87 (10 Marlon 2025 05:34) (146/87 - 158/87)  BP(mean): --  ABP: --  ABP(mean): --  RR: 19 (10 Marlon 2025 05:34) (17 - 19)  SpO2: 100% (10 Marlon 2025 05:34) (95% - 100%)    O2 Parameters below as of 10 Marlon 2025 05:34  Patient On (Oxygen Delivery Method): room air              06-09 @ 07:01  -  06-10 @ 07:00  --------------------------------------------------------  IN: 100 mL / OUT: 0 mL / NET: 100 mL        PHYSICAL EXAM:  GENERAL: NAD, well-groomed, well-developed  HEAD:  Atraumatic, Normocephalic  EYES: EOMI, PERRLA, conjunctiva and sclera clear  ENMT: No tonsillar erythema, exudates, or enlargement; Moist mucous membranes, Good dentition, No lesions  NECK: Supple, No JVD, Normal thyroid  CHEST/LUNG: Clear to auscultation bilaterally; No rales, rhonchi, wheezing, or rubs  HEART: Regular rate and rhythm; No murmurs, rubs, or gallops  ABDOMEN: Soft, Nontender, Nondistended; Bowel sounds present  VASCULAR:  2+ Peripheral Pulses, No clubbing, cyanosis, or edema  LYMPH: No lymphadenopathy noted  SKIN: No rashes or lesions  NERVOUS SYSTEM:  Alert & Oriented X3, Good concentration; Motor Strength 5/5 B/L upper and lower extremities; DTRs 2+ intact and symmetric    HOSPITAL MEDICATIONS:  MEDICATIONS  (STANDING):  albuterol/ipratropium for Nebulization 3 milliLiter(s) Nebulizer every 6 hours  azithromycin   Tablet 250 milliGRAM(s) Oral daily  chlorhexidine 2% Cloths 1 Application(s) Topical <User Schedule>  dextrose 5%. 1000 milliLiter(s) (100 mL/Hr) IV Continuous <Continuous>  dextrose 5%. 1000 milliLiter(s) (50 mL/Hr) IV Continuous <Continuous>  dextrose 50% Injectable 25 Gram(s) IV Push once  dextrose 50% Injectable 12.5 Gram(s) IV Push once  dextrose 50% Injectable 25 Gram(s) IV Push once  diphenhydrAMINE Injectable 25 milliGRAM(s) IV Push at bedtime  efavirenz 600/emtricitabine 200/tenofovir 300mg 1 Tablet(s) Oral at bedtime  ergocalciferol 07928 Unit(s) Oral <User Schedule>  escitalopram 5 milliGRAM(s) Oral daily  fluticasone propionate/ salmeterol 100-50 MICROgram(s) Diskus 1 Dose(s) Inhalation two times a day  glucagon  Injectable 1 milliGRAM(s) IntraMuscular once  heparin   Injectable 5000 Unit(s) SubCutaneous every 12 hours  insulin lispro (ADMELOG) corrective regimen sliding scale   SubCutaneous three times a day before meals  insulin lispro (ADMELOG) corrective regimen sliding scale   SubCutaneous at bedtime  methylPREDNISolone sodium succinate Injectable 40 milliGRAM(s) IV Push daily  metoprolol succinate ER 50 milliGRAM(s) Oral daily  pantoprazole    Tablet 40 milliGRAM(s) Oral before breakfast  rosuvastatin 10 milliGRAM(s) Oral at bedtime    MEDICATIONS  (PRN):  acetaminophen     Tablet .. 650 milliGRAM(s) Oral every 6 hours PRN Temp greater or equal to 38C (100.4F), Mild Pain (1 - 3)  albuterol    90 MICROgram(s) HFA Inhaler 2 Puff(s) Inhalation every 3 hours PRN Shortness of Breath and/or Wheezing  aluminum hydroxide/magnesium hydroxide/simethicone Suspension 30 milliLiter(s) Oral every 4 hours PRN Dyspepsia  benzocaine/menthol Lozenge 1 Lozenge Oral every 3 hours PRN Sore Throat  benzonatate 100 milliGRAM(s) Oral every 8 hours PRN Cough  dextrose Oral Gel 15 Gram(s) Oral once PRN Blood Glucose LESS THAN 70 milliGRAM(s)/deciliter  hydrocodone/homatropine Syrup 5 milliLiter(s) Oral every 8 hours PRN Cough  ketorolac   Injectable 15 milliGRAM(s) IV Push every 6 hours PRN Severe Pain (7 - 10)  melatonin 3 milliGRAM(s) Oral at bedtime PRN Insomnia  ondansetron Injectable 4 milliGRAM(s) IV Push every 8 hours PRN Nausea and/or Vomiting  sodium chloride 0.65% Nasal 1 Spray(s) Both Nostrils every 2 hours PRN Nasal Congestion      LABS:    The Labs were reviewed by me   The Radiology was reviewed by me    EKG tracing reviewed by me    06-09    134[L]  |  100  |  25[H]  ----------------------------<  164[H]  3.8   |  20[L]  |  1.01  06-08    134[L]  |  99  |  19  ----------------------------<  224[H]  3.9   |  20[L]  |  0.94    Ca    9.0      09 Jun 2025 03:20  Ca    8.8      08 Jun 2025 06:45  Phos  3.0     06-09  Mg     2.10     06-09      Magnesium: 2.10 mg/dL (06-09-25 @ 03:20)  Magnesium: 2.00 mg/dL (06-08-25 @ 06:45)    Phosphorus: 3.0 mg/dL (06-09-25 @ 03:20)  Phosphorus: 1.7 mg/dL (06-08-25 @ 06:45)                    Urinalysis Basic - ( 09 Jun 2025 03:20 )    Color: x / Appearance: x / SG: x / pH: x  Gluc: 164 mg/dL / Ketone: x  / Bili: x / Urobili: x   Blood: x / Protein: x / Nitrite: x   Leuk Esterase: x / RBC: x / WBC x   Sq Epi: x / Non Sq Epi: x / Bacteria: x                              12.0   17.26 )-----------( 501      ( 09 Jun 2025 03:20 )             36.5                         12.1   13.32 )-----------( 479      ( 08 Jun 2025 06:45 )             37.4     CAPILLARY BLOOD GLUCOSE      POCT Blood Glucose.: 160 mg/dL (09 Jun 2025 21:27)  POCT Blood Glucose.: 206 mg/dL (09 Jun 2025 17:36)  POCT Blood Glucose.: 182 mg/dL (09 Jun 2025 12:35)  POCT Blood Glucose.: 150 mg/dL (09 Jun 2025 08:44)        MICROBIOLOGY:     RADIOLOGY:  [ ] Reviewed and interpreted by me    Point of Care Ultrasound Findings:    PFT:    EKG: CHIEF COMPLAINT:Patient is a 60y old  Female who presents with a chief complaint of shortness of breath (09 Jun 2025 16:51)      Interval Events: no acute events    REVIEW OF SYSTEMS:  [x] All other systems negative except per HPI   [ ] Unable to assess ROS because ________    OBJECTIVE:  ICU Vital Signs Last 24 Hrs  T(C): 36.9 (10 Marlon 2025 05:34), Max: 37.1 (09 Jun 2025 10:09)  T(F): 98.5 (10 Marlon 2025 05:34), Max: 98.8 (09 Jun 2025 10:09)  HR: 88 (10 Marlon 2025 05:34) (75 - 99)  BP: 146/87 (10 Marlon 2025 05:34) (146/87 - 158/87)  BP(mean): --  ABP: --  ABP(mean): --  RR: 19 (10 Marlon 2025 05:34) (17 - 19)  SpO2: 100% (10 Marlon 2025 05:34) (95% - 100%)    O2 Parameters below as of 10 Marlon 2025 05:34  Patient On (Oxygen Delivery Method): room air              06-09 @ 07:01  -  06-10 @ 07:00  --------------------------------------------------------  IN: 100 mL / OUT: 0 mL / NET: 100 mL        PHYSICAL EXAM:  GENERAL: NAD, well-groomed, well-developed  HEAD:  Atraumatic, Normocephalic  EYES: EOMI, PERRLA, conjunctiva and sclera clear  ENMT: No tonsillar erythema, exudates, or enlargement; Moist mucous membranes, Good dentition, No lesions  NECK: Supple, No JVD, Normal thyroid  CHEST/LUNG: Clear to auscultation bilaterally; No rales, rhonchi, wheezing, or rubs  HEART: Regular rate and rhythm; No murmurs, rubs, or gallops  ABDOMEN: Soft, Nontender, Nondistended; Bowel sounds present  VASCULAR:  2+ Peripheral Pulses, No clubbing, cyanosis, or edema  LYMPH: No lymphadenopathy noted  SKIN: No rashes or lesions  NERVOUS SYSTEM:  Alert & Oriented X3, Good concentration; Motor Strength 5/5 B/L upper and lower extremities; DTRs 2+ intact and symmetric    HOSPITAL MEDICATIONS:  MEDICATIONS  (STANDING):  albuterol/ipratropium for Nebulization 3 milliLiter(s) Nebulizer every 6 hours  azithromycin   Tablet 250 milliGRAM(s) Oral daily  chlorhexidine 2% Cloths 1 Application(s) Topical <User Schedule>  dextrose 5%. 1000 milliLiter(s) (100 mL/Hr) IV Continuous <Continuous>  dextrose 5%. 1000 milliLiter(s) (50 mL/Hr) IV Continuous <Continuous>  dextrose 50% Injectable 25 Gram(s) IV Push once  dextrose 50% Injectable 12.5 Gram(s) IV Push once  dextrose 50% Injectable 25 Gram(s) IV Push once  diphenhydrAMINE Injectable 25 milliGRAM(s) IV Push at bedtime  efavirenz 600/emtricitabine 200/tenofovir 300mg 1 Tablet(s) Oral at bedtime  ergocalciferol 74360 Unit(s) Oral <User Schedule>  escitalopram 5 milliGRAM(s) Oral daily  fluticasone propionate/ salmeterol 100-50 MICROgram(s) Diskus 1 Dose(s) Inhalation two times a day  glucagon  Injectable 1 milliGRAM(s) IntraMuscular once  heparin   Injectable 5000 Unit(s) SubCutaneous every 12 hours  insulin lispro (ADMELOG) corrective regimen sliding scale   SubCutaneous three times a day before meals  insulin lispro (ADMELOG) corrective regimen sliding scale   SubCutaneous at bedtime  methylPREDNISolone sodium succinate Injectable 40 milliGRAM(s) IV Push daily  metoprolol succinate ER 50 milliGRAM(s) Oral daily  pantoprazole    Tablet 40 milliGRAM(s) Oral before breakfast  rosuvastatin 10 milliGRAM(s) Oral at bedtime    MEDICATIONS  (PRN):  acetaminophen     Tablet .. 650 milliGRAM(s) Oral every 6 hours PRN Temp greater or equal to 38C (100.4F), Mild Pain (1 - 3)  albuterol    90 MICROgram(s) HFA Inhaler 2 Puff(s) Inhalation every 3 hours PRN Shortness of Breath and/or Wheezing  aluminum hydroxide/magnesium hydroxide/simethicone Suspension 30 milliLiter(s) Oral every 4 hours PRN Dyspepsia  benzocaine/menthol Lozenge 1 Lozenge Oral every 3 hours PRN Sore Throat  benzonatate 100 milliGRAM(s) Oral every 8 hours PRN Cough  dextrose Oral Gel 15 Gram(s) Oral once PRN Blood Glucose LESS THAN 70 milliGRAM(s)/deciliter  hydrocodone/homatropine Syrup 5 milliLiter(s) Oral every 8 hours PRN Cough  ketorolac   Injectable 15 milliGRAM(s) IV Push every 6 hours PRN Severe Pain (7 - 10)  melatonin 3 milliGRAM(s) Oral at bedtime PRN Insomnia  ondansetron Injectable 4 milliGRAM(s) IV Push every 8 hours PRN Nausea and/or Vomiting  sodium chloride 0.65% Nasal 1 Spray(s) Both Nostrils every 2 hours PRN Nasal Congestion      LABS:    The Labs were reviewed by me   The Radiology was reviewed by me    EKG tracing reviewed by me    06-09    134[L]  |  100  |  25[H]  ----------------------------<  164[H]  3.8   |  20[L]  |  1.01  06-08    134[L]  |  99  |  19  ----------------------------<  224[H]  3.9   |  20[L]  |  0.94    Ca    9.0      09 Jun 2025 03:20  Ca    8.8      08 Jun 2025 06:45  Phos  3.0     06-09  Mg     2.10     06-09      Magnesium: 2.10 mg/dL (06-09-25 @ 03:20)  Magnesium: 2.00 mg/dL (06-08-25 @ 06:45)    Phosphorus: 3.0 mg/dL (06-09-25 @ 03:20)  Phosphorus: 1.7 mg/dL (06-08-25 @ 06:45)                    Urinalysis Basic - ( 09 Jun 2025 03:20 )    Color: x / Appearance: x / SG: x / pH: x  Gluc: 164 mg/dL / Ketone: x  / Bili: x / Urobili: x   Blood: x / Protein: x / Nitrite: x   Leuk Esterase: x / RBC: x / WBC x   Sq Epi: x / Non Sq Epi: x / Bacteria: x                              12.0   17.26 )-----------( 501      ( 09 Jun 2025 03:20 )             36.5                         12.1   13.32 )-----------( 479      ( 08 Jun 2025 06:45 )             37.4     CAPILLARY BLOOD GLUCOSE      POCT Blood Glucose.: 160 mg/dL (09 Jun 2025 21:27)  POCT Blood Glucose.: 206 mg/dL (09 Jun 2025 17:36)  POCT Blood Glucose.: 182 mg/dL (09 Jun 2025 12:35)  POCT Blood Glucose.: 150 mg/dL (09 Jun 2025 08:44)        MICROBIOLOGY:     RADIOLOGY:  [ ] Reviewed and interpreted by me    Point of Care Ultrasound Findings:    PFT:    EKG:

## 2025-06-10 NOTE — DISCHARGE NOTE PROVIDER - NSDCMRMEDTOKEN_GEN_ALL_CORE_FT
albuterol 90 mcg/inh inhalation aerosol: 2 inhaler(s) inhaled every 6 hours as needed for  shortness of breath and/or wheezing  amlodipine 10 mg oral tablet: 1 tab(s) orally once a day  Centrum Adults oral tablet: 1 tab(s) orally once a day  efavirenz/emtricitabine/tenofovir disoproxil fumarate 600 mg-200 mg-300 mg oral tablet: 1 tab(s) orally once a day (at bedtime)  ergocalciferol 1.25 mg (50,000 intl units) oral capsule: 1 cap(s) orally once a week ..(Sundays)  fluticasone 50 mcg/inh nasal spray: 1 spray(s) in each nostril 2 times a day  metoprolol succinate 50 mg oral tablet, extended release: 1 tab(s) orally once a day  pantoprazole 40 mg oral delayed release tablet: 1 tab(s) orally once a day  Pepcid 20 mg oral tablet: 1 tab(s) orally 2 times a day (with meals) as needed for indigestion  rosuvastatin 10 mg oral tablet: 1 tab(s) orally once a day (at bedtime)  Trelegy Ellipta 200 mcg-62.5 mcg-25 mcg/inh inhalation powder: 1 blister(s) inhaled once a day   acetaminophen 325 mg oral tablet: 2 tab(s) orally every 6 hours As needed Temp greater or equal to 38C (100.4F), Mild Pain (1 - 3)  albuterol 90 mcg/inh inhalation aerosol: 2 inhaler(s) inhaled every 6 hours as needed for  shortness of breath and/or wheezing  amlodipine 10 mg oral tablet: 1 tab(s) orally once a day  benzonatate 100 mg oral capsule: 1 cap(s) orally every 8 hours as needed for Cough  Centrum Adults oral tablet: 1 tab(s) orally once a day  efavirenz/emtricitabine/tenofovir disoproxil fumarate 600 mg-200 mg-300 mg oral tablet: 1 tab(s) orally once a day (at bedtime)  ergocalciferol 1.25 mg (50,000 intl units) oral capsule: 1 cap(s) orally once a week ..(Sundays)  fluticasone 50 mcg/inh nasal spray: 1 spray(s) in each nostril 2 times a day  metoprolol succinate 50 mg oral tablet, extended release: 1 tab(s) orally once a day  pantoprazole 40 mg oral delayed release tablet: 1 tab(s) orally once a day  Pepcid 20 mg oral tablet: 1 tab(s) orally 2 times a day (with meals) as needed for indigestion  predniSONE 10 mg oral tablet: 1 tab(s) orally once a day PREDNISONE TAPER: Please take 40mg by mouth once a day for 3 days, then 30mg by mouth once a day for 3 days, then 20mg by mouth once a day for 3 days, and lastly 10mg by mouth once a day for 3 days, then STOP  rosuvastatin 10 mg oral tablet: 1 tab(s) orally once a day (at bedtime)  Trelegy Ellipta 200 mcg-62.5 mcg-25 mcg/inh inhalation powder: 1 blister(s) inhaled once a day

## 2025-06-10 NOTE — PROGRESS NOTE ADULT - ATTENDING COMMENTS
60 year old female with HIV on HAART, lung cancer s/p local resection (2022), moderate TBM (diagnosed bronchoscopy 11/2024), GERD c/b LPR, asthma, COPD presenting with dyspnea.     Suspect this is related to her chronic moderate TBM. She likely needs positive pressure. Will need outpatient follow up to help qualify v. purchasing out of pocket.   Less likely exacerbation of underlying airways disease. Will taper to off.     - ENT evaluation, with horseness  - PPI, elevate HOB  - Can continue bronchodilator therapy as above  - Would start to decrease steroids as noted above  - Should eventually have outpatient sleep study and PFT's, suspect will benefit from positive pressure  - OOB as able  - IS to bedside  - Aerobika and airways clearance  - Rest as above

## 2025-06-23 ENCOUNTER — INPATIENT (INPATIENT)
Facility: HOSPITAL | Age: 60
LOS: 2 days | Discharge: HOME HEALTH | End: 2025-06-26
Attending: STUDENT IN AN ORGANIZED HEALTH CARE EDUCATION/TRAINING PROGRAM | Admitting: STUDENT IN AN ORGANIZED HEALTH CARE EDUCATION/TRAINING PROGRAM
Payer: MEDICAID

## 2025-06-23 VITALS
SYSTOLIC BLOOD PRESSURE: 130 MMHG | OXYGEN SATURATION: 98 % | HEART RATE: 95 BPM | DIASTOLIC BLOOD PRESSURE: 80 MMHG | TEMPERATURE: 98 F | RESPIRATION RATE: 18 BRPM | HEIGHT: 67 IN

## 2025-06-23 DIAGNOSIS — J45.901 UNSPECIFIED ASTHMA WITH (ACUTE) EXACERBATION: ICD-10-CM

## 2025-06-23 DIAGNOSIS — Z90.710 ACQUIRED ABSENCE OF BOTH CERVIX AND UTERUS: Chronic | ICD-10-CM

## 2025-06-23 DIAGNOSIS — E78.5 HYPERLIPIDEMIA, UNSPECIFIED: ICD-10-CM

## 2025-06-23 DIAGNOSIS — Z87.09 PERSONAL HISTORY OF OTHER DISEASES OF THE RESPIRATORY SYSTEM: ICD-10-CM

## 2025-06-23 DIAGNOSIS — J44.9 CHRONIC OBSTRUCTIVE PULMONARY DISEASE, UNSPECIFIED: ICD-10-CM

## 2025-06-23 DIAGNOSIS — M19.90 UNSPECIFIED OSTEOARTHRITIS, UNSPECIFIED SITE: ICD-10-CM

## 2025-06-23 DIAGNOSIS — Z29.9 ENCOUNTER FOR PROPHYLACTIC MEASURES, UNSPECIFIED: ICD-10-CM

## 2025-06-23 DIAGNOSIS — K21.9 GASTRO-ESOPHAGEAL REFLUX DISEASE WITHOUT ESOPHAGITIS: ICD-10-CM

## 2025-06-23 DIAGNOSIS — I10 ESSENTIAL (PRIMARY) HYPERTENSION: ICD-10-CM

## 2025-06-23 DIAGNOSIS — Z90.2 ACQUIRED ABSENCE OF LUNG [PART OF]: Chronic | ICD-10-CM

## 2025-06-23 DIAGNOSIS — R94.31 ABNORMAL ELECTROCARDIOGRAM [ECG] [EKG]: ICD-10-CM

## 2025-06-23 DIAGNOSIS — B20 HUMAN IMMUNODEFICIENCY VIRUS [HIV] DISEASE: ICD-10-CM

## 2025-06-23 LAB
ALBUMIN SERPL ELPH-MCNC: 4 G/DL — SIGNIFICANT CHANGE UP (ref 3.3–5)
ALP SERPL-CCNC: 137 U/L — HIGH (ref 40–120)
ALT FLD-CCNC: 26 U/L — SIGNIFICANT CHANGE UP (ref 4–33)
ANION GAP SERPL CALC-SCNC: 14 MMOL/L — SIGNIFICANT CHANGE UP (ref 7–14)
AST SERPL-CCNC: 22 U/L — SIGNIFICANT CHANGE UP (ref 4–32)
BASOPHILS # BLD AUTO: 0.06 K/UL — SIGNIFICANT CHANGE UP (ref 0–0.2)
BASOPHILS NFR BLD AUTO: 0.5 % — SIGNIFICANT CHANGE UP (ref 0–2)
BILIRUB SERPL-MCNC: <0.2 MG/DL — SIGNIFICANT CHANGE UP (ref 0.2–1.2)
BUN SERPL-MCNC: 24 MG/DL — HIGH (ref 7–23)
CALCIUM SERPL-MCNC: 9.3 MG/DL — SIGNIFICANT CHANGE UP (ref 8.4–10.5)
CHLORIDE SERPL-SCNC: 106 MMOL/L — SIGNIFICANT CHANGE UP (ref 98–107)
CO2 SERPL-SCNC: 21 MMOL/L — LOW (ref 22–31)
CREAT SERPL-MCNC: 1.17 MG/DL — SIGNIFICANT CHANGE UP (ref 0.5–1.3)
EGFR: 53 ML/MIN/1.73M2 — LOW
EGFR: 53 ML/MIN/1.73M2 — LOW
EOSINOPHIL # BLD AUTO: 0.11 K/UL — SIGNIFICANT CHANGE UP (ref 0–0.5)
EOSINOPHIL NFR BLD AUTO: 1 % — SIGNIFICANT CHANGE UP (ref 0–6)
FLUAV AG NPH QL: SIGNIFICANT CHANGE UP
FLUBV AG NPH QL: SIGNIFICANT CHANGE UP
GAS PNL BLDV: SIGNIFICANT CHANGE UP
GLUCOSE BLDC GLUCOMTR-MCNC: 267 MG/DL — HIGH (ref 70–99)
GLUCOSE SERPL-MCNC: 110 MG/DL — HIGH (ref 70–99)
HCT VFR BLD CALC: 38.6 % — SIGNIFICANT CHANGE UP (ref 34.5–45)
HGB BLD-MCNC: 12.3 G/DL — SIGNIFICANT CHANGE UP (ref 11.5–15.5)
IMM GRANULOCYTES # BLD AUTO: 0.04 K/UL — SIGNIFICANT CHANGE UP (ref 0–0.07)
IMM GRANULOCYTES NFR BLD AUTO: 0.4 % — SIGNIFICANT CHANGE UP (ref 0–0.9)
LYMPHOCYTES # BLD AUTO: 2.89 K/UL — SIGNIFICANT CHANGE UP (ref 1–3.3)
LYMPHOCYTES NFR BLD AUTO: 26.4 % — SIGNIFICANT CHANGE UP (ref 13–44)
MCHC RBC-ENTMCNC: 27.8 PG — SIGNIFICANT CHANGE UP (ref 27–34)
MCHC RBC-ENTMCNC: 31.9 G/DL — LOW (ref 32–36)
MCV RBC AUTO: 87.3 FL — SIGNIFICANT CHANGE UP (ref 80–100)
MONOCYTES # BLD AUTO: 0.7 K/UL — SIGNIFICANT CHANGE UP (ref 0–0.9)
MONOCYTES NFR BLD AUTO: 6.4 % — SIGNIFICANT CHANGE UP (ref 2–14)
NEUTROPHILS # BLD AUTO: 7.15 K/UL — SIGNIFICANT CHANGE UP (ref 1.8–7.4)
NEUTROPHILS NFR BLD AUTO: 65.3 % — SIGNIFICANT CHANGE UP (ref 43–77)
NRBC # BLD AUTO: 0 K/UL — SIGNIFICANT CHANGE UP (ref 0–0)
NRBC # FLD: 0 K/UL — SIGNIFICANT CHANGE UP (ref 0–0)
NRBC BLD AUTO-RTO: 0 /100 WBCS — SIGNIFICANT CHANGE UP (ref 0–0)
NT-PROBNP SERPL-SCNC: 98 PG/ML — SIGNIFICANT CHANGE UP
PLATELET # BLD AUTO: 454 K/UL — HIGH (ref 150–400)
PMV BLD: 8.8 FL — SIGNIFICANT CHANGE UP (ref 7–13)
POTASSIUM SERPL-MCNC: 4.1 MMOL/L — SIGNIFICANT CHANGE UP (ref 3.5–5.3)
POTASSIUM SERPL-SCNC: 4.1 MMOL/L — SIGNIFICANT CHANGE UP (ref 3.5–5.3)
PROT SERPL-MCNC: 7.4 G/DL — SIGNIFICANT CHANGE UP (ref 6–8.3)
RBC # BLD: 4.42 M/UL — SIGNIFICANT CHANGE UP (ref 3.8–5.2)
RBC # FLD: 16.6 % — HIGH (ref 10.3–14.5)
RSV RNA NPH QL NAA+NON-PROBE: SIGNIFICANT CHANGE UP
SARS-COV-2 RNA SPEC QL NAA+PROBE: SIGNIFICANT CHANGE UP
SODIUM SERPL-SCNC: 141 MMOL/L — SIGNIFICANT CHANGE UP (ref 135–145)
SOURCE RESPIRATORY: SIGNIFICANT CHANGE UP
TROPONIN T, HIGH SENSITIVITY RESULT: 14 NG/L — SIGNIFICANT CHANGE UP
TROPONIN T, HIGH SENSITIVITY RESULT: 16 NG/L — SIGNIFICANT CHANGE UP
WBC # BLD: 10.95 K/UL — HIGH (ref 3.8–10.5)
WBC # FLD AUTO: 10.95 K/UL — HIGH (ref 3.8–10.5)

## 2025-06-23 PROCEDURE — 99223 1ST HOSP IP/OBS HIGH 75: CPT | Mod: GC

## 2025-06-23 PROCEDURE — 71046 X-RAY EXAM CHEST 2 VIEWS: CPT | Mod: 26

## 2025-06-23 PROCEDURE — 99285 EMERGENCY DEPT VISIT HI MDM: CPT

## 2025-06-23 RX ORDER — OMEPRAZOLE 20 MG/1
1 CAPSULE, DELAYED RELEASE ORAL
Refills: 0 | DISCHARGE

## 2025-06-23 RX ORDER — PREDNISONE 20 MG/1
40 TABLET ORAL DAILY
Refills: 0 | Status: DISCONTINUED | OUTPATIENT
Start: 2025-06-24 | End: 2025-06-26

## 2025-06-23 RX ORDER — ALBUTEROL SULFATE 2.5 MG/3ML
2 VIAL, NEBULIZER (ML) INHALATION EVERY 4 HOURS
Refills: 0 | Status: DISCONTINUED | OUTPATIENT
Start: 2025-06-23 | End: 2025-06-23

## 2025-06-23 RX ORDER — IPRATROPIUM BROMIDE AND ALBUTEROL SULFATE .5; 2.5 MG/3ML; MG/3ML
3 SOLUTION RESPIRATORY (INHALATION)
Refills: 0 | Status: COMPLETED | OUTPATIENT
Start: 2025-06-23 | End: 2025-06-23

## 2025-06-23 RX ORDER — AMLODIPINE BESYLATE 10 MG/1
10 TABLET ORAL DAILY
Refills: 0 | Status: DISCONTINUED | OUTPATIENT
Start: 2025-06-23 | End: 2025-06-23

## 2025-06-23 RX ORDER — ACETAMINOPHEN 500 MG/5ML
650 LIQUID (ML) ORAL EVERY 6 HOURS
Refills: 0 | Status: DISCONTINUED | OUTPATIENT
Start: 2025-06-23 | End: 2025-06-24

## 2025-06-23 RX ORDER — IPRATROPIUM BROMIDE AND ALBUTEROL SULFATE .5; 2.5 MG/3ML; MG/3ML
3 SOLUTION RESPIRATORY (INHALATION) EVERY 12 HOURS
Refills: 0 | Status: DISCONTINUED | OUTPATIENT
Start: 2025-06-23 | End: 2025-06-24

## 2025-06-23 RX ORDER — CYST/ALA/Q10/PHOS.SER/DHA/BROC 100-20-50
1 POWDER (GRAM) ORAL
Refills: 0 | DISCHARGE

## 2025-06-23 RX ORDER — TIOTROPIUM BROMIDE INHALATION SPRAY 3.12 UG/1
2 SPRAY, METERED RESPIRATORY (INHALATION) DAILY
Refills: 0 | Status: DISCONTINUED | OUTPATIENT
Start: 2025-06-23 | End: 2025-06-26

## 2025-06-23 RX ORDER — METOPROLOL SUCCINATE 50 MG/1
1 TABLET, EXTENDED RELEASE ORAL
Refills: 0 | DISCHARGE

## 2025-06-23 RX ORDER — AMLODIPINE BESYLATE 10 MG/1
10 TABLET ORAL DAILY
Refills: 0 | Status: DISCONTINUED | OUTPATIENT
Start: 2025-06-23 | End: 2025-06-26

## 2025-06-23 RX ORDER — ROSUVASTATIN CALCIUM 5 MG/1
10 TABLET, FILM COATED ORAL AT BEDTIME
Refills: 0 | Status: DISCONTINUED | OUTPATIENT
Start: 2025-06-23 | End: 2025-06-26

## 2025-06-23 RX ORDER — METOPROLOL SUCCINATE 50 MG/1
50 TABLET, EXTENDED RELEASE ORAL DAILY
Refills: 0 | Status: DISCONTINUED | OUTPATIENT
Start: 2025-06-23 | End: 2025-06-26

## 2025-06-23 RX ORDER — ONDANSETRON HCL/PF 4 MG/2 ML
4 VIAL (ML) INJECTION EVERY 8 HOURS
Refills: 0 | Status: DISCONTINUED | OUTPATIENT
Start: 2025-06-23 | End: 2025-06-23

## 2025-06-23 RX ORDER — KETOROLAC TROMETHAMINE 30 MG/ML
15 INJECTION, SOLUTION INTRAMUSCULAR; INTRAVENOUS ONCE
Refills: 0 | Status: DISCONTINUED | OUTPATIENT
Start: 2025-06-23 | End: 2025-06-23

## 2025-06-23 RX ORDER — MELATONIN 5 MG
3 TABLET ORAL AT BEDTIME
Refills: 0 | Status: DISCONTINUED | OUTPATIENT
Start: 2025-06-23 | End: 2025-06-23

## 2025-06-23 RX ORDER — METOPROLOL SUCCINATE 50 MG/1
50 TABLET, EXTENDED RELEASE ORAL DAILY
Refills: 0 | Status: DISCONTINUED | OUTPATIENT
Start: 2025-06-23 | End: 2025-06-23

## 2025-06-23 RX ORDER — ALBUTEROL SULFATE 2.5 MG/3ML
2 VIAL, NEBULIZER (ML) INHALATION EVERY 12 HOURS
Refills: 0 | Status: DISCONTINUED | OUTPATIENT
Start: 2025-06-23 | End: 2025-06-26

## 2025-06-23 RX ORDER — METHYLPREDNISOLONE ACETATE 80 MG/ML
125 INJECTION, SUSPENSION INTRA-ARTICULAR; INTRALESIONAL; INTRAMUSCULAR; SOFT TISSUE ONCE
Refills: 0 | Status: COMPLETED | OUTPATIENT
Start: 2025-06-23 | End: 2025-06-23

## 2025-06-23 RX ORDER — IPRATROPIUM BROMIDE AND ALBUTEROL SULFATE .5; 2.5 MG/3ML; MG/3ML
3 SOLUTION RESPIRATORY (INHALATION) EVERY 6 HOURS
Refills: 0 | Status: DISCONTINUED | OUTPATIENT
Start: 2025-06-23 | End: 2025-06-23

## 2025-06-23 RX ORDER — IPRATROPIUM BROMIDE AND ALBUTEROL SULFATE .5; 2.5 MG/3ML; MG/3ML
3 SOLUTION RESPIRATORY (INHALATION) EVERY 12 HOURS
Refills: 0 | Status: DISCONTINUED | OUTPATIENT
Start: 2025-06-23 | End: 2025-06-23

## 2025-06-23 RX ORDER — EFAVIRENZ, EMTRICITABINE AND TENOFOVIR DISOPROXIL FUMARATE 600; 200; 300 MG/1; MG/1; MG/1
1 TABLET, FILM COATED ORAL AT BEDTIME
Refills: 0 | Status: DISCONTINUED | OUTPATIENT
Start: 2025-06-23 | End: 2025-06-26

## 2025-06-23 RX ORDER — MAGNESIUM, ALUMINUM HYDROXIDE 200-200 MG
30 TABLET,CHEWABLE ORAL EVERY 4 HOURS
Refills: 0 | Status: DISCONTINUED | OUTPATIENT
Start: 2025-06-23 | End: 2025-06-23

## 2025-06-23 RX ADMIN — ROSUVASTATIN CALCIUM 10 MILLIGRAM(S): 5 TABLET, FILM COATED ORAL at 23:20

## 2025-06-23 RX ADMIN — EFAVIRENZ, EMTRICITABINE AND TENOFOVIR DISOPROXIL FUMARATE 1 TABLET(S): 600; 200; 300 TABLET, FILM COATED ORAL at 23:20

## 2025-06-23 RX ADMIN — METHYLPREDNISOLONE ACETATE 125 MILLIGRAM(S): 80 INJECTION, SUSPENSION INTRA-ARTICULAR; INTRALESIONAL; INTRAMUSCULAR; SOFT TISSUE at 14:59

## 2025-06-23 RX ADMIN — KETOROLAC TROMETHAMINE 15 MILLIGRAM(S): 30 INJECTION, SOLUTION INTRAMUSCULAR; INTRAVENOUS at 20:39

## 2025-06-23 RX ADMIN — IPRATROPIUM BROMIDE AND ALBUTEROL SULFATE 3 MILLILITER(S): .5; 2.5 SOLUTION RESPIRATORY (INHALATION) at 14:58

## 2025-06-23 RX ADMIN — IPRATROPIUM BROMIDE AND ALBUTEROL SULFATE 3 MILLILITER(S): .5; 2.5 SOLUTION RESPIRATORY (INHALATION) at 15:50

## 2025-06-23 RX ADMIN — IPRATROPIUM BROMIDE AND ALBUTEROL SULFATE 3 MILLILITER(S): .5; 2.5 SOLUTION RESPIRATORY (INHALATION) at 15:20

## 2025-06-23 RX ADMIN — Medication 500 MILLILITER(S): at 17:20

## 2025-06-23 NOTE — H&P ADULT - PROBLEM SELECTOR PLAN 2
EKG on admission with calculated QTc 462 ms. Asymptomatic.     -Hold Qt prolongation agents Chronic knee pain b/l.     -S/p ketolorac 15 mg IV x1   -Can consider PRN acetaminophen 650 mg po q6 PRN and ibuprofen 400 mg po BID New onset.  A1c=6.6  CU=544  Exacerbated by systemic steroids.    -ISS TID  pre meal and QHS  -May need Lantus while on PO Prednisone   -DM diet  -Formal Endocrinology c/s in AM  -DM education   -TSH

## 2025-06-23 NOTE — H&P ADULT - PROBLEM SELECTOR PLAN 1
Acute SOB associated with anxiety after power outage and inability to perform home nebulizer treatment. Resolving infection within 2 weeks prior after completion of steroid taper. No respiratory distress. No additional oxygen needs. Rhonchi on lung exam, no wheezing. CXR without evidence of intrathoracic pathology. Comfortable. Monitor.     -C/w albuterol-ipratropium 3 mL q12 PRN   -C/w albuterol 90 mcg 2 puffs q4 PRN   -Hold home trelegy 1 blister po qd   -Hold home albuterol 0.63% 3 mL 1 ampule PRN Acute SOB associated with anxiety after power outage and inability to perform home nebulizer treatment. Resolving infection within 2 weeks prior after completion of steroid taper. No respiratory distress. No additional oxygen needs. Rhonchi on lung exam, no wheezing. CXR without evidence of intrathoracic pathology. Comfortable. Monitor.     -C/w albuterol-ipratropium 3 mL nebulizer q12   -C/w albuterol 90 mcg 2 puffs q4 PRN   -Hold home trellegy 1 blister po qd   -Hold home albuterol 0.63% 3 mL 1 ampule PRN Acute SOB associated with anxiety after power outage and inability to perform home nebulizer treatment. Resolving infection within 2 weeks prior after completion of steroid taper. No respiratory distress. Increasing additional oxygen needs. Rhonchi on lung exam, no wheezing. CXR without evidence of intrathoracic pathology. Comfortable. Monitor.     -C/w albuterol-ipratropium 3 mL nebulizer q12   -C/w albuterol 90 mcg 2 puffs q4 PRN   -S/p methylprednioslone 125 mg IV x1 in ED   -Initiate prednisone 40 mg po qd x 5 days (start 6/24)   -Hold home trellegy 1 blister po qd   -F/u VBG in AM   -Hold home albuterol 0.63% 3 mL 1 ampule PRN Acute SOB associated with anxiety after power outage and inability to perform home nebulizer treatment. Resolving infection within 2 weeks prior after completion of steroid taper. No respiratory distress. Increasing additional oxygen needs. Rhonchi on lung exam, no wheezing. CXR without evidence of intrathoracic pathology. Comfortable. Monitor.     -C/w albuterol-ipratropium 3 mL nebulizer q12   -C/w albuterol 90 mcg 2 puffs q4 PRN   -C/w tiotropium 2.5 mcg 2 puffs inhalation qd   -C/w fluticasone propionate/salmeterol 100-50 mcg 1 dose inhalation BID   -S/p methylprednioslone 125 mg IV x1 in ED   -Initiate prednisone 40 mg po qd x 5 days (start 6/24)   -Hold home trellegy 1 blister po qd   -F/u VBG in AM   -Hold home albuterol 0.63% 3 mL 1 ampule PRN SOB associated with wheezing. Reports inability to perform home nebulizer treatment. Resolving infection within 2 weeks prior after completion of steroid taper. Increasing additional oxygen needs. Wheezing on lung exam.  CXR: clear lungs     -C/w albuterol-ipratropium q8h x 3 doses, standing then re-evaluated (primary team)   -C/w albuterol 90 mcg 2 puffs q4 PRN   -S/p Methylprednisolone 125 mg IV x1 in ED   -Initiate prednisone 40 mg po qd x 5 days (start 6/24)   -c/w Trelegy equivalent:  tiotropium 2.5 mcg 2 puffs inhalation qd. fluticasone propionate/salmeterol 100-50 mcg 1 dose inhalation BID   -F/u VBG in AM
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
## 2025-06-23 NOTE — H&P ADULT - PROBLEM SELECTOR PLAN 8
Code: FULL   Diet: DASH   DVT ppx: not indicated, IMPROVE score 0   Dispo: likely home -C/w rosuvastatin 10 mg po qhs -Hold home omeprazole 20 mg po qam  -C/w pantoprazole 40 mg po qam

## 2025-06-23 NOTE — H&P ADULT - PROBLEM SELECTOR PLAN 4
Recent 6/2025 undetectable viral load and absolute CD4 1593.     -C/w efavrienza/emtricitabine/tenofovir 600 mg-200 mg-300 mg po qd No S&S of active exacerbation. No wheezing, respiratory distress, nor additional oxygen requirements.     -C/w empiric tx of asthma/copd as above Chronic knee pain b/l.     -S/p ketolorac 15 mg IV x1   -Can consider PRN acetaminophen 650 mg po q6 PRN and ibuprofen 400 mg po BID Chronic knee pain b/l.     -S/p ketolorac 15 mg IV x1   - PRN acetaminophen 650 mg po q6 PRN and ibuprofen 400 mg po BID
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
## 2025-06-23 NOTE — ED ADULT NURSE REASSESSMENT NOTE - NS ED NURSE REASSESS COMMENT FT1
Report given by previous RN. pt not in any distress or discomfort. Pt is NSR on cardiac monitor. Pt breathing is equal and nonlabored. Pt waiting for bed assignment. pt safety maintained.

## 2025-06-23 NOTE — ED PROVIDER NOTE - PHYSICAL EXAMINATION
Izabella Lehman MD (PGY-1)  Physical Exam:    Gen: NAD, AOx3  Head: NCAT  HEENT: EOMI, PEERLA  Lung: +Scattered wheezes diffusely. No respiratory distress,  CV: RRR, no murmurs, rubs or gallops  Abd: soft, NT, ND, no guarding, no rigidity, no rebound tenderness, no CVA tenderness   MSK: no visible deformities, ROM normal in UE/LE, no back pain  Neuro: No focal sensory or motor deficits. Sensation intact to light touch all extremities.  Skin: Warm, well perfused, no rash, no leg swelling  Psych: normal affect, calm

## 2025-06-23 NOTE — H&P ADULT - NSHPPHYSICALEXAM_GEN_ALL_CORE
General: NAD, calm, cooperative, well groomed and hygienic, obese habitus   Neuro: AAOx3, 5/5  strength b/l, 5/5 hip flexion/extension b/l, gross sensory intact   HEENT: NC/AT, PERRLA b/l, EOMI, nonconjunctival pallor, no pharyngeal erythema/lymphadenopathy   Chest: nonTTP   Heart: S1/S2, RRR   Lungs: rhonchi throughout, nonlabored breathing, no wheezing   Abd: soft, nonTTP, nondistended   Ext: no pretibial edema, active/passive strengh LE intact b/l   Pulses: radial 2+ b/l, dorsalis pedis 2+ b/l Vital Signs Last 24 Hrs  T(C): 36.7 (23 Jun 2025 23:40), Max: 36.9 (23 Jun 2025 21:36)  T(F): 98.1 (23 Jun 2025 23:40), Max: 98.5 (23 Jun 2025 21:36)  HR: 114 (23 Jun 2025 23:40) (83 - 114)  BP: 148/97 (23 Jun 2025 23:40) (130/80 - 155/90)  BP(mean): --  RR: 18 (23 Jun 2025 23:40) (18 - 18)  SpO2: 100% (23 Jun 2025 23:40) (98% - 100%)    Parameters below as of 23 Jun 2025 23:40  Patient On (Oxygen Delivery Method): nasal cannula  O2 Flow (L/min): 1

## 2025-06-23 NOTE — H&P ADULT - PROBLEM SELECTOR PLAN 6
-C/w omeprazole 20 mg po qam -Hold home omeprazole 20 mg po qam  -C/w pantoprazole 40 mg po qam Normotensive.     -C/w metoprolol succinate 50 mg po qd   -C/w amlodipine 10 mg po qd Recent 6/2025 undetectable viral load and absolute CD4 1593.     -C/w efavrienza/emtricitabine/tenofovir 600 mg-200 mg-300 mg po qd

## 2025-06-23 NOTE — H&P ADULT - PROBLEM SELECTOR PLAN 9
Code: FULL   Diet: DASH   DVT ppx: not indicated, IMPROVE score 0   Dispo: likely home -C/w rosuvastatin 10 mg po qhs

## 2025-06-23 NOTE — H&P ADULT - NSICDXPASTMEDICALHX_GEN_ALL_CORE_FT
PAST MEDICAL HISTORY:  Asthma     Benign hypertension     COPD, mild     GERD (gastroesophageal reflux disease)     H/O abdominal hysterectomy     HIV disease     Hyperlipidemia     Lung cancer     Osteoarthritis     Vitamin D deficiency

## 2025-06-23 NOTE — ED ADULT NURSE NOTE - NSFALLUNIVINTERV_ED_ALL_ED
Bed/Stretcher in lowest position, wheels locked, appropriate side rails in place/Call bell, personal items and telephone in reach/Instruct patient to call for assistance before getting out of bed/chair/stretcher/Non-slip footwear applied when patient is off stretcher/[place] to call system/Physically safe environment - no spills, clutter or unnecessary equipment/Purposeful proactive rounding/Room/bathroom lighting operational, light cord in reach

## 2025-06-23 NOTE — PATIENT PROFILE ADULT - FALL HARM RISK - UNIVERSAL INTERVENTIONS
Bed in lowest position, wheels locked, appropriate side rails in place/Call bell, personal items and telephone in reach/Instruct patient to call for assistance before getting out of bed or chair/Non-slip footwear when patient is out of bed/[place] to call system/Physically safe environment - no spills, clutter or unnecessary equipment/Purposeful Proactive Rounding/Room/bathroom lighting operational, light cord in reach

## 2025-06-23 NOTE — H&P ADULT - PROBLEM SELECTOR PLAN 7
-C/w rosuvastatin 10 mg po qhs -Hold home omeprazole 20 mg po qam  -C/w pantoprazole 40 mg po qam Normotensive.     -C/w metoprolol succinate 50 mg po qd   -C/w amlodipine 10 mg po qd

## 2025-06-23 NOTE — H&P ADULT - ATTENDING COMMENTS
61yo Female with well controlled HIV, COPD/asthma, HTN, GERD a/w recurrent asthma exacerbation and  new onset likely Type 2 DM.     The above assessment and plan was modified and supplemented by attending where indicated.

## 2025-06-23 NOTE — H&P ADULT - NSHPLABSRESULTS_GEN_ALL_CORE
12.3   10.95 )-----------( 454      ( 23 Jun 2025 15:16 )             38.6       06-23    141  |  106  |  24[H]  ----------------------------<  110[H]  4.1   |  21[L]  |  1.17    Ca    9.3      23 Jun 2025 15:16    TPro  7.4  /  Alb  4.0  /  TBili  <0.2  /  DBili  x   /  AST  22  /  ALT  26  /  AlkPhos  137[H]  06-23              Urinalysis Basic - ( 23 Jun 2025 15:16 )    Color: x / Appearance: x / SG: x / pH: x  Gluc: 110 mg/dL / Ketone: x  / Bili: x / Urobili: x   Blood: x / Protein: x / Nitrite: x   Leuk Esterase: x / RBC: x / WBC x   Sq Epi: x / Non Sq Epi: x / Bacteria: x            Lactate Trend            CAPILLARY BLOOD GLUCOSE      POCT Blood Glucose.: 212 mg/dL (23 Jun 2025 17:14) .    NSR 93bpm, QTc 472, no acute Tw or ST changes, no PACs or PVCs    12.3   10.95 )-----------( 454      ( 23 Jun 2025 15:16 )             38.6       06-23    141  |  106  |  24[H]  ----------------------------<  110[H]  4.1   |  21[L]  |  1.17    Ca    9.3      23 Jun 2025 15:16    TPro  7.4  /  Alb  4.0  /  TBili  <0.2  /  DBili  x   /  AST  22  /  ALT  26  /  AlkPhos  137[H]  06-23              Urinalysis Basic - ( 23 Jun 2025 15:16 )    Color: x / Appearance: x / SG: x / pH: x  Gluc: 110 mg/dL / Ketone: x  / Bili: x / Urobili: x   Blood: x / Protein: x / Nitrite: x   Leuk Esterase: x / RBC: x / WBC x   Sq Epi: x / Non Sq Epi: x / Bacteria: x            Lactate Trend            CAPILLARY BLOOD GLUCOSE      POCT Blood Glucose.: 212 mg/dL (23 Jun 2025 17:14) .    -Personally INTERPRETED EKG: NSR 93bpm, QTc 472, no acute Tw or ST changes, no PACs or PVCs    -Personally INTERPRETED CXR: clear lungs, no pleural effusions, no PTX    -Personally reviewed the following labs below:    12.3   10.95 )-----------( 454      ( 23 Jun 2025 15:16 )             38.6     06-23    141  |  106  |  24[H]  ----------------------------<  110[H]  4.1   |  21[L]  |  1.17    Ca    9.3      23 Jun 2025 15:16    TPro  7.4  /  Alb  4.0  /  TBili  <0.2  /  DBili  x   /  AST  22  /  ALT  26  /  AlkPhos  137[H]  06-23       Urinalysis Basic - ( 23 Jun 2025 15:16 )  Color: x / Appearance: x / SG: x / pH: x  Gluc: 110 mg/dL / Ketone: x  / Bili: x / Urobili: x   Blood: x / Protein: x / Nitrite: x   Leuk Esterase: x / RBC: x / WBC x   Sq Epi: x / Non Sq Epi: x / Bacteria: x    POCT Blood Glucose.: 212 mg/dL (23 Jun 2025 17:14)

## 2025-06-23 NOTE — ED PROVIDER NOTE - NS ED ROS FT
GENERAL: No fever or chills  EYES: No change in vision  HEENT: No trouble swallowing or speaking  CARDIAC: +Chest pain  PULMONARY: +SOB, No cough  GI: No abdominal pain, no nausea or no vomiting, no diarrhea or constipation  : No changes in urination  SKIN: No rashes  NEURO: No headache, no numbness  MSK: No joint pain  Otherwise as HPI or negative.

## 2025-06-23 NOTE — H&P ADULT - PROBLEM SELECTOR PLAN 3
No S&S of active exacerbation. No wheezing, respiratory distress, nor additional oxygen requirements.     -C/w empiric tx of asthma/copd as above EKG on admission with calculated QTc 462 ms. Asymptomatic.     -Hold Qt prolongation agents EKG on admission with calculated QTc 472 ms. Asymptomatic.     -Hold all QT prolongation agents  -check Magnesium in AM  -Potassium wnl
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
## 2025-06-23 NOTE — H&P ADULT - NSHPSOCIALHISTORY_GEN_ALL_CORE
Lives with SO. Has adult children. 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 Independent in IADLs and ADLs. No etoh. quit cigarettes 2019. No other substances.

## 2025-06-23 NOTE — ED PROVIDER NOTE - IV ALTEPLASE EXCL REL HIDDEN
----- Message from Tello Cortez sent at 1/30/2017  9:41 AM CST -----  Contact: pt  Pt is requesting a refill on [email or web address]  Call Back#[phone]  Thanks    [facility] [address] [place] [address] AT [facility] [address]  [address] [address] [address]  Phone: [phone] Fax: [phone]      
30day prescription sent in, with directions that pt needs appt for further refills.  
show
Skyrizi Counseling: I discussed with the patient the risks of risankizumab-rzaa including but not limited to immunosuppression, and serious infections.  The patient understands that monitoring is required including a PPD at baseline and must alert us or the primary physician if symptoms of infection or other concerning signs are noted.

## 2025-06-23 NOTE — H&P ADULT - PROBLEM SELECTOR PROBLEM 8
was told he has an "appendix issue" at Urgent Care Prophylactic measure No Hyperlipidemia GERD (gastroesophageal reflux disease)

## 2025-06-23 NOTE — H&P ADULT - NSHPREVIEWOFSYSTEMS_GEN_ALL_CORE
REVIEW OF SYSTEMS:    CONSTITUTIONAL: No weakness, fevers or chills  EYES/ENT: No visual changes;  No vertigo or throat pain   NECK: No pain or stiffness  RESPIRATORY: + cough, wheezing, hemoptysis; No shortness of breath  CARDIOVASCULAR: No chest pain or palpitations  GASTROINTESTINAL: No abdominal or epigastric pain. No nausea, vomiting, or hematemesis; No diarrhea or constipation. No melena or hematochezia.  GENITOURINARY: No dysuria, frequency or hematuria  NEUROLOGICAL: No numbness or weakness  SKIN: No itching, rashes REVIEW OF SYSTEMS:    CONSTITUTIONAL: No weakness, fevers or chills  EYES/ENT: No visual changes;  No vertigo or throat pain   NECK: No pain or stiffness  RESPIRATORY: + cough, +wheezing, no hemoptysis; +shortness of breath  CARDIOVASCULAR: No chest pain or palpitations  GASTROINTESTINAL: No abdominal or epigastric pain. No nausea, vomiting, or hematemesis; No diarrhea or constipation. No melena or hematochezia.  GENITOURINARY: No dysuria, frequency or hematuria  NEUROLOGICAL: No numbness or weakness  SKIN: No itching, rashes

## 2025-06-23 NOTE — H&P ADULT - PROBLEM SELECTOR PLAN 5
Normotensive.     -C/w metoprolol succinate 50 mg po qd   -C/w amlodipine 10 mg po qd Recent 6/2025 undetectable viral load and absolute CD4 1593.     -C/w efavrienza/emtricitabine/tenofovir 600 mg-200 mg-300 mg po qd No S&S of active exacerbation. No wheezing, respiratory distress, nor additional oxygen requirements.     -C/w empiric tx of asthma/copd as above

## 2025-06-24 DIAGNOSIS — E11.65 TYPE 2 DIABETES MELLITUS WITH HYPERGLYCEMIA: ICD-10-CM

## 2025-06-24 DIAGNOSIS — J44.1 CHRONIC OBSTRUCTIVE PULMONARY DISEASE WITH (ACUTE) EXACERBATION: ICD-10-CM

## 2025-06-24 DIAGNOSIS — M54.30 SCIATICA, UNSPECIFIED SIDE: ICD-10-CM

## 2025-06-24 DIAGNOSIS — E07.81 SICK-EUTHYROID SYNDROME: ICD-10-CM

## 2025-06-24 DIAGNOSIS — E05.00 THYROTOXICOSIS WITH DIFFUSE GOITER WITHOUT THYROTOXIC CRISIS OR STORM: ICD-10-CM

## 2025-06-24 PROBLEM — L29.9 PRURITUS, UNSPECIFIED: Chronic | Status: INACTIVE | Noted: 2018-08-29 | Resolved: 2025-06-23

## 2025-06-24 PROBLEM — B97.7 PAPILLOMAVIRUS AS THE CAUSE OF DISEASES CLASSIFIED ELSEWHERE: Chronic | Status: INACTIVE | Noted: 2017-09-28 | Resolved: 2025-06-23

## 2025-06-24 PROBLEM — M25.561 PAIN IN RIGHT KNEE: Chronic | Status: INACTIVE | Noted: 2019-02-22 | Resolved: 2025-06-23

## 2025-06-24 PROBLEM — F17.210 NICOTINE DEPENDENCE, CIGARETTES, UNCOMPLICATED: Chronic | Status: INACTIVE | Noted: 2018-08-29 | Resolved: 2025-06-23

## 2025-06-24 LAB
ADD ON TEST-SPECIMEN IN LAB: SIGNIFICANT CHANGE UP
ANION GAP SERPL CALC-SCNC: 12 MMOL/L — SIGNIFICANT CHANGE UP (ref 7–14)
BUN SERPL-MCNC: 24 MG/DL — HIGH (ref 7–23)
CALCIUM SERPL-MCNC: 9.3 MG/DL — SIGNIFICANT CHANGE UP (ref 8.4–10.5)
CHLORIDE SERPL-SCNC: 107 MMOL/L — SIGNIFICANT CHANGE UP (ref 98–107)
CO2 SERPL-SCNC: 20 MMOL/L — LOW (ref 22–31)
CREAT SERPL-MCNC: 1.04 MG/DL — SIGNIFICANT CHANGE UP (ref 0.5–1.3)
EGFR: 62 ML/MIN/1.73M2 — SIGNIFICANT CHANGE UP
EGFR: 62 ML/MIN/1.73M2 — SIGNIFICANT CHANGE UP
GAS PNL BLDV: SIGNIFICANT CHANGE UP
GLUCOSE BLDC GLUCOMTR-MCNC: 145 MG/DL — HIGH (ref 70–99)
GLUCOSE BLDC GLUCOMTR-MCNC: 169 MG/DL — HIGH (ref 70–99)
GLUCOSE BLDC GLUCOMTR-MCNC: 178 MG/DL — HIGH (ref 70–99)
GLUCOSE BLDC GLUCOMTR-MCNC: 219 MG/DL — HIGH (ref 70–99)
GLUCOSE BLDC GLUCOMTR-MCNC: 236 MG/DL — HIGH (ref 70–99)
GLUCOSE SERPL-MCNC: 140 MG/DL — HIGH (ref 70–99)
HCT VFR BLD CALC: 37 % — SIGNIFICANT CHANGE UP (ref 34.5–45)
HGB BLD-MCNC: 11.6 G/DL — SIGNIFICANT CHANGE UP (ref 11.5–15.5)
LACTATE SERPL-SCNC: 2.6 MMOL/L — HIGH (ref 0.5–2)
MAGNESIUM SERPL-MCNC: 2.1 MG/DL — SIGNIFICANT CHANGE UP (ref 1.6–2.6)
MCHC RBC-ENTMCNC: 27.2 PG — SIGNIFICANT CHANGE UP (ref 27–34)
MCHC RBC-ENTMCNC: 31.4 G/DL — LOW (ref 32–36)
MCV RBC AUTO: 86.7 FL — SIGNIFICANT CHANGE UP (ref 80–100)
NRBC # BLD AUTO: 0 K/UL — SIGNIFICANT CHANGE UP (ref 0–0)
NRBC # FLD: 0 K/UL — SIGNIFICANT CHANGE UP (ref 0–0)
NRBC BLD AUTO-RTO: 0 /100 WBCS — SIGNIFICANT CHANGE UP (ref 0–0)
PHOSPHATE SERPL-MCNC: 1.9 MG/DL — LOW (ref 2.5–4.5)
PLATELET # BLD AUTO: 445 K/UL — HIGH (ref 150–400)
PMV BLD: 9 FL — SIGNIFICANT CHANGE UP (ref 7–13)
POTASSIUM SERPL-MCNC: 4.1 MMOL/L — SIGNIFICANT CHANGE UP (ref 3.5–5.3)
POTASSIUM SERPL-SCNC: 4.1 MMOL/L — SIGNIFICANT CHANGE UP (ref 3.5–5.3)
RBC # BLD: 4.27 M/UL — SIGNIFICANT CHANGE UP (ref 3.8–5.2)
RBC # FLD: 16.4 % — HIGH (ref 10.3–14.5)
SODIUM SERPL-SCNC: 139 MMOL/L — SIGNIFICANT CHANGE UP (ref 135–145)
T4 FREE SERPL-MCNC: 1 NG/DL — SIGNIFICANT CHANGE UP (ref 0.9–1.8)
TSH SERPL-MCNC: 0.13 UIU/ML — LOW (ref 0.27–4.2)
WBC # BLD: 15.37 K/UL — HIGH (ref 3.8–10.5)
WBC # FLD AUTO: 15.37 K/UL — HIGH (ref 3.8–10.5)

## 2025-06-24 PROCEDURE — 93010 ELECTROCARDIOGRAM REPORT: CPT

## 2025-06-24 PROCEDURE — 99232 SBSQ HOSP IP/OBS MODERATE 35: CPT

## 2025-06-24 RX ORDER — POTASSIUM PHOSPHATE, MONOBASIC POTASSIUM PHOSPHATE, DIBASIC INJECTION, 236; 224 MG/ML; MG/ML
30 SOLUTION, CONCENTRATE INTRAVENOUS ONCE
Refills: 0 | Status: COMPLETED | OUTPATIENT
Start: 2025-06-24 | End: 2025-06-24

## 2025-06-24 RX ORDER — INSULIN LISPRO 100 U/ML
INJECTION, SOLUTION INTRAVENOUS; SUBCUTANEOUS
Refills: 0 | Status: DISCONTINUED | OUTPATIENT
Start: 2025-06-24 | End: 2025-06-26

## 2025-06-24 RX ORDER — IPRATROPIUM BROMIDE AND ALBUTEROL SULFATE .5; 2.5 MG/3ML; MG/3ML
3 SOLUTION RESPIRATORY (INHALATION) EVERY 6 HOURS
Refills: 0 | Status: DISCONTINUED | OUTPATIENT
Start: 2025-06-24 | End: 2025-06-26

## 2025-06-24 RX ORDER — LIDOCAINE HYDROCHLORIDE 20 MG/ML
1 JELLY TOPICAL DAILY
Refills: 0 | Status: DISCONTINUED | OUTPATIENT
Start: 2025-06-24 | End: 2025-06-26

## 2025-06-24 RX ORDER — DEXTROSE 50 % IN WATER 50 %
15 SYRINGE (ML) INTRAVENOUS ONCE
Refills: 0 | Status: DISCONTINUED | OUTPATIENT
Start: 2025-06-24 | End: 2025-06-26

## 2025-06-24 RX ORDER — GABAPENTIN 400 MG/1
300 CAPSULE ORAL THREE TIMES A DAY
Refills: 0 | Status: DISCONTINUED | OUTPATIENT
Start: 2025-06-24 | End: 2025-06-26

## 2025-06-24 RX ORDER — INSULIN LISPRO 100 U/ML
3 INJECTION, SOLUTION INTRAVENOUS; SUBCUTANEOUS ONCE
Refills: 0 | Status: COMPLETED | OUTPATIENT
Start: 2025-06-24 | End: 2025-06-24

## 2025-06-24 RX ORDER — DEXTROSE 50 % IN WATER 50 %
25 SYRINGE (ML) INTRAVENOUS ONCE
Refills: 0 | Status: DISCONTINUED | OUTPATIENT
Start: 2025-06-24 | End: 2025-06-26

## 2025-06-24 RX ORDER — ACETAMINOPHEN 500 MG/5ML
650 LIQUID (ML) ORAL EVERY 6 HOURS
Refills: 0 | Status: DISCONTINUED | OUTPATIENT
Start: 2025-06-24 | End: 2025-06-25

## 2025-06-24 RX ORDER — KETOROLAC TROMETHAMINE 30 MG/ML
15 INJECTION, SOLUTION INTRAMUSCULAR; INTRAVENOUS ONCE
Refills: 0 | Status: DISCONTINUED | OUTPATIENT
Start: 2025-06-24 | End: 2025-06-24

## 2025-06-24 RX ORDER — SODIUM CHLORIDE 9 G/1000ML
1000 INJECTION, SOLUTION INTRAVENOUS
Refills: 0 | Status: DISCONTINUED | OUTPATIENT
Start: 2025-06-24 | End: 2025-06-26

## 2025-06-24 RX ORDER — ENOXAPARIN SODIUM 100 MG/ML
40 INJECTION SUBCUTANEOUS EVERY 24 HOURS
Refills: 0 | Status: DISCONTINUED | OUTPATIENT
Start: 2025-06-24 | End: 2025-06-26

## 2025-06-24 RX ORDER — GLUCAGON 3 MG/1
1 POWDER NASAL ONCE
Refills: 0 | Status: DISCONTINUED | OUTPATIENT
Start: 2025-06-24 | End: 2025-06-26

## 2025-06-24 RX ORDER — AZITHROMYCIN 250 MG
500 CAPSULE ORAL EVERY 24 HOURS
Refills: 0 | Status: COMPLETED | OUTPATIENT
Start: 2025-06-24 | End: 2025-06-26

## 2025-06-24 RX ORDER — BENZONATATE 100 MG
100 CAPSULE ORAL EVERY 8 HOURS
Refills: 0 | Status: DISCONTINUED | OUTPATIENT
Start: 2025-06-24 | End: 2025-06-26

## 2025-06-24 RX ORDER — INSULIN LISPRO 100 U/ML
INJECTION, SOLUTION INTRAVENOUS; SUBCUTANEOUS AT BEDTIME
Refills: 0 | Status: DISCONTINUED | OUTPATIENT
Start: 2025-06-24 | End: 2025-06-26

## 2025-06-24 RX ORDER — IPRATROPIUM BROMIDE AND ALBUTEROL SULFATE .5; 2.5 MG/3ML; MG/3ML
3 SOLUTION RESPIRATORY (INHALATION) EVERY 8 HOURS
Refills: 0 | Status: DISCONTINUED | OUTPATIENT
Start: 2025-06-24 | End: 2025-06-24

## 2025-06-24 RX ORDER — DEXTROSE 50 % IN WATER 50 %
12.5 SYRINGE (ML) INTRAVENOUS ONCE
Refills: 0 | Status: DISCONTINUED | OUTPATIENT
Start: 2025-06-24 | End: 2025-06-26

## 2025-06-24 RX ORDER — METHYLPREDNISOLONE ACETATE 80 MG/ML
40 INJECTION, SUSPENSION INTRA-ARTICULAR; INTRALESIONAL; INTRAMUSCULAR; SOFT TISSUE ONCE
Refills: 0 | Status: COMPLETED | OUTPATIENT
Start: 2025-06-24 | End: 2025-06-24

## 2025-06-24 RX ADMIN — INSULIN LISPRO 2: 100 INJECTION, SOLUTION INTRAVENOUS; SUBCUTANEOUS at 13:41

## 2025-06-24 RX ADMIN — Medication 40 MILLIGRAM(S): at 05:43

## 2025-06-24 RX ADMIN — POTASSIUM PHOSPHATE, MONOBASIC POTASSIUM PHOSPHATE, DIBASIC INJECTION, 83.33 MILLIMOLE(S): 236; 224 SOLUTION, CONCENTRATE INTRAVENOUS at 11:01

## 2025-06-24 RX ADMIN — Medication 500 MILLIGRAM(S): at 13:42

## 2025-06-24 RX ADMIN — INSULIN LISPRO 1: 100 INJECTION, SOLUTION INTRAVENOUS; SUBCUTANEOUS at 09:10

## 2025-06-24 RX ADMIN — EFAVIRENZ, EMTRICITABINE AND TENOFOVIR DISOPROXIL FUMARATE 1 TABLET(S): 600; 200; 300 TABLET, FILM COATED ORAL at 22:59

## 2025-06-24 RX ADMIN — PREDNISONE 40 MILLIGRAM(S): 20 TABLET ORAL at 05:42

## 2025-06-24 RX ADMIN — AMLODIPINE BESYLATE 10 MILLIGRAM(S): 10 TABLET ORAL at 05:43

## 2025-06-24 RX ADMIN — KETOROLAC TROMETHAMINE 15 MILLIGRAM(S): 30 INJECTION, SOLUTION INTRAMUSCULAR; INTRAVENOUS at 01:22

## 2025-06-24 RX ADMIN — Medication 1 DOSE(S): at 09:17

## 2025-06-24 RX ADMIN — ROSUVASTATIN CALCIUM 10 MILLIGRAM(S): 5 TABLET, FILM COATED ORAL at 22:59

## 2025-06-24 RX ADMIN — IPRATROPIUM BROMIDE AND ALBUTEROL SULFATE 3 MILLILITER(S): .5; 2.5 SOLUTION RESPIRATORY (INHALATION) at 22:52

## 2025-06-24 RX ADMIN — LIDOCAINE HYDROCHLORIDE 1 PATCH: 20 JELLY TOPICAL at 19:30

## 2025-06-24 RX ADMIN — Medication 100 MILLIGRAM(S): at 20:42

## 2025-06-24 RX ADMIN — METHYLPREDNISOLONE ACETATE 40 MILLIGRAM(S): 80 INJECTION, SUSPENSION INTRA-ARTICULAR; INTRALESIONAL; INTRAMUSCULAR; SOFT TISSUE at 01:51

## 2025-06-24 RX ADMIN — LIDOCAINE HYDROCHLORIDE 1 PATCH: 20 JELLY TOPICAL at 11:05

## 2025-06-24 RX ADMIN — INSULIN LISPRO 3 UNIT(S): 100 INJECTION, SOLUTION INTRAVENOUS; SUBCUTANEOUS at 01:55

## 2025-06-24 RX ADMIN — IPRATROPIUM BROMIDE AND ALBUTEROL SULFATE 3 MILLILITER(S): .5; 2.5 SOLUTION RESPIRATORY (INHALATION) at 08:59

## 2025-06-24 RX ADMIN — IPRATROPIUM BROMIDE AND ALBUTEROL SULFATE 3 MILLILITER(S): .5; 2.5 SOLUTION RESPIRATORY (INHALATION) at 15:17

## 2025-06-24 RX ADMIN — TIOTROPIUM BROMIDE INHALATION SPRAY 2 PUFF(S): 3.12 SPRAY, METERED RESPIRATORY (INHALATION) at 09:14

## 2025-06-24 RX ADMIN — METOPROLOL SUCCINATE 50 MILLIGRAM(S): 50 TABLET, EXTENDED RELEASE ORAL at 05:42

## 2025-06-24 RX ADMIN — Medication 100 MILLIGRAM(S): at 09:16

## 2025-06-24 RX ADMIN — INSULIN LISPRO 1: 100 INJECTION, SOLUTION INTRAVENOUS; SUBCUTANEOUS at 18:04

## 2025-06-24 RX ADMIN — KETOROLAC TROMETHAMINE 15 MILLIGRAM(S): 30 INJECTION, SOLUTION INTRAMUSCULAR; INTRAVENOUS at 11:02

## 2025-06-24 RX ADMIN — ENOXAPARIN SODIUM 40 MILLIGRAM(S): 100 INJECTION SUBCUTANEOUS at 05:43

## 2025-06-24 RX ADMIN — Medication 1 DOSE(S): at 22:58

## 2025-06-24 NOTE — PROGRESS NOTE ADULT - PROBLEM SELECTOR PLAN 8
-Hold home omeprazole 20 mg po qam  -C/w pantoprazole 40 mg po qam Home: omeprazole 20 mg po qd    PLAN  > C/w pantoprazole 40 mg po qd

## 2025-06-24 NOTE — PROGRESS NOTE ADULT - SUBJECTIVE AND OBJECTIVE BOX
PROGRESS NOTE  06-23-25 (1d)  CHRISTOPHER VERDUZCO Female 60y([id number])- Patient is a 60y old  Female who presents with a chief complaint of SOB (23 Jun 2025 20:54)      INTERVAL HPI/OVERNIGHT EVENTS:   Patient has no acute events overnight,     SUBJECTIVE: Patient was seen and examined at bedside this morning. Denies any nausea/vomiting/diarrhea, headache, shortness of breath, abdominal pain or chest pain/palpitations. Patient responding appropriately to questions and able to make needs known.     MEDICATIONS - STANDING:  albuterol    90 MICROgram(s) HFA Inhaler 2 Puff(s) Inhalation every 12 hours  albuterol/ipratropium for Nebulization 3 milliLiter(s) Nebulizer every 8 hours  amLODIPine   Tablet 10 milliGRAM(s) Oral daily  dextrose 5%. 1000 milliLiter(s) IV Continuous <Continuous>  dextrose 5%. 1000 milliLiter(s) IV Continuous <Continuous>  dextrose 50% Injectable 25 Gram(s) IV Push once  dextrose 50% Injectable 12.5 Gram(s) IV Push once  dextrose 50% Injectable 25 Gram(s) IV Push once  efavirenz 600/emtricitabine 200/tenofovir 300mg 1 Tablet(s) Oral at bedtime  enoxaparin Injectable 40 milliGRAM(s) SubCutaneous every 24 hours  fluticasone propionate/ salmeterol 100-50 MICROgram(s) Diskus 1 Dose(s) Inhalation two times a day  glucagon  Injectable 1 milliGRAM(s) IntraMuscular once  insulin lispro (ADMELOG) corrective regimen sliding scale   SubCutaneous three times a day before meals  insulin lispro (ADMELOG) corrective regimen sliding scale   SubCutaneous at bedtime  metoprolol succinate ER 50 milliGRAM(s) Oral daily  pantoprazole    Tablet 40 milliGRAM(s) Oral before breakfast  predniSONE   Tablet 40 milliGRAM(s) Oral daily  rosuvastatin 10 milliGRAM(s) Oral at bedtime  tiotropium 2.5 MICROgram(s) Inhaler 2 Puff(s) Inhalation daily      MEDICATIONS - PRN:      Insulin Requirements if any:     insulin lispro Injectable (ADMELOG).   3 Unit(s) SubCutaneous (06-24-25 @ 01:55)        PHYSICAL EXAM:  VITALS:   T(C): 36.7 (06-23-25 @ 23:40), Max: 36.9 (06-23-25 @ 21:36)  HR: 114 (06-23-25 @ 23:40) (83 - 114)  BP: 148/97 (06-23-25 @ 23:40) (130/80 - 155/90)  RR: 18 (06-23-25 @ 23:40) (18 - 18)  SpO2: 100% (06-23-25 @ 23:40) (98% - 100%)        GENERAL: NAD, lying in bed comfortably  HEAD:  Atraumatic, Normocephalic  EYES: EOMI, PERRLA. No scleral icterus and no conjunctival injection. Tracks me in room  ENT: Moist mucous membranes  NECK: Supple, No JVD  CHEST/LUNG: Clear to auscultation bilaterally; No rales, rhonchi, wheezing, or rubs. Unlabored respirations  HEART: Regular rate and rhythm; No murmurs, rubs, or gallops  ABDOMEN: BS +; Soft, nontender, nondistended  EXTREMITIES:  2+ Peripheral Pulses, brisk capillary refill. No clubbing, cyanosis, or edema  NERVOUS SYSTEM:  A&Ox3, no focal neurological deficits. CN II-XII grossly intact, but not individually tested.  PSYCHIATRIC: Cooperative. Appropriate mood and affect.  SKIN: Dry, intact, No rashes or lesions    LABS:                          11.6   15.37 )-----------( 445      ( 24 Jun 2025 04:42 )             37.0     06-24    139  |  107  |  24[H]  ----------------------------<  140[H]  4.1   |  20[L]  |  1.04    Ca    9.3      24 Jun 2025 04:42  Phos  1.9     06-24  Mg     2.10     06-24    TPro  7.4  /  Alb  4.0  /  TBili  <0.2  /  DBili  x   /  AST  22  /  ALT  26  /  AlkPhos  137[H]  06-23    LIVER FUNCTIONS - ( 23 Jun 2025 15:16 )  Alb: 4.0 g/dL / Pro: 7.4 g/dL / ALK PHOS: 137 U/L / ALT: 26 U/L / AST: 22 U/L / GGT: x                  Urinalysis Basic - ( 24 Jun 2025 04:42 )    Color: x / Appearance: x / SG: x / pH: x  Gluc: 140 mg/dL / Ketone: x  / Bili: x / Urobili: x   Blood: x / Protein: x / Nitrite: x   Leuk Esterase: x / RBC: x / WBC x   Sq Epi: x / Non Sq Epi: x / Bacteria: x        Lactate Trend        CAPILLARY BLOOD GLUCOSE      POCT Blood Glucose.: 236 mg/dL (24 Jun 2025 01:54)  POCT Blood Glucose.: 267 mg/dL (23 Jun 2025 23:53)  POCT Blood Glucose.: 212 mg/dL (23 Jun 2025 17:14)          insulin lispro Injectable (ADMELOG).   3 Unit(s) SubCutaneous (06-24-25 @ 01:55)          New Radiology, EKG, and additional tests have been reviewed. PROGRESS NOTE  06-23-25 (1d)  CHRISTOPHER VERDUZCO Female 60y([id number])- Patient is a 60y old  Female who presents with a chief complaint of SOB (23 Jun 2025 20:54)      INTERVAL HPI/OVERNIGHT EVENTS:   Patient has no acute events overnight,     SUBJECTIVE: Patient was seen and examined at bedside this morning.   Patient shares that she continues to feel SOB and feels like she is wheezing. She also notes that she has sciatic hip pain that is bothering her     MEDICATIONS - STANDING:  albuterol    90 MICROgram(s) HFA Inhaler 2 Puff(s) Inhalation every 12 hours  albuterol/ipratropium for Nebulization 3 milliLiter(s) Nebulizer every 8 hours  amLODIPine   Tablet 10 milliGRAM(s) Oral daily  dextrose 5%. 1000 milliLiter(s) IV Continuous <Continuous>  dextrose 5%. 1000 milliLiter(s) IV Continuous <Continuous>  dextrose 50% Injectable 25 Gram(s) IV Push once  dextrose 50% Injectable 12.5 Gram(s) IV Push once  dextrose 50% Injectable 25 Gram(s) IV Push once  efavirenz 600/emtricitabine 200/tenofovir 300mg 1 Tablet(s) Oral at bedtime  enoxaparin Injectable 40 milliGRAM(s) SubCutaneous every 24 hours  fluticasone propionate/ salmeterol 100-50 MICROgram(s) Diskus 1 Dose(s) Inhalation two times a day  glucagon  Injectable 1 milliGRAM(s) IntraMuscular once  insulin lispro (ADMELOG) corrective regimen sliding scale   SubCutaneous three times a day before meals  insulin lispro (ADMELOG) corrective regimen sliding scale   SubCutaneous at bedtime  metoprolol succinate ER 50 milliGRAM(s) Oral daily  pantoprazole    Tablet 40 milliGRAM(s) Oral before breakfast  predniSONE   Tablet 40 milliGRAM(s) Oral daily  rosuvastatin 10 milliGRAM(s) Oral at bedtime  tiotropium 2.5 MICROgram(s) Inhaler 2 Puff(s) Inhalation daily      MEDICATIONS - PRN:      Insulin Requirements if any:     insulin lispro Injectable (ADMELOG).   3 Unit(s) SubCutaneous (06-24-25 @ 01:55)        PHYSICAL EXAM:  VITALS:   T(C): 36.7 (06-23-25 @ 23:40), Max: 36.9 (06-23-25 @ 21:36)  HR: 114 (06-23-25 @ 23:40) (83 - 114)  BP: 148/97 (06-23-25 @ 23:40) (130/80 - 155/90)  RR: 18 (06-23-25 @ 23:40) (18 - 18)  SpO2: 100% (06-23-25 @ 23:40) (98% - 100%)        GENERAL: elevate BMI, NAD, lying in bed comfortably  HEAD:  Atraumatic, Normocephalic  EYES: EOMI, PERRLA. No scleral icterus and no conjunctival injection. Tracks me in room  ENT: Moist mucous membranes  NECK: Supple, No JVD  CHEST/LUNG: BL wheezing throughout lungs  HEART: Regular rate and rhythm; No murmurs, rubs, or gallops  ABDOMEN: BS +; Soft, nontender, nondistended  EXTREMITIES:  2+ Peripheral Pulses, brisk capillary refill. No clubbing, cyanosis, or edema  NERVOUS SYSTEM:  A&Ox3, no focal neurological deficits. CN II-XII grossly intact, but not individually tested.  PSYCHIATRIC: Cooperative. Appropriate mood and affect.  SKIN: Dry, intact, No rashes or lesions    LABS:                          11.6   15.37 )-----------( 445      ( 24 Jun 2025 04:42 )             37.0     06-24    139  |  107  |  24[H]  ----------------------------<  140[H]  4.1   |  20[L]  |  1.04    Ca    9.3      24 Jun 2025 04:42  Phos  1.9     06-24  Mg     2.10     06-24    TPro  7.4  /  Alb  4.0  /  TBili  <0.2  /  DBili  x   /  AST  22  /  ALT  26  /  AlkPhos  137[H]  06-23    LIVER FUNCTIONS - ( 23 Jun 2025 15:16 )  Alb: 4.0 g/dL / Pro: 7.4 g/dL / ALK PHOS: 137 U/L / ALT: 26 U/L / AST: 22 U/L / GGT: x                  Urinalysis Basic - ( 24 Jun 2025 04:42 )    Color: x / Appearance: x / SG: x / pH: x  Gluc: 140 mg/dL / Ketone: x  / Bili: x / Urobili: x   Blood: x / Protein: x / Nitrite: x   Leuk Esterase: x / RBC: x / WBC x   Sq Epi: x / Non Sq Epi: x / Bacteria: x        Lactate Trend        CAPILLARY BLOOD GLUCOSE      POCT Blood Glucose.: 236 mg/dL (24 Jun 2025 01:54)  POCT Blood Glucose.: 267 mg/dL (23 Jun 2025 23:53)  POCT Blood Glucose.: 212 mg/dL (23 Jun 2025 17:14)          insulin lispro Injectable (ADMELOG).   3 Unit(s) SubCutaneous (06-24-25 @ 01:55)          New Radiology, EKG, and additional tests have been reviewed. PROGRESS NOTE  06-23-25 (1d)  CHRISTOPHER VERDUZCO Female 60y([id number])- Patient is a 60y old  Female who presents with a chief complaint of SOB (23 Jun 2025 20:54)      INTERVAL HPI/OVERNIGHT EVENTS:   Patient has no acute events overnight,     SUBJECTIVE: Patient was seen and examined at bedside this morning.   Patient shares that she continues to feel SOB and feels like she is wheezing. She also notes that she has sciatic hip pain that is bothering her     MEDICATIONS - STANDING:  albuterol    90 MICROgram(s) HFA Inhaler 2 Puff(s) Inhalation every 12 hours  albuterol/ipratropium for Nebulization 3 milliLiter(s) Nebulizer every 8 hours  amLODIPine   Tablet 10 milliGRAM(s) Oral daily  dextrose 5%. 1000 milliLiter(s) IV Continuous <Continuous>  dextrose 5%. 1000 milliLiter(s) IV Continuous <Continuous>  dextrose 50% Injectable 25 Gram(s) IV Push once  dextrose 50% Injectable 12.5 Gram(s) IV Push once  dextrose 50% Injectable 25 Gram(s) IV Push once  efavirenz 600/emtricitabine 200/tenofovir 300mg 1 Tablet(s) Oral at bedtime  enoxaparin Injectable 40 milliGRAM(s) SubCutaneous every 24 hours  fluticasone propionate/ salmeterol 100-50 MICROgram(s) Diskus 1 Dose(s) Inhalation two times a day  glucagon  Injectable 1 milliGRAM(s) IntraMuscular once  insulin lispro (ADMELOG) corrective regimen sliding scale   SubCutaneous three times a day before meals  insulin lispro (ADMELOG) corrective regimen sliding scale   SubCutaneous at bedtime  metoprolol succinate ER 50 milliGRAM(s) Oral daily  pantoprazole    Tablet 40 milliGRAM(s) Oral before breakfast  predniSONE   Tablet 40 milliGRAM(s) Oral daily  rosuvastatin 10 milliGRAM(s) Oral at bedtime  tiotropium 2.5 MICROgram(s) Inhaler 2 Puff(s) Inhalation daily      MEDICATIONS - PRN:      Insulin Requirements if any:     insulin lispro Injectable (ADMELOG).   3 Unit(s) SubCutaneous (06-24-25 @ 01:55)        PHYSICAL EXAM:  VITALS:   T(C): 36.7 (06-23-25 @ 23:40), Max: 36.9 (06-23-25 @ 21:36)  HR: 114 (06-23-25 @ 23:40) (83 - 114)  BP: 148/97 (06-23-25 @ 23:40) (130/80 - 155/90)  RR: 18 (06-23-25 @ 23:40) (18 - 18)  SpO2: 100% (06-23-25 @ 23:40) (98% - 100%)        GENERAL: elevate BMI, NAD, lying in bed comfortably  HEAD:  Atraumatic, Normocephalic  EYES: EOMI, PERRLA. No scleral icterus and no conjunctival injection. Tracks me in room  ENT: Moist mucous membranes  NECK: Supple, No JVD  CHEST/LUNG: Wheezing resolved, coarse air entry b/l   HEART: Regular rate and rhythm; No murmurs, rubs, or gallops  ABDOMEN: BS +; Soft, nontender, nondistended  EXTREMITIES:  2+ Peripheral Pulses, brisk capillary refill. No clubbing, cyanosis, or edema  NERVOUS SYSTEM:  A&Ox3, no focal neurological deficits. CN II-XII grossly intact, but not individually tested.  PSYCHIATRIC: Cooperative. Appropriate mood and affect.  SKIN: Dry, intact, No rashes or lesions    LABS:                          11.6   15.37 )-----------( 445      ( 24 Jun 2025 04:42 )             37.0     06-24    139  |  107  |  24[H]  ----------------------------<  140[H]  4.1   |  20[L]  |  1.04    Ca    9.3      24 Jun 2025 04:42  Phos  1.9     06-24  Mg     2.10     06-24    TPro  7.4  /  Alb  4.0  /  TBili  <0.2  /  DBili  x   /  AST  22  /  ALT  26  /  AlkPhos  137[H]  06-23    LIVER FUNCTIONS - ( 23 Jun 2025 15:16 )  Alb: 4.0 g/dL / Pro: 7.4 g/dL / ALK PHOS: 137 U/L / ALT: 26 U/L / AST: 22 U/L / GGT: x                  Urinalysis Basic - ( 24 Jun 2025 04:42 )    Color: x / Appearance: x / SG: x / pH: x  Gluc: 140 mg/dL / Ketone: x  / Bili: x / Urobili: x   Blood: x / Protein: x / Nitrite: x   Leuk Esterase: x / RBC: x / WBC x   Sq Epi: x / Non Sq Epi: x / Bacteria: x        Lactate Trend        CAPILLARY BLOOD GLUCOSE      POCT Blood Glucose.: 236 mg/dL (24 Jun 2025 01:54)  POCT Blood Glucose.: 267 mg/dL (23 Jun 2025 23:53)  POCT Blood Glucose.: 212 mg/dL (23 Jun 2025 17:14)          insulin lispro Injectable (ADMELOG).   3 Unit(s) SubCutaneous (06-24-25 @ 01:55)          New Radiology, EKG, and additional tests have been reviewed.

## 2025-06-24 NOTE — DISCHARGE NOTE PROVIDER - CARE PROVIDER_API CALL
LONG PATEL  [address] [place] [address]  Phone: ([phone]  Fax: ()-  Established Patient  Follow Up Time: 2 weeks

## 2025-06-24 NOTE — PROGRESS NOTE ADULT - PROBLEM SELECTOR PLAN 4
Chronic knee pain b/l.     -S/p ketolorac 15 mg IV x1   - PRN acetaminophen 650 mg po q6 PRN and ibuprofen 400 mg po BID Patient reports sciatic hip pain   Not on any medications at home    PLAN  > trial gabapentin 300 TID Patient reports sciatic hip pain   Not on any medications at home    PLAN  > trial gabapentin 300 TID  > Lidocaine patch

## 2025-06-24 NOTE — PROGRESS NOTE ADULT - PROBLEM SELECTOR PLAN 3
EKG on admission with calculated QTc 472 ms. Asymptomatic.     -Hold all QT prolongation agents  -check Magnesium in AM  -Potassium wnl Chronic knee pain b/l.   S/p ketolorac 15 mg IV x2     PLAN  > PRN acetaminophen 650 mg po q6 PRN and ibuprofen 400 mg po BID

## 2025-06-24 NOTE — DISCHARGE NOTE PROVIDER - NSDCCPTREATMENT_GEN_ALL_CORE_FT
PRINCIPAL PROCEDURE  Procedure: XR chest AP  Findings and Treatment: The lungs are clear.  No pleural effusion. No pneumothorax.  The heart is normal in size  The visualized osseous structures demonstrate no acute pathology.  IMPRESSION:  No focal consolidations.

## 2025-06-24 NOTE — DISCHARGE NOTE PROVIDER - NSDCFUSCHEDAPPT_GEN_ALL_CORE_FT
Lopez Johnson  [facility]  [address]  Scheduled Appointment: 07/14/2025     Rosie Jay  [facility] [place] [facility] [address]  Scheduled Appointment: 06/30/2025    Lopez Johnson  [facility] [address]  Scheduled Appointment: 07/14/2025     Rekha Roy  [facility]  [address]  Scheduled Appointment: 10/08/2025

## 2025-06-24 NOTE — DISCHARGE NOTE PROVIDER - NSDCMRMEDTOKEN_GEN_ALL_CORE_FT
albuterol 90 mcg/inh inhalation aerosol: 2 inhaler(s) inhaled every 6 hours as needed for  shortness of breath and/or wheezing  amlodipine 10 mg oral tablet: 1 tab(s) orally once a day  azithromycin 500 mg oral tablet: 1 tab(s) orally every 24 hours  efavirenz/emtricitabine/tenofovir disoproxil fumarate 600 mg-200 mg-300 mg oral tablet: 1 tab(s) orally once a day (at bedtime)  ergocalciferol 1.25 mg (50,000 intl units) oral capsule: 1 cap(s) orally once a week ..(Sundays)  metoprolol succinate 50 mg oral tablet, extended release: 1 tab(s) orally once a day  omeprazole 20 mg oral delayed release capsule: 1 cap(s) orally once a day  predniSONE 20 mg oral tablet: 2 tab(s) orally once a day  rosuvastatin 10 mg oral tablet: 1 tab(s) orally once a day (at bedtime)  Trelegy Ellipta 200 mcg-62.5 mcg-25 mcg/inh inhalation powder: 1 blister(s) inhaled once a day   albuterol 90 mcg/inh inhalation aerosol: 2 inhaler(s) inhaled every 6 hours as needed for  shortness of breath and/or wheezing  amlodipine 10 mg oral tablet: 1 tab(s) orally once a day  efavirenz/emtricitabine/tenofovir disoproxil fumarate 600 mg-200 mg-300 mg oral tablet: 1 tab(s) orally once a day (at bedtime)  ergocalciferol 1.25 mg (50,000 intl units) oral capsule: 1 cap(s) orally once a week ..(Sundays)  metoprolol succinate 50 mg oral tablet, extended release: 1 tab(s) orally once a day  omeprazole 20 mg oral delayed release capsule: 1 cap(s) orally once a day  predniSONE 20 mg oral tablet: 2 tab(s) orally once a day  predniSONE 20 mg oral tablet: 2 tab(s) orally once a day Take 2 tabs daily on 6/27 and 6/28  rosuvastatin 10 mg oral tablet: 1 tab(s) orally once a day (at bedtime)  Trelegy Ellipta 200 mcg-62.5 mcg-25 mcg/inh inhalation powder: 1 blister(s) inhaled once a day   albuterol 90 mcg/inh inhalation aerosol: 2 inhaler(s) inhaled every 6 hours as needed for  shortness of breath and/or wheezing  alcohol swabs : Apply topically to affected area 4 times a day  amlodipine 10 mg oral tablet: 1 tab(s) orally once a day  benzonatate 100 mg oral capsule: 1 cap(s) orally every 8 hours As needed Cough  efavirenz/emtricitabine/tenofovir disoproxil fumarate 600 mg-200 mg-300 mg oral tablet: 1 tab(s) orally once a day (at bedtime)  ergocalciferol 1.25 mg (50,000 intl units) oral capsule: 1 cap(s) orally once a week ..(Sundays)  glucometer (per patient&#x27;s insurance): Test blood sugars four times a day. Dispense #1 glucometer.  glucose tablets: Follow instructions on bottle when sugar is low.  Insulin Pen Needles, 4mm: 1 application subcutaneously 4 times a day. ** Use with insulin pen **  lancets: 1 application subcutaneously 4 times a day  metoprolol succinate 50 mg oral tablet, extended release: 1 tab(s) orally once a day  omeprazole 20 mg oral delayed release capsule: 1 cap(s) orally once a day  predniSONE 20 mg oral tablet: 2 tab(s) orally once a day Take 2 tabs daily on 6/27 and 6/28  rosuvastatin 10 mg oral tablet: 1 tab(s) orally once a day (at bedtime)  test strips (per patient&#x27;s insurance): 1 application subcutaneously 4 times a day. ** Compatible with patient&#x27;s glucometer **  Trelegy Ellipta 200 mcg-62.5 mcg-25 mcg/inh inhalation powder: 1 blister(s) inhaled once a day   albuterol 90 mcg/inh inhalation aerosol: 2 inhaler(s) inhaled every 6 hours as needed for  shortness of breath and/or wheezing  alcohol swabs : Apply topically to affected area 4 times a day  amlodipine 10 mg oral tablet: 1 tab(s) orally once a day  benzonatate 100 mg oral capsule: 1 cap(s) orally every 8 hours As needed Cough  efavirenz/emtricitabine/tenofovir disoproxil fumarate 600 mg-200 mg-300 mg oral tablet: 1 tab(s) orally once a day (at bedtime)  efavirenz/emtricitabine/tenofovir disoproxil fumarate 600 mg-200 mg-300 mg oral tablet: 1 tab(s) orally once a day (at bedtime)  ergocalciferol 1.25 mg (50,000 intl units) oral capsule: 1 cap(s) orally once a week ..(Sundays)  glucometer (per patient&#x27;s insurance): Test blood sugars four times a day. Dispense #1 glucometer.  glucose tablets: Follow instructions on bottle when sugar is low.  Insulin Pen Needles, 4mm: 1 application subcutaneously 4 times a day. ** Use with insulin pen **  lancets: 1 application subcutaneously 4 times a day  metoprolol succinate 50 mg oral tablet, extended release: 1 tab(s) orally once a day  omeprazole 20 mg oral delayed release capsule: 1 cap(s) orally once a day  predniSONE 20 mg oral tablet: 2 tab(s) orally once a day Take 2 tabs daily on 6/27 and 6/28  rosuvastatin 10 mg oral tablet: 1 tab(s) orally once a day (at bedtime)  test strips (per patient&#x27;s insurance): 1 application subcutaneously 4 times a day. ** Compatible with patient&#x27;s glucometer **  Trelegy Ellipta 200 mcg-62.5 mcg-25 mcg/inh inhalation powder: 1 blister(s) inhaled once a day   albuterol 90 mcg/inh inhalation aerosol: 2 inhaler(s) inhaled every 6 hours as needed for  shortness of breath and/or wheezing  alcohol swabs : Apply topically to affected area 4 times a day  amlodipine 10 mg oral tablet: 1 tab(s) orally once a day  benzonatate 100 mg oral capsule: 1 cap(s) orally every 8 hours As needed Cough  efavirenz/emtricitabine/tenofovir disoproxil fumarate 600 mg-200 mg-300 mg oral tablet: 1 tab(s) orally once a day (at bedtime)  efavirenz/emtricitabine/tenofovir disoproxil fumarate 600 mg-200 mg-300 mg oral tablet: 1 tab(s) orally once a day (at bedtime)  ergocalciferol 1.25 mg (50,000 intl units) oral capsule: 1 cap(s) orally once a week ..(Sundays)  glucometer (per patient&#x27;s insurance): Test blood sugars four times a day. Dispense #1 glucometer.  glucose tablets: Follow instructions on bottle when sugar is low.  Insulin Pen Needles, 4mm: 1 application subcutaneously 4 times a day. ** Use with insulin pen **  lancets: 1 application subcutaneously 4 times a day  Macrobid 100 mg oral capsule: 1 cap(s) orally 2 times a day  metoprolol succinate 50 mg oral tablet, extended release: 1 tab(s) orally once a day  omeprazole 20 mg oral delayed release capsule: 1 cap(s) orally once a day  ondansetron 4 mg oral tablet: 1 tab(s) orally every 8 hours as needed for  nausea  predniSONE 20 mg oral tablet: 2 tab(s) orally once a day Take 2 tabs daily on 6/27 and 6/28  rosuvastatin 10 mg oral tablet: 1 tab(s) orally once a day (at bedtime)  test strips (per patient&#x27;s insurance): 1 application subcutaneously 4 times a day. ** Compatible with patient&#x27;s glucometer **  Trelegy Ellipta 200 mcg-62.5 mcg-25 mcg/inh inhalation powder: 1 blister(s) inhaled once a day
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
## 2025-06-24 NOTE — PROGRESS NOTE ADULT - PROBLEM SELECTOR PLAN 6
Recent 6/2025 undetectable viral load and absolute CD4 1593.     -C/w efavrienza/emtricitabine/tenofovir 600 mg-200 mg-300 mg po qd TSH 06/2025  - 0.13 in 02/2024 TSH was 1.09  Free T4 06/2025 - 1    Likely euthyroid sick syndrome iso of acute illness copd/asthma exacerbation   No signs of goiter    PLAN  > f/u outpatient to repeat TSH and monitor

## 2025-06-24 NOTE — DISCHARGE NOTE PROVIDER - ATTENDING DISCHARGE PHYSICAL EXAMINATION:
See physical exam from progress note today.   Patient seen and examined at bedside. All qs answered. Patient stable for discharge to home today.

## 2025-06-24 NOTE — DISCHARGE NOTE PROVIDER - NSDCFUADDAPPT_GEN_ALL_CORE_FT
APPTS ARE READY TO BE MADE: [X] YES    Best Family or Patient Contact (if needed):    Additional Information about above appointments (if needed):    1: PCP - Dr. Thornton - 2 weeks  2: Pulm home program 1-3 days  3:     Other comments or requests:  APPTS ARE READY TO BE MADE: [X] YES    Best Family or Patient Contact (if needed):    Additional Information about above appointments (if needed):    1: PCP - Dr. Thornton - 2 weeks  2: Pul home program 1-3 days  3:     Other comments or requests:   HOMEPULM - Appointment was scheduled in [facility] on 7/1 at 12:30pm with Dr Rosie Jimenez via telehealth. APPTS ARE READY TO BE MADE: [x] YES    Best Family or Patient Contact (if needed):    Additional Information about above appointments (if needed):    1: PCP - Dr. Thornton - 2 weeks  2: Pulm home program 1-3 days  3: Endocrinology     Other comments or requests:   HOMEPULM - Appointment was scheduled in [facility] on 7/1 at 12:30pm with Dr Rosie Jimenez via telehealth. APPTS ARE READY TO BE MADE: [x] YES    Best Family or Patient Contact (if needed):    Additional Information about above appointments (if needed):    1: PCP - Dr. Thornton - 2 weeks  2: Pul home program 1-3 days  3: Endocrinology     Other comments or requests:   HOMEPULM - Appointment was scheduled in [facility] on 6/30 at 9:00am with Dr Rosie Jimenez via telehealth.  PCP - Provided patient with provider referral information, however patient prefers to schedule the appointments on their own.  APPTS ARE READY TO BE MADE: [x] YES    Best Family or Patient Contact (if needed):    Additional Information about above appointments (if needed):    1: PCP - Dr. Thornton - 2 weeks  2: Pulm home program 1-3 days  3: Endocrinology     Other comments or requests:   HOMEPULM - Appointment was scheduled in [facility] on 6/30 at 9:00am with Dr Rosie Jimenez via telehealth.  PCP - Provided patient with provider referral information, however patient prefers to schedule the appointments on their own.     Patient was outreached but did not answer. A voicemail was left for the patient to return our call.

## 2025-06-24 NOTE — DISCHARGE NOTE PROVIDER - NSFOLLOWUPCLINICS_GEN_ALL_ED_FT
Home Program  Pulmonary  [place]   Phone:   Fax:   Follow Up Time: 1-3 days     Home Program  Pulmonary  [place]   Phone:   Fax:   Follow Up Time: 1-3 days    [facility] Endocrinology  Endocrinology  [address] [place] [address]  Phone: [phone]  Fax:   Follow Up Time: 2 weeks

## 2025-06-24 NOTE — PROGRESS NOTE ADULT - PROBLEM SELECTOR PLAN 5
31-Jan-2021 05:12 No S&S of active exacerbation. No wheezing, respiratory distress, nor additional oxygen requirements.     -C/w empiric tx of asthma/copd as above Recent 6/2025 undetectable viral load and absolute CD4 1593.     PLAN  > C/w efavrienza/emtricitabine/tenofovir 600 mg-200 mg-300 mg po qd
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
## 2025-06-24 NOTE — PROGRESS NOTE ADULT - PROBLEM SELECTOR PLAN 2
New onset.  A1c=6.6  FL=982  Exacerbated by systemic steroids.    -ISS TID  pre meal and QHS  -May need Lantus while on PO Prednisone   -DM diet  -Formal Endocrinology c/s in AM  -DM education   -TSH New onset.  A1c=6.6  QA=472  posisbly exacerbated by systemic steroids.    PLAN  > ISS TID  pre meal and QHS  > DM diet  > DM education   > plan to DC on metformin 500 qd for 1 week and then metformin 500 BID after to follow up with PC

## 2025-06-24 NOTE — PROGRESS NOTE ADULT - PROBLEM SELECTOR PLAN 7
Normotensive.     -C/w metoprolol succinate 50 mg po qd   -C/w amlodipine 10 mg po qd Home meds: amlodipine 10 mg qd, Toprol 50 qd     PLAN  > C/w metoprolol succinate 50 mg po qd   > C/w amlodipine 10 mg po qd

## 2025-06-24 NOTE — DISCHARGE NOTE PROVIDER - NSDCCPCAREPLAN_GEN_ALL_CORE_FT
PRINCIPAL DISCHARGE DIAGNOSIS  Diagnosis: Asthma with COPD with exacerbation  Assessment and Plan of Treatment: You experienced a flare-up of chronic obstructive pulmonary disease and asthma. In these conditions the small airways inside your lungs become swollen, narrowed, and filled with extra mucus, so air has trouble moving in and out. A flare-up can be set off by infections, smoke, dust, strong smells, or missing your breathing treatments.  While in the hospital you received a strong steroid (methylprednisolone) to calm the swelling, then prednisone 40 mg by mouth each morning for five days from 6/24 through 6/28. Because a chest infection was possible you also started azithromycin 500 mg once daily from 6/24 through 6/26. Frequent Duoneb breathing treatments (a mist of albuterol and ipratropium) and your usual inhalers helped open your airways.  Follow-Up Recommendations:    – Continue prednisone 40 mg through 6/28 exactly as directed; do not stop early or double a dose.    – Finish the azithromycin course; the last pill is due on 6/26.    – Use your rescue inhaler two puffs every four to six hours as needed; if you need it more often for a whole day call the clinic.    – Take your daily maintenance inhaler exactly as the label says even when you feel well.    – Stay away from cigarette smoke, strong odors, dust, and people with colds; keep windows closed on high-pollen or poor-air-quality days.    – Drink plenty of water, practice slow deep breathing with pursed lips, and keep rescue medicine within reach at all times.    – Make a follow-up visit with PCP in 2 weeks and you have a pulmonology appointment on 7/14.  – Ask about the yearly flu shot, updated COVID vaccine, and the pneumonia vaccine.  Chest pain, very fast or very hard breathing, lips or fingers turning blue, confusion, trouble speaking in full sentences, wheezing that does not improve after using your rescue inhaler, fever over 101.5°F with worsening cough, new leg swelling, or any feeling that you cannot catch your breath should send you to the nearest emergency department without delay.      SECONDARY DISCHARGE DIAGNOSES  Diagnosis: Diabetes type 2  Assessment and Plan of Treatment: You have a mildly elevated a1c of 6.6 that qualifies as type 2 diabetes. This is a condition in which the sugar (glucose) level in your blood stays higher than normal. Over time, too much sugar in the bloodstream can harm blood vessels and nerves, raising the chances of heart disease, kidney problems, vision loss, and poor healing of cuts.  While you were in the hospital your sugar levels controlled. To help keep your blood sugar steady after you leave, you will begin metformin, a pill that helps your body use its own insulin better and lowers the amount of sugar released by the liver.  Follow-Up Recommendations:    – Take metformin 500 mg once a day with the evening meal for 1 week, then increase to 500 mg twice a day with breakfast and dinner unless your doctor tells you otherwise.    – Check your blood sugar as instructed with the meter provided and keep a log to show your primary care provider (PCP).    – Eat balanced meals that focus on vegetables, whole grains, lean protein, and limited sugary foods; aim for at least 30 minutes of brisk walking or other activity on most days; try to maintain or reach a healthy weight.    – Drink plenty of water, limit alcohol, and stop smoking if you smoke.    – Make an appointment with your PCP within 2 weeks to review your readings, renew prescriptions, and order any needed blood tests, including a repeat A1c in about 3 months.    – Schedule routine eye and foot exams and keep all laboratory or specialist visits that your PCP recommends.    Seek emergency care right away for severe shakiness or sweating that does not improve after drinking juice, confusion, trouble speaking, vision suddenly blurs, blood sugar stays above 300 mg/dL despite insulin, vomiting that prevents you from keeping fluids down, chest pain, shortness of breath, or any new numbness, weakness, or severe abdominal pain.     PRINCIPAL DISCHARGE DIAGNOSIS  Diagnosis: Asthma with COPD with exacerbation  Assessment and Plan of Treatment: You experienced a flare-up of chronic obstructive pulmonary disease and asthma. In these conditions the small airways inside your lungs become swollen, narrowed, and filled with extra mucus, so air has trouble moving in and out. A flare-up can be set off by infections, smoke, dust, strong smells, or missing your breathing treatments.  While in the hospital you received a strong steroid (methylprednisolone) to calm the swelling, then prednisone 40 mg by mouth each morning for five days from 6/24 through 6/28. Because a chest infection was possible you also started azithromycin 500 mg once daily from 6/24 through 6/26. Frequent Duoneb breathing treatments (a mist of albuterol and ipratropium) and your usual inhalers helped open your airways.  Follow-Up Recommendations:    – Continue prednisone 40 mg daily on 6/27 and 6/28;  do not stop early or double a dose.    – Use your rescue inhaler two puffs every four to six hours as needed; if you need it more often for a whole day call the clinic.    – Take your daily maintenance inhaler exactly as the label says even when you feel well.    – Stay away from cigarette smoke, strong odors, dust, and people with colds; keep windows closed on high-pollen or poor-air-quality days.    – Drink plenty of water, practice slow deep breathing with pursed lips, and keep rescue medicine within reach at all times.    – Make a follow-up visit with PCP in 2 weeks and you have a pulmonology appointment on 7/14.  – Ask about the yearly flu shot, updated COVID vaccine, and the pneumonia vaccine.  Chest pain, very fast or very hard breathing, lips or fingers turning blue, confusion, trouble speaking in full sentences, wheezing that does not improve after using your rescue inhaler, fever over 101.5°F with worsening cough, new leg swelling, or any feeling that you cannot catch your breath should send you to the nearest emergency department without delay.      SECONDARY DISCHARGE DIAGNOSES  Diagnosis: Diabetes type 2  Assessment and Plan of Treatment: You have a mildly elevated a1c of 6.6 that qualifies as type 2 diabetes. This is a condition in which the sugar (glucose) level in your blood stays higher than normal. Over time, too much sugar in the bloodstream can harm blood vessels and nerves, raising the chances of heart disease, kidney problems, vision loss, and poor healing of cuts.  While you were in the hospital your sugar levels controlled. To help keep your blood sugar steady after you leave, you will begin Sitagliptin, a pill that helps control your blood sugar.  Follow-Up Recommendations:    – Take Sitagliptin 100mg daily   – Check your blood sugar as instructed with the meter provided and keep a log to show your primary care provider (PCP).    – Eat balanced meals that focus on vegetables, whole grains, lean protein, and limited sugary foods; aim for at least 30 minutes of brisk walking or other activity on most days; try to maintain or reach a healthy weight.    – Drink plenty of water, limit alcohol, and stop smoking if you smoke.    - Follow up with Endocrinology  – Make an appointment with your PCP within 2 weeks to review your readings, renew prescriptions, and order any needed blood tests, including a repeat A1c in about 3 months.    – Schedule routine eye and foot exams and keep all laboratory or specialist visits that your PCP recommends.    Seek emergency care right away for severe shakiness or sweating that does not improve after drinking juice, confusion, trouble speaking, vision suddenly blurs, blood sugar stays above 300 mg/dL despite insulin, vomiting that prevents you from keeping fluids down, chest pain, shortness of breath, or any new numbness, weakness, or severe abdominal pain.
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
## 2025-06-25 ENCOUNTER — TRANSCRIPTION ENCOUNTER (OUTPATIENT)
Age: 60
End: 2025-06-25

## 2025-06-25 DIAGNOSIS — D72.829 ELEVATED WHITE BLOOD CELL COUNT, UNSPECIFIED: ICD-10-CM

## 2025-06-25 PROBLEM — J44.9 CHRONIC OBSTRUCTIVE PULMONARY DISEASE, UNSPECIFIED: Chronic | Status: ACTIVE | Noted: 2025-06-23

## 2025-06-25 PROBLEM — C34.90 MALIGNANT NEOPLASM OF UNSPECIFIED PART OF UNSPECIFIED BRONCHUS OR LUNG: Chronic | Status: ACTIVE | Noted: 2025-06-23

## 2025-06-25 PROBLEM — E78.5 HYPERLIPIDEMIA, UNSPECIFIED: Chronic | Status: ACTIVE | Noted: 2025-06-23

## 2025-06-25 LAB
ANION GAP SERPL CALC-SCNC: 10 MMOL/L — SIGNIFICANT CHANGE UP (ref 7–14)
BUN SERPL-MCNC: 21 MG/DL — SIGNIFICANT CHANGE UP (ref 7–23)
CALCIUM SERPL-MCNC: 8.6 MG/DL — SIGNIFICANT CHANGE UP (ref 8.4–10.5)
CHLORIDE SERPL-SCNC: 106 MMOL/L — SIGNIFICANT CHANGE UP (ref 98–107)
CO2 SERPL-SCNC: 21 MMOL/L — LOW (ref 22–31)
CREAT SERPL-MCNC: 0.9 MG/DL — SIGNIFICANT CHANGE UP (ref 0.5–1.3)
EGFR: 73 ML/MIN/1.73M2 — SIGNIFICANT CHANGE UP
EGFR: 73 ML/MIN/1.73M2 — SIGNIFICANT CHANGE UP
GAS PNL BLDV: SIGNIFICANT CHANGE UP
GLUCOSE BLDC GLUCOMTR-MCNC: 128 MG/DL — HIGH (ref 70–99)
GLUCOSE BLDC GLUCOMTR-MCNC: 142 MG/DL — HIGH (ref 70–99)
GLUCOSE BLDC GLUCOMTR-MCNC: 186 MG/DL — HIGH (ref 70–99)
GLUCOSE BLDC GLUCOMTR-MCNC: 194 MG/DL — HIGH (ref 70–99)
GLUCOSE SERPL-MCNC: 124 MG/DL — HIGH (ref 70–99)
HCT VFR BLD CALC: 33.5 % — LOW (ref 34.5–45)
HGB BLD-MCNC: 10.8 G/DL — LOW (ref 11.5–15.5)
MAGNESIUM SERPL-MCNC: 1.8 MG/DL — SIGNIFICANT CHANGE UP (ref 1.6–2.6)
MCHC RBC-ENTMCNC: 27.6 PG — SIGNIFICANT CHANGE UP (ref 27–34)
MCHC RBC-ENTMCNC: 32.2 G/DL — SIGNIFICANT CHANGE UP (ref 32–36)
MCV RBC AUTO: 85.5 FL — SIGNIFICANT CHANGE UP (ref 80–100)
NRBC # BLD AUTO: 0 K/UL — SIGNIFICANT CHANGE UP (ref 0–0)
NRBC # FLD: 0 K/UL — SIGNIFICANT CHANGE UP (ref 0–0)
NRBC BLD AUTO-RTO: 0 /100 WBCS — SIGNIFICANT CHANGE UP (ref 0–0)
PHOSPHATE SERPL-MCNC: 3.3 MG/DL — SIGNIFICANT CHANGE UP (ref 2.5–4.5)
PLATELET # BLD AUTO: 416 K/UL — HIGH (ref 150–400)
PMV BLD: 9.1 FL — SIGNIFICANT CHANGE UP (ref 7–13)
POTASSIUM SERPL-MCNC: 3.6 MMOL/L — SIGNIFICANT CHANGE UP (ref 3.5–5.3)
POTASSIUM SERPL-SCNC: 3.6 MMOL/L — SIGNIFICANT CHANGE UP (ref 3.5–5.3)
RBC # BLD: 3.92 M/UL — SIGNIFICANT CHANGE UP (ref 3.8–5.2)
RBC # FLD: 16.8 % — HIGH (ref 10.3–14.5)
SODIUM SERPL-SCNC: 137 MMOL/L — SIGNIFICANT CHANGE UP (ref 135–145)
WBC # BLD: 16.56 K/UL — HIGH (ref 3.8–10.5)
WBC # FLD AUTO: 16.56 K/UL — HIGH (ref 3.8–10.5)

## 2025-06-25 PROCEDURE — 99232 SBSQ HOSP IP/OBS MODERATE 35: CPT

## 2025-06-25 RX ORDER — AZITHROMYCIN 250 MG
1 CAPSULE ORAL
Qty: 0 | Refills: 0 | DISCHARGE
Start: 2025-06-25

## 2025-06-25 RX ORDER — METFORMIN HYDROCHLORIDE 500 MG/1
1 TABLET ORAL
Qty: 180 | Refills: 3
Start: 2025-06-25 | End: 2026-06-19

## 2025-06-25 RX ORDER — PREDNISONE 20 MG/1
2 TABLET ORAL
Qty: 0 | Refills: 0 | DISCHARGE
Start: 2025-06-25

## 2025-06-25 RX ORDER — MAGNESIUM, ALUMINUM HYDROXIDE 200-200 MG
30 TABLET,CHEWABLE ORAL EVERY 6 HOURS
Refills: 0 | Status: DISCONTINUED | OUTPATIENT
Start: 2025-06-25 | End: 2025-06-26

## 2025-06-25 RX ORDER — ACETAMINOPHEN 500 MG/5ML
650 LIQUID (ML) ORAL EVERY 6 HOURS
Refills: 0 | Status: DISCONTINUED | OUTPATIENT
Start: 2025-06-25 | End: 2025-06-26

## 2025-06-25 RX ADMIN — METOPROLOL SUCCINATE 50 MILLIGRAM(S): 50 TABLET, EXTENDED RELEASE ORAL at 05:41

## 2025-06-25 RX ADMIN — LIDOCAINE HYDROCHLORIDE 1 PATCH: 20 JELLY TOPICAL at 00:00

## 2025-06-25 RX ADMIN — Medication 30 MILLILITER(S): at 10:12

## 2025-06-25 RX ADMIN — ENOXAPARIN SODIUM 40 MILLIGRAM(S): 100 INJECTION SUBCUTANEOUS at 05:41

## 2025-06-25 RX ADMIN — AMLODIPINE BESYLATE 10 MILLIGRAM(S): 10 TABLET ORAL at 05:42

## 2025-06-25 RX ADMIN — GABAPENTIN 300 MILLIGRAM(S): 400 CAPSULE ORAL at 12:19

## 2025-06-25 RX ADMIN — PREDNISONE 40 MILLIGRAM(S): 20 TABLET ORAL at 05:42

## 2025-06-25 RX ADMIN — Medication 650 MILLIGRAM(S): at 05:41

## 2025-06-25 RX ADMIN — Medication 40 MILLIGRAM(S): at 05:41

## 2025-06-25 RX ADMIN — INSULIN LISPRO 1: 100 INJECTION, SOLUTION INTRAVENOUS; SUBCUTANEOUS at 08:42

## 2025-06-25 RX ADMIN — INSULIN LISPRO 1: 100 INJECTION, SOLUTION INTRAVENOUS; SUBCUTANEOUS at 12:40

## 2025-06-25 RX ADMIN — Medication 1 DOSE(S): at 22:34

## 2025-06-25 RX ADMIN — Medication 1 DOSE(S): at 08:43

## 2025-06-25 RX ADMIN — TIOTROPIUM BROMIDE INHALATION SPRAY 2 PUFF(S): 3.12 SPRAY, METERED RESPIRATORY (INHALATION) at 10:13

## 2025-06-25 RX ADMIN — Medication 100 MILLIGRAM(S): at 12:10

## 2025-06-25 RX ADMIN — ROSUVASTATIN CALCIUM 10 MILLIGRAM(S): 5 TABLET, FILM COATED ORAL at 22:33

## 2025-06-25 RX ADMIN — Medication 500 MILLIGRAM(S): at 12:19

## 2025-06-25 RX ADMIN — Medication 100 MILLIGRAM(S): at 22:33

## 2025-06-25 NOTE — PROGRESS NOTE ADULT - PROBLEM SELECTOR PLAN 4
Patient reports sciatic hip pain   Not on any medications at home    PLAN  > trial gabapentin 300 TID  > Lidocaine patch Patient experiencing worsening reflux  Home: omeprazole 20 mg po qd but doesn't regularly take it    PLAN  > C/w pantoprazole 40 mg po qd  > Start maalox 30 mL q6 pRN  > Possibly discharge with maalox
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
## 2025-06-25 NOTE — PROGRESS NOTE ADULT - PROBLEM SELECTOR PLAN 8
Home: omeprazole 20 mg po qd    PLAN  > C/w pantoprazole 40 mg po qd TSH 06/2025  - 0.13 in 02/2024 TSH was 1.09  Free T4 06/2025 - 1    Likely euthyroid sick syndrome iso of acute illness copd/asthma exacerbation   No signs of goiter    PLAN  > f/u outpatient to repeat TSH in 6-8 weeks and monitor

## 2025-06-25 NOTE — PROGRESS NOTE ADULT - PROBLEM SELECTOR PLAN 9
Home: rosuvastatin 10 mg po qhs    PLAN  > c/w rosuvastatin 10 qhs Home meds: amlodipine 10 mg qd, Toprol 50 qd     PLAN  > C/w metoprolol succinate 50 mg po qd   > C/w amlodipine 10 mg po qd

## 2025-06-25 NOTE — PROGRESS NOTE ADULT - PROBLEM SELECTOR PLAN 5
Recent 6/2025 undetectable viral load and absolute CD4 1593.     PLAN  > C/w efavrienza/emtricitabine/tenofovir 600 mg-200 mg-300 mg po qd Chronic knee pain b/l.   S/p ketolorac 15 mg IV x2     PLAN  > PRN acetaminophen 650 mg po q6 PRN

## 2025-06-25 NOTE — PROGRESS NOTE ADULT - PROBLEM SELECTOR PLAN 3
Chronic knee pain b/l.   S/p ketolorac 15 mg IV x2     PLAN  > PRN acetaminophen 650 mg po q6 PRN and ibuprofen 400 mg po BID Elevated WBC after initiation of prednisone, likely due to systemic steroids  WBC 16.56    PLAN  > CTM Elevated WBC after initiation of prednisone, likely due to systemic steroids  WBC 16.56    PLAN  > Most likely reactive, CTM
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
## 2025-06-25 NOTE — PROGRESS NOTE ADULT - PROBLEM SELECTOR PLAN 10
Code: FULL   Diet: DASH   DVT ppx: not indicated, IMPROVE score 0   Dispo: likely home Home: rosuvastatin 10 mg po qhs    PLAN  > c/w rosuvastatin 10 qhs

## 2025-06-25 NOTE — PROGRESS NOTE ADULT - PROBLEM SELECTOR PLAN 2
New onset.  A1c=6.6  CG=105  posisbly exacerbated by systemic steroids.    PLAN  > ISS TID  pre meal and QHS  > DM diet  > DM education   > plan to DC on metformin 500 qd for 1 week and then metformin 500 BID after to follow up with PC New onset.  A1c=6.6  VS=128  possibly exacerbated by systemic steroids.    PLAN  > ISS TID  pre meal and QHS  > DM diet  > DM education   > plan to DC on metformin 500 qd for 1 week and then metformin 500 BID after to follow up with PC
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
## 2025-06-25 NOTE — PROGRESS NOTE ADULT - PROBLEM SELECTOR PLAN 7
Home meds: amlodipine 10 mg qd, Toprol 50 qd     PLAN  > C/w metoprolol succinate 50 mg po qd   > C/w amlodipine 10 mg po qd Recent 6/2025 undetectable viral load and absolute CD4 1593.     PLAN  > C/w efavrienza/emtricitabine/tenofovir 600 mg-200 mg-300 mg po qd

## 2025-06-25 NOTE — PROGRESS NOTE ADULT - PROBLEM SELECTOR PLAN 6
TSH 06/2025  - 0.13 in 02/2024 TSH was 1.09  Free T4 06/2025 - 1    Likely euthyroid sick syndrome iso of acute illness copd/asthma exacerbation   No signs of goiter    PLAN  > f/u outpatient to repeat TSH and monitor Patient reports sciatic hip pain   Not on any medications at home    PLAN  > C/w gabapentin 300 TID. However patient has been declining medication while here.  > C/w Lidocaine patch

## 2025-06-26 ENCOUNTER — TRANSCRIPTION ENCOUNTER (OUTPATIENT)
Age: 60
End: 2025-06-26

## 2025-06-26 VITALS — OXYGEN SATURATION: 96 %

## 2025-06-26 LAB
ANION GAP SERPL CALC-SCNC: 11 MMOL/L — SIGNIFICANT CHANGE UP (ref 7–14)
BUN SERPL-MCNC: 26 MG/DL — HIGH (ref 7–23)
CALCIUM SERPL-MCNC: 8.6 MG/DL — SIGNIFICANT CHANGE UP (ref 8.4–10.5)
CHLORIDE SERPL-SCNC: 104 MMOL/L — SIGNIFICANT CHANGE UP (ref 98–107)
CO2 SERPL-SCNC: 20 MMOL/L — LOW (ref 22–31)
CREAT SERPL-MCNC: 0.9 MG/DL — SIGNIFICANT CHANGE UP (ref 0.5–1.3)
EGFR: 73 ML/MIN/1.73M2 — SIGNIFICANT CHANGE UP
EGFR: 73 ML/MIN/1.73M2 — SIGNIFICANT CHANGE UP
GLUCOSE BLDC GLUCOMTR-MCNC: 194 MG/DL — HIGH (ref 70–99)
GLUCOSE BLDC GLUCOMTR-MCNC: 237 MG/DL — HIGH (ref 70–99)
GLUCOSE SERPL-MCNC: 131 MG/DL — HIGH (ref 70–99)
HCT VFR BLD CALC: 38.1 % — SIGNIFICANT CHANGE UP (ref 34.5–45)
HGB BLD-MCNC: 12.1 G/DL — SIGNIFICANT CHANGE UP (ref 11.5–15.5)
MAGNESIUM SERPL-MCNC: 2 MG/DL — SIGNIFICANT CHANGE UP (ref 1.6–2.6)
MCHC RBC-ENTMCNC: 27.6 PG — SIGNIFICANT CHANGE UP (ref 27–34)
MCHC RBC-ENTMCNC: 31.8 G/DL — LOW (ref 32–36)
MCV RBC AUTO: 86.8 FL — SIGNIFICANT CHANGE UP (ref 80–100)
NRBC # BLD AUTO: 0 K/UL — SIGNIFICANT CHANGE UP (ref 0–0)
NRBC # FLD: 0 K/UL — SIGNIFICANT CHANGE UP (ref 0–0)
NRBC BLD AUTO-RTO: 0 /100 WBCS — SIGNIFICANT CHANGE UP (ref 0–0)
PHOSPHATE SERPL-MCNC: 2.9 MG/DL — SIGNIFICANT CHANGE UP (ref 2.5–4.5)
PLATELET # BLD AUTO: 428 K/UL — HIGH (ref 150–400)
PMV BLD: 9 FL — SIGNIFICANT CHANGE UP (ref 7–13)
POTASSIUM SERPL-MCNC: 3.6 MMOL/L — SIGNIFICANT CHANGE UP (ref 3.5–5.3)
POTASSIUM SERPL-SCNC: 3.6 MMOL/L — SIGNIFICANT CHANGE UP (ref 3.5–5.3)
RBC # BLD: 4.39 M/UL — SIGNIFICANT CHANGE UP (ref 3.8–5.2)
RBC # FLD: 16.9 % — HIGH (ref 10.3–14.5)
SODIUM SERPL-SCNC: 135 MMOL/L — SIGNIFICANT CHANGE UP (ref 135–145)
WBC # BLD: 10.83 K/UL — HIGH (ref 3.8–10.5)
WBC # FLD AUTO: 10.83 K/UL — HIGH (ref 3.8–10.5)

## 2025-06-26 PROCEDURE — 99239 HOSP IP/OBS DSCHRG MGMT >30: CPT

## 2025-06-26 RX ORDER — BENZONATATE 100 MG
1 CAPSULE ORAL
Qty: 21 | Refills: 0
Start: 2025-06-26 | End: 2025-07-02

## 2025-06-26 RX ORDER — BENZONATATE 100 MG
1 CAPSULE ORAL
Qty: 0 | Refills: 0 | DISCHARGE
Start: 2025-06-26

## 2025-06-26 RX ORDER — ISOPROPYL ALCOHOL, BENZOCAINE .7; .06 ML/ML; ML/ML
0 SWAB TOPICAL
Qty: 100 | Refills: 1
Start: 2025-06-26

## 2025-06-26 RX ORDER — EFAVIRENZ, EMTRICITABINE AND TENOFOVIR DISOPROXIL FUMARATE 600; 200; 300 MG/1; MG/1; MG/1
1 TABLET, FILM COATED ORAL
Qty: 30 | Refills: 0
Start: 2025-06-26 | End: 2025-07-25

## 2025-06-26 RX ORDER — PREDNISONE 20 MG/1
2 TABLET ORAL
Qty: 4 | Refills: 0
Start: 2025-06-26 | End: 2025-06-27

## 2025-06-26 RX ADMIN — IPRATROPIUM BROMIDE AND ALBUTEROL SULFATE 3 MILLILITER(S): .5; 2.5 SOLUTION RESPIRATORY (INHALATION) at 14:34

## 2025-06-26 RX ADMIN — INSULIN LISPRO 1: 100 INJECTION, SOLUTION INTRAVENOUS; SUBCUTANEOUS at 08:48

## 2025-06-26 RX ADMIN — TIOTROPIUM BROMIDE INHALATION SPRAY 2 PUFF(S): 3.12 SPRAY, METERED RESPIRATORY (INHALATION) at 08:51

## 2025-06-26 RX ADMIN — ENOXAPARIN SODIUM 40 MILLIGRAM(S): 100 INJECTION SUBCUTANEOUS at 06:02

## 2025-06-26 RX ADMIN — Medication 500 MILLIGRAM(S): at 12:24

## 2025-06-26 RX ADMIN — INSULIN LISPRO 2: 100 INJECTION, SOLUTION INTRAVENOUS; SUBCUTANEOUS at 13:33

## 2025-06-26 RX ADMIN — EFAVIRENZ, EMTRICITABINE AND TENOFOVIR DISOPROXIL FUMARATE 1 TABLET(S): 600; 200; 300 TABLET, FILM COATED ORAL at 00:38

## 2025-06-26 RX ADMIN — METOPROLOL SUCCINATE 50 MILLIGRAM(S): 50 TABLET, EXTENDED RELEASE ORAL at 06:02

## 2025-06-26 RX ADMIN — AMLODIPINE BESYLATE 10 MILLIGRAM(S): 10 TABLET ORAL at 06:03

## 2025-06-26 RX ADMIN — Medication 40 MILLIGRAM(S): at 06:03

## 2025-06-26 RX ADMIN — PREDNISONE 40 MILLIGRAM(S): 20 TABLET ORAL at 06:03

## 2025-06-26 RX ADMIN — Medication 1 DOSE(S): at 08:50

## 2025-06-26 NOTE — PROGRESS NOTE ADULT - PROBLEM SELECTOR PLAN 11
Code: FULL   Diet: DASH   DVT ppx: not indicated, IMPROVE score 0   Dispo: likely home
Code: FULL   Diet: DASH   DVT ppx: not indicated, IMPROVE score 0   Dispo: likely home

## 2025-06-26 NOTE — PROGRESS NOTE ADULT - SUBJECTIVE AND OBJECTIVE BOX
Progress Note  Authored by:   Rox Livingston MD  PGY-1 Internal Medicine    Patient is a 60y old  Female who presents with a chief complaint of SOB (25 Jun 2025 06:35)      Overnight Events: JEN.     Subjective: Patient seen and examined at bedside.             acetaminophen     Tablet .. 650 milliGRAM(s) Oral every 6 hours PRN  albuterol    90 MICROgram(s) HFA Inhaler 2 Puff(s) Inhalation every 12 hours  albuterol/ipratropium for Nebulization 3 milliLiter(s) Nebulizer every 6 hours PRN  aluminum hydroxide/magnesium hydroxide/simethicone Suspension 30 milliLiter(s) Oral every 6 hours PRN  amLODIPine   Tablet 10 milliGRAM(s) Oral daily  azithromycin   Tablet 500 milliGRAM(s) Oral every 24 hours  benzonatate 100 milliGRAM(s) Oral every 8 hours PRN  dextrose 5%. 1000 milliLiter(s) IV Continuous <Continuous>  dextrose 5%. 1000 milliLiter(s) IV Continuous <Continuous>  dextrose 50% Injectable 25 Gram(s) IV Push once  dextrose 50% Injectable 12.5 Gram(s) IV Push once  dextrose 50% Injectable 25 Gram(s) IV Push once  dextrose Oral Gel 15 Gram(s) Oral once PRN  efavirenz 600/emtricitabine 200/tenofovir 300mg 1 Tablet(s) Oral at bedtime  enoxaparin Injectable 40 milliGRAM(s) SubCutaneous every 24 hours  fluticasone propionate/ salmeterol 100-50 MICROgram(s) Diskus 1 Dose(s) Inhalation two times a day  gabapentin 300 milliGRAM(s) Oral three times a day  glucagon  Injectable 1 milliGRAM(s) IntraMuscular once  insulin lispro (ADMELOG) corrective regimen sliding scale   SubCutaneous three times a day before meals  insulin lispro (ADMELOG) corrective regimen sliding scale   SubCutaneous at bedtime  lidocaine   4% Patch 1 Patch Transdermal daily PRN  metoprolol succinate ER 50 milliGRAM(s) Oral daily  pantoprazole    Tablet 40 milliGRAM(s) Oral before breakfast  predniSONE   Tablet 40 milliGRAM(s) Oral daily  rosuvastatin 10 milliGRAM(s) Oral at bedtime  tiotropium 2.5 MICROgram(s) Inhaler 2 Puff(s) Inhalation daily          Physical Exam  GENERAL: NAD, non-toxic appearing  HEENT: EOMI, PERRLA, conjunctiva and sclera clear, moist mucous membranes  CV: RRR, normal S1 and S2. No mumurs, rubs, or gallops. No JVD.   LUNGS: Clear to auscultation bilaterally. No rales, rhonchi, or wheezing.   ABDOMEN: Soft, nontender, nondistended, +bowel sounds   NEURO: AOx4, no FND  PSYCH: Appropriate affect  EXTREMITIES: No edema, cyanosis, or clubbing. 2+ peripheral pulses.  LYMPH: No lymphadenopathy noted  SKIN: No rashes or lesions     T(C): 36.8 (06-26-25 @ 06:00), Max: 36.9 (06-25-25 @ 12:01)  HR: 75 (06-26-25 @ 06:00) (63 - 90)  BP: 156/86 (06-26-25 @ 06:00) (156/86 - 157/78)  RR: 18 (06-26-25 @ 06:00) (18 - 18)  SpO2: 98% (06-26-25 @ 06:00) (97% - 100%)     Progress Note  Authored by:   Rox Livingston MD  PGY-1 Internal Medicine    Patient is a 60y old  Female who presents with a chief complaint of SOB (25 Jun 2025 06:35)      Overnight Events: JEN.     Subjective: Patient seen and examined at bedside. Resting comfortably in bed. Patient is still experiencing some dyspepsia from her GERD but states the maalox has been helping. She denies fevers, chills, CP/palpitations, SOB, abdominal pain, nausea/vomiting/diarrhea.            acetaminophen     Tablet .. 650 milliGRAM(s) Oral every 6 hours PRN  albuterol    90 MICROgram(s) HFA Inhaler 2 Puff(s) Inhalation every 12 hours  albuterol/ipratropium for Nebulization 3 milliLiter(s) Nebulizer every 6 hours PRN  aluminum hydroxide/magnesium hydroxide/simethicone Suspension 30 milliLiter(s) Oral every 6 hours PRN  amLODIPine   Tablet 10 milliGRAM(s) Oral daily  azithromycin   Tablet 500 milliGRAM(s) Oral every 24 hours  benzonatate 100 milliGRAM(s) Oral every 8 hours PRN  dextrose 5%. 1000 milliLiter(s) IV Continuous <Continuous>  dextrose 5%. 1000 milliLiter(s) IV Continuous <Continuous>  dextrose 50% Injectable 25 Gram(s) IV Push once  dextrose 50% Injectable 12.5 Gram(s) IV Push once  dextrose 50% Injectable 25 Gram(s) IV Push once  dextrose Oral Gel 15 Gram(s) Oral once PRN  efavirenz 600/emtricitabine 200/tenofovir 300mg 1 Tablet(s) Oral at bedtime  enoxaparin Injectable 40 milliGRAM(s) SubCutaneous every 24 hours  fluticasone propionate/ salmeterol 100-50 MICROgram(s) Diskus 1 Dose(s) Inhalation two times a day  gabapentin 300 milliGRAM(s) Oral three times a day  glucagon  Injectable 1 milliGRAM(s) IntraMuscular once  insulin lispro (ADMELOG) corrective regimen sliding scale   SubCutaneous three times a day before meals  insulin lispro (ADMELOG) corrective regimen sliding scale   SubCutaneous at bedtime  lidocaine   4% Patch 1 Patch Transdermal daily PRN  metoprolol succinate ER 50 milliGRAM(s) Oral daily  pantoprazole    Tablet 40 milliGRAM(s) Oral before breakfast  predniSONE   Tablet 40 milliGRAM(s) Oral daily  rosuvastatin 10 milliGRAM(s) Oral at bedtime  tiotropium 2.5 MICROgram(s) Inhaler 2 Puff(s) Inhalation daily          Physical Exam  GENERAL: NAD, non-toxic appearing  HEENT: EOMI, PERRLA, conjunctiva and sclera clear, moist mucous membranes  CV: RRR, normal S1 and S2. No mumurs, rubs, or gallops. No JVD.   LUNGS: Mild end-expiratory wheezing, improved from yesterday  ABDOMEN: Soft, nontender, nondistended, +bowel sounds   NEURO: AOx4, no FND  PSYCH: Appropriate affect, cooperative  EXTREMITIES: No edema, cyanosis, or clubbing. 2+ peripheral pulses.  SKIN: No rashes or lesions     T(C): 36.8 (06-26-25 @ 06:00), Max: 36.9 (06-25-25 @ 12:01)  HR: 75 (06-26-25 @ 06:00) (63 - 90)  BP: 156/86 (06-26-25 @ 06:00) (156/86 - 157/78)  RR: 18 (06-26-25 @ 06:00) (18 - 18)  SpO2: 98% (06-26-25 @ 06:00) (97% - 100%)

## 2025-06-26 NOTE — PROGRESS NOTE ADULT - PROBLEM SELECTOR PLAN 2
New onset.  A1c=6.6  OA=544  possibly exacerbated by systemic steroids.    PLAN  > ISS TID  pre meal and QHS  > DM diet  > DM education   > plan to DC on metformin 500 qd for 1 week and then metformin 500 BID after to follow up with PC New onset.  A1c=6.6  BN=953  possibly exacerbated by systemic steroids.    PLAN  > ISS TID  pre meal and QHS  > DM diet  > DM education   > Initial plan was to DC on metformin 500 qd for 1 week and then metformin 500 BID, but pt is declining metformin  > Discharge on sitagliptin 100mg qd as metformin alternative with endo f/u outpt Upon further clarification of chart review, determined that DM is not new. A1c was 6.8% 11/2024.   A1c=6.6  QK=791  possibly exacerbated by systemic steroids.    PLAN  > ISS TID  pre meal and QHS  > DM diet  > DM education   > Initial plan was to DC on metformin 500 qd for 1 week and then metformin 500 BID, but pt is declining metformin  > Discharge on sitagliptin 100mg qd as metformin alternative with endo f/u outpt

## 2025-06-26 NOTE — PROGRESS NOTE ADULT - PROBLEM SELECTOR PLAN 7
Recent 6/2025 undetectable viral load and absolute CD4 1593.     PLAN  > C/w efavrienza/emtricitabine/tenofovir 600 mg-200 mg-300 mg po qd

## 2025-06-26 NOTE — PROGRESS NOTE ADULT - PROBLEM SELECTOR PLAN 4
Patient experiencing worsening reflux  Home: omeprazole 20 mg po qd but doesn't regularly take it    PLAN  > C/w pantoprazole 40 mg po qd  > Start maalox 30 mL q6 pRN  > Possibly discharge with maalox Patient experiencing worsening reflux  Home: omeprazole 20 mg po qd but doesn't regularly take it    PLAN  > C/w pantoprazole 40 mg po qd, discharge on omeprazole  > C/w maalox 30 mL q6 pRN, discharge on maalox

## 2025-06-26 NOTE — PROGRESS NOTE ADULT - PROBLEM/PLAN-11
Reason For Visit    Chief Complaint: diarrhea.   Skilled Nursing Facility:   Facility: [facility] Attending: Dr. Witt.   SNF APN: Josefina.   PCP Name & Contact: Dr. Hang Mora.   Date of Admission: 8/3/17.   Hospital: [facility].   Consults: ID, wound care.   Skilled Care Need: Occupational Therapy, Physical Therapy and Wound Care.      History of Present Illness  Informant: patient. Per Chart Review.   74 y/o female pt transferred to [facility] from [facility] for rehab, acute, and chronic disease management. PMH myasthenia gravis, DM2 steroid induced, HTN, arthritis, DVT LLE, bowel palsy, depression, cholecystectomy. Patient had a fall 7/21/17 at home and went to [facility] ER, no injury, no changes in mental status, found to have cellulitis left elbow. Had L elbow I&D with IVAB treatments, discharged with PO antibiotics. Hospital treatments also include BLE swelling/edema/pain and acute exacerbation of Myasthenia Gravis.    Patient lives home alone, has intermittent assistance from 2 daughters live separately. Patient verbalized would like to find assisted living, do not want to go back home alone, social service notified. Pt lives in 1 story house, 3 CAMERON without railings, hold onto walls, use straight cane, stairs with CGA of family.  Denies alcohol, tobacco, or drug use.     8/4 Patient alert and oriented seen and examined, up in wheelchair, appears in no acute distress on room air. Denies any pain and discomfort. Verbalize good p.o. intake. Denies chest pain, denies shortness of breath. Chronic bilateral lower extremities edema, 1+, recommended compression stockings to bilateral lower extremities or Tubigrip's, and elevate legs. Left elbow wound examined, about dime sized Saxman with tunneling noted, no signs and symptoms of infection, no drainage, no erythema, dressing noted CDI. Per wound care L. elbow 1.5 x 2.5 x 1.5 undermining @ 2.5 cm @ 6 o'clock and 9 O'clock.   Therapy: Bed Mobility = SBA, Transfers = SBA, 75 
feet RW CGA, Stairs = Min (A) 4 steps, Tinetti Assessment = 22/28 (risk for falls). Self Feeding = (I), Hygiene / Grooming = SBA, Bathing = Min (A), Toileting CGA; UB Dressing UB Dressing = Set-up (A), LB Dressing LB Dressing = CGA.     8/8 Pt assessed and examined. Pt c/o small hemorrhoids bleeding noted this am x 1 episode, INR 4.8, bruising on BUE from venous punctures and IVs observed, no other s/s of acute bleeding. Pt stated had EGD and colonoscopy this last hospitalization with polyps removed and hemorrhoids noted, next follow up in 5 years. Vitals stable. Pt denies any pain, CP, SOB on room air. BLE edema slightly decreasing. Last BM today. Pt verbalized good PO intake. No other health related issues per pt and nursing.   Therapy update: 120 ft RW CGA, transfer CGA, bed CGA.     8/9 Pt seen for f/up on bleeding hemorroids, pt verbalized 1 time happened 8/8, no more s/s of active bleeding noted. Vitals stable. 8/9 labs reviewed. Bruising decreasing. BLE edema decreasing, encouraged to elevated legs, compression stocking to RLE. LLE edema more then RLE, pt has 10 years hx of L. behind knee DVT with 10 years use of coumadin. Denies SOB on RA, denies pain. No new health related complaints per pt and nursing.     8/11:  Above HPI copy  Patient seen today for discharge. Patient seen in room. She offers no new complaints. Her daughter spoke with her primary care provider who stated she does not need to see hematology oncology. She is ready to go home. She will be going home with home health care, physical therapy, Occupational Therapy, life alert referral, homemaker referral, Meals on Wheels referral. Family declined family training. Seen by ID today. She offers no new fevers, cough, chills, shortness of breath, pain, worsening drainage from left elbow. Coumadin was restarted yesterday. She states usually at home she takes 3 mg. She was restarted at 3 mg previously from 9 mg. She also is going home with a 
walker.  Therapy update:  120 feet Ã—3 rolling walker  8 stairs  Upper set up  Lowers standby assist    8/14:  Patient seen today per nursing request. Patient with diarrhea Ã—2 days. Patient with up to 5-6 bowel movements a day that are all liquid. States are foul-smelling. Denies any decreased appetite or abdominal pain. She denies any history of C. difficile. She was on antibiotics that were completed at the hospital. She was supposed to go home today will postpone until C. difficile collection made. Patient new discharge date is for this Thursday.  talked with daughter and updated her. Patient also states she is new to insulin. She was not on insulin until the hospital.  8/17  Pt seen today in bed, still with c/o diarrhea 4x today, cdiff result pending, but was started on Flagyl  Pt also states nurses have not started insulin teaching with pt.  INR was 1.6, dose increased today to 5mg, next lab 8/18  Pt also c/o left LE with weeping. Removed left ace wrap from left LE, no ulcers noted, pt has chronic edema.  Also removed left elbow dressing, I& D site with iodoform gauze noted, no drainage, no odor    8/18 Pt seen today in bed, denies diarrhea today, stools negative for c. diff. Labs reviewed, INR fluctuating frequently, no s/s of bleeding noted. Plan for discharge home tomorrow home , [facility]- RN recheck INR.   Pt also c/o left LE with weeping. Removed left ace wrap from left LE, no ulcers noted, pt has chronic edema.Reapplied ace wraps. Also removed left elbow dressing, I& D site with iodoform gauze noted, no drainage, no odor, no erythema, no s/s of infection. Home health RN to change wound dressing every day. L elbow iodoform for tunneling and ace wrap to bilateral lower extremities for edema with oozing.   Therapy update: 120' RW supx2 , transfer mod I, bed Mobility mod I.      Review of SystemsConst: no fever and no chills . Per HPI.   Resp: no cough and not expressed as feeling short of breath. 
  : no change in urinary frequency, no feelings of urinary urgency and no dysuria.   Musc: joint pain, but no joint swelling.   Neuro: difficulty walking, but no confusion.   Psych: no anxiety and no depression.   Skin: bruising and skin wound.   All other systems reviewed and negative.      Allergies  Penicillins    Current Meds    Medication Name Instruction   Atenolol 50 MG Oral Tablet TAKE 1 TABLET BY MOUTH DAILY AS DIRECTED   Calcium 1200+D3 600- MG-MG-UNIT TB24    Toviaz 8 MG Oral Tablet Extended Release 24 Hour TAKE 1 TABLET DAILY.   TraMADol HCl - 50 MG Oral Tablet    Warfarin Sodium 5 MG Oral Tablet SKIP TODAY THEN TAKE 1 TABLET DAILY; 1/2 PILL WED AND FRI     Atenolol 25 mg po qhs for HTN  calcium carbonate 500 mg po bid for heartburn, indigestion  colace 100 mg po bid for bowel management  lexapro 10 mg po qd for depression  lasix 40 mg po qd  lantus solo star pen 15 units sq qhs for DM2  Minocycline 100 mg 1 cap po Q 12 hrs x 10 days- stop 8/4/17 per hospital record.   protonix 40 mg po q6am  prednisone 20 mg po 3 tabs po qd for respiratory symptoms  pyridostigmine bromide 60 mg tab tid for therapeutic supplement  coumadin 7.5 mg po qd for blood thinner   zolpidem 5 mg po qhs prn for sleep.   Inpatient Discharge Medications Reconciled Against Current Medication List.      Active Problems  ACP (advance care planning) (V65.49) (Z71.89)   · Full code, ACP stabilize to prolong life, pt verbalized no intubations.  Bell's palsy (351.0) (G51.0)  Bleeding hemorrhoid (455.8) (K64.9)  Calculus, renal (592.0) (N20.0)  Cellulitis of left elbow (682.3) (L03.114)  Debility (799.3) (R53.81)  Diarrhea (787.91) (R19.7)  DJD (degenerative joint disease) (715.90) (M19.90)  DVT (deep venous thrombosis) (453.40) (I82.409)  Edema of both legs (782.3) (R60.0)  Essential hypertension (401.9) (I10)  Hypertension (401.9) (I10)  Impaired gait and mobility (781.2) (R26.89)  Myasthenia gravis (358.00) (G70.00)  OAB 
(overactive bladder) (596.51) (N32.81)  Patient had 2 or more falls in past year (V15.88) (Z91.81)  S/P cholecystectomy (V45.79) (Z90.49)  Type 2 diabetes mellitus with hyperglycemia, with long-term current use of insulin  (250.00,790.29,V58.67) (E11.65,Z79.4)  Warfarin-induced coagulopathy (286.9,E934.2) (D68.9,T45.515A)    Past Medical History  History of S/P cholecystectomy (V45.79) (Z90.49)  History of S/P laparoscopic cholecystectomy (V45.89) (Z90.49)    Social History  Never a smoker  No drug use  Occasional alcohol use    Vitals  119/74, 98.2 87 18 am .      Screening  Depression Screen:   No, she has not felt depressed or down and out over the past 2 months.    No, she has not had a loss of interest in things that normally brings pleasure.    No, she has not felt fatigued or had a loss of energy recently.      Physical Exam  Constitutiontal: alert, in no acute distress and current vital signs reviewed.     Head and Face: atraumatic . Moon face from chronic steroid use.     Eyes: no discharge.     Neck: supple neck and no mass was seen.     Pulmonary: no respiratory distress, normal respiratory rate and effort and no accessory muscle use. breath sounds clear to auscultation bilaterally.     Cardiovascular: normal rate, no murmurs were heard, regular rhythm and normal S1. BLE 1 + edema, chronic.     Abdomen: soft, nontender and normal bowel sounds.     Musculoskeletal: up in w/chair.     Psychiatric: oriented to person and oriented to place. alert and awake and interactive.     Skin, Hair, Nails: normal skin color and pigmentation . L elbow s/p I&D small opening with tunneling CDI, no s/s of infection and drainage noted. Defer to wound care notes for details. César hands and anticubital old bruising noted.     Constitutional: alert, in no acute distress and current vital signs reviewed . Fluctuating 2-3, occasionally forgetful.      Results/Data    Other Results: 8/10 INR 1.9    8/9 INR 2.4, wbc 11.6, hgb 
10.9  8/8 INR 4.8   8/7 INR 4.4, wbc 10.8  8/4 INR 1.4, wbc 14.2, hgb 10.1, bun 34, cr 1.2, gfr 46.8 CKD 3, co2 33, na 141, k 3.6,     Hospital records :   7-21-17 CT of the head without contrast CT cervical spine without contrastâ€“no acute intracranial process, no evidence of acute fracture in the cervical spine.  7-21-17 VD US upper extremities no evidence of DVT.  7/21/17 chest x-rayâ€“normal heart size and clear right lung. X-ray of spine thoracicâ€“no fracture normal alignment, severe DJD.      Assessment  Cellulitis of left elbow (682.3) (L03.114)  Debility (799.3) (R53.81)  DVT (deep venous thrombosis) (453.40) (I82.409)  Edema of both legs (782.3) (R60.0)  Impaired gait and mobility (781.2) (R26.89)  Myasthenia gravis (358.00) (G70.00)  Diarrhea (787.91) (R19.7)  Type 2 diabetes mellitus with hyperglycemia, with long-term current use of insulin  (250.00,790.29,V58.67) (E11.65,Z79.4)    Plan  Left elbow cellulitisâ€“ABT completed  -Wound care, iodoform strip with Ace wraps changey night shift and PRN.   -CBC, CMP labs reviewed, then Mondays and Thursdays.   -VSS monitor  - PT, OT, fall safety precautions.   --ID consult antibiotics completed, f/up PRN.  -Prescription written for labs for Wednesday with home health care nurse  -Home health RN to change wound dressing every day. L elbow iodoform for tunneling and ace wrap to bilateral lower extremities for edema with oozing      Coagulopathy/ Bleeding Hemorroid/ Hx DVT of LLE  -fluctuating INR, 1.6, increased to 6mg on 8/18, next INR 8/19  -8/9 LLE US VD persistent DVT with 10years hx DVT- refer to outpatient oncologist -he is refusing  -Hgb closely monitor, trending up  -Monitor for s/s of bleeding, monitor HR and B/P closely.    Myasthenia gravis -monitor, continue present management.  -prednisone 20 mg po 3 tabs po qd   - pyridostigmine bromide 60 mg tab tid     Edema, chronic BLE  - compression stocking, tubigrip to RLE, elevate BLE.   - continue lasix 40 
mg po qd. No added NA diet.     Diarrhea- 8/18 stool negative for c. diff., diarrhea decreased   -Postpone discharge   -Collect stool for C. difficile, pending  Flagyl 500 mg every 8Ã—10 days stopped after received       Depression- monitor s/s, continue CPM lexapro 10 mg po qd.     HTN- monitor VS Q shift, Atenolol 25 mg po qhs.     DVT ppx coumadin. PPI ppx protonix po.     Plan of care discussed with patient. Plan of care discussed with . Discharge home Saturday 8/19/17 with HHC/PT/OT, Homemaker, Wheels on Wheels, Life alert, medications, prescriptions, and walker. F/u with PCP in one week. Needs PT/INR on monday. HHC to do wound care for pt. to left elbow and ACE wrapts to BLE for chronic edema, LLE DVT with weaping noted, skin intact, no s/s of infection, f/up with hematogist family declined. Set up apt with PCP. Pt verbalized would like to to more closer to her daughters into assisted living, but after reviewed.  communicated discharge with daughter. POC and new orders reviewed with pt and nursing. Please check point click care.   Patients Goals of Care: stabilization to prolong life.   Advanced Care Planning (ACP): Full Code.   End of life decisions were reviewed with the patient.   Prognosis: fair.   Therapy Plan: Physical Therapy, Occupational Therapy and .   Wounds: present. See wound note.   Prophylaxis: Venous Thromboembolism (VTE): Yes.   GI Ulcer: Yes   Discharge Plan: Home and Home Health.      Home Health  Face to Face Encounter:   Date of Face to Face Encounter: 8/11   First & Last Name of Physician/NP/PA Conducting the Visit: CHLOE Cowan.     Medical diagnosis for which face to face encounter was conducted and for which home health care services were ordered:   see note.     Patient Encounter Findings     Subjective information:   see note.     Objective Information (Physical Exam Findings, Test Results, Progress/Lack of Progress, Functional Losses):   see 
note.     Homebound Status   Prior to this encounter, the patient was Able to leave home with minimal effort but there has been a change.     The patient is now confined to the home because of the following medical conditions:   Arthritis and weakness limits endurance and increases the risk for falls outside the home environment.     Because of the conditions cited above, one or more of the following types of assistance to leave home is normally required:   Assistance of another person is required for the patient to safely leave the home.   Supportive Devices are Required to Safely Leave the Home: walker.     Plan     This patient requires skilled nursing to:    Administer infusion therapy that the patient/caregiver cannot safely administer.   Perform skilled: wound care.   Instruct on Disease Management:    Assess and provide instruction on pain management.   This patient requires:    Physical Therapy, Occupational Therapy   To assess and provide instruction on improving functional mobility at home   To assess and provide gait training, strengthening and/or balance exercise to restore the patient's ability to ambulate or transfer safely   To teach patient and caregivers on non-pharmacological pain reduction techniques and strategies   To increase strength and endurance and restore range of motion post-surgery. Surgical Procedure:    To evaluate the need for assistive/adaptive devices or environmental modifications needed to address functional deficits and improve safety in performing ADLs   To provide and instruct on home exercise program        Signatures   Electronically signed by : ARISTEO OSHEA; Aug 20 2017 12:25AM CST    
DISPLAY PLAN FREE TEXT
DISPLAY PLAN FREE TEXT

## 2025-06-26 NOTE — PROGRESS NOTE ADULT - PROBLEM SELECTOR PLAN 8
TSH 06/2025  - 0.13 in 02/2024 TSH was 1.09  Free T4 06/2025 - 1    Likely euthyroid sick syndrome iso of acute illness copd/asthma exacerbation   No signs of goiter    PLAN  > f/u outpatient to repeat TSH in 6-8 weeks and monitor

## 2025-06-26 NOTE — DISCHARGE NOTE NURSING/CASE MANAGEMENT/SOCIAL WORK - NSDCFUADDAPPT_GEN_ALL_CORE_FT
APPTS ARE READY TO BE MADE: [X] YES    Best Family or Patient Contact (if needed):    Additional Information about above appointments (if needed):    1: PCP - Dr. Thornton - 2 weeks  2: Pul home program 1-3 days  3:     Other comments or requests:   HOMEPULM - Appointment was scheduled in [facility] on 7/1 at 12:30pm with Dr Rosie Jimenez via telehealth.

## 2025-06-26 NOTE — PROGRESS NOTE ADULT - PROBLEM SELECTOR PLAN 6
Patient reports sciatic hip pain   Not on any medications at home    PLAN  > C/w gabapentin 300 TID. However patient has been declining medication while here.  > C/w Lidocaine patch
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
## 2025-06-26 NOTE — DISCHARGE NOTE NURSING/CASE MANAGEMENT/SOCIAL WORK - FINANCIAL ASSISTANCE
[facility] provides services at a reduced cost to those who are determined to be eligible through [facility]’s financial assistance program. Information regarding [facility]’s financial assistance program can be found by going to [email or web address] or by calling [phone].

## 2025-06-26 NOTE — DISCHARGE NOTE NURSING/CASE MANAGEMENT/SOCIAL WORK - PATIENT PORTAL LINK FT
You can access the FollowMyHealth Patient Portal offered by [facility] by registering at the following website: [email or web address]. By joining [employer]’s FollowMyHealth portal, you will also be able to view your health information using other applications (apps) compatible with our system.

## 2025-06-26 NOTE — PROGRESS NOTE ADULT - ATTENDING COMMENTS
61yo Female with well controlled HIV, COPD/asthma, HTN, GERD a/w recurrent asthma/COPD exacerbation and DM2.     Patient seen and examined at bedside. Much improved resp symptoms. No longer wheezing on exam, but coarse air entry b/l. On RA, speaking comfortable but coughing intermittently.     The above assessment and plan was modified and supplemented by attending where indicated.  Patient lacks DM education, at length discussion by myself and resident separately trying to explain to her the dx of DM and medication adherence needed.    Can be discharged today w/ close outpt PCP, pulm and endo f/u.
61yo Female with well controlled HIV, COPD/asthma, HTN, GERD a/w recurrent asthma/COPD exacerbation and  new onset likely Type 2 DM.     Patient seen and examined at bedside. Much improved resp symptoms. No longer wheezing on exam, but coarse air entry b/l. On RA, speaking comfortable.     The above assessment and plan was modified and supplemented by attending where indicated.
59yo Female with well controlled HIV, COPD/asthma, HTN, GERD a/w recurrent asthma/COPD exacerbation and  new onset likely Type 2 DM.     Patient seen and examined at bedside. Much improved resp symptoms. No longer wheezing on exam, but coarse air entry b/l. On RA, speaking comfortable but coughing intermittently.     The above assessment and plan was modified and supplemented by attending where indicated.  Patient lacks DM education, at length discussion by myself and resident separately trying to explain to her the dx of DM and medication adherence needed.

## 2025-06-26 NOTE — PROGRESS NOTE ADULT - PROBLEM SELECTOR PLAN 3
Elevated WBC after initiation of prednisone, likely due to systemic steroids  WBC 16.56    PLAN  > Most likely reactive, CTM Elevated WBC after initiation of prednisone, likely due to systemic steroids  WBC 16.56 --> 10.83 (6/26) improving    PLAN  > Most likely reactive, CTM Elevated WBC after initiation of prednisone, likely due to systemic steroids  WBC 16.56 --> 10.83 (6/26) improving    PLAN  > Most likely reactive, CTM and downtrending

## 2025-06-26 NOTE — PROGRESS NOTE ADULT - PROBLEM SELECTOR PLAN 9
Home meds: amlodipine 10 mg qd, Toprol 50 qd     PLAN  > C/w metoprolol succinate 50 mg po qd   > C/w amlodipine 10 mg po qd
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
## 2025-06-30 ENCOUNTER — APPOINTMENT (OUTPATIENT)
Dept: PULMONOLOGY | Facility: CLINIC | Age: 60
End: 2025-06-30

## 2025-06-30 ENCOUNTER — EMERGENCY (EMERGENCY)
Facility: HOSPITAL | Age: 60
LOS: 1 days | End: 2025-06-30
Attending: EMERGENCY MEDICINE | Admitting: EMERGENCY MEDICINE
Payer: MEDICAID

## 2025-06-30 VITALS
HEART RATE: 80 BPM | RESPIRATION RATE: 16 BRPM | TEMPERATURE: 98 F | OXYGEN SATURATION: 99 % | SYSTOLIC BLOOD PRESSURE: 130 MMHG | DIASTOLIC BLOOD PRESSURE: 72 MMHG

## 2025-06-30 VITALS
SYSTOLIC BLOOD PRESSURE: 161 MMHG | HEART RATE: 88 BPM | TEMPERATURE: 98 F | WEIGHT: 207.9 LBS | RESPIRATION RATE: 18 BRPM | DIASTOLIC BLOOD PRESSURE: 84 MMHG | OXYGEN SATURATION: 96 % | HEIGHT: 67 IN

## 2025-06-30 DIAGNOSIS — Z90.2 ACQUIRED ABSENCE OF LUNG [PART OF]: Chronic | ICD-10-CM

## 2025-06-30 DIAGNOSIS — Z90.710 ACQUIRED ABSENCE OF BOTH CERVIX AND UTERUS: Chronic | ICD-10-CM

## 2025-06-30 LAB
ALBUMIN SERPL ELPH-MCNC: 3.9 G/DL — SIGNIFICANT CHANGE UP (ref 3.3–5)
ALP SERPL-CCNC: 137 U/L — HIGH (ref 40–120)
ALT FLD-CCNC: 25 U/L — SIGNIFICANT CHANGE UP (ref 4–33)
ANION GAP SERPL CALC-SCNC: 10 MMOL/L — SIGNIFICANT CHANGE UP (ref 7–14)
APPEARANCE UR: CLEAR — SIGNIFICANT CHANGE UP
AST SERPL-CCNC: 24 U/L — SIGNIFICANT CHANGE UP (ref 4–32)
BASOPHILS # BLD AUTO: 0.03 K/UL — SIGNIFICANT CHANGE UP (ref 0–0.2)
BASOPHILS NFR BLD AUTO: 0.3 % — SIGNIFICANT CHANGE UP (ref 0–2)
BILIRUB SERPL-MCNC: <0.2 MG/DL — SIGNIFICANT CHANGE UP (ref 0.2–1.2)
BILIRUB UR-MCNC: NEGATIVE — SIGNIFICANT CHANGE UP
BUN SERPL-MCNC: 21 MG/DL — SIGNIFICANT CHANGE UP (ref 7–23)
CALCIUM SERPL-MCNC: 9.9 MG/DL — SIGNIFICANT CHANGE UP (ref 8.4–10.5)
CHLORIDE SERPL-SCNC: 106 MMOL/L — SIGNIFICANT CHANGE UP (ref 98–107)
CO2 SERPL-SCNC: 22 MMOL/L — SIGNIFICANT CHANGE UP (ref 22–31)
COLOR SPEC: YELLOW — SIGNIFICANT CHANGE UP
CREAT SERPL-MCNC: 0.95 MG/DL — SIGNIFICANT CHANGE UP (ref 0.5–1.3)
DIFF PNL FLD: NEGATIVE — SIGNIFICANT CHANGE UP
EGFR: 69 ML/MIN/1.73M2 — SIGNIFICANT CHANGE UP
EGFR: 69 ML/MIN/1.73M2 — SIGNIFICANT CHANGE UP
EOSINOPHIL # BLD AUTO: 0.22 K/UL — SIGNIFICANT CHANGE UP (ref 0–0.5)
EOSINOPHIL NFR BLD AUTO: 2.5 % — SIGNIFICANT CHANGE UP (ref 0–6)
FLUAV AG NPH QL: SIGNIFICANT CHANGE UP
FLUBV AG NPH QL: SIGNIFICANT CHANGE UP
GLUCOSE SERPL-MCNC: 120 MG/DL — HIGH (ref 70–99)
GLUCOSE UR QL: NEGATIVE MG/DL — SIGNIFICANT CHANGE UP
HCT VFR BLD CALC: 39.5 % — SIGNIFICANT CHANGE UP (ref 34.5–45)
HGB BLD-MCNC: 12.5 G/DL — SIGNIFICANT CHANGE UP (ref 11.5–15.5)
IMM GRANULOCYTES # BLD AUTO: 0.03 K/UL — SIGNIFICANT CHANGE UP (ref 0–0.07)
IMM GRANULOCYTES NFR BLD AUTO: 0.3 % — SIGNIFICANT CHANGE UP (ref 0–0.9)
KETONES UR QL: NEGATIVE MG/DL — SIGNIFICANT CHANGE UP
LEUKOCYTE ESTERASE UR-ACNC: NEGATIVE — SIGNIFICANT CHANGE UP
LYMPHOCYTES # BLD AUTO: 2.25 K/UL — SIGNIFICANT CHANGE UP (ref 1–3.3)
LYMPHOCYTES NFR BLD AUTO: 25.5 % — SIGNIFICANT CHANGE UP (ref 13–44)
MAGNESIUM SERPL-MCNC: 2.1 MG/DL — SIGNIFICANT CHANGE UP (ref 1.6–2.6)
MCHC RBC-ENTMCNC: 27.8 PG — SIGNIFICANT CHANGE UP (ref 27–34)
MCHC RBC-ENTMCNC: 31.6 G/DL — LOW (ref 32–36)
MCV RBC AUTO: 88 FL — SIGNIFICANT CHANGE UP (ref 80–100)
MONOCYTES # BLD AUTO: 0.48 K/UL — SIGNIFICANT CHANGE UP (ref 0–0.9)
MONOCYTES NFR BLD AUTO: 5.4 % — SIGNIFICANT CHANGE UP (ref 2–14)
NEUTROPHILS # BLD AUTO: 5.83 K/UL — SIGNIFICANT CHANGE UP (ref 1.8–7.4)
NEUTROPHILS NFR BLD AUTO: 66 % — SIGNIFICANT CHANGE UP (ref 43–77)
NITRITE UR-MCNC: NEGATIVE — SIGNIFICANT CHANGE UP
NRBC # BLD AUTO: 0 K/UL — SIGNIFICANT CHANGE UP (ref 0–0)
NRBC # FLD: 0 K/UL — SIGNIFICANT CHANGE UP (ref 0–0)
NRBC BLD AUTO-RTO: 0 /100 WBCS — SIGNIFICANT CHANGE UP (ref 0–0)
PH UR: 6.5 — SIGNIFICANT CHANGE UP (ref 5–8)
PLATELET # BLD AUTO: 424 K/UL — HIGH (ref 150–400)
PMV BLD: 8.7 FL — SIGNIFICANT CHANGE UP (ref 7–13)
POTASSIUM SERPL-MCNC: 4.2 MMOL/L — SIGNIFICANT CHANGE UP (ref 3.5–5.3)
POTASSIUM SERPL-SCNC: 4.2 MMOL/L — SIGNIFICANT CHANGE UP (ref 3.5–5.3)
PROT SERPL-MCNC: 7.2 G/DL — SIGNIFICANT CHANGE UP (ref 6–8.3)
PROT UR-MCNC: 30 MG/DL
RBC # BLD: 4.49 M/UL — SIGNIFICANT CHANGE UP (ref 3.8–5.2)
RBC # FLD: 16.8 % — HIGH (ref 10.3–14.5)
RSV RNA NPH QL NAA+NON-PROBE: SIGNIFICANT CHANGE UP
SARS-COV-2 RNA SPEC QL NAA+PROBE: SIGNIFICANT CHANGE UP
SODIUM SERPL-SCNC: 138 MMOL/L — SIGNIFICANT CHANGE UP (ref 135–145)
SOURCE RESPIRATORY: SIGNIFICANT CHANGE UP
SP GR SPEC: 1.02 — SIGNIFICANT CHANGE UP (ref 1–1.03)
TROPONIN T, HIGH SENSITIVITY RESULT: 12 NG/L — SIGNIFICANT CHANGE UP
UROBILINOGEN FLD QL: 0.2 MG/DL — SIGNIFICANT CHANGE UP (ref 0.2–1)
WBC # BLD: 8.84 K/UL — SIGNIFICANT CHANGE UP (ref 3.8–10.5)
WBC # FLD AUTO: 8.84 K/UL — SIGNIFICANT CHANGE UP (ref 3.8–10.5)

## 2025-06-30 PROCEDURE — 93010 ELECTROCARDIOGRAM REPORT: CPT

## 2025-06-30 PROCEDURE — 70450 CT HEAD/BRAIN W/O DYE: CPT | Mod: 26

## 2025-06-30 PROCEDURE — 99285 EMERGENCY DEPT VISIT HI MDM: CPT

## 2025-06-30 PROCEDURE — 71046 X-RAY EXAM CHEST 2 VIEWS: CPT | Mod: 26

## 2025-06-30 RX ORDER — ONDANSETRON HCL/PF 4 MG/2 ML
1 VIAL (ML) INJECTION
Qty: 9 | Refills: 0
Start: 2025-06-30 | End: 2025-07-02

## 2025-06-30 RX ORDER — METOCLOPRAMIDE HCL 10 MG
10 TABLET ORAL ONCE
Refills: 0 | Status: COMPLETED | OUTPATIENT
Start: 2025-06-30 | End: 2025-06-30

## 2025-06-30 RX ORDER — ACETAMINOPHEN 500 MG/5ML
1000 LIQUID (ML) ORAL ONCE
Refills: 0 | Status: COMPLETED | OUTPATIENT
Start: 2025-06-30 | End: 2025-06-30

## 2025-06-30 RX ADMIN — Medication 2000 MILLILITER(S): at 10:09

## 2025-06-30 RX ADMIN — Medication 400 MILLIGRAM(S): at 10:09

## 2025-06-30 RX ADMIN — Medication 10 MILLIGRAM(S): at 10:09

## 2025-06-30 NOTE — ED ADULT TRIAGE NOTE - CHIEF COMPLAINT QUOTE
pt coming from home, pt c/o headache and dizziness since yesterday.  pt states she recently finished her prednisone taper.  pt has been taking asa with no relief.  Hx:  asthma, DM, HTN,

## 2025-06-30 NOTE — ED PROVIDER NOTE - CLINICAL SUMMARY MEDICAL DECISION MAKING FREE TEXT BOX
60-year-old female history of asthma hypertension GERD HIV on meds hyperlipidemia previous lung CA presenting to the emergency department with concern for lightheadedness, feeling faint, headache, nausea, generalized weakness, nasal congestion that has been ongoing for the last 3 days, had recent admission for asthma exacerbation was treated with steroid course which she completed, reports her respiratory symptoms have completely resolved, and has concerned this may be related to steroid use as she has had this before in context of prednisone.  On exam she is nontoxic-appearing, unlabored breathing, clear lungs, abdomen soft and nontender, no localizing neurologic deficits.  Given history will check labs, x-ray, EKG, viral swab CT brain, hydrate and symptomatically treat, evaluate for differential including but not limited to anemia, electrolyte disturbance, organ dysfunction, infection, mass, possibly med related effect.  Patient in agreement with plan.

## 2025-06-30 NOTE — ED PROVIDER NOTE - PATIENT PORTAL LINK FT
You can access the FollowMyHealth Patient Portal offered by [facility] by registering at the following website: [email or web address]. By joining [email or web address] FollowMyHealth portal, you will also be able to view your health information using other applications (apps) compatible with our system.

## 2025-06-30 NOTE — ED PROVIDER NOTE - NSFOLLOWUPINSTRUCTIONS_ED_ALL_ED_FT
You were seen in the ED for lightheadedness, headache, weakness. YOu had blood work, an ekg, a chest xray and a ct brain performed, your results are included in this packet. You also received medications for your symptoms as well as fluids.  At your request, zofran was sent to your pharmacy for treatment of nausea. You may take 1 tablet of 4mg every 8 hours as needed for nausea/vomiting.  Please hydrate regularly in heated temps.  Please follow up with your doctor within the next 48 hours and bring your results with you.  Please get immediately evaluated or return to the emergency department if you develop worsening headache, vision changes, weakness, numbness, imbalance, confusion, speech difficulty, fevers, persistent vomiting, difficulty breathing, bleeding, swelling or any new or worsening symptoms.

## 2025-06-30 NOTE — ED PROVIDER NOTE - PROGRESS NOTE DETAILS
patient reassessed, reports feeling better, pending final results Ms Quintero reporting she feels much better, ambulating without difficulty, beka po without difficulty, ha and lightheadedness resolved. W/u in ed with no actionable findings. D/w her all results, copies given, instructed on symptoms to monitor for and return precautions. Is already in process of scheduling md f/u-discussed importance of f/u with her in next few days. Zofran sent to pharmacy to help with nausea at home (has none now requesting it just in case). OK for dc. GLEN consulted to assist with medical ride as per patient request. patient reassessed, reports feeling better, pending final results, ekg nl

## 2025-06-30 NOTE — CHART NOTE - NSCHARTNOTEFT_GEN_A_CORE
was informed by provider that patient is discharged and request assistance with getting transportation home.  met with patient at bedside and confirmed her home address. Patient also confirmed she can travel via taxi.       scheduled taxi through [employer] online; Inv# [id number]. Patient will be picked up by Robert Sharma ([phone]).  informed patient of above. No further social work intervention needed.

## 2025-06-30 NOTE — ED ADULT NURSE NOTE - NSFALLRISKINTERV_ED_ALL_ED

## 2025-06-30 NOTE — ED PROVIDER NOTE - IV ALTEPLASE EXCL REL HIDDEN
The patient has been examined and the H&P has been reviewed:    I concur with the findings and no changes have occurred since H&P was written.    Surgery risks, benefits and alternative options discussed and understood by patient/family.          There are no hospital problems to display for this patient.     show

## 2025-06-30 NOTE — ED PROVIDER NOTE - OBJECTIVE STATEMENT
60-year-old female history of asthma hypertension GERD HIV on medication (last viral load undetectable) hyperlipidemia previous lung CA presenting to the emergency department with concern fornausea, lightheadedness, "feeling faint", generalized weakness, generalized gradual onset ha, nasal Congestion, ongoing for 3 days. No fevers chills vision changes, NP, NS, chest pain, shortness of breath, wheezing, cough, abdominal pain, vomiting, diarrhea, dysuria, cough the patient.  Reports she has been tolerating p.o. without difficulty.  States she has had this previously with steroid use relating to her asthma exacerbation treatments, just completed prednisone course and thinks this may be related to that. States her breathing is recovered back to its baseline.

## 2025-06-30 NOTE — ED ADULT NURSE NOTE - OBJECTIVE STATEMENT
Pt is a 59 y/o Female, A&Ox4, ambulatory at baseline with a PHx of DM, HTN, asthma, HIV. Pt presents to the ED with c/o headache, nausea and dizziness starting yesterday after finishing prednisone taper. Pt reports recent hospitalization for asthma exacerbation. Endorses light sensitivity. Neuro/sensory intact. Respirations even and unlabored, chest rise equal b/l. NSR noted on monitor. O2 saturation >95 % on RA. Pt denies chest pain, SOB, fever, cough, chills, abdominal pain, V/D, numbness/tingling or any urinary symptoms at this time. 20g IV placed in right forearm. Labs collected and sent. Medications administered as ordered, see MAR. No acute distress noted. Safety maintained throughout.

## 2025-06-30 NOTE — ED ADULT TRIAGE NOTE - HEIGHT IN FEET
Take 1000mg of Acetaminophen up to 4 times a day (making sure you don't get more than 4000mg of acetaminophen each day INCLUDING ALL MEDICINES THAT CONTAIN ACETAMINOPHEN).   You may also add 400 mg of ibuprofen 3 times a day as this has been shown to have additional benefit. Usually, these medicine are well-tolerated until the pain is well-controlled.   You may add prescription pain medicines AS PRESCRIBED. These medicines can cause dependence over time, starting in as few as 3 days. They may make driving dangerous, even if you don't feel altered. DO NOT DRIVE FOR 12 HOURS after taking these medicines. Also, these medications often cause constipation. Please keep up with your fiber intake and fluids, and discuss over-the-counter laxitives and stool softeners with a pharmacist or doctor.    Seek immediate medical attention if your pain worsens despite medication, if you're unable to walk, if you have any numbness, or if you have any change in your urinary habits.      5

## 2025-06-30 NOTE — ED PROVIDER NOTE - PHYSICAL EXAMINATION
GEN - NAD;  A+O x3   HEAD - NC/AT   EYES- PERRL, EOMI  ENT: Airway patent, dry mm, Oral cavity and pharynx normal. No inflammation, swelling, exudate, or lesions.    NECK: Neck supple  PULMONARY - CTA b/l, symmetric breath sounds.   CARDIAC -s1s2, RRR, no M,G,R  ABDOMEN - +BS, ND, NT, soft, no guarding, no rebound, no masses   BACK - no CVA tenderness, Normal  spine   EXTREMITIES - FROM, symmetric pulses, capillary refill < 2 seconds, no edema   SKIN - no rash or bruising   NEUROLOGIC - ao3, cn2-12 intact, 5/5 strength in all extremities, sensation grossly intact, f-n nl, no dysdiadochokinesia  PSYCH -nl mood/affect, nl insight.

## 2025-07-03 LAB
-  AMOXICILLIN/CLAVULANIC ACID: SIGNIFICANT CHANGE UP
-  AMPICILLIN/SULBACTAM: SIGNIFICANT CHANGE UP
-  AMPICILLIN: SIGNIFICANT CHANGE UP
-  AMPICILLIN: SIGNIFICANT CHANGE UP
-  AZTREONAM: SIGNIFICANT CHANGE UP
-  CEFAZOLIN: SIGNIFICANT CHANGE UP
-  CEFEPIME: SIGNIFICANT CHANGE UP
-  CEFOXITIN: SIGNIFICANT CHANGE UP
-  CEFTRIAXONE: SIGNIFICANT CHANGE UP
-  CEFUROXIME: SIGNIFICANT CHANGE UP
-  CIPROFLOXACIN: SIGNIFICANT CHANGE UP
-  CIPROFLOXACIN: SIGNIFICANT CHANGE UP
-  ERTAPENEM: SIGNIFICANT CHANGE UP
-  GENTAMICIN: SIGNIFICANT CHANGE UP
-  IMIPENEM: SIGNIFICANT CHANGE UP
-  LEVOFLOXACIN: SIGNIFICANT CHANGE UP
-  LEVOFLOXACIN: SIGNIFICANT CHANGE UP
-  MEROPENEM: SIGNIFICANT CHANGE UP
-  NITROFURANTOIN: SIGNIFICANT CHANGE UP
-  NITROFURANTOIN: SIGNIFICANT CHANGE UP
-  PIPERACILLIN/TAZOBACTAM: SIGNIFICANT CHANGE UP
-  TETRACYCLINE: SIGNIFICANT CHANGE UP
-  TIGECYCLINE: SIGNIFICANT CHANGE UP
-  TOBRAMYCIN: SIGNIFICANT CHANGE UP
-  TRIMETHOPRIM/SULFAMETHOXAZOLE: SIGNIFICANT CHANGE UP
-  VANCOMYCIN: SIGNIFICANT CHANGE UP
CULTURE RESULTS: ABNORMAL
METHOD TYPE: SIGNIFICANT CHANGE UP
METHOD TYPE: SIGNIFICANT CHANGE UP
ORGANISM # SPEC MICROSCOPIC CNT: ABNORMAL
SPECIMEN SOURCE: SIGNIFICANT CHANGE UP

## 2025-07-03 RX ORDER — NITROFURANTOIN MACROCRYSTAL 100 MG
1 CAPSULE ORAL
Qty: 14 | Refills: 0
Start: 2025-07-03 | End: 2025-07-09

## 2025-07-03 RX ORDER — NITROFURANTOIN MACROCRYSTAL 100 MG
1 CAPSULE ORAL
Refills: 0
Start: 2025-07-03

## 2025-07-03 RX ORDER — NITROFURANTOIN MACROCRYSTAL 100 MG
10 CAPSULE ORAL
Refills: 0
Start: 2025-07-03

## 2025-07-14 ENCOUNTER — APPOINTMENT (OUTPATIENT)
Dept: PULMONOLOGY | Facility: CLINIC | Age: 60
End: 2025-07-14
Payer: MEDICAID

## 2025-07-14 ENCOUNTER — APPOINTMENT (OUTPATIENT)
Dept: PULMONOLOGY | Facility: CLINIC | Age: 60
End: 2025-07-14

## 2025-07-14 VITALS
SYSTOLIC BLOOD PRESSURE: 146 MMHG | TEMPERATURE: 97.1 F | HEART RATE: 93 BPM | WEIGHT: 198 LBS | BODY MASS INDEX: 31.08 KG/M2 | HEIGHT: 67 IN | DIASTOLIC BLOOD PRESSURE: 87 MMHG | OXYGEN SATURATION: 97 % | RESPIRATION RATE: 16 BRPM

## 2025-07-14 PROBLEM — J39.8 TRACHEOBRONCHOMALACIA: Status: ACTIVE | Noted: 2025-07-14

## 2025-07-14 PROCEDURE — 99214 OFFICE O/P EST MOD 30 MIN: CPT

## 2025-07-14 RX ORDER — FLUTICASONE FUROATE, UMECLIDINIUM BROMIDE AND VILANTEROL TRIFENATATE 200; 62.5; 25 UG/1; UG/1; UG/1
200-62.5-25 POWDER RESPIRATORY (INHALATION)
Qty: 1 | Refills: 4 | Status: ACTIVE | COMMUNITY
Start: 2025-07-14 | End: 1900-01-01

## 2025-07-17 RX ORDER — BLOOD-GLUCOSE SENSOR
EACH MISCELLANEOUS
Qty: 2 | Refills: 11 | Status: ACTIVE | COMMUNITY
Start: 2025-07-17 | End: 1900-01-01

## 2025-07-22 NOTE — ED ADULT NURSE NOTE - PRIMARY CARE PROVIDER
[FreeTextEntry1] : 7-22-25:  It was a pleasure to meet [place] today in consultation. Her respiratory issues are summarized:  1. 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 There were 4R lymph nodes positive on EBUS guided FNA. She was given radiation therapy followed by Osimertinib; however, on Osimertinib 80mg she developed pneumonitis. Osimertinib was stopped and a lower dose was given (40mg); however, she still developed radiation pneumonitis. At that point in time, the decision was made to stop chemotherapy. She had bulky supraclavicular, mediastinal, and bilateral hilar lymph nodes classifying her tumor as Stage IIIB. When we review the CT of the chest from April 2025, there is a significant shrinking of the right upper lobe mass. There is very pronounced perihilar radiation induced pneumonitis, both on the right and the left side. We looked specifically for loss of air bronchograms, new ground glass opacities, and new soft tissue densities with a convex margin to indicate if there was any recurrence. None of these features are present on the CT of the chest. In addition, there were two other findings from 4/4/25. There is a significant right sided pleural effusion which has disappeared and SVC compression from right upper lobe mass that is significantly less. We cannot see if there is a blood clot since the CT was without contrast. However, on the CT from 6/2024, a non-occlusive segmental pulmonary embolus was present in the lateral basal segment of left lower lobe. We had a conversation with Dr. Lloyd. We agreed that a CT of the chest should be repeated in 6 months from the prior CT (October 2025). Dr. Lloyd will see her soon after the CT scan. She will discuss with Dr. Lloyd any other chemotherapy to be started.  We will also order an echocardiogram to evaluate for radiation changes to the heart.  2. Restrictive lung disease FVC is 62% of predicted and TLC is 51% of predicted. This is consistent with concentric restriction from radiation induced pneumonitis. In addition, she had a reduced diffusion capacity of 36%. What is important is that in Dec 2024, she had an improvement of FEV1 by 100mL and 13% improvement in FEV1/FVC with bronchodilator. It is likely that there may be progression resulting in greater reduction from radiation induced pneumonitis.  In December and January, she was able to walk approximately 400 meters without desaturation. We will repeat PFT, 6MWT at this time to assess her functional status  3. PE On Eliquis 5mg BID, agree with anticoagulation for now HAS-BLED score is low: 0  4. Cough Cough is significantly less. She has been tapered off prednisone in May 2025. Bactrim has been stopped. She continues to be on Symbicort. We have suggested that she continue Symbicort for now. This may help with the cough. There was also a reversible component seen on the PFTs that would be addressed  Return to clinic: 6 months unknown

## 2025-08-07 ENCOUNTER — EMERGENCY (EMERGENCY)
Facility: HOSPITAL | Age: 60
LOS: 1 days | End: 2025-08-07
Attending: STUDENT IN AN ORGANIZED HEALTH CARE EDUCATION/TRAINING PROGRAM | Admitting: STUDENT IN AN ORGANIZED HEALTH CARE EDUCATION/TRAINING PROGRAM
Payer: MEDICAID

## 2025-08-07 VITALS
DIASTOLIC BLOOD PRESSURE: 93 MMHG | HEIGHT: 67 IN | HEART RATE: 78 BPM | OXYGEN SATURATION: 98 % | TEMPERATURE: 98 F | RESPIRATION RATE: 18 BRPM | WEIGHT: 229.94 LBS | SYSTOLIC BLOOD PRESSURE: 154 MMHG

## 2025-08-07 VITALS
HEART RATE: 88 BPM | DIASTOLIC BLOOD PRESSURE: 78 MMHG | RESPIRATION RATE: 16 BRPM | SYSTOLIC BLOOD PRESSURE: 116 MMHG | OXYGEN SATURATION: 100 % | TEMPERATURE: 98 F

## 2025-08-07 DIAGNOSIS — Z90.710 ACQUIRED ABSENCE OF BOTH CERVIX AND UTERUS: Chronic | ICD-10-CM

## 2025-08-07 DIAGNOSIS — Z90.2 ACQUIRED ABSENCE OF LUNG [PART OF]: Chronic | ICD-10-CM

## 2025-08-07 LAB
ADD ON TEST-SPECIMEN IN LAB: SIGNIFICANT CHANGE UP
ALBUMIN SERPL ELPH-MCNC: 4.1 G/DL — SIGNIFICANT CHANGE UP (ref 3.3–5)
ALP SERPL-CCNC: 124 U/L — HIGH (ref 40–120)
ALT FLD-CCNC: 26 U/L — SIGNIFICANT CHANGE UP (ref 4–33)
ANION GAP SERPL CALC-SCNC: 12 MMOL/L — SIGNIFICANT CHANGE UP (ref 7–14)
APPEARANCE UR: ABNORMAL
AST SERPL-CCNC: 16 U/L — SIGNIFICANT CHANGE UP (ref 4–32)
BACTERIA # UR AUTO: NEGATIVE /HPF — SIGNIFICANT CHANGE UP
BASOPHILS # BLD AUTO: 0.05 K/UL — SIGNIFICANT CHANGE UP (ref 0–0.2)
BASOPHILS NFR BLD AUTO: 0.4 % — SIGNIFICANT CHANGE UP (ref 0–2)
BILIRUB SERPL-MCNC: 0.4 MG/DL — SIGNIFICANT CHANGE UP (ref 0.2–1.2)
BILIRUB UR-MCNC: NEGATIVE — SIGNIFICANT CHANGE UP
BUN SERPL-MCNC: 16 MG/DL — SIGNIFICANT CHANGE UP (ref 7–23)
CALCIUM SERPL-MCNC: 9.1 MG/DL — SIGNIFICANT CHANGE UP (ref 8.4–10.5)
CAST: 1 /LPF — SIGNIFICANT CHANGE UP (ref 0–4)
CHLORIDE SERPL-SCNC: 104 MMOL/L — SIGNIFICANT CHANGE UP (ref 98–107)
CO2 SERPL-SCNC: 22 MMOL/L — SIGNIFICANT CHANGE UP (ref 22–31)
COLOR SPEC: YELLOW — SIGNIFICANT CHANGE UP
CREAT SERPL-MCNC: 0.89 MG/DL — SIGNIFICANT CHANGE UP (ref 0.5–1.3)
DIFF PNL FLD: ABNORMAL
EGFR: 74 ML/MIN/1.73M2 — SIGNIFICANT CHANGE UP
EGFR: 74 ML/MIN/1.73M2 — SIGNIFICANT CHANGE UP
EOSINOPHIL # BLD AUTO: 0.19 K/UL — SIGNIFICANT CHANGE UP (ref 0–0.5)
EOSINOPHIL NFR BLD AUTO: 1.5 % — SIGNIFICANT CHANGE UP (ref 0–6)
FLUAV AG NPH QL: SIGNIFICANT CHANGE UP
FLUBV AG NPH QL: SIGNIFICANT CHANGE UP
GLUCOSE SERPL-MCNC: 114 MG/DL — HIGH (ref 70–99)
GLUCOSE UR QL: NEGATIVE MG/DL — SIGNIFICANT CHANGE UP
HCT VFR BLD CALC: 41.4 % — SIGNIFICANT CHANGE UP (ref 34.5–45)
HGB BLD-MCNC: 13.1 G/DL — SIGNIFICANT CHANGE UP (ref 11.5–15.5)
IMM GRANULOCYTES # BLD AUTO: 0.06 K/UL — SIGNIFICANT CHANGE UP (ref 0–0.07)
IMM GRANULOCYTES NFR BLD AUTO: 0.5 % — SIGNIFICANT CHANGE UP (ref 0–0.9)
KETONES UR QL: NEGATIVE MG/DL — SIGNIFICANT CHANGE UP
LEGIONELLA AG UR QL: NEGATIVE — SIGNIFICANT CHANGE UP
LEUKOCYTE ESTERASE UR-ACNC: ABNORMAL
LYMPHOCYTES # BLD AUTO: 4.7 K/UL — HIGH (ref 1–3.3)
LYMPHOCYTES NFR BLD AUTO: 37 % — SIGNIFICANT CHANGE UP (ref 13–44)
MAGNESIUM SERPL-MCNC: 2 MG/DL — SIGNIFICANT CHANGE UP (ref 1.6–2.6)
MCHC RBC-ENTMCNC: 28 PG — SIGNIFICANT CHANGE UP (ref 27–34)
MCHC RBC-ENTMCNC: 31.6 G/DL — LOW (ref 32–36)
MCV RBC AUTO: 88.5 FL — SIGNIFICANT CHANGE UP (ref 80–100)
MONOCYTES # BLD AUTO: 0.62 K/UL — SIGNIFICANT CHANGE UP (ref 0–0.9)
MONOCYTES NFR BLD AUTO: 4.9 % — SIGNIFICANT CHANGE UP (ref 2–14)
NEUTROPHILS # BLD AUTO: 7.08 K/UL — SIGNIFICANT CHANGE UP (ref 1.8–7.4)
NEUTROPHILS NFR BLD AUTO: 55.7 % — SIGNIFICANT CHANGE UP (ref 43–77)
NITRITE UR-MCNC: NEGATIVE — SIGNIFICANT CHANGE UP
NRBC # BLD AUTO: 0 K/UL — SIGNIFICANT CHANGE UP (ref 0–0)
NRBC # FLD: 0 K/UL — SIGNIFICANT CHANGE UP (ref 0–0)
NRBC BLD AUTO-RTO: 0 /100 WBCS — SIGNIFICANT CHANGE UP (ref 0–0)
NT-PROBNP SERPL-SCNC: 215 PG/ML — SIGNIFICANT CHANGE UP
PH UR: 6.5 — SIGNIFICANT CHANGE UP (ref 5–8)
PLATELET # BLD AUTO: 415 K/UL — HIGH (ref 150–400)
PMV BLD: 8.6 FL — SIGNIFICANT CHANGE UP (ref 7–13)
POTASSIUM SERPL-MCNC: 3.8 MMOL/L — SIGNIFICANT CHANGE UP (ref 3.5–5.3)
POTASSIUM SERPL-SCNC: 3.8 MMOL/L — SIGNIFICANT CHANGE UP (ref 3.5–5.3)
PROCALCITONIN SERPL-MCNC: 0.03 NG/ML — SIGNIFICANT CHANGE UP (ref 0.02–0.1)
PROT SERPL-MCNC: 6.9 G/DL — SIGNIFICANT CHANGE UP (ref 6–8.3)
PROT UR-MCNC: 100 MG/DL
RBC # BLD: 4.68 M/UL — SIGNIFICANT CHANGE UP (ref 3.8–5.2)
RBC # FLD: 16.8 % — HIGH (ref 10.3–14.5)
RBC CASTS # UR COMP ASSIST: 1 /HPF — SIGNIFICANT CHANGE UP (ref 0–4)
RSV RNA NPH QL NAA+NON-PROBE: SIGNIFICANT CHANGE UP
S PNEUM AG UR QL: NEGATIVE — SIGNIFICANT CHANGE UP
SARS-COV-2 RNA SPEC QL NAA+PROBE: SIGNIFICANT CHANGE UP
SODIUM SERPL-SCNC: 138 MMOL/L — SIGNIFICANT CHANGE UP (ref 135–145)
SOURCE RESPIRATORY: SIGNIFICANT CHANGE UP
SP GR SPEC: 1.02 — SIGNIFICANT CHANGE UP (ref 1–1.03)
SQUAMOUS # UR AUTO: 11 /HPF — HIGH (ref 0–5)
TROPONIN T, HIGH SENSITIVITY RESULT: 14 NG/L — SIGNIFICANT CHANGE UP
UROBILINOGEN FLD QL: 0.2 MG/DL — SIGNIFICANT CHANGE UP (ref 0.2–1)
WBC # BLD: 12.7 K/UL — HIGH (ref 3.8–10.5)
WBC # FLD AUTO: 12.7 K/UL — HIGH (ref 3.8–10.5)
WBC UR QL: 3 /HPF — SIGNIFICANT CHANGE UP (ref 0–5)

## 2025-08-07 PROCEDURE — 99285 EMERGENCY DEPT VISIT HI MDM: CPT | Mod: 25

## 2025-08-07 PROCEDURE — 93010 ELECTROCARDIOGRAM REPORT: CPT

## 2025-08-07 PROCEDURE — 71275 CT ANGIOGRAPHY CHEST: CPT | Mod: 26

## 2025-08-07 RX ORDER — MECLIZINE HCL 12.5 MG
25 TABLET ORAL ONCE
Refills: 0 | Status: DISCONTINUED | OUTPATIENT
Start: 2025-08-07 | End: 2025-08-07

## 2025-08-07 RX ORDER — CIPROFLOXACIN HCL 250 MG
1 TABLET ORAL
Qty: 7 | Refills: 0
Start: 2025-08-07 | End: 2025-08-13

## 2025-08-07 RX ORDER — PROCHLORPERAZINE 25 MG
10 SUPPOSITORY, RECTAL RECTAL ONCE
Refills: 0 | Status: DISCONTINUED | OUTPATIENT
Start: 2025-08-07 | End: 2025-08-07

## 2025-08-07 RX ORDER — ACETAMINOPHEN 500 MG/5ML
1000 LIQUID (ML) ORAL ONCE
Refills: 0 | Status: COMPLETED | OUTPATIENT
Start: 2025-08-07 | End: 2025-08-07

## 2025-08-07 RX ORDER — METOCLOPRAMIDE HCL 10 MG
10 TABLET ORAL ONCE
Refills: 0 | Status: COMPLETED | OUTPATIENT
Start: 2025-08-07 | End: 2025-08-07

## 2025-08-07 RX ADMIN — Medication 10 MILLIGRAM(S): at 07:17

## 2025-08-07 RX ADMIN — Medication 1000 MILLILITER(S): at 07:17

## 2025-08-07 RX ADMIN — Medication 400 MILLIGRAM(S): at 07:17

## 2025-08-07 RX ADMIN — Medication 1000 MILLILITER(S): at 10:00

## 2025-08-10 LAB
-  AMPICILLIN: SIGNIFICANT CHANGE UP
-  CIPROFLOXACIN: SIGNIFICANT CHANGE UP
-  LEVOFLOXACIN: SIGNIFICANT CHANGE UP
-  NITROFURANTOIN: SIGNIFICANT CHANGE UP
-  TETRACYCLINE: SIGNIFICANT CHANGE UP
-  VANCOMYCIN: SIGNIFICANT CHANGE UP
CULTURE RESULTS: ABNORMAL
METHOD TYPE: SIGNIFICANT CHANGE UP
ORGANISM # SPEC MICROSCOPIC CNT: ABNORMAL
ORGANISM # SPEC MICROSCOPIC CNT: ABNORMAL
SPECIMEN SOURCE: SIGNIFICANT CHANGE UP

## 2025-08-15 ENCOUNTER — EMERGENCY (EMERGENCY)
Facility: HOSPITAL | Age: 60
LOS: 1 days | End: 2025-08-15
Attending: EMERGENCY MEDICINE | Admitting: EMERGENCY MEDICINE
Payer: MEDICAID

## 2025-08-15 VITALS
HEIGHT: 67 IN | OXYGEN SATURATION: 97 % | RESPIRATION RATE: 17 BRPM | WEIGHT: 199.96 LBS | HEART RATE: 93 BPM | DIASTOLIC BLOOD PRESSURE: 98 MMHG | TEMPERATURE: 99 F | SYSTOLIC BLOOD PRESSURE: 157 MMHG

## 2025-08-15 VITALS
HEART RATE: 96 BPM | DIASTOLIC BLOOD PRESSURE: 89 MMHG | TEMPERATURE: 98 F | OXYGEN SATURATION: 98 % | SYSTOLIC BLOOD PRESSURE: 150 MMHG | RESPIRATION RATE: 18 BRPM

## 2025-08-15 DIAGNOSIS — Z90.710 ACQUIRED ABSENCE OF BOTH CERVIX AND UTERUS: Chronic | ICD-10-CM

## 2025-08-15 DIAGNOSIS — Z90.2 ACQUIRED ABSENCE OF LUNG [PART OF]: Chronic | ICD-10-CM

## 2025-08-15 LAB
ALBUMIN SERPL ELPH-MCNC: 4.2 G/DL — SIGNIFICANT CHANGE UP (ref 3.3–5)
ALP SERPL-CCNC: 137 U/L — HIGH (ref 40–120)
ALT FLD-CCNC: 22 U/L — SIGNIFICANT CHANGE UP (ref 4–33)
ANION GAP SERPL CALC-SCNC: 15 MMOL/L — HIGH (ref 7–14)
AST SERPL-CCNC: 21 U/L — SIGNIFICANT CHANGE UP (ref 4–32)
BILIRUB SERPL-MCNC: 0.2 MG/DL — SIGNIFICANT CHANGE UP (ref 0.2–1.2)
BUN SERPL-MCNC: 15 MG/DL — SIGNIFICANT CHANGE UP (ref 7–23)
CALCIUM SERPL-MCNC: 9.4 MG/DL — SIGNIFICANT CHANGE UP (ref 8.4–10.5)
CHLORIDE SERPL-SCNC: 105 MMOL/L — SIGNIFICANT CHANGE UP (ref 98–107)
CO2 SERPL-SCNC: 23 MMOL/L — SIGNIFICANT CHANGE UP (ref 22–31)
CREAT SERPL-MCNC: 1.14 MG/DL — SIGNIFICANT CHANGE UP (ref 0.5–1.3)
EGFR: 55 ML/MIN/1.73M2 — LOW
EGFR: 55 ML/MIN/1.73M2 — LOW
FLUAV AG NPH QL: SIGNIFICANT CHANGE UP
FLUBV AG NPH QL: SIGNIFICANT CHANGE UP
GLUCOSE SERPL-MCNC: 139 MG/DL — HIGH (ref 70–99)
HCT VFR BLD CALC: 41.2 % — SIGNIFICANT CHANGE UP (ref 34.5–45)
HGB BLD-MCNC: 13.4 G/DL — SIGNIFICANT CHANGE UP (ref 11.5–15.5)
MCHC RBC-ENTMCNC: 28.4 PG — SIGNIFICANT CHANGE UP (ref 27–34)
MCHC RBC-ENTMCNC: 32.5 G/DL — SIGNIFICANT CHANGE UP (ref 32–36)
MCV RBC AUTO: 87.3 FL — SIGNIFICANT CHANGE UP (ref 80–100)
NRBC # BLD AUTO: 0 K/UL — SIGNIFICANT CHANGE UP (ref 0–0)
NRBC # FLD: 0 K/UL — SIGNIFICANT CHANGE UP (ref 0–0)
NRBC BLD AUTO-RTO: 0 /100 WBCS — SIGNIFICANT CHANGE UP (ref 0–0)
PLATELET # BLD AUTO: 392 K/UL — SIGNIFICANT CHANGE UP (ref 150–400)
PMV BLD: 8.8 FL — SIGNIFICANT CHANGE UP (ref 7–13)
POTASSIUM SERPL-MCNC: 3.2 MMOL/L — LOW (ref 3.5–5.3)
POTASSIUM SERPL-SCNC: 3.2 MMOL/L — LOW (ref 3.5–5.3)
PROT SERPL-MCNC: 7.4 G/DL — SIGNIFICANT CHANGE UP (ref 6–8.3)
RBC # BLD: 4.72 M/UL — SIGNIFICANT CHANGE UP (ref 3.8–5.2)
RBC # FLD: 15.8 % — HIGH (ref 10.3–14.5)
RSV RNA NPH QL NAA+NON-PROBE: SIGNIFICANT CHANGE UP
SARS-COV-2 RNA SPEC QL NAA+PROBE: SIGNIFICANT CHANGE UP
SODIUM SERPL-SCNC: 143 MMOL/L — SIGNIFICANT CHANGE UP (ref 135–145)
SOURCE RESPIRATORY: SIGNIFICANT CHANGE UP
TROPONIN T, HIGH SENSITIVITY RESULT: 15 NG/L — SIGNIFICANT CHANGE UP
WBC # BLD: 9.69 K/UL — SIGNIFICANT CHANGE UP (ref 3.8–10.5)
WBC # FLD AUTO: 9.69 K/UL — SIGNIFICANT CHANGE UP (ref 3.8–10.5)

## 2025-08-15 PROCEDURE — 93010 ELECTROCARDIOGRAM REPORT: CPT

## 2025-08-15 PROCEDURE — 99285 EMERGENCY DEPT VISIT HI MDM: CPT

## 2025-08-15 PROCEDURE — 71046 X-RAY EXAM CHEST 2 VIEWS: CPT | Mod: 26

## 2025-08-15 PROCEDURE — 70491 CT SOFT TISSUE NECK W/DYE: CPT | Mod: 26

## 2025-08-15 RX ORDER — IPRATROPIUM BROMIDE AND ALBUTEROL SULFATE .5; 2.5 MG/3ML; MG/3ML
3 SOLUTION RESPIRATORY (INHALATION) ONCE
Refills: 0 | Status: DISCONTINUED | OUTPATIENT
Start: 2025-08-15 | End: 2025-08-15

## 2025-08-15 RX ADMIN — Medication 40 MILLIEQUIVALENT(S): at 20:45

## 2025-08-27 ENCOUNTER — APPOINTMENT (OUTPATIENT)
Dept: PULMONOLOGY | Facility: CLINIC | Age: 60
End: 2025-08-27

## 2025-08-28 ENCOUNTER — INPATIENT (INPATIENT)
Facility: HOSPITAL | Age: 60
LOS: 1 days | Discharge: HOME | End: 2025-08-30
Attending: STUDENT IN AN ORGANIZED HEALTH CARE EDUCATION/TRAINING PROGRAM | Admitting: STUDENT IN AN ORGANIZED HEALTH CARE EDUCATION/TRAINING PROGRAM
Payer: MEDICAID

## 2025-08-28 VITALS
SYSTOLIC BLOOD PRESSURE: 149 MMHG | DIASTOLIC BLOOD PRESSURE: 79 MMHG | RESPIRATION RATE: 18 BRPM | HEIGHT: 67 IN | HEART RATE: 89 BPM | TEMPERATURE: 98 F | OXYGEN SATURATION: 96 % | WEIGHT: 199.96 LBS

## 2025-08-28 DIAGNOSIS — B20 HUMAN IMMUNODEFICIENCY VIRUS [HIV] DISEASE: ICD-10-CM

## 2025-08-28 DIAGNOSIS — Z87.09 PERSONAL HISTORY OF OTHER DISEASES OF THE RESPIRATORY SYSTEM: ICD-10-CM

## 2025-08-28 DIAGNOSIS — J18.9 PNEUMONIA, UNSPECIFIED ORGANISM: ICD-10-CM

## 2025-08-28 DIAGNOSIS — Z90.710 ACQUIRED ABSENCE OF BOTH CERVIX AND UTERUS: Chronic | ICD-10-CM

## 2025-08-28 DIAGNOSIS — Z90.2 ACQUIRED ABSENCE OF LUNG [PART OF]: Chronic | ICD-10-CM

## 2025-08-28 DIAGNOSIS — R53.1 WEAKNESS: ICD-10-CM

## 2025-08-28 DIAGNOSIS — J44.9 CHRONIC OBSTRUCTIVE PULMONARY DISEASE, UNSPECIFIED: ICD-10-CM

## 2025-08-28 DIAGNOSIS — Z29.9 ENCOUNTER FOR PROPHYLACTIC MEASURES, UNSPECIFIED: ICD-10-CM

## 2025-08-28 DIAGNOSIS — I10 ESSENTIAL (PRIMARY) HYPERTENSION: ICD-10-CM

## 2025-08-28 DIAGNOSIS — C34.90 MALIGNANT NEOPLASM OF UNSPECIFIED PART OF UNSPECIFIED BRONCHUS OR LUNG: ICD-10-CM

## 2025-08-28 LAB
ADD ON TEST-SPECIMEN IN LAB: SIGNIFICANT CHANGE UP
ALBUMIN SERPL ELPH-MCNC: 3.9 G/DL — SIGNIFICANT CHANGE UP (ref 3.3–5)
ALP SERPL-CCNC: 177 U/L — HIGH (ref 40–120)
ALT FLD-CCNC: 31 U/L — SIGNIFICANT CHANGE UP (ref 4–33)
ANION GAP SERPL CALC-SCNC: 13 MMOL/L — SIGNIFICANT CHANGE UP (ref 7–14)
APTT BLD: 29.1 SEC — SIGNIFICANT CHANGE UP (ref 26.1–36.8)
AST SERPL-CCNC: 28 U/L — SIGNIFICANT CHANGE UP (ref 4–32)
B PERT DNA SPEC QL NAA+PROBE: SIGNIFICANT CHANGE UP
B PERT+PARAPERT DNA PNL SPEC NAA+PROBE: SIGNIFICANT CHANGE UP
BASOPHILS # BLD AUTO: 0.07 K/UL — SIGNIFICANT CHANGE UP (ref 0–0.2)
BASOPHILS NFR BLD AUTO: 0.4 % — SIGNIFICANT CHANGE UP (ref 0–2)
BILIRUB SERPL-MCNC: 0.3 MG/DL — SIGNIFICANT CHANGE UP (ref 0.2–1.2)
BLD GP AB SCN SERPL QL: NEGATIVE — SIGNIFICANT CHANGE UP
BUN SERPL-MCNC: 10 MG/DL — SIGNIFICANT CHANGE UP (ref 7–23)
C PNEUM DNA SPEC QL NAA+PROBE: SIGNIFICANT CHANGE UP
CALCIUM SERPL-MCNC: 9.5 MG/DL — SIGNIFICANT CHANGE UP (ref 8.4–10.5)
CHLORIDE SERPL-SCNC: 103 MMOL/L — SIGNIFICANT CHANGE UP (ref 98–107)
CO2 SERPL-SCNC: 23 MMOL/L — SIGNIFICANT CHANGE UP (ref 22–31)
CREAT SERPL-MCNC: 0.98 MG/DL — SIGNIFICANT CHANGE UP (ref 0.5–1.3)
CRP SERPL-MCNC: 92.1 MG/L — HIGH
EGFR: 66 ML/MIN/1.73M2 — SIGNIFICANT CHANGE UP
EGFR: 66 ML/MIN/1.73M2 — SIGNIFICANT CHANGE UP
EOSINOPHIL # BLD AUTO: 0.17 K/UL — SIGNIFICANT CHANGE UP (ref 0–0.5)
EOSINOPHIL NFR BLD AUTO: 1.1 % — SIGNIFICANT CHANGE UP (ref 0–6)
ERYTHROCYTE [SEDIMENTATION RATE] IN BLOOD: 74 MM/HR — HIGH (ref 0–20)
FLUAV AG NPH QL: SIGNIFICANT CHANGE UP
FLUAV SUBTYP SPEC NAA+PROBE: SIGNIFICANT CHANGE UP
FLUBV AG NPH QL: SIGNIFICANT CHANGE UP
FLUBV RNA SPEC QL NAA+PROBE: SIGNIFICANT CHANGE UP
GLUCOSE BLDC GLUCOMTR-MCNC: 119 MG/DL — HIGH (ref 70–99)
GLUCOSE BLDC GLUCOMTR-MCNC: 131 MG/DL — HIGH (ref 70–99)
GLUCOSE BLDC GLUCOMTR-MCNC: 133 MG/DL — HIGH (ref 70–99)
GLUCOSE BLDC GLUCOMTR-MCNC: 169 MG/DL — HIGH (ref 70–99)
GLUCOSE SERPL-MCNC: 109 MG/DL — HIGH (ref 70–99)
HADV DNA SPEC QL NAA+PROBE: SIGNIFICANT CHANGE UP
HCOV 229E RNA SPEC QL NAA+PROBE: SIGNIFICANT CHANGE UP
HCOV HKU1 RNA SPEC QL NAA+PROBE: SIGNIFICANT CHANGE UP
HCOV NL63 RNA SPEC QL NAA+PROBE: SIGNIFICANT CHANGE UP
HCOV OC43 RNA SPEC QL NAA+PROBE: SIGNIFICANT CHANGE UP
HCT VFR BLD CALC: 39 % — SIGNIFICANT CHANGE UP (ref 34.5–45)
HGB BLD-MCNC: 12.4 G/DL — SIGNIFICANT CHANGE UP (ref 11.5–15.5)
HMPV RNA SPEC QL NAA+PROBE: SIGNIFICANT CHANGE UP
HPIV1 RNA SPEC QL NAA+PROBE: SIGNIFICANT CHANGE UP
HPIV2 RNA SPEC QL NAA+PROBE: SIGNIFICANT CHANGE UP
HPIV3 RNA SPEC QL NAA+PROBE: SIGNIFICANT CHANGE UP
HPIV4 RNA SPEC QL NAA+PROBE: SIGNIFICANT CHANGE UP
IMM GRANULOCYTES # BLD AUTO: 0.06 K/UL — SIGNIFICANT CHANGE UP (ref 0–0.07)
IMM GRANULOCYTES NFR BLD AUTO: 0.4 % — SIGNIFICANT CHANGE UP (ref 0–0.9)
INR BLD: 0.97 RATIO — SIGNIFICANT CHANGE UP (ref 0.85–1.16)
IRON SATN MFR SERPL: 10 % — LOW (ref 14–50)
IRON SATN MFR SERPL: 31 UG/DL — SIGNIFICANT CHANGE UP (ref 30–160)
LIDOCAIN IGE QN: 27 U/L — SIGNIFICANT CHANGE UP (ref 7–60)
LYMPHOCYTES # BLD AUTO: 2.81 K/UL — SIGNIFICANT CHANGE UP (ref 1–3.3)
LYMPHOCYTES NFR BLD AUTO: 17.6 % — SIGNIFICANT CHANGE UP (ref 13–44)
M PNEUMO DNA SPEC QL NAA+PROBE: SIGNIFICANT CHANGE UP
MCHC RBC-ENTMCNC: 28.6 PG — SIGNIFICANT CHANGE UP (ref 27–34)
MCHC RBC-ENTMCNC: 31.8 G/DL — LOW (ref 32–36)
MCV RBC AUTO: 90.1 FL — SIGNIFICANT CHANGE UP (ref 80–100)
MONOCYTES # BLD AUTO: 0.83 K/UL — SIGNIFICANT CHANGE UP (ref 0–0.9)
MONOCYTES NFR BLD AUTO: 5.2 % — SIGNIFICANT CHANGE UP (ref 2–14)
NEUTROPHILS # BLD AUTO: 12.01 K/UL — HIGH (ref 1.8–7.4)
NEUTROPHILS NFR BLD AUTO: 75.3 % — SIGNIFICANT CHANGE UP (ref 43–77)
NRBC # BLD AUTO: 0 K/UL — SIGNIFICANT CHANGE UP (ref 0–0)
NRBC # FLD: 0 K/UL — SIGNIFICANT CHANGE UP (ref 0–0)
NRBC BLD AUTO-RTO: 0 /100 WBCS — SIGNIFICANT CHANGE UP (ref 0–0)
PLATELET # BLD AUTO: 519 K/UL — HIGH (ref 150–400)
PMV BLD: 8.8 FL — SIGNIFICANT CHANGE UP (ref 7–13)
POTASSIUM SERPL-MCNC: 3.9 MMOL/L — SIGNIFICANT CHANGE UP (ref 3.5–5.3)
POTASSIUM SERPL-SCNC: 3.9 MMOL/L — SIGNIFICANT CHANGE UP (ref 3.5–5.3)
PROCALCITONIN SERPL-MCNC: 0.05 NG/ML — SIGNIFICANT CHANGE UP (ref 0.02–0.1)
PROT SERPL-MCNC: 7.9 G/DL — SIGNIFICANT CHANGE UP (ref 6–8.3)
PROTHROM AB SERPL-ACNC: 11.2 SEC — SIGNIFICANT CHANGE UP (ref 9.9–13.4)
RAPID RVP RESULT: SIGNIFICANT CHANGE UP
RBC # BLD: 4.33 M/UL — SIGNIFICANT CHANGE UP (ref 3.8–5.2)
RBC # FLD: 15.8 % — HIGH (ref 10.3–14.5)
RH IG SCN BLD-IMP: POSITIVE — SIGNIFICANT CHANGE UP
RSV RNA NPH QL NAA+NON-PROBE: SIGNIFICANT CHANGE UP
RSV RNA SPEC QL NAA+PROBE: SIGNIFICANT CHANGE UP
RV+EV RNA SPEC QL NAA+PROBE: SIGNIFICANT CHANGE UP
SARS-COV-2 RNA SPEC QL NAA+PROBE: SIGNIFICANT CHANGE UP
SARS-COV-2 RNA SPEC QL NAA+PROBE: SIGNIFICANT CHANGE UP
SODIUM SERPL-SCNC: 139 MMOL/L — SIGNIFICANT CHANGE UP (ref 135–145)
SOURCE RESPIRATORY: SIGNIFICANT CHANGE UP
TIBC SERPL-MCNC: 322 UG/DL — SIGNIFICANT CHANGE UP (ref 220–430)
TROPONIN T, HIGH SENSITIVITY RESULT: 12 NG/L — SIGNIFICANT CHANGE UP
TROPONIN T, HIGH SENSITIVITY RESULT: 13 NG/L — SIGNIFICANT CHANGE UP
TSH SERPL-MCNC: 0.42 UIU/ML — SIGNIFICANT CHANGE UP (ref 0.27–4.2)
UIBC SERPL-MCNC: 291 UG/DL — SIGNIFICANT CHANGE UP (ref 110–370)
VIT B12 SERPL-MCNC: 952 PG/ML — HIGH (ref 200–900)
WBC # BLD: 15.95 K/UL — HIGH (ref 3.8–10.5)
WBC # FLD AUTO: 15.95 K/UL — HIGH (ref 3.8–10.5)

## 2025-08-28 PROCEDURE — 71250 CT THORAX DX C-: CPT | Mod: 26

## 2025-08-28 PROCEDURE — 99285 EMERGENCY DEPT VISIT HI MDM: CPT

## 2025-08-28 PROCEDURE — 99223 1ST HOSP IP/OBS HIGH 75: CPT

## 2025-08-28 RX ORDER — IPRATROPIUM BROMIDE AND ALBUTEROL SULFATE .5; 2.5 MG/3ML; MG/3ML
3 SOLUTION RESPIRATORY (INHALATION) ONCE
Refills: 0 | Status: COMPLETED | OUTPATIENT
Start: 2025-08-28 | End: 2025-08-28

## 2025-08-28 RX ORDER — HYDROXYZINE HYDROCHLORIDE 25 MG/1
25 TABLET, FILM COATED ORAL ONCE
Refills: 0 | Status: COMPLETED | OUTPATIENT
Start: 2025-08-28 | End: 2025-08-28

## 2025-08-28 RX ORDER — LIDOCAINE HYDROCHLORIDE 20 MG/ML
15 JELLY TOPICAL ONCE
Refills: 0 | Status: COMPLETED | OUTPATIENT
Start: 2025-08-28 | End: 2025-08-28

## 2025-08-28 RX ORDER — CEFTRIAXONE 500 MG/1
1000 INJECTION, POWDER, FOR SOLUTION INTRAMUSCULAR; INTRAVENOUS EVERY 24 HOURS
Refills: 0 | Status: DISCONTINUED | OUTPATIENT
Start: 2025-08-29 | End: 2025-08-30

## 2025-08-28 RX ORDER — KETOROLAC TROMETHAMINE 30 MG/ML
15 INJECTION, SOLUTION INTRAMUSCULAR; INTRAVENOUS ONCE
Refills: 0 | Status: DISCONTINUED | OUTPATIENT
Start: 2025-08-28 | End: 2025-08-28

## 2025-08-28 RX ORDER — DEXTROSE 50 % IN WATER 50 %
25 SYRINGE (ML) INTRAVENOUS ONCE
Refills: 0 | Status: DISCONTINUED | OUTPATIENT
Start: 2025-08-28 | End: 2025-08-30

## 2025-08-28 RX ORDER — DEXTROSE 50 % IN WATER 50 %
15 SYRINGE (ML) INTRAVENOUS ONCE
Refills: 0 | Status: DISCONTINUED | OUTPATIENT
Start: 2025-08-28 | End: 2025-08-30

## 2025-08-28 RX ORDER — GLUCAGON 3 MG/1
1 POWDER NASAL ONCE
Refills: 0 | Status: DISCONTINUED | OUTPATIENT
Start: 2025-08-28 | End: 2025-08-30

## 2025-08-28 RX ORDER — EFAVIRENZ, EMTRICITABINE AND TENOFOVIR DISOPROXIL FUMARATE 600; 200; 300 MG/1; MG/1; MG/1
1 TABLET, FILM COATED ORAL AT BEDTIME
Refills: 0 | Status: DISCONTINUED | OUTPATIENT
Start: 2025-08-28 | End: 2025-08-30

## 2025-08-28 RX ORDER — TIOTROPIUM BROMIDE AND OLODATEROL 3.124; 2.736 UG/1; UG/1
2 SPRAY, METERED RESPIRATORY (INHALATION) DAILY
Refills: 0 | Status: DISCONTINUED | OUTPATIENT
Start: 2025-08-28 | End: 2025-08-30

## 2025-08-28 RX ORDER — LIDOCAINE HYDROCHLORIDE 20 MG/ML
1 JELLY TOPICAL DAILY
Refills: 0 | Status: DISCONTINUED | OUTPATIENT
Start: 2025-08-28 | End: 2025-08-30

## 2025-08-28 RX ORDER — INSULIN LISPRO 100 U/ML
INJECTION, SOLUTION INTRAVENOUS; SUBCUTANEOUS AT BEDTIME
Refills: 0 | Status: DISCONTINUED | OUTPATIENT
Start: 2025-08-28 | End: 2025-08-30

## 2025-08-28 RX ORDER — SODIUM CHLORIDE 9 G/1000ML
1000 INJECTION, SOLUTION INTRAVENOUS
Refills: 0 | Status: DISCONTINUED | OUTPATIENT
Start: 2025-08-28 | End: 2025-08-30

## 2025-08-28 RX ORDER — MOMETASONE FUROATE 220 UG/1
1 INHALANT RESPIRATORY (INHALATION) DAILY
Refills: 0 | Status: DISCONTINUED | OUTPATIENT
Start: 2025-08-28 | End: 2025-08-30

## 2025-08-28 RX ORDER — METOCLOPRAMIDE HCL 10 MG
10 TABLET ORAL ONCE
Refills: 0 | Status: COMPLETED | OUTPATIENT
Start: 2025-08-28 | End: 2025-08-28

## 2025-08-28 RX ORDER — MAGNESIUM, ALUMINUM HYDROXIDE 200-200 MG
30 TABLET,CHEWABLE ORAL EVERY 6 HOURS
Refills: 0 | Status: DISCONTINUED | OUTPATIENT
Start: 2025-08-28 | End: 2025-08-30

## 2025-08-28 RX ORDER — ROSUVASTATIN CALCIUM 20 MG/1
10 TABLET, FILM COATED ORAL AT BEDTIME
Refills: 0 | Status: DISCONTINUED | OUTPATIENT
Start: 2025-08-28 | End: 2025-08-30

## 2025-08-28 RX ORDER — AZITHROMYCIN 250 MG
500 CAPSULE ORAL ONCE
Refills: 0 | Status: COMPLETED | OUTPATIENT
Start: 2025-08-28 | End: 2025-08-28

## 2025-08-28 RX ORDER — SODIUM CHLORIDE 0.65 %
1 AEROSOL, SPRAY (ML) NASAL
Refills: 0 | Status: DISCONTINUED | OUTPATIENT
Start: 2025-08-28 | End: 2025-08-30

## 2025-08-28 RX ORDER — METOPROLOL SUCCINATE 50 MG/1
50 TABLET, EXTENDED RELEASE ORAL DAILY
Refills: 0 | Status: DISCONTINUED | OUTPATIENT
Start: 2025-08-28 | End: 2025-08-30

## 2025-08-28 RX ORDER — ALBUTEROL SULFATE 2.5 MG/3ML
2 VIAL, NEBULIZER (ML) INHALATION EVERY 6 HOURS
Refills: 0 | Status: DISCONTINUED | OUTPATIENT
Start: 2025-08-28 | End: 2025-08-30

## 2025-08-28 RX ORDER — ACETAMINOPHEN 500 MG/5ML
650 LIQUID (ML) ORAL EVERY 6 HOURS
Refills: 0 | Status: DISCONTINUED | OUTPATIENT
Start: 2025-08-28 | End: 2025-08-30

## 2025-08-28 RX ORDER — MELATONIN 5 MG
3 TABLET ORAL AT BEDTIME
Refills: 0 | Status: DISCONTINUED | OUTPATIENT
Start: 2025-08-28 | End: 2025-08-30

## 2025-08-28 RX ORDER — CEFTRIAXONE 500 MG/1
1000 INJECTION, POWDER, FOR SOLUTION INTRAMUSCULAR; INTRAVENOUS ONCE
Refills: 0 | Status: COMPLETED | OUTPATIENT
Start: 2025-08-28 | End: 2025-08-28

## 2025-08-28 RX ORDER — MAGNESIUM, ALUMINUM HYDROXIDE 200-200 MG
60 TABLET,CHEWABLE ORAL ONCE
Refills: 0 | Status: COMPLETED | OUTPATIENT
Start: 2025-08-28 | End: 2025-08-28

## 2025-08-28 RX ORDER — DEXTROSE 50 % IN WATER 50 %
12.5 SYRINGE (ML) INTRAVENOUS ONCE
Refills: 0 | Status: DISCONTINUED | OUTPATIENT
Start: 2025-08-28 | End: 2025-08-30

## 2025-08-28 RX ORDER — ENOXAPARIN SODIUM 100 MG/ML
40 INJECTION SUBCUTANEOUS EVERY 24 HOURS
Refills: 0 | Status: DISCONTINUED | OUTPATIENT
Start: 2025-08-28 | End: 2025-08-30

## 2025-08-28 RX ORDER — AMLODIPINE BESYLATE 10 MG/1
10 TABLET ORAL DAILY
Refills: 0 | Status: DISCONTINUED | OUTPATIENT
Start: 2025-08-28 | End: 2025-08-30

## 2025-08-28 RX ORDER — AZITHROMYCIN 250 MG
500 CAPSULE ORAL EVERY 24 HOURS
Refills: 0 | Status: DISCONTINUED | OUTPATIENT
Start: 2025-08-29 | End: 2025-08-30

## 2025-08-28 RX ORDER — LIDOCAINE HYDROCHLORIDE 20 MG/ML
2 JELLY TOPICAL EVERY 24 HOURS
Refills: 0 | Status: DISCONTINUED | OUTPATIENT
Start: 2025-08-28 | End: 2025-08-30

## 2025-08-28 RX ORDER — INSULIN LISPRO 100 U/ML
INJECTION, SOLUTION INTRAVENOUS; SUBCUTANEOUS
Refills: 0 | Status: DISCONTINUED | OUTPATIENT
Start: 2025-08-28 | End: 2025-08-30

## 2025-08-28 RX ADMIN — EFAVIRENZ, EMTRICITABINE AND TENOFOVIR DISOPROXIL FUMARATE 1 TABLET(S): 600; 200; 300 TABLET, FILM COATED ORAL at 21:43

## 2025-08-28 RX ADMIN — KETOROLAC TROMETHAMINE 15 MILLIGRAM(S): 30 INJECTION, SOLUTION INTRAMUSCULAR; INTRAVENOUS at 22:26

## 2025-08-28 RX ADMIN — LIDOCAINE HYDROCHLORIDE 1 PATCH: 20 JELLY TOPICAL at 19:45

## 2025-08-28 RX ADMIN — HYDROXYZINE HYDROCHLORIDE 25 MILLIGRAM(S): 25 TABLET, FILM COATED ORAL at 16:45

## 2025-08-28 RX ADMIN — Medication 60 MILLILITER(S): at 11:17

## 2025-08-28 RX ADMIN — IPRATROPIUM BROMIDE AND ALBUTEROL SULFATE 3 MILLILITER(S): .5; 2.5 SOLUTION RESPIRATORY (INHALATION) at 11:58

## 2025-08-28 RX ADMIN — Medication 10 MILLIGRAM(S): at 11:16

## 2025-08-28 RX ADMIN — CEFTRIAXONE 100 MILLIGRAM(S): 500 INJECTION, POWDER, FOR SOLUTION INTRAMUSCULAR; INTRAVENOUS at 11:58

## 2025-08-28 RX ADMIN — LIDOCAINE HYDROCHLORIDE 15 MILLILITER(S): 20 JELLY TOPICAL at 11:17

## 2025-08-28 RX ADMIN — LIDOCAINE HYDROCHLORIDE 2 PATCH: 20 JELLY TOPICAL at 16:46

## 2025-08-28 RX ADMIN — KETOROLAC TROMETHAMINE 15 MILLIGRAM(S): 30 INJECTION, SOLUTION INTRAMUSCULAR; INTRAVENOUS at 11:16

## 2025-08-28 RX ADMIN — LIDOCAINE HYDROCHLORIDE 1 PATCH: 20 JELLY TOPICAL at 16:45

## 2025-08-28 RX ADMIN — LIDOCAINE HYDROCHLORIDE 2 PATCH: 20 JELLY TOPICAL at 19:45

## 2025-08-28 RX ADMIN — KETOROLAC TROMETHAMINE 15 MILLIGRAM(S): 30 INJECTION, SOLUTION INTRAMUSCULAR; INTRAVENOUS at 22:07

## 2025-08-28 RX ADMIN — Medication 250 MILLIGRAM(S): at 12:16

## 2025-08-28 RX ADMIN — Medication 40 MILLIGRAM(S): at 11:16

## 2025-08-28 RX ADMIN — ROSUVASTATIN CALCIUM 10 MILLIGRAM(S): 20 TABLET, FILM COATED ORAL at 21:42

## 2025-08-28 RX ADMIN — Medication 1 SPRAY(S): at 21:41

## 2025-08-29 ENCOUNTER — TRANSCRIPTION ENCOUNTER (OUTPATIENT)
Age: 60
End: 2025-08-29

## 2025-08-29 DIAGNOSIS — E11.9 TYPE 2 DIABETES MELLITUS WITHOUT COMPLICATIONS: ICD-10-CM

## 2025-08-29 LAB
A1C WITH ESTIMATED AVERAGE GLUCOSE RESULT: 6.9 % — HIGH (ref 4–5.6)
ALBUMIN SERPL ELPH-MCNC: 3.4 G/DL — SIGNIFICANT CHANGE UP (ref 3.3–5)
ALP SERPL-CCNC: 158 U/L — HIGH (ref 40–120)
ALT FLD-CCNC: 25 U/L — SIGNIFICANT CHANGE UP (ref 4–33)
ANION GAP SERPL CALC-SCNC: 16 MMOL/L — HIGH (ref 7–14)
AST SERPL-CCNC: 20 U/L — SIGNIFICANT CHANGE UP (ref 4–32)
BASOPHILS # BLD AUTO: 0.05 K/UL — SIGNIFICANT CHANGE UP (ref 0–0.2)
BASOPHILS NFR BLD AUTO: 0.4 % — SIGNIFICANT CHANGE UP (ref 0–2)
BILIRUB SERPL-MCNC: 0.2 MG/DL — SIGNIFICANT CHANGE UP (ref 0.2–1.2)
BUN SERPL-MCNC: 16 MG/DL — SIGNIFICANT CHANGE UP (ref 7–23)
CALCIUM SERPL-MCNC: 9.4 MG/DL — SIGNIFICANT CHANGE UP (ref 8.4–10.5)
CHLORIDE SERPL-SCNC: 101 MMOL/L — SIGNIFICANT CHANGE UP (ref 98–107)
CO2 SERPL-SCNC: 19 MMOL/L — LOW (ref 22–31)
CREAT SERPL-MCNC: 0.94 MG/DL — SIGNIFICANT CHANGE UP (ref 0.5–1.3)
EGFR: 69 ML/MIN/1.73M2 — SIGNIFICANT CHANGE UP
EGFR: 69 ML/MIN/1.73M2 — SIGNIFICANT CHANGE UP
EOSINOPHIL # BLD AUTO: 0.17 K/UL — SIGNIFICANT CHANGE UP (ref 0–0.5)
EOSINOPHIL NFR BLD AUTO: 1.5 % — SIGNIFICANT CHANGE UP (ref 0–6)
ESTIMATED AVERAGE GLUCOSE: 151 — SIGNIFICANT CHANGE UP
FOLATE SERPL-MCNC: 12.6 NG/ML — SIGNIFICANT CHANGE UP (ref 3.1–17.5)
GLUCOSE BLDC GLUCOMTR-MCNC: 112 MG/DL — HIGH (ref 70–99)
GLUCOSE BLDC GLUCOMTR-MCNC: 140 MG/DL — HIGH (ref 70–99)
GLUCOSE BLDC GLUCOMTR-MCNC: 194 MG/DL — HIGH (ref 70–99)
GLUCOSE BLDC GLUCOMTR-MCNC: 208 MG/DL — HIGH (ref 70–99)
GLUCOSE SERPL-MCNC: 122 MG/DL — HIGH (ref 70–99)
HCT VFR BLD CALC: 35.7 % — SIGNIFICANT CHANGE UP (ref 34.5–45)
HGB BLD-MCNC: 11.4 G/DL — LOW (ref 11.5–15.5)
HIV-1 VIRAL LOAD RESULT: SIGNIFICANT CHANGE UP
HIV1 RNA # SERPL NAA+PROBE: SIGNIFICANT CHANGE UP COPIES/ML
HIV1 RNA SER-IMP: SIGNIFICANT CHANGE UP
HIV1 RNA SERPL NAA+PROBE-ACNC: SIGNIFICANT CHANGE UP
HIV1 RNA SERPL NAA+PROBE-LOG#: SIGNIFICANT CHANGE UP LG COP/ML
IMM GRANULOCYTES # BLD AUTO: 0.04 K/UL — SIGNIFICANT CHANGE UP (ref 0–0.07)
IMM GRANULOCYTES NFR BLD AUTO: 0.3 % — SIGNIFICANT CHANGE UP (ref 0–0.9)
LEGIONELLA AG UR QL: NEGATIVE — SIGNIFICANT CHANGE UP
LYMPHOCYTES # BLD AUTO: 2.92 K/UL — SIGNIFICANT CHANGE UP (ref 1–3.3)
LYMPHOCYTES NFR BLD AUTO: 25.2 % — SIGNIFICANT CHANGE UP (ref 13–44)
MCHC RBC-ENTMCNC: 28.6 PG — SIGNIFICANT CHANGE UP (ref 27–34)
MCHC RBC-ENTMCNC: 31.9 G/DL — LOW (ref 32–36)
MCV RBC AUTO: 89.5 FL — SIGNIFICANT CHANGE UP (ref 80–100)
MONOCYTES # BLD AUTO: 0.86 K/UL — SIGNIFICANT CHANGE UP (ref 0–0.9)
MONOCYTES NFR BLD AUTO: 7.4 % — SIGNIFICANT CHANGE UP (ref 2–14)
MRSA PCR RESULT.: SIGNIFICANT CHANGE UP
NEUTROPHILS # BLD AUTO: 7.55 K/UL — HIGH (ref 1.8–7.4)
NEUTROPHILS NFR BLD AUTO: 65.2 % — SIGNIFICANT CHANGE UP (ref 43–77)
NRBC # BLD AUTO: 0 K/UL — SIGNIFICANT CHANGE UP (ref 0–0)
NRBC # FLD: 0 K/UL — SIGNIFICANT CHANGE UP (ref 0–0)
NRBC BLD AUTO-RTO: 0 /100 WBCS — SIGNIFICANT CHANGE UP (ref 0–0)
PLATELET # BLD AUTO: 491 K/UL — HIGH (ref 150–400)
PMV BLD: 8.7 FL — SIGNIFICANT CHANGE UP (ref 7–13)
POTASSIUM SERPL-MCNC: 3.4 MMOL/L — LOW (ref 3.5–5.3)
POTASSIUM SERPL-SCNC: 3.4 MMOL/L — LOW (ref 3.5–5.3)
PROT SERPL-MCNC: 6.9 G/DL — SIGNIFICANT CHANGE UP (ref 6–8.3)
RBC # BLD: 3.99 M/UL — SIGNIFICANT CHANGE UP (ref 3.8–5.2)
RBC # FLD: 15.4 % — HIGH (ref 10.3–14.5)
S AUREUS DNA NOSE QL NAA+PROBE: SIGNIFICANT CHANGE UP
S PNEUM AG UR QL: NEGATIVE — SIGNIFICANT CHANGE UP
SODIUM SERPL-SCNC: 136 MMOL/L — SIGNIFICANT CHANGE UP (ref 135–145)
WBC # BLD: 11.59 K/UL — HIGH (ref 3.8–10.5)
WBC # FLD AUTO: 11.59 K/UL — HIGH (ref 3.8–10.5)

## 2025-08-29 PROCEDURE — 99233 SBSQ HOSP IP/OBS HIGH 50: CPT | Mod: GC

## 2025-08-29 PROCEDURE — 70450 CT HEAD/BRAIN W/O DYE: CPT | Mod: 26

## 2025-08-29 PROCEDURE — 74177 CT ABD & PELVIS W/CONTRAST: CPT | Mod: 26

## 2025-08-29 RX ORDER — KETOROLAC TROMETHAMINE 30 MG/ML
15 INJECTION, SOLUTION INTRAMUSCULAR; INTRAVENOUS ONCE
Refills: 0 | Status: DISCONTINUED | OUTPATIENT
Start: 2025-08-29 | End: 2025-08-29

## 2025-08-29 RX ADMIN — LIDOCAINE HYDROCHLORIDE 1 PATCH: 20 JELLY TOPICAL at 11:28

## 2025-08-29 RX ADMIN — INSULIN LISPRO 1: 100 INJECTION, SOLUTION INTRAVENOUS; SUBCUTANEOUS at 08:29

## 2025-08-29 RX ADMIN — KETOROLAC TROMETHAMINE 15 MILLIGRAM(S): 30 INJECTION, SOLUTION INTRAMUSCULAR; INTRAVENOUS at 06:35

## 2025-08-29 RX ADMIN — Medication 650 MILLIGRAM(S): at 17:39

## 2025-08-29 RX ADMIN — METOPROLOL SUCCINATE 50 MILLIGRAM(S): 50 TABLET, EXTENDED RELEASE ORAL at 06:12

## 2025-08-29 RX ADMIN — AMLODIPINE BESYLATE 10 MILLIGRAM(S): 10 TABLET ORAL at 06:12

## 2025-08-29 RX ADMIN — LIDOCAINE HYDROCHLORIDE 2 PATCH: 20 JELLY TOPICAL at 05:00

## 2025-08-29 RX ADMIN — Medication 40 MILLIGRAM(S): at 06:12

## 2025-08-29 RX ADMIN — TIOTROPIUM BROMIDE AND OLODATEROL 2 PUFF(S): 3.124; 2.736 SPRAY, METERED RESPIRATORY (INHALATION) at 10:27

## 2025-08-29 RX ADMIN — EFAVIRENZ, EMTRICITABINE AND TENOFOVIR DISOPROXIL FUMARATE 1 TABLET(S): 600; 200; 300 TABLET, FILM COATED ORAL at 21:21

## 2025-08-29 RX ADMIN — KETOROLAC TROMETHAMINE 15 MILLIGRAM(S): 30 INJECTION, SOLUTION INTRAMUSCULAR; INTRAVENOUS at 21:13

## 2025-08-29 RX ADMIN — Medication 250 MILLIGRAM(S): at 06:13

## 2025-08-29 RX ADMIN — Medication 1 SPRAY(S): at 17:00

## 2025-08-29 RX ADMIN — LIDOCAINE HYDROCHLORIDE 1 PATCH: 20 JELLY TOPICAL at 05:00

## 2025-08-29 RX ADMIN — KETOROLAC TROMETHAMINE 15 MILLIGRAM(S): 30 INJECTION, SOLUTION INTRAMUSCULAR; INTRAVENOUS at 22:13

## 2025-08-29 RX ADMIN — CEFTRIAXONE 100 MILLIGRAM(S): 500 INJECTION, POWDER, FOR SOLUTION INTRAMUSCULAR; INTRAVENOUS at 06:13

## 2025-08-29 RX ADMIN — KETOROLAC TROMETHAMINE 15 MILLIGRAM(S): 30 INJECTION, SOLUTION INTRAMUSCULAR; INTRAVENOUS at 07:04

## 2025-08-29 RX ADMIN — ENOXAPARIN SODIUM 40 MILLIGRAM(S): 100 INJECTION SUBCUTANEOUS at 21:20

## 2025-08-29 RX ADMIN — MOMETASONE FUROATE 1 PUFF(S): 220 INHALANT RESPIRATORY (INHALATION) at 10:29

## 2025-08-29 RX ADMIN — ROSUVASTATIN CALCIUM 10 MILLIGRAM(S): 20 TABLET, FILM COATED ORAL at 21:20

## 2025-08-29 RX ADMIN — Medication 650 MILLIGRAM(S): at 16:39

## 2025-08-29 RX ADMIN — LIDOCAINE HYDROCHLORIDE 2 PATCH: 20 JELLY TOPICAL at 16:41

## 2025-08-29 RX ADMIN — Medication 1 APPLICATION(S): at 11:28

## 2025-08-30 ENCOUNTER — TRANSCRIPTION ENCOUNTER (OUTPATIENT)
Age: 60
End: 2025-08-30

## 2025-08-30 VITALS
TEMPERATURE: 99 F | RESPIRATION RATE: 17 BRPM | OXYGEN SATURATION: 98 % | SYSTOLIC BLOOD PRESSURE: 139 MMHG | HEART RATE: 94 BPM | DIASTOLIC BLOOD PRESSURE: 95 MMHG

## 2025-08-30 LAB
GLUCOSE BLDC GLUCOMTR-MCNC: 115 MG/DL — HIGH (ref 70–99)
GLUCOSE BLDC GLUCOMTR-MCNC: 134 MG/DL — HIGH (ref 70–99)

## 2025-08-30 PROCEDURE — 99239 HOSP IP/OBS DSCHRG MGMT >30: CPT | Mod: GC

## 2025-08-30 RX ORDER — CEFUROXIME SODIUM 1.5 G
1 VIAL (EA) INJECTION
Qty: 4 | Refills: 0
Start: 2025-08-30 | End: 2025-08-31

## 2025-08-30 RX ORDER — ACETAMINOPHEN 500 MG/5ML
1000 LIQUID (ML) ORAL ONCE
Refills: 0 | Status: COMPLETED | OUTPATIENT
Start: 2025-08-30 | End: 2025-08-30

## 2025-08-30 RX ADMIN — Medication 1 APPLICATION(S): at 05:23

## 2025-08-30 RX ADMIN — Medication 1000 MILLIGRAM(S): at 08:30

## 2025-08-30 RX ADMIN — METOPROLOL SUCCINATE 50 MILLIGRAM(S): 50 TABLET, EXTENDED RELEASE ORAL at 05:22

## 2025-08-30 RX ADMIN — Medication 250 MILLIGRAM(S): at 05:21

## 2025-08-30 RX ADMIN — LIDOCAINE HYDROCHLORIDE 1 PATCH: 20 JELLY TOPICAL at 11:41

## 2025-08-30 RX ADMIN — AMLODIPINE BESYLATE 10 MILLIGRAM(S): 10 TABLET ORAL at 05:22

## 2025-08-30 RX ADMIN — Medication 400 MILLIGRAM(S): at 07:32

## 2025-08-30 RX ADMIN — Medication 40 MILLIGRAM(S): at 07:35

## 2025-08-30 RX ADMIN — Medication 30 MILLILITER(S): at 05:31

## 2025-08-30 RX ADMIN — MOMETASONE FUROATE 1 PUFF(S): 220 INHALANT RESPIRATORY (INHALATION) at 09:11

## 2025-08-30 RX ADMIN — CEFTRIAXONE 100 MILLIGRAM(S): 500 INJECTION, POWDER, FOR SOLUTION INTRAMUSCULAR; INTRAVENOUS at 05:21

## 2025-08-30 RX ADMIN — TIOTROPIUM BROMIDE AND OLODATEROL 2 PUFF(S): 3.124; 2.736 SPRAY, METERED RESPIRATORY (INHALATION) at 09:11

## 2025-08-30 RX ADMIN — Medication 1 SPRAY(S): at 06:38

## 2025-09-03 LAB
M PNEUMO IGM SER-ACNC: 0.73 INDEX — SIGNIFICANT CHANGE UP (ref 0–0.9)
MYCOPLASMA AG SPEC QL: NEGATIVE — SIGNIFICANT CHANGE UP

## 2025-09-04 ENCOUNTER — EMERGENCY (EMERGENCY)
Facility: HOSPITAL | Age: 60
LOS: 1 days | End: 2025-09-04
Attending: EMERGENCY MEDICINE | Admitting: EMERGENCY MEDICINE
Payer: MEDICAID

## 2025-09-04 VITALS
HEART RATE: 88 BPM | DIASTOLIC BLOOD PRESSURE: 82 MMHG | OXYGEN SATURATION: 95 % | TEMPERATURE: 98 F | SYSTOLIC BLOOD PRESSURE: 137 MMHG | RESPIRATION RATE: 17 BRPM | HEIGHT: 67 IN

## 2025-09-04 DIAGNOSIS — Z90.710 ACQUIRED ABSENCE OF BOTH CERVIX AND UTERUS: Chronic | ICD-10-CM

## 2025-09-04 DIAGNOSIS — Z90.2 ACQUIRED ABSENCE OF LUNG [PART OF]: Chronic | ICD-10-CM

## 2025-09-04 LAB
ALBUMIN SERPL ELPH-MCNC: 3.5 G/DL — SIGNIFICANT CHANGE UP (ref 3.3–5)
ALP SERPL-CCNC: 179 U/L — HIGH (ref 40–120)
ALT FLD-CCNC: 21 U/L — SIGNIFICANT CHANGE UP (ref 4–33)
ANION GAP SERPL CALC-SCNC: 15 MMOL/L — HIGH (ref 7–14)
AST SERPL-CCNC: 19 U/L — SIGNIFICANT CHANGE UP (ref 4–32)
BILIRUB SERPL-MCNC: 0.2 MG/DL — SIGNIFICANT CHANGE UP (ref 0.2–1.2)
BUN SERPL-MCNC: 13 MG/DL — SIGNIFICANT CHANGE UP (ref 7–23)
CALCIUM SERPL-MCNC: 9.6 MG/DL — SIGNIFICANT CHANGE UP (ref 8.4–10.5)
CHLORIDE SERPL-SCNC: 106 MMOL/L — SIGNIFICANT CHANGE UP (ref 98–107)
CO2 SERPL-SCNC: 20 MMOL/L — LOW (ref 22–31)
CREAT SERPL-MCNC: 0.88 MG/DL — SIGNIFICANT CHANGE UP (ref 0.5–1.3)
EGFR: 75 ML/MIN/1.73M2 — SIGNIFICANT CHANGE UP
EGFR: 75 ML/MIN/1.73M2 — SIGNIFICANT CHANGE UP
FLUAV AG NPH QL: SIGNIFICANT CHANGE UP
FLUBV AG NPH QL: SIGNIFICANT CHANGE UP
GLUCOSE SERPL-MCNC: 141 MG/DL — HIGH (ref 70–99)
HCT VFR BLD CALC: 34.3 % — LOW (ref 34.5–45)
HGB BLD-MCNC: 11 G/DL — LOW (ref 11.5–15.5)
MCHC RBC-ENTMCNC: 28.1 PG — SIGNIFICANT CHANGE UP (ref 27–34)
MCHC RBC-ENTMCNC: 32.1 G/DL — SIGNIFICANT CHANGE UP (ref 32–36)
MCV RBC AUTO: 87.7 FL — SIGNIFICANT CHANGE UP (ref 80–100)
NRBC # BLD AUTO: 0 K/UL — SIGNIFICANT CHANGE UP (ref 0–0)
NRBC # FLD: 0 K/UL — SIGNIFICANT CHANGE UP (ref 0–0)
NRBC BLD AUTO-RTO: 0 /100 WBCS — SIGNIFICANT CHANGE UP (ref 0–0)
PLATELET # BLD AUTO: 499 K/UL — HIGH (ref 150–400)
PMV BLD: 8.3 FL — SIGNIFICANT CHANGE UP (ref 7–13)
POTASSIUM SERPL-MCNC: 4.2 MMOL/L — SIGNIFICANT CHANGE UP (ref 3.5–5.3)
POTASSIUM SERPL-SCNC: 4.2 MMOL/L — SIGNIFICANT CHANGE UP (ref 3.5–5.3)
PROT SERPL-MCNC: 7.4 G/DL — SIGNIFICANT CHANGE UP (ref 6–8.3)
RBC # BLD: 3.91 M/UL — SIGNIFICANT CHANGE UP (ref 3.8–5.2)
RBC # FLD: 15.3 % — HIGH (ref 10.3–14.5)
RSV RNA NPH QL NAA+NON-PROBE: SIGNIFICANT CHANGE UP
SARS-COV-2 RNA SPEC QL NAA+PROBE: SIGNIFICANT CHANGE UP
SODIUM SERPL-SCNC: 141 MMOL/L — SIGNIFICANT CHANGE UP (ref 135–145)
SOURCE RESPIRATORY: SIGNIFICANT CHANGE UP
WBC # BLD: 14.44 K/UL — HIGH (ref 3.8–10.5)
WBC # FLD AUTO: 14.44 K/UL — HIGH (ref 3.8–10.5)

## 2025-09-04 PROCEDURE — 99284 EMERGENCY DEPT VISIT MOD MDM: CPT

## 2025-09-04 PROCEDURE — 71046 X-RAY EXAM CHEST 2 VIEWS: CPT | Mod: 26

## 2025-09-04 RX ORDER — ACETAMINOPHEN 500 MG/5ML
1000 LIQUID (ML) ORAL ONCE
Refills: 0 | Status: DISCONTINUED | OUTPATIENT
Start: 2025-09-04 | End: 2025-09-04

## 2025-09-04 RX ORDER — ONDANSETRON HCL/PF 4 MG/2 ML
4 VIAL (ML) INJECTION ONCE
Refills: 0 | Status: COMPLETED | OUTPATIENT
Start: 2025-09-04 | End: 2025-09-04

## 2025-09-04 RX ORDER — KETOROLAC TROMETHAMINE 30 MG/ML
30 INJECTION, SOLUTION INTRAMUSCULAR; INTRAVENOUS ONCE
Refills: 0 | Status: DISCONTINUED | OUTPATIENT
Start: 2025-09-04 | End: 2025-09-04

## 2025-09-04 RX ORDER — POLYETHYLENE GLYCOL 3350 17 G/17G
17 POWDER, FOR SOLUTION ORAL ONCE
Refills: 0 | Status: COMPLETED | OUTPATIENT
Start: 2025-09-04 | End: 2025-09-04

## 2025-09-04 RX ORDER — IPRATROPIUM BROMIDE AND ALBUTEROL SULFATE .5; 2.5 MG/3ML; MG/3ML
3 SOLUTION RESPIRATORY (INHALATION) ONCE
Refills: 0 | Status: COMPLETED | OUTPATIENT
Start: 2025-09-04 | End: 2025-09-04

## 2025-09-04 RX ORDER — KETOROLAC TROMETHAMINE 30 MG/ML
15 INJECTION, SOLUTION INTRAMUSCULAR; INTRAVENOUS ONCE
Refills: 0 | Status: DISCONTINUED | OUTPATIENT
Start: 2025-09-04 | End: 2025-09-04

## 2025-09-04 RX ADMIN — IPRATROPIUM BROMIDE AND ALBUTEROL SULFATE 3 MILLILITER(S): .5; 2.5 SOLUTION RESPIRATORY (INHALATION) at 20:24

## 2025-09-04 RX ADMIN — POLYETHYLENE GLYCOL 3350 17 GRAM(S): 17 POWDER, FOR SOLUTION ORAL at 20:24

## 2025-09-04 RX ADMIN — Medication 4 MILLIGRAM(S): at 20:24

## 2025-09-04 RX ADMIN — KETOROLAC TROMETHAMINE 15 MILLIGRAM(S): 30 INJECTION, SOLUTION INTRAMUSCULAR; INTRAVENOUS at 20:24

## 2025-09-04 RX ADMIN — Medication 1000 MILLILITER(S): at 20:24

## 2025-09-05 VITALS
HEART RATE: 83 BPM | RESPIRATION RATE: 17 BRPM | SYSTOLIC BLOOD PRESSURE: 153 MMHG | OXYGEN SATURATION: 99 % | TEMPERATURE: 98 F | DIASTOLIC BLOOD PRESSURE: 79 MMHG

## 2025-09-05 LAB
APPEARANCE UR: ABNORMAL
BACTERIA # UR AUTO: NEGATIVE /HPF — SIGNIFICANT CHANGE UP
BILIRUB UR-MCNC: NEGATIVE — SIGNIFICANT CHANGE UP
CAST: 2 /LPF — SIGNIFICANT CHANGE UP (ref 0–4)
COLOR SPEC: YELLOW — SIGNIFICANT CHANGE UP
DIFF PNL FLD: NEGATIVE — SIGNIFICANT CHANGE UP
GLUCOSE UR QL: NEGATIVE MG/DL — SIGNIFICANT CHANGE UP
KETONES UR QL: NEGATIVE MG/DL — SIGNIFICANT CHANGE UP
LEUKOCYTE ESTERASE UR-ACNC: NEGATIVE — SIGNIFICANT CHANGE UP
NITRITE UR-MCNC: NEGATIVE — SIGNIFICANT CHANGE UP
PH UR: 6 — SIGNIFICANT CHANGE UP (ref 5–8)
PROT UR-MCNC: 100 MG/DL
RBC CASTS # UR COMP ASSIST: 1 /HPF — SIGNIFICANT CHANGE UP (ref 0–4)
SP GR SPEC: 1.02 — SIGNIFICANT CHANGE UP (ref 1–1.03)
SQUAMOUS # UR AUTO: 5 /HPF — SIGNIFICANT CHANGE UP (ref 0–5)
UROBILINOGEN FLD QL: 0.2 MG/DL — SIGNIFICANT CHANGE UP (ref 0.2–1)
WBC UR QL: 2 /HPF — SIGNIFICANT CHANGE UP (ref 0–5)

## 2025-09-05 RX ORDER — KETOROLAC TROMETHAMINE 30 MG/ML
15 INJECTION, SOLUTION INTRAMUSCULAR; INTRAVENOUS ONCE
Refills: 0 | Status: DISCONTINUED | OUTPATIENT
Start: 2025-09-05 | End: 2025-09-05

## 2025-09-05 RX ORDER — POLYETHYLENE GLYCOL 3350 17 G/17G
17 POWDER, FOR SOLUTION ORAL
Qty: 119 | Refills: 0
Start: 2025-09-05 | End: 2025-09-11

## 2025-09-05 RX ADMIN — KETOROLAC TROMETHAMINE 15 MILLIGRAM(S): 30 INJECTION, SOLUTION INTRAMUSCULAR; INTRAVENOUS at 00:38

## (undated) DEVICE — Device

## (undated) DEVICE — SOL ANTI FOG (FRED)

## (undated) DEVICE — DRSG TAPE TRANSPORE 1"

## (undated) DEVICE — DRAPE 3/4 SHEET 52X76"

## (undated) DEVICE — VALVE BIOPSY BRONCHOVIDEOSCOPE

## (undated) DEVICE — ADAPTER FIBEROPTIC BRONCHOSCOPE DUAL AXIS SWIVEL

## (undated) DEVICE — SOL IRR NS 0.9% 250ML

## (undated) DEVICE — VENODYNE/SCD SLEEVE CALF MEDIUM

## (undated) DEVICE — VALVE SUCTION EVIS 160/200/240

## (undated) DEVICE — LABELS BLANK W PEN

## (undated) DEVICE — FORCEP BIOPSY 1.8MM JAW X 100CM DISP

## (undated) DEVICE — TUBING CANNULA SALTER LABS NASAL ADULT 7FT

## (undated) DEVICE — SUTURE REMOVAL KIT

## (undated) DEVICE — MASK SURGICAL WITH EYESHIELD ANTIFOG (ORANGE)

## (undated) DEVICE — TRAP SPECIMEN SPUTUM 40CC

## (undated) DEVICE — SOL IRR POUR H2O 500ML

## (undated) DEVICE — DRAPE TOWEL BLUE 17" X 24"

## (undated) DEVICE — SYR LUER SLIP TIP 30CC

## (undated) DEVICE — PACK BRONCHOSCOPY

## (undated) DEVICE — FORCEP BIOPSY BRONCHOSCOPE DISP